# Patient Record
Sex: FEMALE | Race: BLACK OR AFRICAN AMERICAN | NOT HISPANIC OR LATINO | Employment: UNEMPLOYED | URBAN - NONMETROPOLITAN AREA
[De-identification: names, ages, dates, MRNs, and addresses within clinical notes are randomized per-mention and may not be internally consistent; named-entity substitution may affect disease eponyms.]

---

## 2017-01-31 ENCOUNTER — OFFICE VISIT (OUTPATIENT)
Dept: GASTROENTEROLOGY | Facility: CLINIC | Age: 62
End: 2017-01-31

## 2017-01-31 ENCOUNTER — PREP FOR SURGERY (OUTPATIENT)
Dept: GASTROENTEROLOGY | Facility: CLINIC | Age: 62
End: 2017-01-31

## 2017-01-31 VITALS
WEIGHT: 158 LBS | TEMPERATURE: 97.7 F | HEART RATE: 67 BPM | HEIGHT: 60 IN | RESPIRATION RATE: 15 BRPM | DIASTOLIC BLOOD PRESSURE: 76 MMHG | SYSTOLIC BLOOD PRESSURE: 137 MMHG | BODY MASS INDEX: 31.02 KG/M2

## 2017-01-31 DIAGNOSIS — R10.13 EPIGASTRIC PAIN: ICD-10-CM

## 2017-01-31 DIAGNOSIS — Z12.11 COLON CANCER SCREENING: Primary | ICD-10-CM

## 2017-01-31 DIAGNOSIS — R12 HEARTBURN: Chronic | ICD-10-CM

## 2017-01-31 PROCEDURE — 99203 OFFICE O/P NEW LOW 30 MIN: CPT | Performed by: NURSE PRACTITIONER

## 2017-01-31 RX ORDER — SODIUM CHLORIDE 9 MG/ML
70 INJECTION, SOLUTION INTRAVENOUS CONTINUOUS PRN
Status: CANCELLED | OUTPATIENT
Start: 2017-01-31

## 2017-01-31 RX ORDER — OMEPRAZOLE 20 MG/1
20 CAPSULE, DELAYED RELEASE ORAL DAILY
COMMUNITY
End: 2019-05-28

## 2017-01-31 RX ORDER — LISINOPRIL AND HYDROCHLOROTHIAZIDE 20; 12.5 MG/1; MG/1
1 TABLET ORAL DAILY
COMMUNITY
End: 2019-06-07 | Stop reason: HOSPADM

## 2017-01-31 RX ORDER — ASPIRIN 81 MG/1
81 TABLET ORAL DAILY
COMMUNITY
End: 2017-02-23 | Stop reason: HOSPADM

## 2017-01-31 RX ORDER — SODIUM CHLORIDE 0.9 % (FLUSH) 0.9 %
1-10 SYRINGE (ML) INJECTION AS NEEDED
Status: CANCELLED | OUTPATIENT
Start: 2017-01-31

## 2017-01-31 RX ORDER — SIMVASTATIN 20 MG
20 TABLET ORAL NIGHTLY
COMMUNITY
End: 2019-08-29

## 2017-01-31 NOTE — MR AVS SNAPSHOT
Lizbeth Johns   2017 12:00 PM   Office Visit    Dept Phone:  [phone]   Encounter #:  [id number]    Provider:  MARGARET Anand   Department:  [facility]                Your Full Care Plan              Your Updated Medication List          This list is accurate as of: 17 12:54 PM.  Always use your most recent med list.                aspirin 81 MG EC tablet       lisinopril-hydrochlorothiazide 20-12.5 MG per tablet   Commonly known as:  PRINZIDE,ZESTORETIC       omeprazole 20 MG capsule   Commonly known as:  priLOSEC       simvastatin 20 MG tablet   Commonly known as:  ZOCOR               You Were Diagnosed With        Codes Comments    Colon cancer screening    -  Primary ICD-10-CM: Z12.11  ICD-9-CM: V76.51 Possible family history of colon cancer in the patient's brother. Patient is unsure. No previous colonoscopy.    Epigastric pain     ICD-10-CM: R10.13  ICD-9-CM: 789.06 Improved. History of epigastric pain when drinking coffee. Patient has stopped coffee and symptoms improved.      Instructions    1. Colonoscopy: Description of the procedure, risks, benefits, alternatives and options, including nonoperative options, were discussed with the patient in detail. The patient understands and wishes to proceed.     Patient Instructions History      Upcoming Appointments     Visit Type Date Time Department    NEW PATIENT 2017 12:00 PM [facility]      [employer] Signup     [facility] [employer] allows you to send messages to your doctor, view your test results, renew your prescriptions, schedule appointments, and more. To sign up, go to [employer] and click on the Sign Up Now link in the New User? box. Enter your [employer] Activation Code exactly as it appears below along with the last four digits of your Social Security Number and your Date of Birth () to complete the sign-up process. If you do not sign up  "before the expiration date, you must request a new code.    [employer] Activation Code: [email or web address]  Expires: 2/14/2017 12:54 PM    If you have questions, you can email [email or web address] or call [phone] to talk to our [employer] staff. Remember, [employer] is NOT to be used for urgent needs. For medical emergencies, dial 911.               Other Info from Your Visit           Allergies     No Known Allergies      Reason for Visit     Abdominal Pain           Vital Signs     Blood Pressure Pulse Temperature Respirations Height Weight    137/76 67 97.7 °F (36.5 °C) 15 60\" (152.4 cm) 158 lb (71.7 kg)    Body Mass Index Smoking Status                30.86 kg/m2 Current Every Day Smoker          Problems and Diagnoses Noted     Screen for colon cancer    Abdominal pain, pit of stomach        "

## 2017-01-31 NOTE — PATIENT INSTRUCTIONS
1. Antireflux measures: Avoid fried, fatty foods, alcohol, chocolate, coffee, tea,  soft drinks, peppermint and spearmint, spicy foods, tomatoes and tomato based foods, onion based foods, and smoking. Other antireflux measures include weight reduction if overweight, avoiding tight clothing around the abdomen, elevating the head of the bed 6 inches with blocks under the head board, and don't drink or eat before going to bed and avoid lying down immediately after meals.  2. Continue Omeprazole 20 mg 1 po daily in the am 30 minutes before breakfast.  3. Colonoscopy: Description of the procedure, risks, benefits, alternatives and options, including nonoperative options, were discussed with the patient in detail. The patient understands and wishes to proceed.

## 2017-01-31 NOTE — LETTER
January 31, 2017     Ciaran Sanches DO  [address]  [facility] [address]    Patient: Lizbeth Johns   YOB: 1955   Date of Visit: 1/31/2017       Dear Dr. Myron DO:    Thank you for referring Lizbeth Johns to me for evaluation. Below is a copy of my consult note.    If you have questions, please do not hesitate to call me. I look forward to following Lizbeth along with you.         Sincerely,        MARGARET Sanchez        CC: No Recipients    [address] [facility] [address]    (H) [phone]  (W)     Chief Complaint   Patient presents with   • Abdominal Pain     The patient denies recent change in bowel habits. There is no diarrhea or constipation. There is a history of epigastric abdominal pain if she drinks coffee. The patient states she has been avoiding coffee and she is no longer having epigastric pain. There is no history of overt GI bleed (hematemesis melena or hematochezia). The patient denies nausea or vomiting. There is a history of reflux. This is well controlled with Omeprazole daily.. The patient denies dysphagia or odynophagia. There is no history of recent significant weight loss. There is no history of liver disease in the past. There is a possible family history of colon cancer in the patient's brother. Patient is unsure. The patient has not had a colonoscopy in the past.    Abdominal Pain   This is a chronic problem. The current episode started more than 1 year ago. The onset quality is sudden. The problem has been rapidly improving (Patient states she only has epigastric pain when she drinks coffee. She states she has stopped coffee and her pain has resolved.). The pain is located in the epigastric region. The pain is at a severity of 2/10. The pain is mild. The quality of the pain is burning. The abdominal pain does not radiate. Associated symptoms include arthralgias, constipation and nausea. Pertinent negatives include no diarrhea, dysuria, fever,  headaches, hematochezia, hematuria, melena, myalgias or vomiting. Exacerbated by: drinking coffee. Relieved by: avoiding coffee. Treatments tried: avoiding coffee. The treatment provided significant relief. Her past medical history is significant for GERD.   Heartburn   She complains of abdominal pain, heartburn and nausea. She reports no chest pain, no choking, no coughing or no dysphagia. This is a chronic problem. The current episode started more than 1 year ago. The problem occurs rarely. The problem has been unchanged. The heartburn duration is an hour. The heartburn is located in the substernum. The heartburn is of mild intensity. The heartburn does not wake her from sleep. The heartburn does not limit her activity. The heartburn doesn't change with position. The symptoms are aggravated by caffeine. Pertinent negatives include no anemia, fatigue or melena. There are no known risk factors. She has tried a PPI for the symptoms. The treatment provided significant relief.     Review of Systems   Constitutional: Negative for appetite change, chills, fatigue, fever and unexpected weight change.   HENT: Negative for mouth sores, nosebleeds and trouble swallowing.    Eyes: Negative for discharge and redness.   Respiratory: Negative for apnea, cough, choking and shortness of breath.    Cardiovascular: Negative for chest pain, palpitations and leg swelling.   Gastrointestinal: Positive for abdominal pain, constipation, heartburn and nausea. Negative for abdominal distention, anal bleeding, blood in stool, diarrhea, dysphagia, hematochezia, melena and vomiting.   Endocrine: Negative for cold intolerance, heat intolerance and polydipsia.   Genitourinary: Negative for dysuria, hematuria and urgency.   Musculoskeletal: Positive for arthralgias. Negative for myalgias.   Skin: Negative for rash.   Allergic/Immunologic: Negative for food allergies and immunocompromised state.   Neurological: Negative for dizziness, seizures,  "syncope and headaches.   Hematological: Negative for adenopathy. Does not bruise/bleed easily.   Psychiatric/Behavioral: Negative for dysphoric mood. The patient is not nervous/anxious and is not hyperactive.      Patient Active Problem List   Diagnosis   • Colon cancer screening   • Epigastric pain   • Heartburn     Past Medical History   Diagnosis Date   • Hyperlipidemia    • Hypertension      History reviewed. No pertinent past surgical history.  Family History   Problem Relation Age of Onset   • Cirrhosis Brother    • Liver disease Brother    • Colon cancer Brother      Possibly colon cancer, patient unsure.   • Stomach cancer Neg Hx    • Esophageal cancer Neg Hx      Social History   Substance Use Topics   • Smoking status: Current Every Day Smoker     Packs/day: 1.00     Years: 50.00   • Smokeless tobacco: Not on file   • Alcohol use Yes      Comment: rarely       Current Outpatient Prescriptions:   •  aspirin 81 MG EC tablet, Take 81 mg by mouth Daily., Disp: , Rfl:   •  lisinopril-hydrochlorothiazide (PRINZIDE,ZESTORETIC) 20-12.5 MG per tablet, Take 1 tablet by mouth Daily., Disp: , Rfl:   •  simvastatin (ZOCOR) 20 MG tablet, Take 20 mg by mouth Every Night., Disp: , Rfl:   •  omeprazole (priLOSEC) 20 MG capsule, Take 20 mg by mouth Daily., Disp: , Rfl:      No Known Allergies     Visit Vitals   • /76   • Pulse 67   • Temp 97.7 °F (36.5 °C)   • Resp 15   • Ht 60\" (152.4 cm)   • Wt 158 lb (71.7 kg)   • BMI 30.86 kg/m2     Physical Exam   Constitutional: She is oriented to person, place, and time. She appears well-developed and well-nourished. No distress.   HENT:   Head: Normocephalic and atraumatic.   Right Ear: Hearing and external ear normal.   Left Ear: Hearing and external ear normal.   Nose: Nose normal.   Mouth/Throat: Oropharynx is clear and moist and mucous membranes are normal. Mucous membranes are not pale, not dry and not cyanotic. No oral lesions. No oropharyngeal exudate.   Eyes: " Conjunctivae and EOM are normal. Right eye exhibits no discharge. Left eye exhibits no discharge.   Neck: Trachea normal. Neck supple. No JVD present. No edema present. No thyroid mass and no thyromegaly present.   Cardiovascular: Normal rate, regular rhythm, S2 normal and normal heart sounds.  Exam reveals no gallop, no S3 and no friction rub.    No murmur heard.  Pulmonary/Chest: Effort normal and breath sounds normal. No respiratory distress. She exhibits no tenderness.   Abdominal: Normal appearance and bowel sounds are normal. She exhibits no distension, no ascites and no mass. There is no splenomegaly or hepatomegaly. There is no tenderness. There is no rigidity, no rebound and no guarding. No hernia.       Vascular Status -  Her exam exhibits no right foot edema. Her exam exhibits no left foot edema.  Lymphadenopathy:     She has no cervical adenopathy.        Left: No supraclavicular adenopathy present.   Neurological: She is alert and oriented to person, place, and time. She has normal strength. No cranial nerve deficit or sensory deficit.   Skin: No rash noted. She is not diaphoretic. No cyanosis. No pallor. Nails show no clubbing.   Psychiatric: She has a normal mood and affect.   Nursing note and vitals reviewed.    Lizbeth was seen today for abdominal pain.    Diagnoses and all orders for this visit:    Colon cancer screening  Comments:  Possible family history of colon cancer in the patient's brother. Patient is unsure. No previous colonoscopy.    Epigastric pain  Comments:  Improved. History of epigastric pain when drinking coffee. Patient has stopped coffee and symptoms improved.    Heartburn  Comments:  History of recurrent heartburn. Well controlled with Omeprazole daily.        Plan   Patient Instructions   1. Antireflux measures: Avoid fried, fatty foods, alcohol, chocolate, coffee, tea,  soft drinks, peppermint and spearmint, spicy foods, tomatoes and tomato based foods, onion based foods, and  smoking. Other antireflux measures include weight reduction if overweight, avoiding tight clothing around the abdomen, elevating the head of the bed 6 inches with blocks under the head board, and don't drink or eat before going to bed and avoid lying down immediately after meals.  2. Continue Omeprazole 20 mg 1 po daily in the am 30 minutes before breakfast.  3. Colonoscopy: Description of the procedure, risks, benefits, alternatives and options, including nonoperative options, were discussed with the patient in detail. The patient understands and wishes to proceed.    Patient Care Team:  Ciaran Sanches DO as PCP - General    Jonathan Brito, APRN

## 2017-01-31 NOTE — PROGRESS NOTES
[address] [place] [address]    (H) [phone]  (W)     Chief Complaint   Patient presents with   • Abdominal Pain     The patient denies recent change in bowel habits. There is no diarrhea or constipation. There is a history of epigastric abdominal pain if she drinks coffee. The patient states she has been avoiding coffee and she is no longer having epigastric pain. There is no history of overt GI bleed (hematemesis melena or hematochezia). The patient denies nausea or vomiting. There is a history of reflux. This is well controlled with Omeprazole daily.. The patient denies dysphagia or odynophagia. There is no history of recent significant weight loss. There is no history of liver disease in the past. There is a possible family history of colon cancer in the patient's brother. Patient is unsure. The patient has not had a colonoscopy in the past.    Abdominal Pain   This is a chronic problem. The current episode started more than 1 year ago. The onset quality is sudden. The problem has been rapidly improving (Patient states she only has epigastric pain when she drinks coffee. She states she has stopped coffee and her pain has resolved.). The pain is located in the epigastric region. The pain is at a severity of 2/10. The pain is mild. The quality of the pain is burning. The abdominal pain does not radiate. Associated symptoms include arthralgias, constipation and nausea. Pertinent negatives include no diarrhea, dysuria, fever, headaches, hematochezia, hematuria, melena, myalgias or vomiting. Exacerbated by: drinking coffee. Relieved by: avoiding coffee. Treatments tried: avoiding coffee. The treatment provided significant relief. Her past medical history is significant for GERD.   Heartburn   She complains of abdominal pain, heartburn and nausea. She reports no chest pain, no choking, no coughing or no dysphagia. This is a chronic problem. The current episode started more than 1 year ago. The problem occurs  rarely. The problem has been unchanged. The heartburn duration is an hour. The heartburn is located in the substernum. The heartburn is of mild intensity. The heartburn does not wake her from sleep. The heartburn does not limit her activity. The heartburn doesn't change with position. The symptoms are aggravated by caffeine. Pertinent negatives include no anemia, fatigue or melena. There are no known risk factors. She has tried a PPI for the symptoms. The treatment provided significant relief.     Review of Systems   Constitutional: Negative for appetite change, chills, fatigue, fever and unexpected weight change.   HENT: Negative for mouth sores, nosebleeds and trouble swallowing.    Eyes: Negative for discharge and redness.   Respiratory: Negative for apnea, cough, choking and shortness of breath.    Cardiovascular: Negative for chest pain, palpitations and leg swelling.   Gastrointestinal: Positive for abdominal pain, constipation, heartburn and nausea. Negative for abdominal distention, anal bleeding, blood in stool, diarrhea, dysphagia, hematochezia, melena and vomiting.   Endocrine: Negative for cold intolerance, heat intolerance and polydipsia.   Genitourinary: Negative for dysuria, hematuria and urgency.   Musculoskeletal: Positive for arthralgias. Negative for myalgias.   Skin: Negative for rash.   Allergic/Immunologic: Negative for food allergies and immunocompromised state.   Neurological: Negative for dizziness, seizures, syncope and headaches.   Hematological: Negative for adenopathy. Does not bruise/bleed easily.   Psychiatric/Behavioral: Negative for dysphoric mood. The patient is not nervous/anxious and is not hyperactive.      Patient Active Problem List   Diagnosis   • Colon cancer screening   • Epigastric pain   • Heartburn     Past Medical History   Diagnosis Date   • Hyperlipidemia    • Hypertension      History reviewed. No pertinent past surgical history.  Family History   Problem Relation Age  "of Onset   • Cirrhosis Brother    • Liver disease Brother    • Colon cancer Brother      Possibly colon cancer, patient unsure.   • Stomach cancer Neg Hx    • Esophageal cancer Neg Hx      Social History   Substance Use Topics   • Smoking status: Current Every Day Smoker     Packs/day: 1.00     Years: 50.00   • Smokeless tobacco: Not on file   • Alcohol use Yes      Comment: rarely       Current Outpatient Prescriptions:   •  aspirin 81 MG EC tablet, Take 81 mg by mouth Daily., Disp: , Rfl:   •  lisinopril-hydrochlorothiazide (PRINZIDE,ZESTORETIC) 20-12.5 MG per tablet, Take 1 tablet by mouth Daily., Disp: , Rfl:   •  simvastatin (ZOCOR) 20 MG tablet, Take 20 mg by mouth Every Night., Disp: , Rfl:   •  omeprazole (priLOSEC) 20 MG capsule, Take 20 mg by mouth Daily., Disp: , Rfl:      No Known Allergies     Visit Vitals   • /76   • Pulse 67   • Temp 97.7 °F (36.5 °C)   • Resp 15   • Ht 60\" (152.4 cm)   • Wt 158 lb (71.7 kg)   • BMI 30.86 kg/m2     Physical Exam   Constitutional: She is oriented to person, place, and time. She appears well-developed and well-nourished. No distress.   HENT:   Head: Normocephalic and atraumatic.   Right Ear: Hearing and external ear normal.   Left Ear: Hearing and external ear normal.   Nose: Nose normal.   Mouth/Throat: Oropharynx is clear and moist and mucous membranes are normal. Mucous membranes are not pale, not dry and not cyanotic. No oral lesions. No oropharyngeal exudate.   Eyes: Conjunctivae and EOM are normal. Right eye exhibits no discharge. Left eye exhibits no discharge.   Neck: Trachea normal. Neck supple. No JVD present. No edema present. No thyroid mass and no thyromegaly present.   Cardiovascular: Normal rate, regular rhythm, S2 normal and normal heart sounds.  Exam reveals no gallop, no S3 and no friction rub.    No murmur heard.  Pulmonary/Chest: Effort normal and breath sounds normal. No respiratory distress. She exhibits no tenderness.   Abdominal: Normal " appearance and bowel sounds are normal. She exhibits no distension, no ascites and no mass. There is no splenomegaly or hepatomegaly. There is no tenderness. There is no rigidity, no rebound and no guarding. No hernia.       Vascular Status -  Her exam exhibits no right foot edema. Her exam exhibits no left foot edema.  Lymphadenopathy:     She has no cervical adenopathy.        Left: No supraclavicular adenopathy present.   Neurological: She is alert and oriented to person, place, and time. She has normal strength. No cranial nerve deficit or sensory deficit.   Skin: No rash noted. She is not diaphoretic. No cyanosis. No pallor. Nails show no clubbing.   Psychiatric: She has a normal mood and affect.   Nursing note and vitals reviewed.    Lizbeth was seen today for abdominal pain.    Diagnoses and all orders for this visit:    Colon cancer screening  Comments:  Possible family history of colon cancer in the patient's brother. Patient is unsure. No previous colonoscopy.    Epigastric pain  Comments:  Improved. History of epigastric pain when drinking coffee. Patient has stopped coffee and symptoms improved.    Heartburn  Comments:  History of recurrent heartburn. Well controlled with Omeprazole daily.        Plan   Patient Instructions   1. Antireflux measures: Avoid fried, fatty foods, alcohol, chocolate, coffee, tea,  soft drinks, peppermint and spearmint, spicy foods, tomatoes and tomato based foods, onion based foods, and smoking. Other antireflux measures include weight reduction if overweight, avoiding tight clothing around the abdomen, elevating the head of the bed 6 inches with blocks under the head board, and don't drink or eat before going to bed and avoid lying down immediately after meals.  2. Continue Omeprazole 20 mg 1 po daily in the am 30 minutes before breakfast.  3. Colonoscopy: Description of the procedure, risks, benefits, alternatives and options, including nonoperative options, were discussed  with the patient in detail. The patient understands and wishes to proceed.    Patient Care Team:  Ciaran Sanches DO as PCP - General    MARGARET Sanchez

## 2017-02-23 ENCOUNTER — ANESTHESIA EVENT (OUTPATIENT)
Dept: GASTROENTEROLOGY | Facility: HOSPITAL | Age: 62
End: 2017-02-23

## 2017-02-23 ENCOUNTER — HOSPITAL ENCOUNTER (OUTPATIENT)
Facility: HOSPITAL | Age: 62
Setting detail: HOSPITAL OUTPATIENT SURGERY
Discharge: HOME | End: 2017-02-23
Attending: INTERNAL MEDICINE | Admitting: INTERNAL MEDICINE

## 2017-02-23 ENCOUNTER — ANESTHESIA (OUTPATIENT)
Dept: GASTROENTEROLOGY | Facility: HOSPITAL | Age: 62
End: 2017-02-23

## 2017-02-23 VITALS
RESPIRATION RATE: 18 BRPM | TEMPERATURE: 98 F | DIASTOLIC BLOOD PRESSURE: 88 MMHG | WEIGHT: 158 LBS | OXYGEN SATURATION: 95 % | HEIGHT: 60 IN | SYSTOLIC BLOOD PRESSURE: 152 MMHG | BODY MASS INDEX: 31.02 KG/M2 | HEART RATE: 62 BPM

## 2017-02-23 DIAGNOSIS — Z12.11 COLON CANCER SCREENING: ICD-10-CM

## 2017-02-23 PROCEDURE — 88305 TISSUE EXAM BY PATHOLOGIST: CPT | Performed by: INTERNAL MEDICINE

## 2017-02-23 PROCEDURE — 25010000002 MIDAZOLAM PER 1 MG: Performed by: NURSE ANESTHETIST, CERTIFIED REGISTERED

## 2017-02-23 PROCEDURE — 45380 COLONOSCOPY AND BIOPSY: CPT | Performed by: INTERNAL MEDICINE

## 2017-02-23 PROCEDURE — 25010000002 PROPOFOL 200 MG/20ML EMULSION: Performed by: NURSE ANESTHETIST, CERTIFIED REGISTERED

## 2017-02-23 PROCEDURE — 45384 COLONOSCOPY W/LESION REMOVAL: CPT | Performed by: INTERNAL MEDICINE

## 2017-02-23 PROCEDURE — 25010000002 MEPERIDINE PER 100 MG: Performed by: NURSE ANESTHETIST, CERTIFIED REGISTERED

## 2017-02-23 PROCEDURE — 45385 COLONOSCOPY W/LESION REMOVAL: CPT | Performed by: INTERNAL MEDICINE

## 2017-02-23 DEVICE — DEV CLIP ENDO RESOLUTION360 CONTRL ROT 235CM: Type: IMPLANTABLE DEVICE | Status: FUNCTIONAL

## 2017-02-23 RX ORDER — MEPERIDINE HYDROCHLORIDE 50 MG/ML
INJECTION INTRAMUSCULAR; INTRAVENOUS; SUBCUTANEOUS AS NEEDED
Status: DISCONTINUED | OUTPATIENT
Start: 2017-02-23 | End: 2017-02-23 | Stop reason: SURG

## 2017-02-23 RX ORDER — PROPOFOL 10 MG/ML
INJECTION, EMULSION INTRAVENOUS AS NEEDED
Status: DISCONTINUED | OUTPATIENT
Start: 2017-02-23 | End: 2017-02-23 | Stop reason: SURG

## 2017-02-23 RX ORDER — SODIUM CHLORIDE 0.9 % (FLUSH) 0.9 %
1-10 SYRINGE (ML) INJECTION AS NEEDED
Status: DISCONTINUED | OUTPATIENT
Start: 2017-02-23 | End: 2017-02-23 | Stop reason: HOSPADM

## 2017-02-23 RX ORDER — MIDAZOLAM HYDROCHLORIDE 1 MG/ML
INJECTION INTRAMUSCULAR; INTRAVENOUS AS NEEDED
Status: DISCONTINUED | OUTPATIENT
Start: 2017-02-23 | End: 2017-02-23 | Stop reason: SURG

## 2017-02-23 RX ORDER — SODIUM CHLORIDE 9 MG/ML
70 INJECTION, SOLUTION INTRAVENOUS CONTINUOUS PRN
Status: DISCONTINUED | OUTPATIENT
Start: 2017-02-23 | End: 2017-02-23 | Stop reason: HOSPADM

## 2017-02-23 RX ADMIN — MEPERIDINE HYDROCHLORIDE 50 MG: 50 INJECTION INTRAMUSCULAR; INTRAVENOUS; SUBCUTANEOUS at 09:54

## 2017-02-23 RX ADMIN — SODIUM CHLORIDE 70 ML/HR: 9 INJECTION, SOLUTION INTRAVENOUS at 09:30

## 2017-02-23 RX ADMIN — MIDAZOLAM HYDROCHLORIDE 2 MG: 1 INJECTION, SOLUTION INTRAMUSCULAR; INTRAVENOUS at 09:50

## 2017-02-23 RX ADMIN — PROPOFOL 50 MG: 10 INJECTION, EMULSION INTRAVENOUS at 10:05

## 2017-02-23 RX ADMIN — PROPOFOL 30 MG: 10 INJECTION, EMULSION INTRAVENOUS at 10:30

## 2017-02-23 RX ADMIN — PROPOFOL 20 MG: 10 INJECTION, EMULSION INTRAVENOUS at 11:00

## 2017-02-23 RX ADMIN — PROPOFOL 30 MG: 10 INJECTION, EMULSION INTRAVENOUS at 10:15

## 2017-02-23 RX ADMIN — PROPOFOL 20 MG: 10 INJECTION, EMULSION INTRAVENOUS at 10:45

## 2017-02-23 NOTE — ANESTHESIA PREPROCEDURE EVALUATION
Anesthesia Evaluation     Patient summary reviewed and Nursing notes reviewed   NPO Status: > 8 hours   Airway   Dental - normal exam     Pulmonary - negative pulmonary ROS and normal exam    breath sounds clear to auscultation  Cardiovascular - normal exam    Rhythm: regular  Rate: normal    (+) hypertension well controlled,       Neuro/Psych- negative ROS  GI/Hepatic/Renal/Endo    (+)  GERD well controlled,     Musculoskeletal (-) negative ROS    Abdominal    Substance History - negative use     OB/GYN negative ob/gyn ROS         Other - negative ROS                                 Anesthesia Plan    ASA 2     MAC     Anesthetic plan and risks discussed with patient.

## 2017-02-23 NOTE — ANESTHESIA POSTPROCEDURE EVALUATION
Patient: Lizbeth Johns    Procedure Summary     Date Anesthesia Start Anesthesia Stop Room / Location    02/23/17 0953 1123 [facility] ENDOSCOPY 2 / [facility] ENDOSCOPY       Procedure Diagnosis Surgeon Provider    COLONOSCOPY with hot and cold snare polypectomies,  hot biopsy polypectomies, biopsies, resolution clip placement (N/A Anus) Colon cancer screening  (Colon cancer screening [Z12.11]) MD Neno Alonzo CRNA          Anesthesia Type: MAC  Last vitals  BP      Temp      Pulse     Resp      SpO2        Post Anesthesia Care and Evaluation    Patient location during evaluation: bedside  Patient participation: complete - patient participated  Level of consciousness: awake and alert  Pain management: satisfactory to patient  Airway patency: patent  Anesthetic complications: No anesthetic complications  PONV Status: none  Cardiovascular status: acceptable and stable  Respiratory status: acceptable  Hydration status: acceptable

## 2017-02-23 NOTE — ADDENDUM NOTE
Addendum  created 02/23/17 1424 by Bryan King, CASSANDRA    Anesthesia Review and Sign - Ready for Procedure

## 2017-02-28 LAB
LAB AP CASE REPORT: NORMAL
Lab: NORMAL
PATH REPORT.FINAL DX SPEC: NORMAL

## 2017-03-16 ENCOUNTER — OFFICE VISIT (OUTPATIENT)
Dept: GASTROENTEROLOGY | Facility: CLINIC | Age: 62
End: 2017-03-16

## 2017-03-16 VITALS
BODY MASS INDEX: 32.59 KG/M2 | HEART RATE: 76 BPM | SYSTOLIC BLOOD PRESSURE: 127 MMHG | RESPIRATION RATE: 18 BRPM | WEIGHT: 166 LBS | TEMPERATURE: 98.4 F | DIASTOLIC BLOOD PRESSURE: 80 MMHG | HEIGHT: 60 IN

## 2017-03-16 DIAGNOSIS — K63.5 COLON POLYPS: Primary | ICD-10-CM

## 2017-03-16 DIAGNOSIS — K57.30 DIVERTICULOSIS OF COLON WITHOUT HEMORRHAGE: ICD-10-CM

## 2017-03-16 DIAGNOSIS — R12 HEARTBURN: ICD-10-CM

## 2017-03-16 PROCEDURE — 99214 OFFICE O/P EST MOD 30 MIN: CPT | Performed by: INTERNAL MEDICINE

## 2017-06-12 ENCOUNTER — TRANSCRIBE ORDERS (OUTPATIENT)
Dept: MAMMOGRAPHY | Facility: HOSPITAL | Age: 62
End: 2017-06-12

## 2017-06-12 DIAGNOSIS — Z13.9 SCREENING: Primary | ICD-10-CM

## 2017-06-16 ENCOUNTER — HOSPITAL ENCOUNTER (OUTPATIENT)
Dept: MAMMOGRAPHY | Facility: HOSPITAL | Age: 62
Discharge: HOME | End: 2017-06-16
Attending: INTERNAL MEDICINE | Admitting: INTERNAL MEDICINE

## 2017-06-16 DIAGNOSIS — Z13.9 SCREENING: ICD-10-CM

## 2017-06-16 PROCEDURE — 77063 BREAST TOMOSYNTHESIS BI: CPT

## 2017-06-16 PROCEDURE — G0202 SCR MAMMO BI INCL CAD: HCPCS

## 2018-02-02 ENCOUNTER — TELEPHONE (OUTPATIENT)
Dept: URGENT CARE | Facility: CLINIC | Age: 63
End: 2018-02-02

## 2018-02-02 DIAGNOSIS — J20.9 ACUTE BRONCHITIS, UNSPECIFIED ORGANISM: Primary | ICD-10-CM

## 2018-02-02 RX ORDER — CEFUROXIME AXETIL 500 MG/1
500 TABLET ORAL 2 TIMES DAILY
Qty: 20 TABLET | Refills: 0 | Status: SHIPPED | OUTPATIENT
Start: 2018-02-02 | End: 2018-04-23

## 2018-02-02 NOTE — TELEPHONE ENCOUNTER
Pt called c/o diarrhea since beginning Augmentin x Tuesday. Has tried medication with/without food, has diarrhea either way. Requests RX change.     Stop augmentin; rx for ceftin sent to [facility]

## 2018-04-23 ENCOUNTER — OFFICE VISIT (OUTPATIENT)
Dept: GASTROENTEROLOGY | Facility: CLINIC | Age: 63
End: 2018-04-23

## 2018-04-23 ENCOUNTER — PREP FOR SURGERY (OUTPATIENT)
Dept: OTHER | Facility: HOSPITAL | Age: 63
End: 2018-04-23

## 2018-04-23 VITALS
BODY MASS INDEX: 31.8 KG/M2 | DIASTOLIC BLOOD PRESSURE: 82 MMHG | RESPIRATION RATE: 18 BRPM | TEMPERATURE: 98 F | HEART RATE: 87 BPM | HEIGHT: 60 IN | SYSTOLIC BLOOD PRESSURE: 145 MMHG | WEIGHT: 162 LBS

## 2018-04-23 DIAGNOSIS — R12 HEARTBURN: ICD-10-CM

## 2018-04-23 DIAGNOSIS — K63.9 LESION OF COLON: Primary | ICD-10-CM

## 2018-04-23 DIAGNOSIS — K57.30 DIVERTICULOSIS OF COLON WITHOUT HEMORRHAGE: ICD-10-CM

## 2018-04-23 DIAGNOSIS — Z12.11 COLON CANCER SCREENING: ICD-10-CM

## 2018-04-23 DIAGNOSIS — D12.2 ADENOMATOUS POLYP OF ASCENDING COLON: ICD-10-CM

## 2018-04-23 DIAGNOSIS — K59.00 CONSTIPATION, UNSPECIFIED CONSTIPATION TYPE: ICD-10-CM

## 2018-04-23 PROCEDURE — 99214 OFFICE O/P EST MOD 30 MIN: CPT | Performed by: INTERNAL MEDICINE

## 2018-04-23 NOTE — PROGRESS NOTES
Chief Complaint   Patient presents with   • Colon Cancer Screening   • Colon lesion     History of Present Illness     For colon cancer screening.  The patient denies recent change in bowel habits.  There is no diarrhea.  The patient has a history of some mild constipation at times for the last for the last several years. The patient has a bowel movement at times on daily basis and at times every other day. The patient had been prescribed MiraLAX however the patient does not take it.  There is history of multiple tubular adenomata and large lesions within the colon. She has 1 in the ascending colon that needs to be resected. There is positive family history of colon cancer-Brother. Her last colonoscopy was in 2/ 2017.  Here is no history of anemia.There is no history of overt GI bleed (Hematemesis, melena or hematochezia).    There is no history of abdominal pain. The patient denies nausea or vomiting.  The patient denies dysphagia or odynophagia.  There is no history of recent significant weight loss.  There is no history of liver or pancreatic disease.      The patient has a history of reflux off and on for the last several years.  The reflux is moderately severe.  Symptoms are described as retrosternal burning sensation, and indigestion.  There is history of occasional regurgitative symptoms.  Frequency being several times per week.  The symptoms are worse at night.  The patient takes acid suppressive therapy with reasonable control of his symptoms.  His no dysphagia or odynophagia.       Review of Systems   Constitutional: Negative for appetite change, chills, fatigue, fever and unexpected weight change.   HENT: Positive for ear pain. Negative for mouth sores, nosebleeds and trouble swallowing.    Eyes: Negative for discharge and redness.   Respiratory: Negative for apnea, cough and shortness of breath.    Cardiovascular: Negative for chest pain, palpitations and leg swelling.   Gastrointestinal: Negative for  abdominal distention, abdominal pain, anal bleeding, blood in stool, constipation, diarrhea, nausea and vomiting.   Endocrine: Negative for cold intolerance, heat intolerance and polydipsia.   Genitourinary: Negative for dysuria, hematuria and urgency.   Musculoskeletal: Positive for arthralgias and myalgias. Negative for joint swelling.   Skin: Negative for rash.   Allergic/Immunologic: Negative for food allergies and immunocompromised state.   Neurological: Negative for dizziness, seizures, syncope and headaches.   Hematological: Negative for adenopathy. Bruises/bleeds easily.   Psychiatric/Behavioral: Negative for dysphoric mood. The patient is not nervous/anxious and is not hyperactive.      Patient Active Problem List   Diagnosis   • Colon cancer screening   • Epigastric pain   • Heartburn     Past Medical History:   Diagnosis Date   • Acid reflux    • Cataract, bilateral    • Full dentures    • High cholesterol    • Hyperlipidemia    • Hypertension    • Wears glasses      Past Surgical History:   Procedure Laterality Date   • COLONOSCOPY N/A 2/23/2017    Procedure: COLONOSCOPY with hot and cold snare polypectomies,  hot biopsy polypectomies, biopsies, resolution clip placement;  Surgeon: Zackery Siddiqui MD;  Location: [facility] ENDOSCOPY;  Service:    • FRACTURE SURGERY Left     LEG   • TUBAL ABDOMINAL LIGATION       Family History   Problem Relation Age of Onset   • Cirrhosis Brother    • Liver disease Brother    • Colon cancer Brother      Possibly colon cancer, patient unsure.   • Stomach cancer Neg Hx    • Esophageal cancer Neg Hx      Social History   Substance Use Topics   • Smoking status: Current Every Day Smoker     Packs/day: 1.00     Years: 50.00     Types: Cigarettes   • Smokeless tobacco: Never Used   • Alcohol use Yes      Comment: rarely       Current Outpatient Prescriptions:   •  albuterol (PROVENTIL HFA;VENTOLIN HFA) 108 (90 Base) MCG/ACT inhaler, Inhale 2 puffs Every 4 (Four) Hours As Needed for  "Wheezing., Disp: 3.7 g, Rfl: 0  •  aspirin 81 MG EC tablet, Take 81 mg by mouth Daily., Disp: , Rfl:   •  lisinopril-hydrochlorothiazide (PRINZIDE,ZESTORETIC) 20-12.5 MG per tablet, Take 1 tablet by mouth Daily., Disp: , Rfl:   •  omeprazole (priLOSEC) 20 MG capsule, Take 20 mg by mouth Daily., Disp: , Rfl:   •  promethazine-dextromethorphan (PROMETHAZINE-DM) 6.25-15 MG/5ML syrup, Take 5 mL by mouth 4 (Four) Times a Day As Needed for Cough. May cause drowsiness, caution advised., Disp: 118 mL, Rfl: 0  •  simvastatin (ZOCOR) 20 MG tablet, Take 20 mg by mouth Every Night., Disp: , Rfl:     Allergies   Allergen Reactions   • Augmentin [Amoxicillin-Pot Clavulanate] Diarrhea   • Prednisone      She is unsure of reaction but told not to take steroids.       Blood pressure 145/82, pulse 87, temperature 98 °F (36.7 °C), resp. rate 18, height 152.4 cm (60\"), weight 73.5 kg (162 lb), last menstrual period 03/16/2004.    Physical Exam   Constitutional: She is oriented to person, place, and time. She appears well-developed and well-nourished. No distress.   HENT:   Head: Normocephalic and atraumatic.   Right Ear: Hearing and external ear normal.   Left Ear: Hearing and external ear normal.   Nose: Nose normal.   Mouth/Throat: Oropharynx is clear and moist and mucous membranes are normal. Mucous membranes are not pale, not dry and not cyanotic. No oral lesions. No oropharyngeal exudate.   Eyes: Conjunctivae and EOM are normal. Right eye exhibits no discharge. Left eye exhibits no discharge. No scleral icterus.   Neck: Trachea normal. Neck supple. No JVD present. No edema present. No thyroid mass and no thyromegaly present.   Cardiovascular: Normal rate, regular rhythm, S2 normal and normal heart sounds.  Exam reveals no gallop, no S3 and no friction rub.    No murmur heard.  Pulmonary/Chest: Effort normal and breath sounds normal. No respiratory distress. She has no wheezes. She has no rales. She exhibits no tenderness. "   Abdominal: Soft. Normal appearance and bowel sounds are normal. She exhibits no distension, no ascites and no mass. There is no splenomegaly or hepatomegaly. There is no tenderness. There is no rigidity, no rebound and no guarding. No hernia.   Musculoskeletal: She exhibits no tenderness or deformity.     Vascular Status -  Her right foot exhibits no edema. Her left foot exhibits no edema.  Lymphadenopathy:     She has no cervical adenopathy.        Left: No supraclavicular adenopathy present.   Neurological: She is alert and oriented to person, place, and time. She has normal strength. No cranial nerve deficit or sensory deficit. She exhibits normal muscle tone. Coordination normal.   Skin: No rash noted. She is not diaphoretic. No cyanosis. No pallor. Nails show no clubbing.   Psychiatric: She has a normal mood and affect. Her behavior is normal. Judgment and thought content normal.   Nursing note and vitals reviewed.  Stigmata of chronic liver disease:  None.  Asterixis:  None.    Procedures:  Upon review of medical records:       Dated February 23, 2017 the patient underwent a colonoscopy to the terminal ileum which revealed: Pandiverticulosis.  Multiple colon polyps.  Lipomatous area in the ascending colon (somewhat more firm than lipomatous feel).  Vascular ectasia.  Internal hemorrhoids.  Sigmoid colon polyps, biopsy revealed hyperplastic polyp fragments.  Descending colon polyps, biopsy revealed tubular adenoma fragments without high-grade dysplasia. Transverse colon polyps, biopsy revealed pedunculated tubular adenoma without high-grade dysplasia, margin free. Ascending colon polyps, biopsy revealed tubular adenoma fragments without high-grade dysplasia.  Tubular adenoma without high-grade dysplasia, margin clear.  Ascending colon biopsies revealed reactive colon mucosa without atypia.  No submucosal tissue or lipoma identified.  Rectal polyps, biopsy revealed hyperplastic polyp fragments.    Assessment:       ICD-10-CM ICD-9-CM   1. Lesion of colon K63.9 569.89   2. Adenomatous polyp of ascending colon D12.2 211.3   3. Diverticulosis of colon without hemorrhage K57.30 562.10   4. Heartburn R12 787.1   5. Constipation, unspecified constipation type K59.00 564.00   6. Colon cancer screening Z12.11 V76.51         Plan/  Patient Instructions   1. Antireflux measures.  2. Low-fat and high-fiber diet with liberal water intake.  3. Weight reduction.  4. Omeprazole 20 mg 1 by mouth every morning one half hour before breakfast.  5. Colonoscopy for further evaluation and resection of right colonic lesion.: Description of the procedure, risks benefits alternatives and options including non-operative options were discussed with the patient in detail.  The patient understands and wishes to proceed.  6. Discussed with the patient in detail. Opportunity was given to ask questions.           Zackery Siddiqui MD

## 2018-04-24 RX ORDER — SODIUM CHLORIDE 9 MG/ML
70 INJECTION, SOLUTION INTRAVENOUS CONTINUOUS PRN
Status: CANCELLED | OUTPATIENT
Start: 2018-04-24

## 2018-04-24 NOTE — PATIENT INSTRUCTIONS
1. Antireflux measures.  2. Low-fat and high-fiber diet with liberal water intake.  3. Weight reduction.  4. Omeprazole 20 mg 1 by mouth every morning one half hour before breakfast.  5. Colonoscopy for further evaluation and resection of right colonic lesion.: Description of the procedure, risks benefits alternatives and options including non-operative options were discussed with the patient in detail.  The patient understands and wishes to proceed.  6. Discussed with the patient in detail. Opportunity was given to ask questions.

## 2018-04-27 ENCOUNTER — TELEPHONE (OUTPATIENT)
Dept: GASTROENTEROLOGY | Facility: CLINIC | Age: 63
End: 2018-04-27

## 2018-04-27 NOTE — TELEPHONE ENCOUNTER
Called patient who had called us, wanting other options for the colon prep.  Miralax prep briefly discussed, patient will stick with current prep regimen.

## 2018-05-21 PROBLEM — K63.9 LESION OF COLON: Status: ACTIVE | Noted: 2018-05-21

## 2018-06-04 ENCOUNTER — ANESTHESIA (OUTPATIENT)
Dept: GASTROENTEROLOGY | Facility: HOSPITAL | Age: 63
End: 2018-06-04

## 2018-06-04 ENCOUNTER — ANESTHESIA EVENT (OUTPATIENT)
Dept: GASTROENTEROLOGY | Facility: HOSPITAL | Age: 63
End: 2018-06-04

## 2018-06-04 ENCOUNTER — HOSPITAL ENCOUNTER (OUTPATIENT)
Facility: HOSPITAL | Age: 63
Setting detail: HOSPITAL OUTPATIENT SURGERY
Discharge: HOME | End: 2018-06-04
Attending: INTERNAL MEDICINE | Admitting: INTERNAL MEDICINE

## 2018-06-04 VITALS
HEART RATE: 52 BPM | TEMPERATURE: 97.3 F | BODY MASS INDEX: 30.43 KG/M2 | SYSTOLIC BLOOD PRESSURE: 134 MMHG | DIASTOLIC BLOOD PRESSURE: 74 MMHG | RESPIRATION RATE: 18 BRPM | WEIGHT: 155 LBS | OXYGEN SATURATION: 99 % | HEIGHT: 60 IN

## 2018-06-04 DIAGNOSIS — K63.9 LESION OF COLON: ICD-10-CM

## 2018-06-04 PROCEDURE — 25010000002 PROPOFOL 10 MG/ML EMULSION: Performed by: NURSE ANESTHETIST, CERTIFIED REGISTERED

## 2018-06-04 PROCEDURE — 45388 COLONOSCOPY W/ABLATION: CPT | Performed by: INTERNAL MEDICINE

## 2018-06-04 PROCEDURE — 45384 COLONOSCOPY W/LESION REMOVAL: CPT | Performed by: INTERNAL MEDICINE

## 2018-06-04 PROCEDURE — 45380 COLONOSCOPY AND BIOPSY: CPT | Performed by: INTERNAL MEDICINE

## 2018-06-04 PROCEDURE — 45385 COLONOSCOPY W/LESION REMOVAL: CPT | Performed by: INTERNAL MEDICINE

## 2018-06-04 PROCEDURE — 25010000002 PROPOFOL 1000 MG/ML EMULSION: Performed by: NURSE ANESTHETIST, CERTIFIED REGISTERED

## 2018-06-04 PROCEDURE — 88305 TISSUE EXAM BY PATHOLOGIST: CPT | Performed by: INTERNAL MEDICINE

## 2018-06-04 PROCEDURE — S0260 H&P FOR SURGERY: HCPCS | Performed by: INTERNAL MEDICINE

## 2018-06-04 PROCEDURE — 25010000002 ONDANSETRON PER 1 MG: Performed by: NURSE ANESTHETIST, CERTIFIED REGISTERED

## 2018-06-04 DEVICE — CLIPPING DEVICE
Type: IMPLANTABLE DEVICE | Site: ASCENDING COLON | Status: FUNCTIONAL
Brand: RESOLUTION CLIP

## 2018-06-04 RX ORDER — PROPOFOL 10 MG/ML
VIAL (ML) INTRAVENOUS AS NEEDED
Status: DISCONTINUED | OUTPATIENT
Start: 2018-06-04 | End: 2018-06-04 | Stop reason: SURG

## 2018-06-04 RX ORDER — SODIUM CHLORIDE 0.9 % (FLUSH) 0.9 %
3 SYRINGE (ML) INJECTION AS NEEDED
Status: DISCONTINUED | OUTPATIENT
Start: 2018-06-04 | End: 2018-06-04 | Stop reason: HOSPADM

## 2018-06-04 RX ORDER — BUPIVACAINE HCL/0.9 % NACL/PF 0.125 %
PLASTIC BAG, INJECTION (ML) EPIDURAL AS NEEDED
Status: DISCONTINUED | OUTPATIENT
Start: 2018-06-04 | End: 2018-06-04 | Stop reason: SURG

## 2018-06-04 RX ORDER — ONDANSETRON 2 MG/ML
INJECTION INTRAMUSCULAR; INTRAVENOUS AS NEEDED
Status: DISCONTINUED | OUTPATIENT
Start: 2018-06-04 | End: 2018-06-04 | Stop reason: SURG

## 2018-06-04 RX ORDER — SODIUM CHLORIDE 9 MG/ML
70 INJECTION, SOLUTION INTRAVENOUS CONTINUOUS PRN
Status: DISCONTINUED | OUTPATIENT
Start: 2018-06-04 | End: 2018-06-04 | Stop reason: HOSPADM

## 2018-06-04 RX ADMIN — PROPOFOL 120 MCG/KG/MIN: 10 INJECTION, EMULSION INTRAVENOUS at 11:13

## 2018-06-04 RX ADMIN — PROPOFOL 40 MG: 10 INJECTION, EMULSION INTRAVENOUS at 12:03

## 2018-06-04 RX ADMIN — Medication 200 MCG: at 12:04

## 2018-06-04 RX ADMIN — SODIUM CHLORIDE 70 ML/HR: 9 INJECTION, SOLUTION INTRAVENOUS at 09:55

## 2018-06-04 RX ADMIN — ONDANSETRON 4 MG: 2 INJECTION INTRAMUSCULAR; INTRAVENOUS at 11:10

## 2018-06-04 RX ADMIN — PROPOFOL 40 MG: 10 INJECTION, EMULSION INTRAVENOUS at 11:30

## 2018-06-04 RX ADMIN — Medication 200 MCG: at 11:25

## 2018-06-04 RX ADMIN — PROPOFOL 40 MG: 10 INJECTION, EMULSION INTRAVENOUS at 11:20

## 2018-06-04 RX ADMIN — PROPOFOL 40 MG: 10 INJECTION, EMULSION INTRAVENOUS at 12:12

## 2018-06-04 RX ADMIN — Medication 200 MCG: at 12:14

## 2018-06-04 RX ADMIN — PROPOFOL 40 MG: 10 INJECTION, EMULSION INTRAVENOUS at 11:12

## 2018-06-04 RX ADMIN — LIDOCAINE HYDROCHLORIDE 80 MG: 20 INJECTION, SOLUTION INTRAVENOUS at 11:12

## 2018-06-04 RX ADMIN — SODIUM CHLORIDE: 9 INJECTION, SOLUTION INTRAVENOUS at 11:56

## 2018-06-04 RX ADMIN — SODIUM CHLORIDE: 9 INJECTION, SOLUTION INTRAVENOUS at 11:06

## 2018-06-04 NOTE — OP NOTE
PROCEDURE:  Colonoscopy to the terminal ileum with 7 hot snare, 3 cold snare, 20 hot biopsy and 2 cold biopsy polypectomies as well as thermal ablation of 23 polyps, and placement of 2 resolution clips to coapt the margins     DATE OF PROCEDURE:  June 4, 2018    REFERRING PROVIDER:  Ciaran Sanches DO     INSTRUMENT USED:   Olympus PCF H 190 videocolonoscope.      INDICATIONS OF THE PROCEDURE:   This is a 62-year-old -American female with history of multiple colon polyps and large lesion in the ascending colon somewhat atypical for lipoma. Currently undergoing colonoscopy for further evaluation and colon cancer screening as well as removal of residual polyps.    PREVIOUS COLONOSCOPY: February 2017.    FAMILY HISTORY OF COLON CANCER: Brother.    BIOPSIES:  Ascending colon: Multiple biopsies were obtained from the large lipomatous lesion in the ascending colon to include the deeper tissue. 3 hot snare polypectomies. One cold snare polypectomy. Hepatic flexure: One hot snare polypectomy. Transverse colon: 1 cold snare polypectomy. Descending colon: 1 cold snare and one cold biopsy polypectomies. Sigmoid colon: 3 hot snare, and 16 hot biopsy and one cold biopsy polypectomies. Rectum: 4 hot biopsy polypectomies. Additionally, 23 polyps were thermally ablated in the rectum and sigmoid colon.      PHOTOGRAPHS:  Photographs were included in the medical records.     MEDICATIONS:  MAC.       CONSENT/PREPROCEDURE EVALUATION:  Risks, benefits, alternatives and options of the procedure including risks of sedation/anesthesia were discussed with the patient and informed consent was obtained prior to the procedure.  History and physical examination were performed and nothing precluded the test.      REPORT:  The patient was placed in left lateral decubitus position and a digital examination was performed.  Once under the influence of IV sedation, the instrument was inserted into the rectum and advanced under direct vision  to cecum which was identified by the ileocecal valve, triradiate folds and appendiceal orifice. The scope was then maneuvered into the terminal ileum.        FINDINGS:      Digital rectal examination:  Good anal tone.  No perianal pathology.  No mass.        Terminal ileum:  7-8 cm.  Normal.     Cecum and ascending colon: Diverticulosis was noted.   A 2 cm lipomatous lesion was noted.  This was palpated with biopsy forceps and appeared to be a little more firm than classic lipoma. Multiple biopsies were obtained to include the deeper tissue.     4, 5-10 mm sessile polyps were noted in the ascending colon. One was removed with cold snare and 3 with hot snare. Margins of the larger polypectomy site were coapt using a resolution clip. This includes 2 polyps behind the folds on retroflex view.     Hepatic flexure, transverse colon, splenic flexure:  Diverticulosis. A 10 mm sessile polyp was noted at hepatic flexure behind a fold. This was removed with hot snare. Margins were coapt using a resolution clip.         Descending colon, sigmoid colon and rectum:  Diverticulosis was noted. 2, 3 - 5 mm sessile polyps in the descending colon were removed. One with cold biopsy forceps and 1 with cold snare. In the sigmoid : 36-7 mm sessile polyps were removed with hot snare. 16, 4 mm sessile polyps were removed with hot biopsy forceps. One 3 mm sessile polyp was removed with cold biopsy forceps. In the rectum for, 4 mm sessile polyps were removed with hot biopsy forceps.  Additionally,  23 2-3 mm sessile polyps in the sigmoid colon and rectum were thermally ablated using soft cautery. A retroflex examination within the rectum revealed internal hemorrhoids.        The scope was then straightened, the lower GI tract was decompressed, and the scope was pulled out of the patient.  The patient tolerated the procedure well.  There were no immediate complications and the patient was transferred in stable condition for post procedure  observation.      TECHNICAL DATA:   1. Ada prep score: 6 (2+2+2).    2. Anus to cecal time: 6  minutes.  3. Difficulty of examination:  Average.  The colon was noted to be tortuous and somewhat redundant.  4. Withdrawal time: 8 minutes.  5. Procedure time:  1 hour and 12 minutes.  6. Retroflex examination in right colon: Yes.    7. Second look Rectum to cecum with decompression: Yes.    DIAGNOSES:    1. Pandiverticulosis more pronounced in the left colon.  2. Multiple colon polyps. 32 were removed ranging between 3 mm and 10 mm in size. Additionally, 23 small polyps were thermally ablated.  3. 2 cm lipomatous lesion in the ascending colon which is somewhat firm than classic lipoma. Multiple biopsies were obtained to include the deeper tissue. The lesion is otherwise stable.  4. Internal hemorrhoids.      RECOMMENDATIONS:     1. Dietary instructions.  2. Follow biopsies.    3. Follow-up:   2-3 weeks.     4. Followup colonoscopy:  Depending on the biopsy results.          Thank you very much for letting me participate in the care of this patient. Please do not hesitate to call me if you have any questions.

## 2018-06-04 NOTE — ANESTHESIA PREPROCEDURE EVALUATION
Anesthesia Evaluation     Patient summary reviewed and Nursing notes reviewed   no history of anesthetic complications:  NPO Solid Status: > 8 hours  NPO Liquid Status: > 8 hours           Airway   Mallampati: I  TM distance: >3 FB  Neck ROM: full  no difficulty expected  Dental - normal exam     Pulmonary - normal exam    breath sounds clear to auscultation  (+) a smoker Current,   Cardiovascular - normal exam    PT is on anticoagulation therapy  Rhythm: regular  Rate: normal    (+) hypertension well controlled, hyperlipidemia,       Neuro/Psych- negative ROS  GI/Hepatic/Renal/Endo    (+) obesity,  GERD well controlled,      Musculoskeletal     Abdominal  - normal exam    Abdomen: soft.  Bowel sounds: normal.   Substance History   (+) alcohol use,      OB/GYN negative ob/gyn ROS         Other   (+) arthritis                       Anesthesia Plan    ASA 2     MAC   (Risks and benefits discussed including risk of aspiration, recall and dental damage. All patient questions answered. Will continue with POC.)  intravenous induction   Anesthetic plan and risks discussed with patient.

## 2018-06-04 NOTE — H&P
Chief complaint:  Colon CA Screen/Large Polyp in Colon    History of present illness: Colon Cancer Screen, Mild Constipation, Hx Multiple Colon Polyp/Large Lesions, Family History of Colon Cancer (Brother), Last Colonoscopy 2017    Past medical history:   Past Medical History:   Diagnosis Date   • Acid reflux    • Arthritis    • Cataract, bilateral    • Full dentures    • High cholesterol    • Hyperlipidemia    • Hypertension    • Wears glasses        Surgical history:    Past Surgical History:   Procedure Laterality Date   • COLONOSCOPY N/A 2/23/2017    Procedure: COLONOSCOPY with hot and cold snare polypectomies,  hot biopsy polypectomies, biopsies, resolution clip placement;  Surgeon: Zackery Siddiqui MD;  Location: [facility] ENDOSCOPY;  Service:    • FRACTURE SURGERY Left     LEG   • MOUTH SURGERY      FULL EXTRACTION   • TUBAL ABDOMINAL LIGATION         Social history:  Social History     Social History   • Marital status:      Spouse name: N/A   • Number of children: N/A   • Years of education: N/A     Occupational History   • Not on file.     Social History Main Topics   • Smoking status: Current Every Day Smoker     Packs/day: 1.00     Years: 50.00     Types: Cigarettes   • Smokeless tobacco: Never Used   • Alcohol use 1.2 oz/week     2 Glasses of wine per week   • Drug use: No   • Sexual activity: Defer     Other Topics Concern   • Not on file     Social History Narrative   • No narrative on file       Allergies:  Augmentin [amoxicillin-pot clavulanate] and Prednisone  Latex allergy: None  Contrast allergy: None    Medications:  Prescriptions Prior to Admission   Medication Sig Dispense Refill Last Dose   • albuterol (PROVENTIL HFA;VENTOLIN HFA) 108 (90 Base) MCG/ACT inhaler Inhale 2 puffs Every 4 (Four) Hours As Needed for Wheezing. 3.7 g 0 Past Month at Unknown time   • aspirin 81 MG EC tablet Take 81 mg by mouth Daily.   Past Week at Unknown time   • lisinopril-hydrochlorothiazide (PRINZIDE,ZESTORETIC)  "20-12.5 MG per tablet Take 1 tablet by mouth Daily.   6/4/2018 at 0700   • simvastatin (ZOCOR) 20 MG tablet Take 20 mg by mouth Every Night.   6/3/2018 at 2300   • omeprazole (priLOSEC) 20 MG capsule Take 20 mg by mouth Daily.   More than a month at Unknown time       Review of systems:   Constitutional: No recent: Fever, Weight loss   Respiratory: No recent: SOB, Cough   Cardiovascular: No recent: Chest Pains, congestive heart failure or arrhythmias.   Neurological: No recent: Seizures, CVA, TIA.   Genitourinary: No recent: Renal Failure, UTI.  Endocrine: No recent: Worsening of diabetes or thyroid disease.  Musculoskeletal: No recent: Joint swelling.  Hem. Oncology: No recent: Anemia or bleeding.  Psychiatric: No recent: Worsening of depression or anxiety.     VITAL SIGNS:    Blood pressure 114/75, pulse 85, temperature 97.6 °F (36.4 °C), temperature source Temporal Artery , resp. rate 18, height 152.4 cm (60\"), weight 70.3 kg (155 lb), last menstrual period 03/16/2004, SpO2 96 %.    PHYSICAL EXAMINATION:   HEENT: Normal.   Lungs: Clear to auscultation.  Heart: No S3, no murmur.    Abdomen: Soft.  BS+ ND, NT  Extremities: No edema.  No cyanosis.  Neuro: Alert X 3. No focal deficit.    Assessment: Colon Cancer Screen, Mild Constipation, Hx Multiple Colon Polyp/Large Lesions, Family History of Colon Cancer (Brother), Last Colonoscopy 2017    Plan:  Colonoscopy       Risks/Benefits:  The potential benefits, risk and/or side effects of the procedure and alternatives have been discussed with the patient/authorized representative and questions were answered.    "

## 2018-06-04 NOTE — ANESTHESIA POSTPROCEDURE EVALUATION
Patient: Lizbeth Johns    Procedure Summary     Date:  06/04/18 Room / Location:  [facility] ENDOSCOPY 2 / [facility] ENDOSCOPY    Anesthesia Start:  1106 Anesthesia Stop:  1237    Procedure:  COLONOSCOPY WITH HOT SNARE POLYPECTOMY X 7; COLD SNARE POLYPECTOMY X 3; HOT BIOPSY POLYPECTOMY X 20; COLD BIOPSY POLYPECTOMY X 2; THERMAL ABLATION OF COLON POLYPS X 23; CLIP PLACEMENT X 1; BIOPSIES (N/A Anus) Diagnosis:       Lesion of colon      (Lesion of colon [K63.9])    Surgeon:  Zackery Siddiqui MD Provider:  Andre Ortiz CRNA    Anesthesia Type:  MAC ASA Status:  2          Anesthesia Type: MAC  Last vitals  BP   (!) 101/39 (06/04/18 1240)   Temp   97.3 °F (36.3 °C) (06/04/18 1240)   Pulse   66 (06/04/18 1240)   Resp   16 (06/04/18 1240)     SpO2   96 % (06/04/18 1240)     Post Anesthesia Care and Evaluation    Patient location during evaluation: PHASE II  Patient participation: complete - patient participated  Level of consciousness: awake and awake and alert  Pain score: 0  Pain management: satisfactory to patient  Airway patency: patent  Anesthetic complications: No anesthetic complications  PONV Status: none  Cardiovascular status: acceptable and stable  Respiratory status: acceptable, spontaneous ventilation, room air and nonlabored ventilation  Hydration status: acceptable

## 2018-06-04 NOTE — DISCHARGE INSTRUCTIONS
Postprocedure instructions:  Nothing by mouth total fully alert.  Bedrest until fully alert.  Once fully alert may have clear liquids.  Avoid soda beverages.  Advance diet as tolerated.  Vital signs as routine.    Diet:   Low Fat Diet.   Fiber One Cereal-40% Nutty Clusters 1/4 cup daily   [place] All Bran-Bran Buds 1/4 cup daily.  Use skim, 1, 2 % or soy milk.  Drink 5-6 glasses of water daily.    Blood Thinner Directions:     Avoid Aspirin & other NSAIDS for _7__ days.  Tylenol is okay.    Treatments:      [facility] at [phone] or come to the Emergency Department if you experience the following: Chest pain, abdominal pain, bleeding (vomiting of blood or coffee colored material, black stools or clare blood in stools), fever/chills, nausea and vomiting or dizziness.      Follow-up:  DR. NAHOMI [facility] in 2-3 weeks. Office phone # [phone].    Follow-up colonoscopy:  Depending on the biopsy results.

## 2018-06-08 LAB
LAB AP CASE REPORT: NORMAL
Lab: NORMAL
PATH REPORT.FINAL DX SPEC: NORMAL

## 2018-06-19 ENCOUNTER — TRANSCRIBE ORDERS (OUTPATIENT)
Dept: ADMINISTRATIVE | Facility: HOSPITAL | Age: 63
End: 2018-06-19

## 2018-06-19 DIAGNOSIS — Z12.39 SCREENING BREAST EXAMINATION: Primary | ICD-10-CM

## 2018-06-20 ENCOUNTER — HOSPITAL ENCOUNTER (OUTPATIENT)
Dept: MAMMOGRAPHY | Facility: HOSPITAL | Age: 63
Discharge: HOME | End: 2018-06-20
Attending: INTERNAL MEDICINE | Admitting: INTERNAL MEDICINE

## 2018-06-20 DIAGNOSIS — Z12.39 SCREENING BREAST EXAMINATION: ICD-10-CM

## 2018-06-20 PROCEDURE — 77063 BREAST TOMOSYNTHESIS BI: CPT

## 2018-06-20 PROCEDURE — 77067 SCR MAMMO BI INCL CAD: CPT

## 2018-06-27 ENCOUNTER — OFFICE VISIT (OUTPATIENT)
Dept: GASTROENTEROLOGY | Facility: CLINIC | Age: 63
End: 2018-06-27

## 2018-06-27 VITALS
TEMPERATURE: 97 F | WEIGHT: 165 LBS | HEIGHT: 60 IN | HEART RATE: 71 BPM | BODY MASS INDEX: 32.39 KG/M2 | DIASTOLIC BLOOD PRESSURE: 78 MMHG | SYSTOLIC BLOOD PRESSURE: 146 MMHG | RESPIRATION RATE: 18 BRPM

## 2018-06-27 DIAGNOSIS — D12.2 ADENOMATOUS POLYP OF ASCENDING COLON: ICD-10-CM

## 2018-06-27 DIAGNOSIS — R12 HEARTBURN: ICD-10-CM

## 2018-06-27 DIAGNOSIS — K63.9 LESION OF COLON: Primary | ICD-10-CM

## 2018-06-27 PROCEDURE — 99214 OFFICE O/P EST MOD 30 MIN: CPT | Performed by: INTERNAL MEDICINE

## 2018-08-14 ENCOUNTER — TELEPHONE (OUTPATIENT)
Dept: GASTROENTEROLOGY | Facility: CLINIC | Age: 63
End: 2018-08-14

## 2018-08-14 NOTE — TELEPHONE ENCOUNTER
Called patient. She has been having irritation on the outside of her rectum for the past several days. She has been exercising, but not sure if this is the cause. She denies drainage or rash. No vaginal symptoms. No urinary symptoms. Recommended sitz baths, hemorrhoidal care. Advised patient to be evaluated as we can't see the area over the phone. The patient verbalizes understanding and will call back if she wants to make a follow up appointment.      ----- Message from Usha Salmeron MA sent at 8/13/2018  7:48 PM EDT -----  Please advise.    Thanks  Usha        ----- Message -----  From: Lesa Culp  Sent: 8/13/2018   1:29 PM  To: [facility]    PT WAS LAST SEEN June 27TH AND SHE IS STATING SHE'S HAVING IRRITATION SINCE TAKING MEDS AFTER PROCEDURE June 4TH. PT WOULD LIKE A CALL BACK REGARDING WHAT SHE SHOULD DO.

## 2019-05-23 ENCOUNTER — TRANSCRIBE ORDERS (OUTPATIENT)
Dept: MAMMOGRAPHY | Facility: HOSPITAL | Age: 64
End: 2019-05-23

## 2019-05-23 DIAGNOSIS — Z12.39 BREAST CANCER SCREENING: Primary | ICD-10-CM

## 2019-05-28 ENCOUNTER — OFFICE VISIT (OUTPATIENT)
Dept: GASTROENTEROLOGY | Facility: CLINIC | Age: 64
End: 2019-05-28

## 2019-05-28 VITALS
TEMPERATURE: 97.1 F | BODY MASS INDEX: 32.2 KG/M2 | RESPIRATION RATE: 16 BRPM | WEIGHT: 164 LBS | HEIGHT: 60 IN | DIASTOLIC BLOOD PRESSURE: 78 MMHG | HEART RATE: 76 BPM | SYSTOLIC BLOOD PRESSURE: 125 MMHG

## 2019-05-28 DIAGNOSIS — K59.00 CONSTIPATION, UNSPECIFIED CONSTIPATION TYPE: Primary | ICD-10-CM

## 2019-05-28 DIAGNOSIS — D12.2 ADENOMATOUS POLYP OF ASCENDING COLON: ICD-10-CM

## 2019-05-28 DIAGNOSIS — R19.4 CHANGE IN BOWEL HABITS: ICD-10-CM

## 2019-05-28 DIAGNOSIS — E66.3 OVER WEIGHT: ICD-10-CM

## 2019-05-28 DIAGNOSIS — K63.9 LESION OF COLON: ICD-10-CM

## 2019-05-28 PROCEDURE — 99214 OFFICE O/P EST MOD 30 MIN: CPT | Performed by: INTERNAL MEDICINE

## 2019-05-29 ENCOUNTER — PREP FOR SURGERY (OUTPATIENT)
Dept: OTHER | Facility: HOSPITAL | Age: 64
End: 2019-05-29

## 2019-05-29 DIAGNOSIS — Z86.010 HISTORY OF COLON POLYPS: Primary | ICD-10-CM

## 2019-05-29 DIAGNOSIS — Z80.0 FAMILY HISTORY OF COLON CANCER: ICD-10-CM

## 2019-05-29 DIAGNOSIS — K59.00 CONSTIPATION: ICD-10-CM

## 2019-05-29 RX ORDER — SODIUM CHLORIDE 9 MG/ML
70 INJECTION, SOLUTION INTRAVENOUS CONTINUOUS PRN
Status: CANCELLED | OUTPATIENT
Start: 2019-05-29

## 2019-05-31 PROBLEM — Z86.0100 HISTORY OF COLON POLYPS: Status: ACTIVE | Noted: 2019-05-31

## 2019-05-31 PROBLEM — Z86.010 HISTORY OF COLON POLYPS: Status: ACTIVE | Noted: 2019-05-31

## 2019-05-31 PROBLEM — K59.00 CONSTIPATION: Status: ACTIVE | Noted: 2019-05-31

## 2019-05-31 PROBLEM — Z80.0 FAMILY HISTORY OF COLON CANCER: Status: ACTIVE | Noted: 2019-05-31

## 2019-06-06 ENCOUNTER — APPOINTMENT (OUTPATIENT)
Dept: ULTRASOUND IMAGING | Facility: HOSPITAL | Age: 64
End: 2019-06-06

## 2019-06-06 ENCOUNTER — APPOINTMENT (OUTPATIENT)
Dept: GENERAL RADIOLOGY | Facility: HOSPITAL | Age: 64
End: 2019-06-06

## 2019-06-06 ENCOUNTER — HOSPITAL ENCOUNTER (OUTPATIENT)
Facility: HOSPITAL | Age: 64
Setting detail: OBSERVATION
Discharge: HOME | End: 2019-06-07
Attending: EMERGENCY MEDICINE | Admitting: INTERNAL MEDICINE

## 2019-06-06 DIAGNOSIS — I10 ESSENTIAL HYPERTENSION: ICD-10-CM

## 2019-06-06 DIAGNOSIS — R91.1 LUNG NODULE: ICD-10-CM

## 2019-06-06 DIAGNOSIS — R91.8 MASS OF UPPER LOBE OF RIGHT LUNG: ICD-10-CM

## 2019-06-06 DIAGNOSIS — N28.9 RENAL INSUFFICIENCY: Primary | ICD-10-CM

## 2019-06-06 DIAGNOSIS — N17.9 ACUTE RENAL FAILURE, UNSPECIFIED ACUTE RENAL FAILURE TYPE (HCC): ICD-10-CM

## 2019-06-06 PROBLEM — M79.604 PAIN IN BOTH LOWER EXTREMITIES: Status: ACTIVE | Noted: 2019-06-06

## 2019-06-06 PROBLEM — M79.605 PAIN IN BOTH LOWER EXTREMITIES: Status: ACTIVE | Noted: 2019-06-06

## 2019-06-06 LAB
ALBUMIN SERPL-MCNC: 4.9 G/DL (ref 3.5–5)
ALBUMIN/GLOB SERPL: 1.4 G/DL (ref 1–2)
ALP SERPL-CCNC: 115 U/L (ref 38–126)
ALT SERPL W P-5'-P-CCNC: 20 U/L (ref 13–69)
ANION GAP SERPL CALCULATED.3IONS-SCNC: 15.6 MMOL/L (ref 10–20)
AST SERPL-CCNC: 30 U/L (ref 15–46)
BASOPHILS # BLD AUTO: 0.04 10*3/MM3 (ref 0–0.2)
BASOPHILS NFR BLD AUTO: 0.6 % (ref 0–1.5)
BILIRUB SERPL-MCNC: 0.5 MG/DL (ref 0.2–1.3)
BUN BLD-MCNC: 40 MG/DL (ref 7–20)
BUN/CREAT SERPL: 19 (ref 7.1–23.5)
CALCIUM SPEC-SCNC: 9.3 MG/DL (ref 8.4–10.2)
CHLORIDE SERPL-SCNC: 97 MMOL/L (ref 98–107)
CO2 SERPL-SCNC: 26 MMOL/L (ref 26–30)
CREAT BLD-MCNC: 2.1 MG/DL (ref 0.6–1.3)
CRP SERPL-MCNC: 1.9 MG/DL (ref 0–1)
DEPRECATED RDW RBC AUTO: 46.4 FL (ref 37–54)
EOSINOPHIL # BLD AUTO: 0.12 10*3/MM3 (ref 0–0.4)
EOSINOPHIL NFR BLD AUTO: 1.8 % (ref 0.3–6.2)
ERYTHROCYTE [DISTWIDTH] IN BLOOD BY AUTOMATED COUNT: 14.4 % (ref 12.3–15.4)
ERYTHROCYTE [SEDIMENTATION RATE] IN BLOOD: 67 MM/HR (ref 0–20)
GFR SERPL CREATININE-BSD FRML MDRD: 29 ML/MIN/1.73
GLOBULIN UR ELPH-MCNC: 3.6 GM/DL
GLUCOSE BLD-MCNC: 143 MG/DL (ref 74–98)
HCT VFR BLD AUTO: 36.3 % (ref 34–46.6)
HGB BLD-MCNC: 12.1 G/DL (ref 12–15.9)
IMM GRANULOCYTES # BLD AUTO: 0.03 10*3/MM3 (ref 0–0.05)
IMM GRANULOCYTES NFR BLD AUTO: 0.5 % (ref 0–0.5)
LYMPHOCYTES # BLD AUTO: 1.84 10*3/MM3 (ref 0.7–3.1)
LYMPHOCYTES NFR BLD AUTO: 27.9 % (ref 19.6–45.3)
MCH RBC QN AUTO: 29.4 PG (ref 26.6–33)
MCHC RBC AUTO-ENTMCNC: 33.3 G/DL (ref 31.5–35.7)
MCV RBC AUTO: 88.1 FL (ref 79–97)
MONOCYTES # BLD AUTO: 0.75 10*3/MM3 (ref 0.1–0.9)
MONOCYTES NFR BLD AUTO: 11.4 % (ref 5–12)
NEUTROPHILS # BLD AUTO: 3.81 10*3/MM3 (ref 1.7–7)
NEUTROPHILS NFR BLD AUTO: 57.8 % (ref 42.7–76)
NRBC BLD AUTO-RTO: 0 /100 WBC (ref 0–0.2)
NT-PROBNP SERPL-MCNC: 90.3 PG/ML (ref 0–125)
PLATELET # BLD AUTO: 280 10*3/MM3 (ref 140–450)
PMV BLD AUTO: 9.3 FL (ref 6–12)
POTASSIUM BLD-SCNC: 3.6 MMOL/L (ref 3.5–5.1)
PROT SERPL-MCNC: 8.5 G/DL (ref 6.3–8.2)
RBC # BLD AUTO: 4.12 10*6/MM3 (ref 3.77–5.28)
SODIUM BLD-SCNC: 135 MMOL/L (ref 137–145)
TROPONIN I SERPL-MCNC: <0.012 NG/ML (ref 0–0.03)
WBC NRBC COR # BLD: 6.59 10*3/MM3 (ref 3.4–10.8)

## 2019-06-06 PROCEDURE — 25810000003 SODIUM CHLORIDE 0.9 % WITH KCL 20 MEQ 20-0.9 MEQ/L-% SOLUTION: Performed by: INTERNAL MEDICINE

## 2019-06-06 PROCEDURE — 96361 HYDRATE IV INFUSION ADD-ON: CPT

## 2019-06-06 PROCEDURE — 83880 ASSAY OF NATRIURETIC PEPTIDE: CPT | Performed by: NURSE PRACTITIONER

## 2019-06-06 PROCEDURE — 84484 ASSAY OF TROPONIN QUANT: CPT | Performed by: INTERNAL MEDICINE

## 2019-06-06 PROCEDURE — G0378 HOSPITAL OBSERVATION PER HR: HCPCS

## 2019-06-06 PROCEDURE — 25010000002 METHYLPREDNISOLONE PER 125 MG: Performed by: NURSE PRACTITIONER

## 2019-06-06 PROCEDURE — 85651 RBC SED RATE NONAUTOMATED: CPT | Performed by: INTERNAL MEDICINE

## 2019-06-06 PROCEDURE — 84484 ASSAY OF TROPONIN QUANT: CPT | Performed by: NURSE PRACTITIONER

## 2019-06-06 PROCEDURE — 99284 EMERGENCY DEPT VISIT MOD MDM: CPT

## 2019-06-06 PROCEDURE — 94799 UNLISTED PULMONARY SVC/PX: CPT

## 2019-06-06 PROCEDURE — 85025 COMPLETE CBC W/AUTO DIFF WBC: CPT | Performed by: NURSE PRACTITIONER

## 2019-06-06 PROCEDURE — 80053 COMPREHEN METABOLIC PANEL: CPT | Performed by: NURSE PRACTITIONER

## 2019-06-06 PROCEDURE — 86140 C-REACTIVE PROTEIN: CPT | Performed by: INTERNAL MEDICINE

## 2019-06-06 PROCEDURE — 96374 THER/PROPH/DIAG INJ IV PUSH: CPT

## 2019-06-06 PROCEDURE — 96375 TX/PRO/DX INJ NEW DRUG ADDON: CPT

## 2019-06-06 PROCEDURE — 93970 EXTREMITY STUDY: CPT

## 2019-06-06 PROCEDURE — 76775 US EXAM ABDO BACK WALL LIM: CPT

## 2019-06-06 PROCEDURE — 71046 X-RAY EXAM CHEST 2 VIEWS: CPT

## 2019-06-06 PROCEDURE — 93005 ELECTROCARDIOGRAM TRACING: CPT | Performed by: NURSE PRACTITIONER

## 2019-06-06 PROCEDURE — 96372 THER/PROPH/DIAG INJ SC/IM: CPT

## 2019-06-06 PROCEDURE — 99220 PR INITIAL OBSERVATION CARE/DAY 70 MINUTES: CPT | Performed by: INTERNAL MEDICINE

## 2019-06-06 PROCEDURE — 25010000002 HEPARIN (PORCINE) PER 1000 UNITS: Performed by: INTERNAL MEDICINE

## 2019-06-06 RX ORDER — METHYLPREDNISOLONE SODIUM SUCCINATE 125 MG/2ML
125 INJECTION, POWDER, LYOPHILIZED, FOR SOLUTION INTRAMUSCULAR; INTRAVENOUS ONCE
Status: COMPLETED | OUTPATIENT
Start: 2019-06-06 | End: 2019-06-06

## 2019-06-06 RX ORDER — ATORVASTATIN CALCIUM 10 MG/1
10 TABLET, FILM COATED ORAL DAILY
Status: DISCONTINUED | OUTPATIENT
Start: 2019-06-06 | End: 2019-06-07 | Stop reason: HOSPADM

## 2019-06-06 RX ORDER — SODIUM CHLORIDE 0.9 % (FLUSH) 0.9 %
10 SYRINGE (ML) INJECTION AS NEEDED
Status: DISCONTINUED | OUTPATIENT
Start: 2019-06-06 | End: 2019-06-07 | Stop reason: HOSPADM

## 2019-06-06 RX ORDER — TRAMADOL HYDROCHLORIDE 50 MG/1
50 TABLET ORAL EVERY 12 HOURS PRN
Status: DISCONTINUED | OUTPATIENT
Start: 2019-06-06 | End: 2019-06-07 | Stop reason: HOSPADM

## 2019-06-06 RX ORDER — ONDANSETRON 2 MG/ML
4 INJECTION INTRAMUSCULAR; INTRAVENOUS EVERY 6 HOURS PRN
Status: DISCONTINUED | OUTPATIENT
Start: 2019-06-06 | End: 2019-06-07 | Stop reason: HOSPADM

## 2019-06-06 RX ORDER — SODIUM CHLORIDE AND POTASSIUM CHLORIDE 150; 900 MG/100ML; MG/100ML
125 INJECTION, SOLUTION INTRAVENOUS CONTINUOUS
Status: DISCONTINUED | OUTPATIENT
Start: 2019-06-06 | End: 2019-06-07 | Stop reason: HOSPADM

## 2019-06-06 RX ORDER — PANTOPRAZOLE SODIUM 40 MG/1
40 TABLET, DELAYED RELEASE ORAL EVERY MORNING
Status: DISCONTINUED | OUTPATIENT
Start: 2019-06-07 | End: 2019-06-07 | Stop reason: HOSPADM

## 2019-06-06 RX ORDER — IPRATROPIUM BROMIDE AND ALBUTEROL SULFATE 2.5; .5 MG/3ML; MG/3ML
3 SOLUTION RESPIRATORY (INHALATION) ONCE
Status: COMPLETED | OUTPATIENT
Start: 2019-06-06 | End: 2019-06-06

## 2019-06-06 RX ORDER — SODIUM CHLORIDE 0.9 % (FLUSH) 0.9 %
3-10 SYRINGE (ML) INJECTION AS NEEDED
Status: DISCONTINUED | OUTPATIENT
Start: 2019-06-06 | End: 2019-06-07 | Stop reason: HOSPADM

## 2019-06-06 RX ORDER — HEPARIN SODIUM 5000 [USP'U]/ML
5000 INJECTION, SOLUTION INTRAVENOUS; SUBCUTANEOUS EVERY 8 HOURS SCHEDULED
Status: DISCONTINUED | OUTPATIENT
Start: 2019-06-06 | End: 2019-06-07 | Stop reason: HOSPADM

## 2019-06-06 RX ORDER — AMLODIPINE BESYLATE 5 MG/1
5 TABLET ORAL
Status: DISCONTINUED | OUTPATIENT
Start: 2019-06-06 | End: 2019-06-07

## 2019-06-06 RX ORDER — CHOLECALCIFEROL (VITAMIN D3) 125 MCG
5 CAPSULE ORAL NIGHTLY PRN
Status: DISCONTINUED | OUTPATIENT
Start: 2019-06-06 | End: 2019-06-07 | Stop reason: HOSPADM

## 2019-06-06 RX ORDER — ACETAMINOPHEN 325 MG/1
650 TABLET ORAL EVERY 4 HOURS PRN
Status: DISCONTINUED | OUTPATIENT
Start: 2019-06-06 | End: 2019-06-07 | Stop reason: HOSPADM

## 2019-06-06 RX ORDER — SODIUM CHLORIDE 0.9 % (FLUSH) 0.9 %
3 SYRINGE (ML) INJECTION EVERY 12 HOURS SCHEDULED
Status: DISCONTINUED | OUTPATIENT
Start: 2019-06-06 | End: 2019-06-07 | Stop reason: HOSPADM

## 2019-06-06 RX ORDER — ROPINIROLE 0.25 MG/1
0.5 TABLET, FILM COATED ORAL NIGHTLY
Status: DISCONTINUED | OUTPATIENT
Start: 2019-06-06 | End: 2019-06-07 | Stop reason: HOSPADM

## 2019-06-06 RX ADMIN — AMLODIPINE BESYLATE 5 MG: 5 TABLET ORAL at 18:34

## 2019-06-06 RX ADMIN — ATORVASTATIN CALCIUM 10 MG: 10 TABLET, FILM COATED ORAL at 18:34

## 2019-06-06 RX ADMIN — IPRATROPIUM BROMIDE AND ALBUTEROL SULFATE 3 ML: .5; 3 SOLUTION RESPIRATORY (INHALATION) at 14:06

## 2019-06-06 RX ADMIN — METHYLPREDNISOLONE SODIUM SUCCINATE 125 MG: 125 INJECTION, POWDER, FOR SOLUTION INTRAMUSCULAR; INTRAVENOUS at 14:00

## 2019-06-06 RX ADMIN — SODIUM CHLORIDE 1000 ML: 9 INJECTION, SOLUTION INTRAVENOUS at 15:04

## 2019-06-06 RX ADMIN — ROPINIROLE 0.5 MG: 0.25 TABLET, FILM COATED ORAL at 21:10

## 2019-06-06 RX ADMIN — POTASSIUM CHLORIDE AND SODIUM CHLORIDE 125 ML/HR: 900; 150 INJECTION, SOLUTION INTRAVENOUS at 18:34

## 2019-06-06 RX ADMIN — HEPARIN SODIUM 5000 UNITS: 5000 INJECTION, SOLUTION INTRAVENOUS; SUBCUTANEOUS at 21:17

## 2019-06-06 NOTE — ED NOTES
At this time, house supervisor was contacted for bed assignment      Joanne Slaughter  06/06/19 [id number]

## 2019-06-06 NOTE — H&P
[facility]   HISTORY AND PHYSICAL      Name:  Lizbeth Johns   Age:  63 y.o.  Sex:  female  :  1955  MRN:  [id number]   Visit Number:  [id number]  Admission Date:  2019  Date Of Service:  19  Primary Care Physician:  Yassine Brambila MD    History Obtained From:    patient    Chief Complaint:     Leg pain    History Of Presenting Illness:      Patient is a 63-year-old  female who comes in with multiple complaints.  She was sent from urgent care center.  Patient apparently had intermittent chest pain, arm pain, leg pain and general sense of weakness.  Upon questioning here she does not claim the arm pain of the chest pain.  She states her main complaint was bilateral leg pain which started in her lower legs and has now gone to her upper legs.  This is been going on for months.  She states that it is has worsened to the point where it is moderately severe.  She even states that she spoke to Dr. Brambila regarding this and he had prescribed Mobic.  He had later stopped it, and she has no idea why.  She did take some today without relief.  She is also stating that her legs move all over the bed when she is sleeping.  She also complains of fatigue, and she states that she is usually able to do her activities of daily living without any difficulty.  She currently denies any chest pressure, shortness of breath, nausea, vomiting, diarrhea, fever, chills.  She was sent from the urgent care over to the emergency room.  Lab work in the emergency room showed a creatinine of 2.10.  Patient has never known that she has any kidney failure at all.  Chest x-ray shows right upper lobe mass, the patient did not know anything about this as well.  She claims she has a cough but is nonproductive and she does not think it very significant.  She denies any fevers or chills.  Her vital signs are stable.  Troponin has been negative.  Pro BNP is negative as well.  She is admitted for the  renal failure and work-up of the mass.    Review Of Systems:     General ROS: positive for  - fatigue and malaise  Psychological ROS: negative  Ophthalmic ROS: negative  ENT ROS: negative  Allergy and Immunology ROS: negative  Hematological and Lymphatic ROS: negative  Endocrine ROS: negative  Breast ROS: negative  Respiratory ROS: negative  Cardiovascular ROS: negative  Gastrointestinal ROS: negative  Genito-Urinary ROS: negative  Musculoskeletal ROS: positive for - muscle pain  Neurological ROS: negative  Dermatological ROS: negative       Past Medical History:    Essential hypertension, colon polyps, hyperlipidemia    Past Surgical history:    Colonoscopy in 2017, left leg fracture repair      Social History:    She is a smoker with 50-pack-year history, she drinks liquor every other weekend, denies drugs.  She has 4 children.  She lives with her boyfriend.  She is a full code.    Family History:    Mother  of lung cancer.  Father  in meet accident.  Brother had cirrhosis and colon cancer.    Allergies:      Patient has no known allergies.  When patient asked about allergies she states she has none.  She thinks some sort of cream made her break out on her legs.  She is not allergic to prednisone and Augmentin causes diarrhea for her which is a known side effect.    Home Medications:    Prior to Admission Medications     Prescriptions Last Dose Informant Patient Reported? Taking?    aspirin 81 MG EC tablet  Self Yes No    Take 81 mg by mouth Daily.    lisinopril-hydrochlorothiazide (PRINZIDE,ZESTORETIC) 20-12.5 MG per tablet  Self Yes No    Take 1 tablet by mouth Daily.    meloxicam (MOBIC) 15 MG tablet   Yes No    Take 15 mg by mouth Daily.    omeprazole (priLOSEC) 40 MG capsule   Yes No    Take 40 mg by mouth Daily.    simvastatin (ZOCOR) 20 MG tablet  Self Yes No    Take 20 mg by mouth Every Night.             Hospital Scheduled Meds:               Vital Signs:    Temp:  [97.9 °F (36.6 °C)-98.2 °F  (36.8 °C)] 97.9 °F (36.6 °C)  Heart Rate:  [66-90] 72  Resp:  [14-18] 18  BP: (102-132)/(70-87) 123/77        06/06/19  1340   Weight: 72.5 kg (159 lb 12.8 oz)       Body mass index is 31.21 kg/m².    Physical Exam:      General Appearance:    Alert, cooperative, in no acute distress   Head:    Normocephalic, without obvious abnormality, atraumatic   Eyes:            Lids and lashes normal, conjunctivae and sclerae normal, no   icterus, no pallor, corneas clear, PERRLA   Ears:    Ears appear intact with no abnormalities noted   Throat:   No oral lesions, no thrush, oral mucosa moist   Neck:   No adenopathy, supple, trachea midline, no thyromegaly, no     carotid bruit, no JVD   Back:     No kyphosis present, no scoliosis present, no skin lesions,       erythema or scars, no tenderness to percussion or                   palpation,   range of motion normal   Lungs:     Clear to auscultation,respirations regular, even and                   unlabored    Heart:    Regular rhythm and normal rate, normal S1 and S2, no            murmur, no gallop, no rub, no click   Breast Exam:    Deferred   Abdomen:     Normal bowel sounds, no masses, no organomegaly, soft        non-tender, non-distended, no guarding, no rebound                 tenderness   Genitalia:    Deferred   Extremities:   Moves all extremities well, no edema, no cyanosis, no              redness   Pulses:   Pulses palpable and equal bilaterally   Skin:   No bleeding, bruising or rash   Lymph nodes:   No palpable adenopathy   Neurologic:   Cranial nerves 2 - 12 grossly intact, sensation intact, DTR        present and equal bilaterally       EKG:      Normal sinus rhythm with inverted T wave in aVL    Telemetry:      Sinus rhythm    I have personally looked at both the EKG and the telemetry strips.    Labs:    Results from last 7 days   Lab Units 06/06/19  1350   WBC 10*3/mm3 6.59   HEMOGLOBIN g/dL 12.1   HEMATOCRIT % 36.3   MCV fL 88.1   MCHC g/dL 33.3    PLATELETS 10*3/mm3 280         Results from last 7 days   Lab Units 06/06/19  1350   SODIUM mmol/L 135*   POTASSIUM mmol/L 3.6   CHLORIDE mmol/L 97*   CO2 mmol/L 26.0   BUN mg/dL 40*   CREATININE mg/dL 2.10*   EGFR IF AFRICN AM mL/min/1.73 29*   CALCIUM mg/dL 9.3   GLUCOSE mg/dL 143*   ALBUMIN g/dL 4.90   BILIRUBIN mg/dL 0.5   ALK PHOS U/L 115   AST (SGOT) U/L 30   ALT (SGPT) U/L 20   Estimated Creatinine Clearance: 23.7 mL/min (A) (by C-G formula based on SCr of 2.1 mg/dL (H)).  No results found for: AMMONIA  Results from last 7 days   Lab Units 06/06/19  1555 06/06/19  1350   TROPONIN I ng/mL <0.012 <0.012     Results from last 7 days   Lab Units 06/06/19  1350   PROBNP pg/mL 90.3     No results found for: HGBA1C  No results found for: TSH, FREET4  No results found for: PREGTESTUR, PREGSERUM, HCG, HCGQUANT  Pain Management Panel     There is no flowsheet data to display.                          Radiology:    Imaging Results (last 7 days)     Procedure Component Value Units Date/Time    XR Chest 2 View [[id number]] Collected:  06/06/19 1455     Updated:  06/06/19 1502    Narrative:       PROCEDURE: XR CHEST 2 VW-        HISTORY: cough, short of breath     COMPARISON: 01/29/2018.     FINDINGS: The heart is normal in size. The mediastinum is unremarkable.  A nodular opacity is noted in the right upper lobe medially. The left  lung is clear. There is no pneumothorax. There are no acute osseous  abnormalities.       Impression:       Nodular opacity in the medial right upper lobe which should  be followed up with a CT of the chest with contrast.           This report was finalized on 6/6/2019 2:56 PM by Ashley Ariza M.D..          Assessment:    1.  Acute renal failure, suspect due to medications  2.  Right upper lobe mass  3.  Leg pain, suspect restless leg syndrome, rule out polymyalgia rheumatica, versus DVT  4.  Essential hypertension    Plan:     We will give IV fluids.  Hold lisinopril,  hydrochlorothiazide, Mobic.  Check renal ultrasound.  Check urine sodium.  Check serial troponins.  Placed on Requip 0.5 mg at night.  Check venous Dopplers of bilateral lower extremities.  Recheck lab work in the a.m.  Will check sed rate and CRP as well.  Once creatinine returns to normal, would check CT with IV contrast as recommended by radiology.  Heparin for DVT prophylaxis.  Further recommendations will depend on the clinical course.    Sanju Briseno,   06/06/19  4:45 PM

## 2019-06-06 NOTE — PLAN OF CARE
Problem: Patient Care Overview  Goal: Plan of Care Review  Outcome: Ongoing (interventions implemented as appropriate)   06/06/19 6899   Coping/Psychosocial   Plan of Care Reviewed With patient   Coping/Psychosocial   Patient Agreement with Plan of Care agrees   Plan of Care Review   Progress improving   OTHER   Outcome Summary NEW ADMISSION. NO C/O CHEST DISCOMFORT.IVF INITIATED.

## 2019-06-07 ENCOUNTER — APPOINTMENT (OUTPATIENT)
Dept: CT IMAGING | Facility: HOSPITAL | Age: 64
End: 2019-06-07

## 2019-06-07 VITALS
OXYGEN SATURATION: 100 % | BODY MASS INDEX: 32.45 KG/M2 | RESPIRATION RATE: 16 BRPM | TEMPERATURE: 97.7 F | WEIGHT: 165.3 LBS | SYSTOLIC BLOOD PRESSURE: 145 MMHG | DIASTOLIC BLOOD PRESSURE: 74 MMHG | HEIGHT: 60 IN | HEART RATE: 66 BPM

## 2019-06-07 LAB
ANION GAP SERPL CALCULATED.3IONS-SCNC: 11.6 MMOL/L (ref 10–20)
BASOPHILS # BLD AUTO: 0.01 10*3/MM3 (ref 0–0.2)
BASOPHILS NFR BLD AUTO: 0.2 % (ref 0–1.5)
BUN BLD-MCNC: 35 MG/DL (ref 7–20)
BUN/CREAT SERPL: 26.9 (ref 7.1–23.5)
CALCIUM SPEC-SCNC: 8.5 MG/DL (ref 8.4–10.2)
CHLORIDE SERPL-SCNC: 109 MMOL/L (ref 98–107)
CO2 SERPL-SCNC: 22 MMOL/L (ref 26–30)
CREAT BLD-MCNC: 1.3 MG/DL (ref 0.6–1.3)
DEPRECATED RDW RBC AUTO: 46 FL (ref 37–54)
EOSINOPHIL # BLD AUTO: 0 10*3/MM3 (ref 0–0.4)
EOSINOPHIL NFR BLD AUTO: 0 % (ref 0.3–6.2)
ERYTHROCYTE [DISTWIDTH] IN BLOOD BY AUTOMATED COUNT: 14.3 % (ref 12.3–15.4)
GFR SERPL CREATININE-BSD FRML MDRD: 50 ML/MIN/1.73
GLUCOSE BLD-MCNC: 149 MG/DL (ref 74–98)
HBA1C MFR BLD: 5.7 % (ref 3–6)
HCT VFR BLD AUTO: 30.4 % (ref 34–46.6)
HGB BLD-MCNC: 10.2 G/DL (ref 12–15.9)
IMM GRANULOCYTES # BLD AUTO: 0.02 10*3/MM3 (ref 0–0.05)
IMM GRANULOCYTES NFR BLD AUTO: 0.4 % (ref 0–0.5)
LYMPHOCYTES # BLD AUTO: 0.64 10*3/MM3 (ref 0.7–3.1)
LYMPHOCYTES NFR BLD AUTO: 11.9 % (ref 19.6–45.3)
MAGNESIUM SERPL-MCNC: 1.9 MG/DL (ref 1.6–2.3)
MCH RBC QN AUTO: 29.4 PG (ref 26.6–33)
MCHC RBC AUTO-ENTMCNC: 33.6 G/DL (ref 31.5–35.7)
MCV RBC AUTO: 87.6 FL (ref 79–97)
MONOCYTES # BLD AUTO: 0.19 10*3/MM3 (ref 0.1–0.9)
MONOCYTES NFR BLD AUTO: 3.5 % (ref 5–12)
NEUTROPHILS # BLD AUTO: 4.53 10*3/MM3 (ref 1.7–7)
NEUTROPHILS NFR BLD AUTO: 84 % (ref 42.7–76)
NRBC BLD AUTO-RTO: 0 /100 WBC (ref 0–0.2)
PHOSPHATE SERPL-MCNC: 2.8 MG/DL (ref 2.5–4.5)
PLATELET # BLD AUTO: 260 10*3/MM3 (ref 140–450)
PMV BLD AUTO: 9.9 FL (ref 6–12)
POTASSIUM BLD-SCNC: 4.6 MMOL/L (ref 3.5–5.1)
RBC # BLD AUTO: 3.47 10*6/MM3 (ref 3.77–5.28)
SODIUM BLD-SCNC: 138 MMOL/L (ref 137–145)
SODIUM UR-SCNC: 117 MMOL/L (ref 30–90)
TROPONIN I SERPL-MCNC: <0.012 NG/ML (ref 0–0.03)
WBC NRBC COR # BLD: 5.39 10*3/MM3 (ref 3.4–10.8)

## 2019-06-07 PROCEDURE — 83036 HEMOGLOBIN GLYCOSYLATED A1C: CPT | Performed by: INTERNAL MEDICINE

## 2019-06-07 PROCEDURE — 80048 BASIC METABOLIC PNL TOTAL CA: CPT | Performed by: INTERNAL MEDICINE

## 2019-06-07 PROCEDURE — 96361 HYDRATE IV INFUSION ADD-ON: CPT

## 2019-06-07 PROCEDURE — 84100 ASSAY OF PHOSPHORUS: CPT | Performed by: INTERNAL MEDICINE

## 2019-06-07 PROCEDURE — 84300 ASSAY OF URINE SODIUM: CPT | Performed by: INTERNAL MEDICINE

## 2019-06-07 PROCEDURE — 85025 COMPLETE CBC W/AUTO DIFF WBC: CPT | Performed by: INTERNAL MEDICINE

## 2019-06-07 PROCEDURE — 99217 PR OBSERVATION CARE DISCHARGE MANAGEMENT: CPT | Performed by: INTERNAL MEDICINE

## 2019-06-07 PROCEDURE — 71250 CT THORAX DX C-: CPT

## 2019-06-07 PROCEDURE — 84484 ASSAY OF TROPONIN QUANT: CPT | Performed by: INTERNAL MEDICINE

## 2019-06-07 PROCEDURE — 83735 ASSAY OF MAGNESIUM: CPT | Performed by: INTERNAL MEDICINE

## 2019-06-07 PROCEDURE — 25810000003 SODIUM CHLORIDE 0.9 % WITH KCL 20 MEQ 20-0.9 MEQ/L-% SOLUTION: Performed by: INTERNAL MEDICINE

## 2019-06-07 PROCEDURE — G0378 HOSPITAL OBSERVATION PER HR: HCPCS

## 2019-06-07 PROCEDURE — 25010000002 HEPARIN (PORCINE) PER 1000 UNITS: Performed by: INTERNAL MEDICINE

## 2019-06-07 PROCEDURE — 96372 THER/PROPH/DIAG INJ SC/IM: CPT

## 2019-06-07 RX ORDER — TRAMADOL HYDROCHLORIDE 50 MG/1
50 TABLET ORAL EVERY 12 HOURS PRN
Qty: 12 TABLET | Refills: 0 | Status: SHIPPED | OUTPATIENT
Start: 2019-06-07 | End: 2019-06-16

## 2019-06-07 RX ORDER — AMLODIPINE BESYLATE 10 MG/1
10 TABLET ORAL
Qty: 30 TABLET | Refills: 0 | Status: SHIPPED | OUTPATIENT
Start: 2019-06-08 | End: 2019-08-29

## 2019-06-07 RX ORDER — AMLODIPINE BESYLATE 5 MG/1
10 TABLET ORAL
Status: DISCONTINUED | OUTPATIENT
Start: 2019-06-08 | End: 2019-06-07 | Stop reason: HOSPADM

## 2019-06-07 RX ORDER — ROPINIROLE 0.5 MG/1
0.5 TABLET, FILM COATED ORAL NIGHTLY
Qty: 30 TABLET | Refills: 0 | Status: SHIPPED | OUTPATIENT
Start: 2019-06-07 | End: 2020-12-16

## 2019-06-07 RX ADMIN — SODIUM CHLORIDE, PRESERVATIVE FREE 3 ML: 5 INJECTION INTRAVENOUS at 08:19

## 2019-06-07 RX ADMIN — HEPARIN SODIUM 5000 UNITS: 5000 INJECTION, SOLUTION INTRAVENOUS; SUBCUTANEOUS at 06:45

## 2019-06-07 RX ADMIN — HEPARIN SODIUM 5000 UNITS: 5000 INJECTION, SOLUTION INTRAVENOUS; SUBCUTANEOUS at 14:29

## 2019-06-07 RX ADMIN — POTASSIUM CHLORIDE AND SODIUM CHLORIDE 125 ML/HR: 900; 150 INJECTION, SOLUTION INTRAVENOUS at 12:07

## 2019-06-07 RX ADMIN — ATORVASTATIN CALCIUM 10 MG: 10 TABLET, FILM COATED ORAL at 08:15

## 2019-06-07 RX ADMIN — AMLODIPINE BESYLATE 5 MG: 5 TABLET ORAL at 08:15

## 2019-06-07 RX ADMIN — PANTOPRAZOLE SODIUM 40 MG: 40 TABLET, DELAYED RELEASE ORAL at 06:45

## 2019-06-07 NOTE — PROGRESS NOTES
Discharge Planning Assessment  [facility]     Patient Name: Lizbeth Johns  MRN: [id number]  Today's Date: 6/7/2019    Admit Date: 6/6/2019    Discharge Needs Assessment     Row Name 06/07/19 1132       Living Environment    Lives With  alone    Primary Care Provided by  self    Provides Primary Care For  no one, unable/limited ability to care for self    Family Caregiver if Needed  none    Able to Return to Prior Arrangements  yes    Living Arrangement Comments  -- lives alone in one story house       Resource/Environmental Concerns    Resource/Environmental Concerns  -- Has no problem getting food in home or paying bills    Transportation Concerns  car, none       Discharge Needs Assessment    Readmission Within the Last 30 Days  no previous admission in last 30 days    Concerns to be Addressed  no discharge needs identified    Equipment Currently Used at Home  none    Anticipated Changes Related to Illness  none    Equipment Needed After Discharge  none    Offered/Gave Vendor List  no Not nec        Discharge Plan     Row Name 06/07/19 1145       Plan    Plan Spoke to pt and boyfriend in room...Permission granted to speak in front of boyfriend.  Has no POA or Living Will.  Lives alone in a one story house.  Address on Phone # correct.  Independent with ADLS.  Has no Medical equipment.  Plans to go home at discharge.  Denies discharge needs.          Destination      No service coordination in this encounter.      Durable Medical Equipment      No service coordination in this encounter.      Dialysis/Infusion      No service coordination in this encounter.      Home Medical Care      No service coordination in this encounter.      Therapy      No service coordination in this encounter.      Community Resources      No service coordination in this encounter.        Expected Discharge Date and Time     Expected Discharge Date Expected Discharge Time    Jun 7, 2019         Demographic Summary     Row Name  06/07/19 1126       General Information    Admission Type  observation    Arrived From  emergency department    Required Notices Provided  Observation Status Notice    Referral Source  admission list    Reason for Consult  discharge planning        Functional Status     Row Name 06/07/19 1131       Functional Status    Usual Activity Tolerance  good    Current Activity Tolerance  good       Functional Status, IADL    Medications  independent    Meal Preparation  independent    Housekeeping  independent    Laundry  independent    Shopping  independent;other (see comments)       Mental Status    General Appearance WDL  WDL       Mental Status Summary    Recent Changes in Mental Status/Cognitive Functioning  no changes        Psychosocial    No documentation.       Abuse/Neglect    No documentation.       Legal    No documentation.       Substance Abuse    No documentation.       Patient Forms    No documentation.           Rubia Kinney

## 2019-06-07 NOTE — PLAN OF CARE
Problem: Patient Care Overview  Goal: Plan of Care Review  Outcome: Ongoing (interventions implemented as appropriate)   06/07/19 0132   Coping/Psychosocial   Plan of Care Reviewed With patient   Plan of Care Review   Progress improving

## 2019-06-07 NOTE — DISCHARGE SUMMARY
[facility]   DISCHARGE SUMMARY      Name:  Lizbeth Johns   Age:  63 y.o.  Sex:  female  :  1955  MRN:  [id number]   Visit Number:  [id number]  Primary Care Physician:  Yassine Brambila MD  Date of Discharge:  2019  Admission Date:  2019      Discharge Diagnosis:     1.  Acute renal failure, suspect due to medications  2.  Right upper lobe mass, 1.7 cm, spiculated nodule.  3.  Leg pain, suspect restless leg syndrome, rule out polymyalgia rheumatica, versus DVT  4.  Essential hypertension  5.  1.3 cm left adrenal nodule.      Active Hospital Problems    Diagnosis  POA   • Renal insufficiency [N28.9]  Yes   • Acute renal failure (ARF) (CMS/HCC) [N17.9]  Unknown   • Mass of upper lobe of right lung [R91.8]  Unknown   • Pain in both lower extremities [M79.604, M79.605]  Unknown   • Essential hypertension [I10]  Unknown      Resolved Hospital Problems   No resolved problems to display.         Presenting Problem/History of Present Illness:    Renal insufficiency [N28.9]         Hospital Course:    Patient is a 63-year-old  female who comes in with multiple complaints.  She was sent from urgent care center.  Patient apparently had intermittent chest pain, arm pain, leg pain and general sense of weakness.  Upon questioning here she does not claim the arm pain of the chest pain.  She states her main complaint was bilateral leg pain which started in her lower legs and has now gone to her upper legs.  This is been going on for months.  She states that it is has worsened to the point where it is moderately severe.  She even states that she spoke to Dr. Brambila regarding this and he had prescribed Mobic.  He had later stopped it, and she has no idea why.  She did take some today without relief.  She is also stating that her legs move all over the bed when she is sleeping.  She also complains of fatigue, and she states that she is usually able to do her activities of daily living  without any difficulty.  She currently denies any chest pressure, shortness of breath, nausea, vomiting, diarrhea, fever, chills.  She was sent from the urgent care over to the emergency room.  Lab work in the emergency room showed a creatinine of 2.10.  Patient has never known that she has any kidney failure at all.  Chest x-ray shows right upper lobe mass, the patient did not know anything about this as well.  She claims she has a cough but is nonproductive and she does not think it very significant.  She denies any fevers or chills.  Her vital signs are stable.  Troponin has been negative.  Pro BNP is negative as well.      Patient was given IV fluids, her creatinine has improved down to 1.30.  Patient was placed on Norvasc and lisinopril/hydrochlorothiazide was stopped.  She has been placed on Ultram, and Mobic has been stopped.  Would recommend follow-up BMP in 1 week and follow-up with Dr. Brambila in 1 week.  She currently denies any chest pressure, shortness of breath, nausea, vomiting, or pain.  She is less fatigued today.  Her leg symptoms were better last night with the addition of Requip.  We will give her a prescription for this.  In regards to her lung mass, CT of the chest is in process at the present time.    Patient eagerly wants to go home today as she has a family gathering.  Will discharge her home today after the CT.    Reviewed CT.  Patient has a suspicious spiculated nodule in the right upper lobe as well as a 1.3 cm left adrenal nodule.  Will refer the patient back to Dr. Brambila as well as referred to Dr. Patel as soon as possible.  Patient has been spoken to regarding this.  She otherwise will be discharged home today.      Procedures Performed:           Consults:     Consults     No orders found for last 30 day(s).          Pertinent Test Results:     Lab Results (all)     Procedure Component Value Units Date/Time    Sodium, Urine, Random - Urine, Clean Catch [[id number]]  (Abnormal) Collected:   06/07/19 0907    Specimen:  Urine, Clean Catch Updated:  06/07/19 0958     Sodium, Urine 117 mmol/L     Basic Metabolic Panel [[id number]]  (Abnormal) Collected:  06/07/19 0519    Specimen:  Blood Updated:  06/07/19 0649     Glucose 149 mg/dL      BUN 35 mg/dL      Creatinine 1.30 mg/dL      Sodium 138 mmol/L      Potassium 4.6 mmol/L      Chloride 109 mmol/L      CO2 22.0 mmol/L      Calcium 8.5 mg/dL      eGFR  African Amer 50 mL/min/1.73      BUN/Creatinine Ratio 26.9     Anion Gap 11.6 mmol/L     Narrative:       GFR Normal >60  Chronic Kidney Disease <60  Kidney Failure <15    Magnesium [[id number]]  (Normal) Collected:  06/07/19 0519    Specimen:  Blood Updated:  06/07/19 0646     Magnesium 1.9 mg/dL     Phosphorus [[id number]]  (Normal) Collected:  06/07/19 0519    Specimen:  Blood Updated:  06/07/19 0646     Phosphorus 2.8 mg/dL     Troponin [[id number]]  (Normal) Collected:  06/07/19 0519    Specimen:  Blood Updated:  06/07/19 0646     Troponin I <0.012 ng/mL     Narrative:       Normal Patient Upper Reference Limit (URL) (99th Percentile)=0.03 ng/mL   Non-AMI Illness Reference Limit=0.03-0.11 ng/mL   AMI Confirmation=0.12 ng/mL and above    CBC Auto Differential [[id number]]  (Abnormal) Collected:  06/07/19 0519    Specimen:  Blood Updated:  06/07/19 0619     WBC 5.39 10*3/mm3      RBC 3.47 10*6/mm3      Hemoglobin 10.2 g/dL      Hematocrit 30.4 %      MCV 87.6 fL      MCH 29.4 pg      MCHC 33.6 g/dL      RDW 14.3 %      RDW-SD 46.0 fl      MPV 9.9 fL      Platelets 260 10*3/mm3      Neutrophil % 84.0 %      Lymphocyte % 11.9 %      Monocyte % 3.5 %      Eosinophil % 0.0 %      Basophil % 0.2 %      Immature Grans % 0.4 %      Neutrophils, Absolute 4.53 10*3/mm3      Lymphocytes, Absolute 0.64 10*3/mm3      Monocytes, Absolute 0.19 10*3/mm3      Eosinophils, Absolute 0.00 10*3/mm3      Basophils, Absolute 0.01 10*3/mm3      Immature Grans, Absolute 0.02 10*3/mm3      nRBC 0.0 /100 WBC     Hemoglobin A1c  [[id number]] Collected:  06/07/19 0519    Specimen:  Blood Updated:  06/07/19 0606    Sedimentation Rate [[id number]]  (Abnormal) Collected:  06/06/19 1812    Specimen:  Blood Updated:  06/06/19 1948     Sed Rate 67 mm/hr     C-reactive Protein [[id number]]  (Abnormal) Collected:  06/06/19 1812    Specimen:  Blood Updated:  06/06/19 1902     C-Reactive Protein 1.90 mg/dL     Troponin [[id number]]  (Normal) Collected:  06/06/19 1812    Specimen:  Blood Updated:  06/06/19 1843     Troponin I <0.012 ng/mL     Narrative:       Normal Patient Upper Reference Limit (URL) (99th Percentile)=0.03 ng/mL   Non-AMI Illness Reference Limit=0.03-0.11 ng/mL   AMI Confirmation=0.12 ng/mL and above    Troponin [[id number]]  (Normal) Collected:  06/06/19 1555    Specimen:  Blood Updated:  06/06/19 1633     Troponin I <0.012 ng/mL     Narrative:       Normal Patient Upper Reference Limit (URL) (99th Percentile)=0.03 ng/mL   Non-AMI Illness Reference Limit=0.03-0.11 ng/mL   AMI Confirmation=0.12 ng/mL and above    Comprehensive Metabolic Panel [[id number]]  (Abnormal) Collected:  06/06/19 1350    Specimen:  Blood Updated:  06/06/19 1422     Glucose 143 mg/dL      BUN 40 mg/dL      Creatinine 2.10 mg/dL      Sodium 135 mmol/L      Potassium 3.6 mmol/L      Chloride 97 mmol/L      CO2 26.0 mmol/L      Calcium 9.3 mg/dL      Total Protein 8.5 g/dL      Albumin 4.90 g/dL      ALT (SGPT) 20 U/L      AST (SGOT) 30 U/L      Alkaline Phosphatase 115 U/L      Total Bilirubin 0.5 mg/dL      eGFR  African Amer 29 mL/min/1.73      Globulin 3.6 gm/dL      A/G Ratio 1.4 g/dL      BUN/Creatinine Ratio 19.0     Anion Gap 15.6 mmol/L     Narrative:       GFR Normal >60  Chronic Kidney Disease <60  Kidney Failure <15    Troponin [[id number]]  (Normal) Collected:  06/06/19 1350    Specimen:  Blood Updated:  06/06/19 1422     Troponin I <0.012 ng/mL     Narrative:       Normal Patient Upper Reference Limit (URL) (99th Percentile)=0.03 ng/mL   Non-AMI Illness  Reference Limit=0.03-0.11 ng/mL   AMI Confirmation=0.12 ng/mL and above    BNP [[id number]]  (Normal) Collected:  06/06/19 1350    Specimen:  Blood Updated:  06/06/19 1422     proBNP 90.3 pg/mL     CBC & Differential [[id number]] Collected:  06/06/19 1350    Specimen:  Blood Updated:  06/06/19 1357    Narrative:       The following orders were created for panel order CBC & Differential.  Procedure                               Abnormality         Status                     ---------                               -----------         ------                     CBC Auto Differential[[id number]]        Normal              Final result                 Please view results for these tests on the individual orders.    CBC Auto Differential [[id number]]  (Normal) Collected:  06/06/19 1350    Specimen:  Blood Updated:  06/06/19 1357     WBC 6.59 10*3/mm3      RBC 4.12 10*6/mm3      Hemoglobin 12.1 g/dL      Hematocrit 36.3 %      MCV 88.1 fL      MCH 29.4 pg      MCHC 33.3 g/dL      RDW 14.4 %      RDW-SD 46.4 fl      MPV 9.3 fL      Platelets 280 10*3/mm3      Neutrophil % 57.8 %      Lymphocyte % 27.9 %      Monocyte % 11.4 %      Eosinophil % 1.8 %      Basophil % 0.6 %      Immature Grans % 0.5 %      Neutrophils, Absolute 3.81 10*3/mm3      Lymphocytes, Absolute 1.84 10*3/mm3      Monocytes, Absolute 0.75 10*3/mm3      Eosinophils, Absolute 0.12 10*3/mm3      Basophils, Absolute 0.04 10*3/mm3      Immature Grans, Absolute 0.03 10*3/mm3      nRBC 0.0 /100 WBC           Imaging Results (all)     Procedure Component Value Units Date/Time    US Renal Limited [[id number]] Collected:  06/07/19 0755     Updated:  06/07/19 0757    Narrative:       PROCEDURE: US RENAL LIMITED-     HISTORY: renal failure; N28.9-Disorder of kidney and ureter,  unspecified; R91.1-Solitary pulmonary nodule     PROCEDURE: Ultrasound images of the kidneys were obtained.     FINDINGS:.    The kidneys are normal in size and echogenicity with the right  kidney  measuring 9.9 cm and the left kidney measuring 9.6 cm. There are small  bilateral renal cysts. There is no solid renal mass. There is no  hydronephrosis.       Impression:       Bilateral renal cysts with no evidence of renal obstruction.     This report was finalized on 6/7/2019 7:55 AM by Ashley Ariza M.D..    US Venous Doppler Lower Extremity Bilateral (duplex) [[id number]] Collected:  06/07/19 0754     Updated:  06/07/19 0757    Narrative:       PROCEDURE: US VENOUS DOPPLER LOWER EXTREMITY BILATERAL (DUPLEX)-     HISTORY: leg pain; N28.9-Disorder of kidney and ureter, unspecified;  R91.1-Solitary pulmonary nodule     PROCEDURE: Multiple transverse and longitudinal scans were performed of  both femoropopliteal deep venous system, with augmentation and  compression maneuvers.     FINDINGS: Normal phasic flow was noted in the visualized deep venous  system. No intraluminal increased echogenicity is noted to suggest  thrombus. There is normal compression and augmentation of the venous  structures.  No abnormal venous collaterals are seen.       Impression:       No evidence of deep venous thrombosis.     This report was finalized on 6/7/2019 7:54 AM by Ashley Ariza M.D..    XR Chest 2 View [[id number]] Collected:  06/06/19 1455     Updated:  06/06/19 1502    Narrative:       PROCEDURE: XR CHEST 2 VW-        HISTORY: cough, short of breath     COMPARISON: 01/29/2018.     FINDINGS: The heart is normal in size. The mediastinum is unremarkable.  A nodular opacity is noted in the right upper lobe medially. The left  lung is clear. There is no pneumothorax. There are no acute osseous  abnormalities.       Impression:       Nodular opacity in the medial right upper lobe which should  be followed up with a CT of the chest with contrast.           This report was finalized on 6/6/2019 2:56 PM by Ashley Ariza M.D..          Condition on Discharge:      Stable    Vital Signs:    /74 (BP  "Location: Left arm, Patient Position: Lying)   Pulse 66   Temp 97.7 °F (36.5 °C) (Oral)   Resp 16   Ht 152.4 cm (60\")   Wt 75 kg (165 lb 4.8 oz)   LMP 03/16/2004   SpO2 100%   BMI 32.28 kg/m²     Physical Exam:      General Appearance:    Alert, cooperative, in no acute distress   Head:    Normocephalic, without obvious abnormality, atraumatic   Eyes:            Lids and lashes normal, conjunctivae and sclerae normal, no   icterus, no pallor, corneas clear, PERRLA   Ears:    Ears appear intact with no abnormalities noted   Throat:   No oral lesions, no thrush, oral mucosa moist   Neck:   No adenopathy, supple, trachea midline, no thyromegaly, no     carotid bruit, no JVD   Back:     No kyphosis present, no scoliosis present, no skin lesions,       erythema or scars, no tenderness to percussion or                   palpation,   range of motion normal   Lungs:     Clear to auscultation,respirations regular, even and                   unlabored    Heart:    Regular rhythm and normal rate, normal S1 and S2, no            murmur, no gallop, no rub, no click   Breast Exam:    Deferred   Abdomen:     Normal bowel sounds, no masses, no organomegaly, soft        non-tender, non-distended, no guarding, no rebound                 tenderness   Genitalia:    Deferred   Extremities:   Moves all extremities well, no edema, no cyanosis, no              redness   Pulses:   Pulses palpable and equal bilaterally   Skin:   No bleeding, bruising or rash   Lymph nodes:   No palpable adenopathy   Neurologic:   Cranial nerves 2 - 12 grossly intact, sensation intact, DTR        present and equal bilaterally       Discharge Disposition:    Home or Self Care    Discharge Medication:       Discharge Medications      New Medications      Instructions Start Date   amLODIPine 10 MG tablet  Commonly known as:  NORVASC   10 mg, Oral, Every 24 Hours Scheduled   Start Date:  6/8/2019     rOPINIRole 0.5 MG tablet  Commonly known as:  REQUIP   " 0.5 mg, Oral, Nightly, Take 1 hour before bedtime.      traMADol 50 MG tablet  Commonly known as:  ULTRAM   50 mg, Oral, Every 12 Hours PRN         Continue These Medications      Instructions Start Date   aspirin 81 MG EC tablet   81 mg, Oral, Daily      omeprazole 40 MG capsule  Commonly known as:  priLOSEC   40 mg, Oral, Daily      simvastatin 20 MG tablet  Commonly known as:  ZOCOR   20 mg, Oral, Nightly         Stop These Medications    lisinopril-hydrochlorothiazide 20-12.5 MG per tablet  Commonly known as:  PRINZIDE,ZESTORETIC     meloxicam 15 MG tablet  Commonly known as:  MOBIC            Discharge Diet:     Diet Instructions     Diet: Cardiac      Discharge Diet:  Cardiac          Activity at Discharge:     Activity Instructions     Activity as Tolerated            Follow-up Appointments:    Future Appointments   Date Time Provider Department Center   7/18/2019  9:30 AM [facility]  [facility]     Additional Instructions for the Follow-ups that You Need to Schedule     Discharge Follow-up with PCP   As directed       Currently Documented PCP:    Yassine Brambila MD    PCP Phone Number:    [phone]     Follow Up Details:  1 week         Discharge Follow-up with Specialty: dr vasquez martinez   As directed      Specialty:  dr vasquez martinez         Renal Function Panel    Jun 14, 2019 (Approximate)      Results to dr brambila    Order Comments:  Results to dr brambila                Test Results Pending at Discharge:     Order Current Status    Hemoglobin A1c In process             Sanju Briseno DO  06/07/19  1:41 PM

## 2019-06-10 ENCOUNTER — PREP FOR SURGERY (OUTPATIENT)
Dept: OTHER | Facility: HOSPITAL | Age: 64
End: 2019-06-10

## 2019-06-10 ENCOUNTER — OFFICE VISIT (OUTPATIENT)
Dept: PULMONOLOGY | Facility: CLINIC | Age: 64
End: 2019-06-10

## 2019-06-10 ENCOUNTER — TELEPHONE (OUTPATIENT)
Dept: TELEMETRY | Facility: HOSPITAL | Age: 64
End: 2019-06-10

## 2019-06-10 VITALS
HEIGHT: 60 IN | BODY MASS INDEX: 31.8 KG/M2 | DIASTOLIC BLOOD PRESSURE: 88 MMHG | SYSTOLIC BLOOD PRESSURE: 120 MMHG | OXYGEN SATURATION: 95 % | RESPIRATION RATE: 18 BRPM | WEIGHT: 162 LBS | HEART RATE: 90 BPM

## 2019-06-10 DIAGNOSIS — IMO0001 LUNG NODULE < 6CM ON CT: ICD-10-CM

## 2019-06-10 DIAGNOSIS — J43.9 PULMONARY EMPHYSEMA, UNSPECIFIED EMPHYSEMA TYPE (HCC): ICD-10-CM

## 2019-06-10 DIAGNOSIS — F17.200 SMOKING: ICD-10-CM

## 2019-06-10 DIAGNOSIS — R93.89 ABNORMAL CT OF THE CHEST: Primary | ICD-10-CM

## 2019-06-10 PROCEDURE — 99204 OFFICE O/P NEW MOD 45 MIN: CPT | Performed by: INTERNAL MEDICINE

## 2019-06-10 RX ORDER — SODIUM CHLORIDE 0.9 % (FLUSH) 0.9 %
3 SYRINGE (ML) INJECTION EVERY 12 HOURS SCHEDULED
Status: CANCELLED | OUTPATIENT
Start: 2019-06-10

## 2019-06-10 RX ORDER — SODIUM CHLORIDE 0.9 % (FLUSH) 0.9 %
3-10 SYRINGE (ML) INJECTION AS NEEDED
Status: CANCELLED | OUTPATIENT
Start: 2019-06-10

## 2019-06-10 RX ORDER — SODIUM CHLORIDE 9 MG/ML
42 INJECTION, SOLUTION INTRAVENOUS CONTINUOUS
Status: CANCELLED | OUTPATIENT
Start: 2019-06-10

## 2019-06-10 NOTE — H&P (VIEW-ONLY)
"CONSULT NOTE    Requested by:   Yassine Brambila MD      Chief Complaint   Patient presents with   • Consult     Abnormal CT scan of the chest       Subjective:  Lizbeth Johns is a 63 y.o. female.     History of Present Illness   Patient comes in today for consultation because of abnormal CT.    Patient underwent a CT due to weakness and \"feeling weird\". she was told that the imaging study showed a 1.7 cm lung nodule for which a pulmonology consultation was requested.    Patient says that she is a smoker and has been doing so for the past 50 years. The patient describes family members with a history of lung cancer, in her mother.    She denies any shortness of breath.     The following portions of the patient's history were reviewed and updated as appropriate: allergies, current medications, past family history, past medical history, past social history and past surgical history.    Review of Systems   Constitutional: Negative for chills, fatigue and fever.   HENT: Negative for rhinorrhea, sinus pressure, sneezing, sore throat and trouble swallowing.    Respiratory: Positive for cough and wheezing. Negative for chest tightness and shortness of breath.    Cardiovascular: Negative for chest pain, palpitations and leg swelling.   Psychiatric/Behavioral: Positive for sleep disturbance.   All other systems reviewed and are negative.      Past Medical History:   Diagnosis Date   • Acid reflux    • Arthritis    • Cataract, bilateral    • Full dentures    • High cholesterol    • Hyperlipidemia    • Hypertension    • Wears glasses        Social History     Tobacco Use   • Smoking status: Current Every Day Smoker     Packs/day: 1.00     Years: 50.00     Pack years: 50.00     Types: Cigarettes   • Smokeless tobacco: Never Used   Substance Use Topics   • Alcohol use: Yes     Alcohol/week: 1.2 oz     Types: 2 Glasses of wine per week     Comment: once in a while          Objective:  Visit Vitals  /88   Pulse 90 " "  Resp 18   Ht 152.4 cm (60\")   Wt 73.5 kg (162 lb)   LMP 03/16/2004   SpO2 95%   BMI 31.64 kg/m²       Physical Exam   Constitutional: She is oriented to person, place, and time. She appears well-developed.   HENT:   Head: Normocephalic and atraumatic.   Dentures noted.    Eyes: EOM are normal.   Neck: Neck supple. No JVD present.   Cardiovascular: Normal rate and regular rhythm.   Pulmonary/Chest: Effort normal. She has no rales.   Somewhat hyperresonant to percussion.  Decreased Air Entry Bilaterally.   Musculoskeletal:   Gait was normal.   Neurological: She is alert and oriented to person, place, and time.   Skin: Skin is warm and dry.   Psychiatric: She has a normal mood and affect. Her behavior is normal.   Vitals reviewed.      Assessment/Plan:  Lizbeth was seen today for consult.    Diagnoses and all orders for this visit:    Abnormal CT of the chest  -     Pulmonary Function Test; Future    Lung nodule < 6cm on CT    Smoking    Pulmonary emphysema, unspecified emphysema type (CMS/HCC)  -     Pulmonary Function Test; Future        Return in about 2 weeks (around 6/24/2019) for Recheck, Overbook, PFT on day of F/Up.    DISCUSSION(if any):  I have reviewed the patient's imaging studies and shared them with her.    Last CT scan was reviewed in great detail with the patient.  Results for orders placed during the hospital encounter of 06/06/19   CT Chest Without Contrast    Narrative PROCEDURE: CT CHEST WO CONTRAST-     HISTORY: Neoplasm: chest, lung, suspected; N28.9-Disorder of kidney and  ureter, unspecified; R91.1-Solitary pulmonary nodule; N17.9-Acute kidney  failure, unspecified; R91.8-Other nonspecific abnormal finding of lung  field; I10-Essential (primary) hypertension     COMPARISON:  None .     PROCEDURE:  Multiple axial CT images were obtained from the thoracic  inlet through the upper abdomen without the use of contrast.      FINDINGS:   Soft tissue windows reveal no axillary, hilar or mediastinal " adenopathy.  Heart size is normal. The aorta is normal in caliber. There are no  pleural or pericardial effusions. Lung windows reveal a spiculated  nodule in the right upper lobe measuring approximately 1.7 cm. No other  noncalcified pulmonary nodules are identified. Within the visualized  upper abdomen there are bilateral renal cysts. There is a suspected 1.3  cm left adrenal nodule. The upper abdomen is otherwise unremarkable.  Bone windows reveal no acute osseous abnormalities.       Impression 1. 1.7 cm spiculated nodule in the right upper lobe which may reflect a  primary or metastatic process. A soft tissue diagnosis is recommended.  2. Suspected 1.3 cm left adrenal nodule which should be followed up with  a CT adrenal mass protocol.     385.97 mGy.cm        This study was performed with techniques to keep radiation doses as low  as reasonably achievable (ALARA). Individualized dose reduction  techniques using automated exposure control or adjustment of mA and/or  kV according to the patient size were employed.      This report was finalized on 6/7/2019 11:50 AM by Ashley Ariza M.D..       I have also reviewed her last discharge summary that mentions acute renal failure and right upper lobe mass along with adrenal nodule.     ===========================  ===========================  Laboratory workup was also reviewed which showed   Lab Results   Component Value Date    CO2 22.0 (L) 06/07/2019     ===========================  ===========================    Based on the history obtained today, and based on the latest guidelines, the patient will need to be considered for a bronchoscopy.     The risks of Bronchoscopy including but not limited to damage to the overlying structures, pneumothorax, bleeding, worsening of respiratory failure, requirement for chest tube placement among others were explained.    Will also send Biologic Genomic marker.     The alternatives were also discussed with the patient.      The patient has considered the above-mentioned risks, benefits and alternatives and has agreed to proceed with the procedure.    Patient was offered modalities such as Chantix/nicotine patches/Wellbutrin to aid in smoking cessation.    The patient says that she will think about it and get back to us regarding her choice, once a decision has been taken.     Patient was given reading material.    I advised Lizbeth of the risks of continuing to use tobacco, and I provided her with tobacco cessation educational materials in the After Visit Summary.     During this visit, I spent 5-10 minutes counseling the patient regarding tobacco cessation.    PFTs will be done on the day of follow up.       Dictated utilizing Dragon dictation.    This document was electronically signed by Gudelia Patel MD on 06/10/19 at 2:36 PM

## 2019-06-12 ENCOUNTER — DOCUMENTATION (OUTPATIENT)
Dept: PULMONOLOGY | Facility: CLINIC | Age: 64
End: 2019-06-12

## 2019-06-12 NOTE — PROGRESS NOTES
Called patient's daughter, at the request of the patient. Informed her about the RUL nodule being very concerning for malignancy. Informed of the issues with bronchoscopy possibly not able to reach the lesion.     Answered some other questions about bronchoscopy and future course of action.     This document was electronically signed by Gudelia Patel MD on 06/12/19 at 8:39 AM

## 2019-06-13 ENCOUNTER — APPOINTMENT (OUTPATIENT)
Dept: GENERAL RADIOLOGY | Facility: HOSPITAL | Age: 64
End: 2019-06-13

## 2019-06-13 ENCOUNTER — ANESTHESIA (OUTPATIENT)
Dept: PERIOP | Facility: HOSPITAL | Age: 64
End: 2019-06-13

## 2019-06-13 ENCOUNTER — HOSPITAL ENCOUNTER (OUTPATIENT)
Facility: HOSPITAL | Age: 64
Setting detail: HOSPITAL OUTPATIENT SURGERY
Discharge: HOME | End: 2019-06-13
Attending: INTERNAL MEDICINE | Admitting: INTERNAL MEDICINE

## 2019-06-13 ENCOUNTER — ANESTHESIA EVENT (OUTPATIENT)
Dept: PERIOP | Facility: HOSPITAL | Age: 64
End: 2019-06-13

## 2019-06-13 VITALS
OXYGEN SATURATION: 92 % | WEIGHT: 162 LBS | TEMPERATURE: 96.6 F | SYSTOLIC BLOOD PRESSURE: 117 MMHG | RESPIRATION RATE: 20 BRPM | BODY MASS INDEX: 31.8 KG/M2 | HEIGHT: 60 IN | DIASTOLIC BLOOD PRESSURE: 74 MMHG | HEART RATE: 96 BPM

## 2019-06-13 DIAGNOSIS — R93.89 ABNORMAL CT OF THE CHEST: ICD-10-CM

## 2019-06-13 DIAGNOSIS — IMO0001 LUNG NODULE < 6CM ON CT: ICD-10-CM

## 2019-06-13 LAB
DEPRECATED RDW RBC AUTO: 46.5 FL (ref 37–54)
ERYTHROCYTE [DISTWIDTH] IN BLOOD BY AUTOMATED COUNT: 14.5 % (ref 12.3–15.4)
HCT VFR BLD AUTO: 31.8 % (ref 34–46.6)
HGB BLD-MCNC: 10.7 G/DL (ref 12–15.9)
MCH RBC QN AUTO: 29.2 PG (ref 26.6–33)
MCHC RBC AUTO-ENTMCNC: 33.6 G/DL (ref 31.5–35.7)
MCV RBC AUTO: 86.9 FL (ref 79–97)
PLATELET # BLD AUTO: 237 10*3/MM3 (ref 140–450)
PMV BLD AUTO: 9.9 FL (ref 6–12)
RBC # BLD AUTO: 3.66 10*6/MM3 (ref 3.77–5.28)
WBC NRBC COR # BLD: 6.08 10*3/MM3 (ref 3.4–10.8)

## 2019-06-13 PROCEDURE — 87206 SMEAR FLUORESCENT/ACID STAI: CPT | Performed by: INTERNAL MEDICINE

## 2019-06-13 PROCEDURE — 87070 CULTURE OTHR SPECIMN AEROBIC: CPT | Performed by: INTERNAL MEDICINE

## 2019-06-13 PROCEDURE — 88305 TISSUE EXAM BY PATHOLOGIST: CPT | Performed by: INTERNAL MEDICINE

## 2019-06-13 PROCEDURE — 31624 DX BRONCHOSCOPE/LAVAGE: CPT | Performed by: INTERNAL MEDICINE

## 2019-06-13 PROCEDURE — 31623 DX BRONCHOSCOPE/BRUSH: CPT | Performed by: INTERNAL MEDICINE

## 2019-06-13 PROCEDURE — 88112 CYTOPATH CELL ENHANCE TECH: CPT | Performed by: INTERNAL MEDICINE

## 2019-06-13 PROCEDURE — 25010000002 ONDANSETRON PER 1 MG: Performed by: NURSE ANESTHETIST, CERTIFIED REGISTERED

## 2019-06-13 PROCEDURE — 25010000002 PROPOFOL 10 MG/ML EMULSION: Performed by: NURSE ANESTHETIST, CERTIFIED REGISTERED

## 2019-06-13 PROCEDURE — 31628 BRONCHOSCOPY/LUNG BX EACH: CPT | Performed by: INTERNAL MEDICINE

## 2019-06-13 PROCEDURE — 85027 COMPLETE CBC AUTOMATED: CPT | Performed by: INTERNAL MEDICINE

## 2019-06-13 PROCEDURE — 87102 FUNGUS ISOLATION CULTURE: CPT | Performed by: INTERNAL MEDICINE

## 2019-06-13 PROCEDURE — 87116 MYCOBACTERIA CULTURE: CPT | Performed by: INTERNAL MEDICINE

## 2019-06-13 PROCEDURE — 71045 X-RAY EXAM CHEST 1 VIEW: CPT

## 2019-06-13 PROCEDURE — 87205 SMEAR GRAM STAIN: CPT | Performed by: INTERNAL MEDICINE

## 2019-06-13 PROCEDURE — 25010000002 MIDAZOLAM PER 1 MG: Performed by: NURSE ANESTHETIST, CERTIFIED REGISTERED

## 2019-06-13 PROCEDURE — 25010000002 PROPOFOL 1000 MG/ML EMULSION: Performed by: NURSE ANESTHETIST, CERTIFIED REGISTERED

## 2019-06-13 RX ORDER — SODIUM CHLORIDE 9 MG/ML
42 INJECTION, SOLUTION INTRAVENOUS CONTINUOUS
Status: DISCONTINUED | OUTPATIENT
Start: 2019-06-13 | End: 2019-06-13 | Stop reason: HOSPADM

## 2019-06-13 RX ORDER — MAGNESIUM HYDROXIDE 1200 MG/15ML
LIQUID ORAL AS NEEDED
Status: DISCONTINUED | OUTPATIENT
Start: 2019-06-13 | End: 2019-06-13 | Stop reason: HOSPADM

## 2019-06-13 RX ORDER — ONDANSETRON 2 MG/ML
INJECTION INTRAMUSCULAR; INTRAVENOUS AS NEEDED
Status: DISCONTINUED | OUTPATIENT
Start: 2019-06-13 | End: 2019-06-13 | Stop reason: SURG

## 2019-06-13 RX ORDER — IPRATROPIUM BROMIDE AND ALBUTEROL SULFATE 2.5; .5 MG/3ML; MG/3ML
3 SOLUTION RESPIRATORY (INHALATION) ONCE AS NEEDED
Status: CANCELLED | OUTPATIENT
Start: 2019-06-13

## 2019-06-13 RX ORDER — PROPOFOL 10 MG/ML
VIAL (ML) INTRAVENOUS AS NEEDED
Status: DISCONTINUED | OUTPATIENT
Start: 2019-06-13 | End: 2019-06-13 | Stop reason: SURG

## 2019-06-13 RX ORDER — ACETAMINOPHEN 650 MG/1
650 SUPPOSITORY RECTAL ONCE AS NEEDED
Status: CANCELLED | OUTPATIENT
Start: 2019-06-13

## 2019-06-13 RX ORDER — KETAMINE HYDROCHLORIDE 50 MG/ML
INJECTION, SOLUTION, CONCENTRATE INTRAMUSCULAR; INTRAVENOUS AS NEEDED
Status: DISCONTINUED | OUTPATIENT
Start: 2019-06-13 | End: 2019-06-13 | Stop reason: SURG

## 2019-06-13 RX ORDER — GLYCOPYRROLATE 0.2 MG/ML
INJECTION INTRAMUSCULAR; INTRAVENOUS AS NEEDED
Status: DISCONTINUED | OUTPATIENT
Start: 2019-06-13 | End: 2019-06-13 | Stop reason: SURG

## 2019-06-13 RX ORDER — ACETAMINOPHEN 325 MG/1
650 TABLET ORAL ONCE AS NEEDED
Status: CANCELLED | OUTPATIENT
Start: 2019-06-13

## 2019-06-13 RX ORDER — LIDOCAINE HYDROCHLORIDE 20 MG/ML
INJECTION, SOLUTION INTRAVENOUS AS NEEDED
Status: DISCONTINUED | OUTPATIENT
Start: 2019-06-13 | End: 2019-06-13 | Stop reason: SURG

## 2019-06-13 RX ORDER — SODIUM CHLORIDE 0.9 % (FLUSH) 0.9 %
3 SYRINGE (ML) INJECTION AS NEEDED
Status: DISCONTINUED | OUTPATIENT
Start: 2019-06-13 | End: 2019-06-13 | Stop reason: HOSPADM

## 2019-06-13 RX ORDER — MIDAZOLAM HYDROCHLORIDE 1 MG/ML
INJECTION INTRAMUSCULAR; INTRAVENOUS AS NEEDED
Status: DISCONTINUED | OUTPATIENT
Start: 2019-06-13 | End: 2019-06-13 | Stop reason: SURG

## 2019-06-13 RX ORDER — LIDOCAINE HYDROCHLORIDE 20 MG/ML
INJECTION, SOLUTION INFILTRATION; PERINEURAL AS NEEDED
Status: DISCONTINUED | OUTPATIENT
Start: 2019-06-13 | End: 2019-06-13 | Stop reason: HOSPADM

## 2019-06-13 RX ORDER — SODIUM CHLORIDE 0.9 % (FLUSH) 0.9 %
3 SYRINGE (ML) INJECTION EVERY 12 HOURS SCHEDULED
Status: DISCONTINUED | OUTPATIENT
Start: 2019-06-13 | End: 2019-06-13 | Stop reason: HOSPADM

## 2019-06-13 RX ORDER — SODIUM CHLORIDE 0.9 % (FLUSH) 0.9 %
3-10 SYRINGE (ML) INJECTION AS NEEDED
Status: DISCONTINUED | OUTPATIENT
Start: 2019-06-13 | End: 2019-06-13 | Stop reason: HOSPADM

## 2019-06-13 RX ADMIN — PROPOFOL 30 MG: 10 INJECTION, EMULSION INTRAVENOUS at 12:25

## 2019-06-13 RX ADMIN — MIDAZOLAM HYDROCHLORIDE 1 MG: 1 INJECTION, SOLUTION INTRAMUSCULAR; INTRAVENOUS at 12:28

## 2019-06-13 RX ADMIN — KETAMINE HYDROCHLORIDE 15 MG: 50 INJECTION, SOLUTION INTRAMUSCULAR; INTRAVENOUS at 12:14

## 2019-06-13 RX ADMIN — PROPOFOL 75 MCG/KG/MIN: 10 INJECTION, EMULSION INTRAVENOUS at 12:14

## 2019-06-13 RX ADMIN — ONDANSETRON 4 MG: 2 INJECTION INTRAMUSCULAR; INTRAVENOUS at 12:14

## 2019-06-13 RX ADMIN — GLYCOPYRROLATE 0.2 MG: 0.2 INJECTION, SOLUTION INTRAMUSCULAR; INTRAVENOUS at 12:14

## 2019-06-13 RX ADMIN — KETAMINE HYDROCHLORIDE 10 MG: 50 INJECTION, SOLUTION INTRAMUSCULAR; INTRAVENOUS at 12:28

## 2019-06-13 RX ADMIN — MIDAZOLAM HYDROCHLORIDE 1 MG: 1 INJECTION, SOLUTION INTRAMUSCULAR; INTRAVENOUS at 12:14

## 2019-06-13 RX ADMIN — LIDOCAINE HYDROCHLORIDE 60 MG: 20 INJECTION, SOLUTION INTRAVENOUS at 12:14

## 2019-06-13 RX ADMIN — SODIUM CHLORIDE 42 ML/HR: 9 INJECTION, SOLUTION INTRAVENOUS at 11:52

## 2019-06-13 RX ADMIN — PROPOFOL 60 MG: 10 INJECTION, EMULSION INTRAVENOUS at 12:14

## 2019-06-13 NOTE — ANESTHESIA POSTPROCEDURE EVALUATION
Patient: Lizbeth Johns    Procedure Summary     Date:  06/13/19 Room / Location:  [facility] /  [facility]    Anesthesia Start:  1211 Anesthesia Stop:      Procedure:  BRONCHOSCOPY WITH FLUOROSCOPY, BIOPSY, BRUSHINGS, AND WASHINGS (N/A Bronchus) Diagnosis:       Abnormal CT of the chest      Lung nodule < 6cm on CT      (Abnormal CT of the chest [R93.89])      (Lung nodule < 6cm on CT [R91.1])    Surgeon:  Gudelia Patel MD Provider:  Popeye Hickey CRNA    Anesthesia Type:  MAC ASA Status:  3          Anesthesia Type: MAC  Last vitals  BP   105/64 (06/13/19 1300)   Temp   96.6 °F (35.9 °C) (06/13/19 1300)   Pulse   102 (06/13/19 1300)   Resp   20 (06/13/19 1300)     SpO2   99 % (06/13/19 1300)     Post Anesthesia Care and Evaluation    Patient location during evaluation: PHASE II  Patient participation: complete - patient participated  Level of consciousness: sleepy but conscious  Pain management: adequate  Airway patency: patent  Anesthetic complications: No anesthetic complications  PONV Status: none  Cardiovascular status: acceptable  Respiratory status: acceptable, face mask and spontaneous ventilation  Hydration status: acceptable

## 2019-06-13 NOTE — OP NOTE
Date of Procedure: June 13, 2019       Type:   1. Bronchoscopy with BAL    2. Bronchoscopy with [place] Biopsy    3. Bronchoscopy with Trans-bronchial biopsies under [place] Guidance      Reason for procedure: Pre-Op Diagnosis Codes:     * Abnormal CT of the chest [R93.89]     * Lung nodule < 6cm on CT [R91.1]      Consent: Consent was obtained from the Patient & Family after explanation of the risks and benefits of the procedure. Risks including but not limited to damage to the overlying structures, pneumothorax, bleeding, infection, worsening of respiratory status, requirement for chest tube placement among others were explained.    Patient & Family understood the risks and benefits and agreed to proceed with the procedure.    Time Out: Time out was done with the RN & Bronch Technician  at the bedside after confirmation of the site of the procedure and name and date of birth with the patient's ID band.    Details of the procedure:   MAC anesthesia was provided by the anesthesia team. Vital signs were monitored by them, as well. Once under MAC, the right nostril was anesthetized with lidocaine gel. The bronchoscope was introduced through the nostril with immediate  visualization of the vocal cords. The vocal cords were adducting and abducting to inspiration and expiration, equally.     Lidocaine was then instilled on the vocal cords and surrounding structures. The bronchoscope was then introduced through the vocal cords with immediate visualization of the trachea.     The trachea was central. The bartolo was sharp.     Left side was inspected first. The bronchoscope was introduced into the main stem.  The left upper lobe and lower lobe were visualized.  No endobronchial lesion noted.  No obvious secretions were noted as well.    Next, the right side was evaluated. The bronchoscope was introduced into the main stem.  The right upper lobe, middle lobe and lower lobe were all identified and no significant endobronchial  lesions are noted.  Upon evaluation of the right upper lobe, posterior subsegment, the mucosa appeared somewhat thickened although there was no mass or significant irregularity noted.    From the right mainstem, a genomic biomarker brush biopsy, x 2, was obtained.    Biopsy specimens were obtained under fluoroscopic guidance, in a transbronchial fashion from the right upper lobe of the lung.     2 separate brushings were also obtained from the same location, using fluoroscopy.      Bronchial alveolar lavage was collected during the procedure as well, before and after the biopsies were obtained. A total of 120 mls was instilled and return of approximately 25 mls was obtained.     The samples obtained during the procedure will be sent for Pathology & Cytology. Additionally, cultures have been ordered.    The patient will be monitored by anesthesia. The patient will be discharged home once deemed stable by anesthesia team and if there are no post-procedural complications.     Complications:  No immediate complications noted.  Blood loss of <2 mls.      Chest X Ray has been ordered.      Post Op Impression:  Pre-Op Diagnosis Codes:     * Abnormal CT of the chest [R93.89]     * Lung nodule < 6cm on CT [R91.1]        This document was electronically signed by Gudelia Patel MD on 06/13/19 at 12:55 PM

## 2019-06-13 NOTE — ANESTHESIA PREPROCEDURE EVALUATION
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  Cardiovascular - normal exam  Exercise tolerance: good (4-7 METS)    ECG reviewed  PT is on anticoagulation therapy    (+) hypertension well controlled less than 2 medications, hyperlipidemia,     ROS comment: 6/2019 NSR ecg    Asa 81 MG taken 6/11/2019    Neuro/Psych- negative ROS  GI/Hepatic/Renal/Endo    (+)  GERD,  renal disease ARF,     Musculoskeletal     (+) chronic pain,   Abdominal  - normal exam   Substance History   (+) alcohol use,       Comment: 1.2 oz of etoh per week    OB/GYN negative ob/gyn ROS         Other   (+) arthritis     ROS/Med Hx Other: Colon cancer screening  Epigastric pain  Heartburn  Lesion of colon  History of colon polyps  Family history of colon cancer  Constipation  Renal insufficiency  Acute renal failure (ARF)   Mass of upper lobe of right lung  Pain in both lower extremities  Essential hypertension  Abnormal CT of the chest  Lung nodule < 6cm on CT                      Anesthesia Plan    ASA 3     MAC   (Patient advised that intravenous sedation would be used as the primary anesthetic, along with local anesthesia if necessary. Every effort will be made to make sure the patient is comfortable.     The patient was told that they may experience recall of the procedure. The patient was further advised that if the MAC anesthetic was deemed ineffective that general anesthesia administered via LMA or ETT may be required.    Patient verbalized understanding and agreed to plan of care. )  intravenous induction   Anesthetic plan, all " risks, benefits, and alternatives have been provided, discussed and informed consent has been obtained with: patient.    Plan discussed with CRNA.

## 2019-06-13 NOTE — DISCHARGE INSTRUCTIONS
Rest today  No pushing,pulling,tugging,heavy lifting, or strenuous activity   No major decision making,driving,or drinking alcoholic beverages for 24 hours due to the sedation you received  Always use good hand hygiene/washing technique  No driving on pain medication.      To assist you in voiding:  Drink plenty of fluids  Listen to running water while attempting to void.    If you are unable to urinate and you have an uncomfortable urge to void or it has been   6 hours since you were discharged, return to the Emergency Room.    *********************************************************************************    BRONCHOSCOPY AFTER CARE:  WHAT TO EXPECT AFTER THE PROCEDURE  It is normal to have these symptoms 24-48 hours following your procedure.  * increased cough  * low grade fever  * sore throat or hoarse voice  * small streaks of blood in your thick spit(sputum)  HOME CARE INSTRUCTIONS  * Do not eat or drink anything for two hours (or as instructed by your physician) after your procedure.Then for the first 4 hours cool liquids as tolerated. If you try to eat or drink before the medicine wears off, food or drink could go into your lungs or you could burn your self.  After the numbness is gone and your cough and gag reflexes have returned, you may eat soft food and drink room temperature liquids slowly.  * The day after the procedure, you can go back to your normal diet.  * You may resume normal activities.  * You make take tylenol 650 mg for low grade temperature.  * Keep all follow up visits as directed by your health care provider.  SEED IMMEDIATE MEDICAL CARE IF:  * You experience increasing shortness of breath.  * You become light-headed or faint.  * You have chest pain.  * You cough up more than a small amount of blood( a cup in 6 hours)  * The amount of blood you cough up increases.  MAKE SURE YOU:  * Understand these instructions.  * Will watch your condition.  * Will get help right away if you are not  doing well (if unable to contact your provider, return to the ED)  * No driving , no alcohol, and no major decisions for 24 hours.  * Avoid cigarettes for at least 24 hours.  Plan to stop smoking!

## 2019-06-16 LAB
BACTERIA SPEC RESP CULT: NORMAL
GRAM STN SPEC: NORMAL

## 2019-06-18 LAB
LAB AP CASE REPORT: NORMAL
LAB AP DIAGNOSIS COMMENT: NORMAL

## 2019-06-19 ENCOUNTER — ANESTHESIA (OUTPATIENT)
Dept: GASTROENTEROLOGY | Facility: HOSPITAL | Age: 64
End: 2019-06-19

## 2019-06-19 ENCOUNTER — HOSPITAL ENCOUNTER (OUTPATIENT)
Facility: HOSPITAL | Age: 64
Setting detail: HOSPITAL OUTPATIENT SURGERY
Discharge: HOME | End: 2019-06-19
Attending: INTERNAL MEDICINE | Admitting: INTERNAL MEDICINE

## 2019-06-19 ENCOUNTER — ANESTHESIA EVENT (OUTPATIENT)
Dept: GASTROENTEROLOGY | Facility: HOSPITAL | Age: 64
End: 2019-06-19

## 2019-06-19 VITALS
HEART RATE: 90 BPM | HEIGHT: 60 IN | RESPIRATION RATE: 18 BRPM | OXYGEN SATURATION: 96 % | DIASTOLIC BLOOD PRESSURE: 71 MMHG | SYSTOLIC BLOOD PRESSURE: 133 MMHG | BODY MASS INDEX: 31.8 KG/M2 | TEMPERATURE: 97.1 F | WEIGHT: 162 LBS

## 2019-06-19 DIAGNOSIS — K59.00 CONSTIPATION: ICD-10-CM

## 2019-06-19 DIAGNOSIS — Z80.0 FAMILY HISTORY OF COLON CANCER: ICD-10-CM

## 2019-06-19 DIAGNOSIS — Z86.010 HISTORY OF COLON POLYPS: ICD-10-CM

## 2019-06-19 PROCEDURE — 88305 TISSUE EXAM BY PATHOLOGIST: CPT | Performed by: INTERNAL MEDICINE

## 2019-06-19 PROCEDURE — 25010000002 PROPOFOL 1000 MG/ML EMULSION: Performed by: NURSE ANESTHETIST, CERTIFIED REGISTERED

## 2019-06-19 PROCEDURE — 25010000002 PROPOFOL 200 MG/20ML EMULSION: Performed by: NURSE ANESTHETIST, CERTIFIED REGISTERED

## 2019-06-19 PROCEDURE — 45380 COLONOSCOPY AND BIOPSY: CPT | Performed by: INTERNAL MEDICINE

## 2019-06-19 RX ORDER — MAGNESIUM HYDROXIDE 1200 MG/15ML
LIQUID ORAL AS NEEDED
Status: DISCONTINUED | OUTPATIENT
Start: 2019-06-19 | End: 2019-06-19 | Stop reason: HOSPADM

## 2019-06-19 RX ORDER — LIDOCAINE 50 MG/G
OINTMENT TOPICAL AS NEEDED
Status: DISCONTINUED | OUTPATIENT
Start: 2019-06-19 | End: 2019-06-19 | Stop reason: HOSPADM

## 2019-06-19 RX ORDER — GLYCOPYRROLATE 0.2 MG/ML
INJECTION INTRAMUSCULAR; INTRAVENOUS AS NEEDED
Status: DISCONTINUED | OUTPATIENT
Start: 2019-06-19 | End: 2019-06-19 | Stop reason: SURG

## 2019-06-19 RX ORDER — IPRATROPIUM BROMIDE AND ALBUTEROL SULFATE 2.5; .5 MG/3ML; MG/3ML
3 SOLUTION RESPIRATORY (INHALATION) ONCE
Status: DISCONTINUED | OUTPATIENT
Start: 2019-06-19 | End: 2019-06-19 | Stop reason: HOSPADM

## 2019-06-19 RX ORDER — IPRATROPIUM BROMIDE AND ALBUTEROL SULFATE 2.5; .5 MG/3ML; MG/3ML
SOLUTION RESPIRATORY (INHALATION)
Status: DISCONTINUED
Start: 2019-06-19 | End: 2019-06-19 | Stop reason: HOSPADM

## 2019-06-19 RX ORDER — PROPOFOL 10 MG/ML
INJECTION, EMULSION INTRAVENOUS AS NEEDED
Status: DISCONTINUED | OUTPATIENT
Start: 2019-06-19 | End: 2019-06-19 | Stop reason: SURG

## 2019-06-19 RX ORDER — LIDOCAINE HYDROCHLORIDE 20 MG/ML
INJECTION, SOLUTION INTRAVENOUS AS NEEDED
Status: DISCONTINUED | OUTPATIENT
Start: 2019-06-19 | End: 2019-06-19 | Stop reason: SURG

## 2019-06-19 RX ORDER — SIMETHICONE 20 MG/.3ML
EMULSION ORAL AS NEEDED
Status: DISCONTINUED | OUTPATIENT
Start: 2019-06-19 | End: 2019-06-19 | Stop reason: HOSPADM

## 2019-06-19 RX ORDER — SODIUM CHLORIDE 9 MG/ML
70 INJECTION, SOLUTION INTRAVENOUS CONTINUOUS PRN
Status: DISCONTINUED | OUTPATIENT
Start: 2019-06-19 | End: 2019-06-19 | Stop reason: HOSPADM

## 2019-06-19 RX ORDER — SODIUM CHLORIDE 0.9 % (FLUSH) 0.9 %
3 SYRINGE (ML) INJECTION AS NEEDED
Status: DISCONTINUED | OUTPATIENT
Start: 2019-06-19 | End: 2019-06-19 | Stop reason: HOSPADM

## 2019-06-19 RX ORDER — KETAMINE HYDROCHLORIDE 50 MG/ML
INJECTION, SOLUTION, CONCENTRATE INTRAMUSCULAR; INTRAVENOUS AS NEEDED
Status: DISCONTINUED | OUTPATIENT
Start: 2019-06-19 | End: 2019-06-19 | Stop reason: SURG

## 2019-06-19 RX ADMIN — SODIUM CHLORIDE 70 ML/HR: 9 INJECTION, SOLUTION INTRAVENOUS at 09:18

## 2019-06-19 RX ADMIN — PROPOFOL 100 MG: 10 INJECTION, EMULSION INTRAVENOUS at 11:19

## 2019-06-19 RX ADMIN — PROPOFOL 100 MCG/KG/MIN: 10 INJECTION, EMULSION INTRAVENOUS at 11:19

## 2019-06-19 RX ADMIN — GLYCOPYRROLATE 0.2 MG: 0.2 INJECTION, SOLUTION INTRAMUSCULAR; INTRAVENOUS at 11:34

## 2019-06-19 RX ADMIN — LIDOCAINE HYDROCHLORIDE 70 MG: 20 INJECTION, SOLUTION INTRAVENOUS at 11:48

## 2019-06-19 RX ADMIN — SODIUM CHLORIDE: 9 INJECTION, SOLUTION INTRAVENOUS at 11:10

## 2019-06-19 RX ADMIN — KETAMINE HYDROCHLORIDE 20 MG: 50 INJECTION, SOLUTION INTRAMUSCULAR; INTRAVENOUS at 11:19

## 2019-06-19 NOTE — OP NOTE
PROCEDURE:  Colonoscopy to the terminal ileum with 5 cold biopsy polypectomies and biopsies.    DATE OF PROCEDURE:  June 19, 2019    REFERRING PROVIDER:  Yassine Brambila MD     INSTRUMENT USED:  Olympus PCF H 190 videocolonoscope.      INDICATIONS OF THE PROCEDURE: This is a 63-year-old Afro-American female for colon cancer screening.  There is history of constipation.  There is positive family history of colon cancer (brother)    PREVIOUS COLONOSCOPY: 2018.  Multiple polyps.    BIOPSIES: For cold biopsy polypectomies were performed in the rectum and one in the sigmoid colon.  Multiple biopsies were obtained from the lipomatous area in the proximal ascending colon.      PHOTOGRAPHS:  Photographs were included in the medical records.     MEDICATIONS:  MAC.       CONSENT/PREPROCEDURE EVALUATION:  Risks, benefits, alternatives and options of the procedure including risks of sedation/anesthesia were discussed with the patient and informed consent was obtained prior to the procedure.  History and physical examination were performed and nothing precluded the test.      REPORT:  The patient was placed in left lateral decubitus position and a digital examination was performed.  Once under the influence of IV sedation, the instrument was inserted into the rectum and advanced under direct vision to cecum which was identified by the ileocecal valve, triradiate folds and appendiceal orifice. The scope was then maneuvered into the terminal ileum.        FINDINGS:      Digital rectal examination:  Good anal tone.  No perianal pathology.  No mass.        Terminal ileum:  7-8 cm.  Normal.     Cecum and ascending colon: 1 cm lipomatous area was seen in the ascending colon.  This was palpated with biopsy forceps.  Multiple biopsies were obtained to include the deeper tissue.  Diverticulosis was noted involving the right colon.     Hepatic flexure, transverse colon, splenic flexure: Diverticulosis.     Descending colon, sigmoid  colon and rectum: Diverticulosis.  A 3 mm sessile polyp in the sigmoid colon was removed with cold biopsy forceps.  4, 3 mm sessile polyps in the rectum were removed with cold biopsy forceps.  A retroflex examination within the rectum revealed internal hemorrhoids.        The scope was then straightened, the lower GI tract was decompressed, and the scope was pulled out of the patient.  The patient tolerated the procedure well.  There were no immediate complications and the patient was transferred in stable condition for post procedure observation.      TECHNICAL DATA:   1. [place] prep score: 6 (2+2+2).  Extended preparation.  The colon was extensively irrigated with water and suction.  2. Anus to cecal time: 6  minutes.  3. Difficulty of examination:  Average.  The colon was noted to be tortuous, redundant and spastic.  4. Withdrawal time: 16 minutes.  5. Procedure time: 29 minutes  6. Retroflex examination in right colon: Not attempted.  Redundant colon and right-sided diverticulosis.  7. Second look Rectum to cecum with decompression: Yes.    DIAGNOSES:    1. Pandiverticulosis.  More pronounced in the left colon is compared to the transverse colon and right colon.  2. Colon polyps.  5 were removed.  3 mm in size.  3. Submucosal lipoma within the proximal ascending colon.  4. Internal hemorrhoids.    RECOMMENDATIONS:     1. Dietary instructions.  2. Follow biopsies.    3. Follow-up: 3 to 4 weeks.  4. Followup colonoscopy: 3 years.          Thank you very much for letting me participate in the care of this patient. Please do not hesitate to call me if you have any questions.

## 2019-06-19 NOTE — INTERVAL H&P NOTE
"  H&P reviewed. The patient was examined and there are no changes to the H&P.       No recent shortness of breath or chest pains.      Blood pressure 116/77, pulse 103, temperature 97.8 °F (36.6 °C), temperature source Temporal, resp. rate 16, height 152.4 cm (60\"), weight 73.5 kg (162 lb), last menstrual period 03/16/2004, SpO2 97 %, not currently breastfeeding.    Chest: clear to auscultation.  Cardiac exam: No S3 no murmurs.   Abdomen: soft bowel sounds present nondistended nontender.        "

## 2019-06-19 NOTE — DISCHARGE INSTRUCTIONS
Rest today  No pushing,pulling,tugging,heavy lifting, or strenuous activity   No major decision making,driving,or drinking alcoholic beverages for 24 hours due to the sedation you received  Always use good hand hygiene/washing technique  No driving on pain medication.    To assist you in voiding:  Drink plenty of fluids  Listen to running water while attempting to void.    If you are unable to urinate and you have an uncomfortable urge to void or it has been   6 hours since you were discharged, return to the Emergency Room.    ************************************************************************************    Postprocedure instructions:    1. Nothing by mouth to fully alert.  2. Once fully alert may have clear liquid diet.  3. Advance diet as tolerated.  4. Vital signs as routine.    Diet:     1. Low-fat diet.  2. David's All Bran-Bran Buds 1/4 cup daily.  3. May add Honey Bunches of Oats with Almonds 1/4 cup for taste.  4. Use almond milk, 1 or 2% milk.  5. Drink liberal amounts of water.    Blood Thinner Directions:    Avoid Aspirin & other NSAIDS for _7__ days. Tylenol is okay.    Treatments:  If still constipated may use over-the-counter Hanks magnesium caplet. Take one caplet one orally at night.    Other Instructions:    Call [facility] at [phone] or come to the Emergency Department if you experience the following: Chest pain, abdominal pain, bleeding (vomiting of blood or coffee colored material, black stools or clare blood in stools), fever/chills, nausea and vomiting or dizziness.      Follow-up:  DR. NAHOMI SIDDIQUI in 3-4 weeks.Office phone # [phone].    Follow-up colonoscopy: in 3 years.    ************************************************************************************    Notes to the patient and the family from Dr. Siddiqui.    Dear patient/family member,    Today your colon was examined extensively from rectum to cecum and beyond into the small intestine twice.    Findings on today's procedure are as follows:    1. Diverticulosis. These are benign outpouchings in the colon.   2. Small Colon polyps. 5 were found and removed.   3. No cancer. No inflammation or colitis. No suggestion of Crohn's disease.  4. Benign fatty tumor (lipoma) in the right colon.  This was biopsied.  5. Internal hemorrhoids.    Recommendations:    1. As above.      Should you have more questions please do not hesitate to talk to the nurse who can call me and let me talk to you.      I hope you feel better.    Zackery Siddiqui M.D., FACP, FACG.

## 2019-06-19 NOTE — ANESTHESIA POSTPROCEDURE EVALUATION
Patient: Lizbeth Johns    Procedure Summary     Date:  06/19/19 Room / Location:  [facility] ENDOSCOPY 2 / [facility] ENDOSCOPY    Anesthesia Start:  1110 Anesthesia Stop:  1154    Procedure:  COLONOSCOPY with cold biopsy polypectomies and cold biopsy (N/A Anus) Diagnosis:       History of colon polyps      Family history of colon cancer      Constipation      (History of colon polyps [Z86.010])      (Family history of colon cancer [Z80.0])      (Constipation [K59.00])    Surgeon:  Zackery Siddiqui MD Provider:  Miguel Angel Serrano CRNA    Anesthesia Type:  MAC ASA Status:  3          Anesthesia Type: MAC  Last vitals  BP   133/71 (06/19/19 1225)   Temp   97.1 °F (36.2 °C) (06/19/19 1200)   Pulse   90 (06/19/19 1225)   Resp   18 (06/19/19 1225)     SpO2   96 % (06/19/19 1225)     Post Anesthesia Care and Evaluation    Patient location during evaluation: bedside  Patient participation: complete - patient participated  Level of consciousness: awake and alert  Pain score: 0  Pain management: satisfactory to patient  Airway patency: patent  Anesthetic complications: No anesthetic complications  PONV Status: none  Cardiovascular status: acceptable and hemodynamically stable  Respiratory status: acceptable  Hydration status: acceptable    Comments: Lungs clear bilat

## 2019-06-19 NOTE — ANESTHESIA PREPROCEDURE EVALUATION
Anesthesia Evaluation     Patient summary reviewed and Nursing notes reviewed   no history of anesthetic complications:  NPO Solid Status: > 8 hours  NPO Liquid Status: > 8 hours           Airway   Mallampati: II  TM distance: >3 FB  Neck ROM: full  no difficulty expected  Dental - normal exam     Pulmonary - normal exam   (+) a smoker Current Smoked day of surgery, shortness of breath,   Cardiovascular - normal exam    ECG reviewed  PT is on anticoagulation therapy  Rhythm: regular  Rate: normal    (+) hypertension less than 2 medications, hyperlipidemia,       Neuro/Psych- negative ROS  GI/Hepatic/Renal/Endo    (+) obesity,  GERD,      Musculoskeletal     Abdominal    Substance History - negative use     OB/GYN negative ob/gyn ROS         Other   (+) arthritis                     Anesthesia Plan    ASA 3     MAC   (Pt told that intravenous sedation will be used as the primary anesthetic along with local anesthesia if necessary. Every effort will be made to make sure the patient is comfortable.     The patient was told they may or may not have recall for the procedure. It was further explained that if the MAC was not adequate that a general anesthetic with either an LMA or endotracheal tube would be required.     Will proceed with the plan of care.)  intravenous induction   Anesthetic plan, all risks, benefits, and alternatives have been provided, discussed and informed consent has been obtained with: patient.

## 2019-06-20 LAB
LAB AP CASE REPORT: NORMAL
PATH REPORT.FINAL DX SPEC: NORMAL

## 2019-06-24 LAB
LAB AP CASE REPORT: NORMAL
PATH REPORT.FINAL DX SPEC: NORMAL

## 2019-06-26 ENCOUNTER — OFFICE VISIT (OUTPATIENT)
Dept: PULMONOLOGY | Facility: CLINIC | Age: 64
End: 2019-06-26

## 2019-06-26 VITALS
WEIGHT: 164 LBS | OXYGEN SATURATION: 94 % | HEIGHT: 60 IN | DIASTOLIC BLOOD PRESSURE: 74 MMHG | HEART RATE: 82 BPM | BODY MASS INDEX: 32.2 KG/M2 | SYSTOLIC BLOOD PRESSURE: 122 MMHG | RESPIRATION RATE: 18 BRPM

## 2019-06-26 DIAGNOSIS — R06.02 SHORTNESS OF BREATH: Primary | ICD-10-CM

## 2019-06-26 DIAGNOSIS — J43.9 PULMONARY EMPHYSEMA, UNSPECIFIED EMPHYSEMA TYPE (HCC): ICD-10-CM

## 2019-06-26 DIAGNOSIS — R93.89 ABNORMAL CT OF THE CHEST: ICD-10-CM

## 2019-06-26 DIAGNOSIS — IMO0001 LUNG NODULE < 6CM ON CT: ICD-10-CM

## 2019-06-26 DIAGNOSIS — F17.200 SMOKING: ICD-10-CM

## 2019-06-26 DIAGNOSIS — J44.9 CHRONIC OBSTRUCTIVE PULMONARY DISEASE, UNSPECIFIED COPD TYPE (HCC): ICD-10-CM

## 2019-06-26 PROCEDURE — 99214 OFFICE O/P EST MOD 30 MIN: CPT | Performed by: INTERNAL MEDICINE

## 2019-06-26 PROCEDURE — 94729 DIFFUSING CAPACITY: CPT | Performed by: INTERNAL MEDICINE

## 2019-06-26 PROCEDURE — 94726 PLETHYSMOGRAPHY LUNG VOLUMES: CPT | Performed by: INTERNAL MEDICINE

## 2019-06-26 PROCEDURE — 94060 EVALUATION OF WHEEZING: CPT | Performed by: INTERNAL MEDICINE

## 2019-06-26 NOTE — PROGRESS NOTES
"Chief Complaint   Patient presents with   • Follow-up   • Breathing Problem       Subjective   Lizbeth Johns is a 63 y.o. female.     History of Present Illness   Patient comes in today to follow-up with the results of bronchoscopy.    The patient actually reports some shortness of breath with exertion.  She denies any cough or phlegm production.    She unfortunately continues to smoke 1 pack/day.    The patient denies any weight loss, hemoptysis or night sweats.    The following portions of the patient's history were reviewed and updated as appropriate: allergies, current medications, past family history, past medical history, past social history and past surgical history.    Review of Systems   HENT: Negative for sinus pressure, sneezing and sore throat.    Respiratory: Positive for cough and wheezing. Negative for shortness of breath.        Objective   Visit Vitals  /74   Pulse 82   Resp 18   Ht 152.4 cm (60\")   Wt 74.4 kg (164 lb)   LMP 03/16/2004   SpO2 94%   BMI 32.03 kg/m²       Physical Exam   Constitutional: She is oriented to person, place, and time. She appears well-developed and well-nourished.   Eyes: EOM are normal.   Neck: Neck supple.   Cardiovascular: Normal rate and regular rhythm.   Pulmonary/Chest: Effort normal. No respiratory distress.   Somewhat hyperresonant to percussion.  Somewhat decreased A/E with out wheezing noted.   Musculoskeletal: She exhibits no edema.   Neurological: She is alert and oriented to person, place, and time.   Skin: Skin is warm and dry.   Psychiatric: She has a normal mood and affect.   Vitals reviewed.        Assessment/Plan   Lizbeth was seen today for follow-up and breathing problem.    Diagnoses and all orders for this visit:    Shortness of breath    Abnormal CT of the chest  -     NM Pet Skull Base To Mid Thigh; Future  -     Ambulatory Referral to Cardiothoracic Surgery    Lung nodule < 6cm on CT  -     NM Pet Skull Base To Mid Thigh; Future  -     " Ambulatory Referral to Cardiothoracic Surgery    Pulmonary emphysema, unspecified emphysema type (CMS/HCC)    Smoking    Chronic obstructive pulmonary disease, unspecified COPD type (CMS/HCC)    Other orders  -     tiotropium bromide-olodaterol (STIOLTO RESPIMAT) 2.5-2.5 MCG/ACT aerosol solution inhaler; Inhale 2 puffs Daily.           Return in about 3 weeks (around 7/17/2019) for Recheck, Imaging study, Overbook.    DISCUSSION (if any):  PFTs reviewed with her. Showed Moderate COPD. FEV1 of 1.06 (60%). DLCO of 50%.     Reviewed all the available results including pathology, cytology and breast biopsies.  None of the available results were positive for malignancy.    The genomic biological marker test is still pending.    Due to symptoms which are suggestive of mildly poorly controlled obstructive lung disease, I have decided to add Stiolto.    Compliance with medications stressed.     Side effects of prescribed medications were discussed with the patient    Patient was offered modalities such as Chantix/nicotine patches/Wellbutrin to aid in smoking cessation.    The patient says that she will think about it and get back to us regarding her choice, once a decision has been taken.     Patient was given reading material.    I advised Lizbeth of the risks of continuing to use tobacco, and I provided her with tobacco cessation educational materials in the After Visit Summary.     During this visit, I spent 7-8 minutes counseling the patient regarding tobacco cessation.    Since the lung nodule is inaccessible via conventional bronchoscopy, and is definitely suspicious, combined with the fact that there is a left adrenal nodule, the patient will need a PET scan before proceeding further.    Patient wants her daughter ([phone]) to be involved in her disease process and decision making.     I actually tried to call her daughter to inform her of the plan.    I also called Dr. Barreto and discussed the plan with  him.     She will be referred to his office for possible resection.    Dictated utilizing Dragon dictation.    This document was electronically signed by Gudelia Patel MD on 06/26/19 at 5:48 PM

## 2019-07-11 DIAGNOSIS — R93.89 ABNORMAL CT OF THE CHEST: ICD-10-CM

## 2019-07-11 DIAGNOSIS — IMO0001 LUNG NODULE < 6CM ON CT: ICD-10-CM

## 2019-07-11 LAB
BACTERIA SPEC AEROBE CULT: NORMAL
FUNGUS SPEC CULT: NORMAL
FUNGUS SPEC CULT: NORMAL
FUNGUS SPEC FUNGUS STN: NORMAL

## 2019-07-12 ENCOUNTER — TRANSCRIBE ORDERS (OUTPATIENT)
Dept: ADMINISTRATIVE | Facility: HOSPITAL | Age: 64
End: 2019-07-12

## 2019-07-12 ENCOUNTER — APPOINTMENT (OUTPATIENT)
Dept: LAB | Facility: HOSPITAL | Age: 64
End: 2019-07-12

## 2019-07-12 DIAGNOSIS — I10 ESSENTIAL HYPERTENSION, MALIGNANT: Primary | ICD-10-CM

## 2019-07-12 LAB
ALBUMIN SERPL-MCNC: 4.3 G/DL (ref 3.5–5)
ALBUMIN/GLOB SERPL: 1.3 G/DL (ref 1–2)
ALP SERPL-CCNC: 105 U/L (ref 38–126)
ALT SERPL W P-5'-P-CCNC: 14 U/L (ref 13–69)
ANION GAP SERPL CALCULATED.3IONS-SCNC: 11.2 MMOL/L (ref 10–20)
AST SERPL-CCNC: 26 U/L (ref 15–46)
BILIRUB SERPL-MCNC: 0.2 MG/DL (ref 0.2–1.3)
BUN BLD-MCNC: 28 MG/DL (ref 7–20)
BUN/CREAT SERPL: 25.5 (ref 7.1–23.5)
CALCIUM SPEC-SCNC: 9.5 MG/DL (ref 8.4–10.2)
CHLORIDE SERPL-SCNC: 105 MMOL/L (ref 98–107)
CHOLEST SERPL-MCNC: 228 MG/DL (ref 0–199)
CO2 SERPL-SCNC: 27 MMOL/L (ref 26–30)
CREAT BLD-MCNC: 1.1 MG/DL (ref 0.6–1.3)
DEPRECATED RDW RBC AUTO: 46.5 FL (ref 37–54)
ERYTHROCYTE [DISTWIDTH] IN BLOOD BY AUTOMATED COUNT: 14.4 % (ref 12.3–15.4)
GFR SERPL CREATININE-BSD FRML MDRD: 60 ML/MIN/1.73
GLOBULIN UR ELPH-MCNC: 3.4 GM/DL
GLUCOSE BLD-MCNC: 87 MG/DL (ref 74–98)
HCT VFR BLD AUTO: 34.5 % (ref 34–46.6)
HDLC SERPL-MCNC: 52 MG/DL (ref 40–60)
HGB BLD-MCNC: 11 G/DL (ref 12–15.9)
LDLC SERPL CALC-MCNC: 146 MG/DL (ref 0–99)
LDLC/HDLC SERPL: 2.81 {RATIO}
MCH RBC QN AUTO: 28.3 PG (ref 26.6–33)
MCHC RBC AUTO-ENTMCNC: 31.9 G/DL (ref 31.5–35.7)
MCV RBC AUTO: 88.7 FL (ref 79–97)
PLATELET # BLD AUTO: 347 10*3/MM3 (ref 140–450)
PMV BLD AUTO: 9.1 FL (ref 6–12)
POTASSIUM BLD-SCNC: 4.2 MMOL/L (ref 3.5–5.1)
PROT SERPL-MCNC: 7.7 G/DL (ref 6.3–8.2)
RBC # BLD AUTO: 3.89 10*6/MM3 (ref 3.77–5.28)
SODIUM BLD-SCNC: 139 MMOL/L (ref 137–145)
TRIGL SERPL-MCNC: 149 MG/DL
VLDLC SERPL-MCNC: 29.8 MG/DL
WBC NRBC COR # BLD: 7 10*3/MM3 (ref 3.4–10.8)

## 2019-07-12 PROCEDURE — 85027 COMPLETE CBC AUTOMATED: CPT | Performed by: FAMILY MEDICINE

## 2019-07-12 PROCEDURE — 80053 COMPREHEN METABOLIC PANEL: CPT | Performed by: FAMILY MEDICINE

## 2019-07-12 PROCEDURE — 36415 COLL VENOUS BLD VENIPUNCTURE: CPT | Performed by: FAMILY MEDICINE

## 2019-07-12 PROCEDURE — 80061 LIPID PANEL: CPT | Performed by: FAMILY MEDICINE

## 2019-07-16 ENCOUNTER — OFFICE VISIT (OUTPATIENT)
Dept: CARDIAC SURGERY | Facility: CLINIC | Age: 64
End: 2019-07-16

## 2019-07-16 VITALS
DIASTOLIC BLOOD PRESSURE: 94 MMHG | SYSTOLIC BLOOD PRESSURE: 173 MMHG | HEIGHT: 60 IN | HEART RATE: 69 BPM | WEIGHT: 167.4 LBS | OXYGEN SATURATION: 98 % | TEMPERATURE: 97.7 F | BODY MASS INDEX: 32.86 KG/M2

## 2019-07-16 DIAGNOSIS — R91.8 MASS OF UPPER LOBE OF RIGHT LUNG: Primary | ICD-10-CM

## 2019-07-16 DIAGNOSIS — Z00.6 EXAMINATION FOR NORMAL COMPARISON OR CONTROL IN CLINICAL RESEARCH: Primary | ICD-10-CM

## 2019-07-16 PROCEDURE — 99204 OFFICE O/P NEW MOD 45 MIN: CPT | Performed by: THORACIC SURGERY (CARDIOTHORACIC VASCULAR SURGERY)

## 2019-07-16 NOTE — PROGRESS NOTES
"07/16/2019  Patient Information  Lizbeth Johns                                                                                          [address]  [place] [address]   1955  'PCP/Referring Physician'  Emily Ferreira MD  [phone]  Gudelia Patel MD  [phone]  Chief Complaint   Patient presents with   • Consult     referred by Dr. Jose Antonio Patel   • Lung Nodule     right       History of Present Illness: 63-year-old -American female with a history of hypertension, hyperlipidemia, COPD and active tobacco abuse who presents with a right lung nodule.  Initially, the patient presented on 6/6/2019 after \"feeling sick,\" with pain in the chest.  She describes this as a pressure-like sensation with subsequent spontaneous resolution.  The patient has an occasional cough with sputum production with her cigarette use.  She denies hemoptysis, weight loss, lymphadenopathy, fevers or chills.  The patient does note left lower extremity swelling that is long-standing, but has recently worsened with accompanied knee and ankle pain.  Ambulation helps with these symptoms.  She is currently trying to quit smoking with the use of e-cigarettes.      Patient Active Problem List   Diagnosis   • Colon cancer screening   • Epigastric pain   • Heartburn   • Lesion of colon   • History of colon polyps   • Family history of colon cancer   • Constipation   • Renal insufficiency   • Acute renal failure (ARF) (CMS/HCC)   • Mass of upper lobe of right lung   • Pain in both lower extremities   • Essential hypertension   • Abnormal CT of the chest   • Lung nodule < 6cm on CT     Past Medical History:   Diagnosis Date   • Acid reflux    • Arthritis    • Cataract, bilateral    • COPD (chronic obstructive pulmonary disease) (CMS/HCC)    • Full dentures    • High cholesterol    • Hyperlipidemia    • Hypertension    • Leg cramps    • 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   • Piercing     ears only   • SOB (shortness of breath)     " xray showed spot on lungs   • Wears glasses      Past Surgical History:   Procedure Laterality Date   • BRONCHOSCOPY N/A 6/13/2019    Procedure: BRONCHOSCOPY WITH FLUOROSCOPY, BIOPSY, BRUSHINGS, AND WASHINGS;  Surgeon: Gudelia Patel MD;  Location: [facility] OR;  Service: Pulmonary   • COLONOSCOPY N/A 2/23/2017    Procedure: COLONOSCOPY with hot and cold snare polypectomies,  hot biopsy polypectomies, biopsies, resolution clip placement;  Surgeon: Zackery Siddiqui MD;  Location: [facility] ENDOSCOPY;  Service:    • COLONOSCOPY N/A 6/4/2018    Procedure: COLONOSCOPY WITH HOT SNARE POLYPECTOMY X 7; COLD SNARE POLYPECTOMY X 3; HOT BIOPSY POLYPECTOMY X 20; COLD BIOPSY POLYPECTOMY X 2; THERMAL ABLATION OF COLON POLYPS X 23; CLIP PLACEMENT X 2; BIOPSIES;  Surgeon: Zackery Siddiqui MD;  Location: [facility] ENDOSCOPY;  Service: Gastroenterology   • COLONOSCOPY N/A 6/19/2019    Procedure: COLONOSCOPY with cold biopsy polypectomies and cold biopsy;  Surgeon: Zackery Siddiqui MD;  Location: [facility] ENDOSCOPY;  Service: Gastroenterology   • ENDOSCOPY  06/13/2019   • FRACTURE SURGERY Left     LEG   • MOUTH SURGERY      FULL EXTRACTION   • TUBAL ABDOMINAL LIGATION         Current Outpatient Medications:   •  aspirin 81 MG EC tablet, Take 81 mg by mouth Daily., Disp: , Rfl:   •  amLODIPine (NORVASC) 10 MG tablet, Take 1 tablet by mouth Daily., Disp: 30 tablet, Rfl: 0  •  omeprazole (priLOSEC) 40 MG capsule, Take 40 mg by mouth Daily., Disp: , Rfl:   •  rOPINIRole (REQUIP) 0.5 MG tablet, Take 1 tablet by mouth Every Night. Take 1 hour before bedtime., Disp: 30 tablet, Rfl: 0  •  simvastatin (ZOCOR) 20 MG tablet, Take 20 mg by mouth Every Night., Disp: , Rfl:   •  tiotropium bromide-olodaterol (STIOLTO RESPIMAT) 2.5-2.5 MCG/ACT aerosol solution inhaler, Inhale 2 puffs Daily., Disp: 1 inhaler, Rfl: 5  No Known Allergies  Social History     Socioeconomic History   • Marital status:      Spouse name: Not on file   • Number of children: 4    • Years of education: Not on file   • Highest education level: Not on file   Occupational History     Employer: UNEMPLOYED   Tobacco Use   • Smoking status: Current Every Day Smoker     Packs/day: 1.00     Years: 50.00     Pack years: 50.00     Types: Cigarettes   • Smokeless tobacco: Never Used   • Tobacco comment: cutting down on cigarettes   Substance and Sexual Activity   • Alcohol use: Yes     Alcohol/week: 1.2 oz     Types: 2 Glasses of wine per week     Comment: once in a while    • Drug use: No   • Sexual activity: Defer   Social History Narrative    Lives in [place], [place]     Family History   Problem Relation Age of Onset   • Cirrhosis Brother    • Liver disease Brother    • Colon cancer Brother         Possibly colon cancer, patient unsure.   • Cancer Mother    • No Known Problems Father    • Stomach cancer Neg Hx    • Esophageal cancer Neg Hx      Review of Systems   Constitution: Positive for night sweats. Negative for chills, diaphoresis, fever, weakness, malaise/fatigue, weight gain and weight loss.   HENT: Negative for congestion, ear pain, hearing loss, nosebleeds and sore throat.    Eyes: Positive for blurred vision and double vision.   Cardiovascular: Positive for claudication, dyspnea on exertion and leg swelling. Negative for chest pain, near-syncope, palpitations and syncope.   Respiratory: Positive for cough. Negative for hemoptysis, shortness of breath and wheezing.    Hematologic/Lymphatic: Does not bruise/bleed easily.   Skin: Negative for itching, poor wound healing and rash.   Musculoskeletal: Positive for muscle cramps (IN LEGS AT NIGHT) and muscle weakness (IN LEGS AT NIGHT). Negative for arthritis, back pain, falls, joint pain, joint swelling and neck pain.   Gastrointestinal: Negative for abdominal pain, constipation, diarrhea, dysphagia, hematochezia, nausea and vomiting.   Genitourinary: Negative for frequency, hematuria, hesitancy, incomplete emptying and urgency.   Neurological:  "Negative for dizziness, headaches, light-headedness, loss of balance, numbness, seizures and tremors.   Psychiatric/Behavioral: Negative for depression and suicidal ideas. The patient is not nervous/anxious.    Allergic/Immunologic: Negative for environmental allergies and HIV exposure.     Vitals:    07/16/19 0940   BP: 173/94   BP Location: Right arm   Patient Position: Sitting   Pulse: 69   Temp: 97.7 °F (36.5 °C)   SpO2: 98%   Weight: 75.9 kg (167 lb 6.4 oz)   Height: 152.4 cm (60\")      Physical Exam   Constitutional: She is oriented to person, place, and time. She appears well-developed and well-nourished. No distress.   -American female who is present with her daughter.   HENT:   Head: Normocephalic and atraumatic.   Eyes: Conjunctivae and EOM are normal. No scleral icterus.   Neck: Normal range of motion. Neck supple. No JVD present. No tracheal deviation present.   Cardiovascular: Normal rate, regular rhythm and normal heart sounds. Exam reveals no gallop and no friction rub.   No murmur heard.  Pulmonary/Chest: Effort normal and breath sounds normal. No stridor. No respiratory distress. She has no wheezes. She has no rales.   Abdominal: Soft. She exhibits no distension and no mass. There is no tenderness. There is no rebound and no guarding.   Musculoskeletal: Normal range of motion. She exhibits no deformity.   Lymphadenopathy:     She has no cervical adenopathy.     She has no axillary adenopathy.        Right: No supraclavicular adenopathy present.        Left: No supraclavicular adenopathy present.   Neurological: She is alert and oriented to person, place, and time.   Skin: No rash noted. No erythema.   Psychiatric: She has a normal mood and affect. Her behavior is normal. Judgment and thought content normal.       Labs/Imaging:  -Right upper lobe transbronchial needle biopsy performed 6/13/2019 consistent with fibrous scar tissue and lymphocytic infiltrate.  Negative for malignancy.  Brushings " and lavage negative for malignancy.  -CT of the chest performed 6/7/2019, personally reviewed, demonstrates a 1.7 cm right upper lobe spiculated nodule.  There is a 1.3 cm left adrenal nodule.  No mediastinal lymphadenopathy.  -PET/CT performed 7/1/2019, personally reviewed, demonstrates a 3.2 cm mass in the right upper lobe with an SUV of 38.2.  No mediastinal lymph node activity.  No adrenal activity.  -Pulmonary function test performed 6/26/2019, FEV1 1.06 (60% predicted), DLCO 50% predicted    Assessment/Plan:  63-year-old -American female with a history of hypertension, hyperlipidemia, COPD and active tobacco abuse who presents with a PET active 3.2 cm spiculated right lung nodule.  She also has a 1.3 cm left adrenal nodule.  This lung nodule is suspicious for malignancy.  She will need an MRI of the brain to ensure there is no intracranial mass.  I will discuss her case at multidisciplinary tumor board to evaluate this adrenal nodule.  If radiology feels that this is suspicious, she may need a biopsy of this prior to proceeding with surgical intervention.  If this adrenal nodule is felt to be separate, the plan will be to proceed with bronchoscopy, right VATS, right upper lobe wedge resection with possible lobectomy, mediastinal lymph node dissection and intercostal nerve blocks with cryoablation.  The risks and benefits of surgery were discussed with the patient including pain, bleeding, infection, air leak, myocardial infarction and death.  The patient understood these risks and wished to proceed with surgery.  I have personally discussed this patient with Dr. Patel of pulmonary medicine who has been most helpful in arranging this patient's care.    Patient Active Problem List   Diagnosis   • Colon cancer screening   • Epigastric pain   • Heartburn   • Lesion of colon   • History of colon polyps   • Family history of colon cancer   • Constipation   • Renal insufficiency   • Acute renal failure (ARF)  (CMS/HCC)   • Mass of upper lobe of right lung   • Pain in both lower extremities   • Essential hypertension   • Abnormal CT of the chest   • Lung nodule < 6cm on CT

## 2019-07-18 ENCOUNTER — APPOINTMENT (OUTPATIENT)
Dept: MAMMOGRAPHY | Facility: HOSPITAL | Age: 64
End: 2019-07-18

## 2019-07-18 ENCOUNTER — OFFICE VISIT (OUTPATIENT)
Dept: PULMONOLOGY | Facility: CLINIC | Age: 64
End: 2019-07-18

## 2019-07-18 VITALS
SYSTOLIC BLOOD PRESSURE: 124 MMHG | RESPIRATION RATE: 16 BRPM | OXYGEN SATURATION: 94 % | HEIGHT: 60 IN | HEART RATE: 92 BPM | DIASTOLIC BLOOD PRESSURE: 76 MMHG | BODY MASS INDEX: 32.2 KG/M2 | WEIGHT: 164 LBS

## 2019-07-18 DIAGNOSIS — F17.200 SMOKING: ICD-10-CM

## 2019-07-18 DIAGNOSIS — IMO0001 LUNG NODULE < 6CM ON CT: ICD-10-CM

## 2019-07-18 DIAGNOSIS — J44.9 CHRONIC OBSTRUCTIVE PULMONARY DISEASE, UNSPECIFIED COPD TYPE (HCC): ICD-10-CM

## 2019-07-18 DIAGNOSIS — J44.1 ACUTE EXACERBATION OF CHRONIC OBSTRUCTIVE PULMONARY DISEASE (COPD) (HCC): ICD-10-CM

## 2019-07-18 DIAGNOSIS — R93.89 ABNORMAL CT OF THE CHEST: Primary | ICD-10-CM

## 2019-07-18 PROCEDURE — 96372 THER/PROPH/DIAG INJ SC/IM: CPT | Performed by: INTERNAL MEDICINE

## 2019-07-18 PROCEDURE — 99214 OFFICE O/P EST MOD 30 MIN: CPT | Performed by: INTERNAL MEDICINE

## 2019-07-18 RX ORDER — TRAMADOL HYDROCHLORIDE 50 MG/1
TABLET ORAL EVERY 12 HOURS
COMMUNITY
End: 2019-08-29

## 2019-07-18 RX ORDER — METHYLPREDNISOLONE ACETATE 80 MG/ML
80 INJECTION, SUSPENSION INTRA-ARTICULAR; INTRALESIONAL; INTRAMUSCULAR; SOFT TISSUE ONCE
Status: COMPLETED | OUTPATIENT
Start: 2019-07-18 | End: 2019-07-18

## 2019-07-18 RX ORDER — CARVEDILOL 12.5 MG/1
12.5 TABLET ORAL 2 TIMES DAILY WITH MEALS
COMMUNITY

## 2019-07-18 RX ADMIN — METHYLPREDNISOLONE ACETATE 80 MG: 80 INJECTION, SUSPENSION INTRA-ARTICULAR; INTRALESIONAL; INTRAMUSCULAR; SOFT TISSUE at 11:37

## 2019-07-18 NOTE — PROGRESS NOTES
"Chief Complaint   Patient presents with   • Follow-up   • Shortness of Breath       Subjective   Lizbeth Johns is a 63 y.o. female.     History of Present Illness   Patient comes in today complaining of shortness of breath, cough and phlegm production which has been worse for the past few days.    she is not complaining of subjective chills.  The patient also denies fever.    The patient says that her sputum is mostly nonproductive but occasionally productive of thick clear sputum. She actually ran out of [place] 1 week ago and has noticed worsening symptoms.     Still smokes 1/2 PPD now. Is trying to cut down.     The following portions of the patient's history were reviewed and updated as appropriate: allergies, current medications, past family history, past medical history, past social history and past surgical history.    Review of Systems   HENT: Positive for sinus pressure. Negative for sneezing and sore throat.    Respiratory: Positive for cough, shortness of breath and wheezing.        Objective   Visit Vitals  /76   Pulse 92   Resp 16   Ht 152.4 cm (60\")   Wt 74.4 kg (164 lb)   LMP 03/16/2004   SpO2 94%   BMI 32.03 kg/m²       Physical Exam   Constitutional: She is oriented to person, place, and time. She appears well-developed and well-nourished.   Eyes: EOM are normal.   Neck: Neck supple.   Cardiovascular: Normal rate and regular rhythm.   Pulmonary/Chest: Effort normal. No respiratory distress.   Somewhat hyperresonant to percussion.  Somewhat decreased A/E with out wheezing noted.   Musculoskeletal: She exhibits no edema.   Neurological: She is alert and oriented to person, place, and time.   Skin: Skin is warm and dry.   Psychiatric: She has a normal mood and affect.   Vitals reviewed.    Assessment/Plan   Lizbeth was seen today for follow-up and shortness of breath.    Diagnoses and all orders for this visit:    Abnormal CT of the chest    Lung nodule < 6cm on CT    Chronic obstructive " pulmonary disease, unspecified COPD type (CMS/[facility], located within [facility])    Smoking    Acute exacerbation of chronic obstructive pulmonary disease (COPD) (CMS/[facility], located within [facility])  -     methylPREDNISolone acetate (DEPO-medrol) injection 80 mg    Other orders  -     tiotropium bromide-olodaterol (STIOLTO RESPIMAT) 2.5-2.5 MCG/ACT aerosol solution inhaler; Inhale 2 puffs Daily.         Return in about 6 weeks (around 8/29/2019) for Recheck, For Shannon.    DISCUSSION (if any):  For the symptoms of acute exacerbation of COPD, she was prescribed intramuscular steroids.    Patient maybe prescribed augmentin, if the patient develops any fever or if she develops purulent sputum.    Side effects have been discussed in detail.    Patient was asked to report to the emergency room if symptoms do not improve.    PET scan once again reviewed.     Will give her Stiolto samples to last a month.     She was encouraged to have her MRI of the brain done soon.     She will definitely need resection of the hypermetabolic right lung nodule.     Continue trying to quit smoking.     PFTs will allow for lobectomy.       Dictated utilizing Dragon dictation.    This document was electronically signed by Gudelia Patel MD on 07/18/19 at 11:25 AM

## 2019-07-19 DIAGNOSIS — Z00.6 EXAMINATION FOR NORMAL COMPARISON FOR CLINICAL RESEARCH: Primary | ICD-10-CM

## 2019-07-22 ENCOUNTER — MDT ASSESSMENT (OUTPATIENT)
Dept: ONCOLOGY | Facility: CLINIC | Age: 64
End: 2019-07-22

## 2019-07-25 LAB
ACID FAST STN SPEC: NEGATIVE
BACTERIA SPEC AEROBE CULT: NEGATIVE
SPECIMEN PREPARATION: NORMAL

## 2019-07-26 ENCOUNTER — LAB (OUTPATIENT)
Dept: LAB | Facility: HOSPITAL | Age: 64
End: 2019-07-26

## 2019-07-26 DIAGNOSIS — N17.9 ACUTE RENAL FAILURE, UNSPECIFIED ACUTE RENAL FAILURE TYPE (HCC): ICD-10-CM

## 2019-07-26 DIAGNOSIS — R91.8 MASS OF UPPER LOBE OF RIGHT LUNG: ICD-10-CM

## 2019-07-26 DIAGNOSIS — I10 ESSENTIAL HYPERTENSION: ICD-10-CM

## 2019-07-26 LAB
ALBUMIN SERPL-MCNC: 4.3 G/DL (ref 3.5–5)
ANION GAP SERPL CALCULATED.3IONS-SCNC: 11 MMOL/L (ref 10–20)
BUN BLD-MCNC: 23 MG/DL (ref 7–20)
BUN/CREAT SERPL: 20.9 (ref 7.1–23.5)
CALCIUM SPEC-SCNC: 9.8 MG/DL (ref 8.4–10.2)
CHLORIDE SERPL-SCNC: 104 MMOL/L (ref 98–107)
CO2 SERPL-SCNC: 29 MMOL/L (ref 26–30)
CREAT BLD-MCNC: 1.1 MG/DL (ref 0.6–1.3)
GFR SERPL CREATININE-BSD FRML MDRD: 60 ML/MIN/1.73
GLUCOSE BLD-MCNC: 94 MG/DL (ref 74–98)
PHOSPHATE SERPL-MCNC: 3.7 MG/DL (ref 2.5–4.5)
POTASSIUM BLD-SCNC: 4 MMOL/L (ref 3.5–5.1)
SODIUM BLD-SCNC: 140 MMOL/L (ref 137–145)

## 2019-07-26 PROCEDURE — 36415 COLL VENOUS BLD VENIPUNCTURE: CPT

## 2019-07-26 PROCEDURE — 80069 RENAL FUNCTION PANEL: CPT

## 2019-08-09 ENCOUNTER — TELEPHONE (OUTPATIENT)
Dept: GASTROENTEROLOGY | Facility: CLINIC | Age: 64
End: 2019-08-09

## 2019-08-09 DIAGNOSIS — Z00.6 EXAMINATION FOR NORMAL COMPARISON FOR CLINICAL RESEARCH: Primary | ICD-10-CM

## 2019-08-13 DIAGNOSIS — R91.8 MASS OF UPPER LOBE OF RIGHT LUNG: Primary | ICD-10-CM

## 2019-08-14 ENCOUNTER — PREP FOR SURGERY (OUTPATIENT)
Dept: OTHER | Facility: HOSPITAL | Age: 64
End: 2019-08-14

## 2019-08-14 DIAGNOSIS — R91.1 LUNG NODULE: Primary | ICD-10-CM

## 2019-08-14 RX ORDER — CHLORHEXIDINE GLUCONATE 500 MG/1
1 CLOTH TOPICAL EVERY 12 HOURS PRN
Status: CANCELLED | OUTPATIENT
Start: 2019-08-29

## 2019-08-26 ENCOUNTER — DOCUMENTATION (OUTPATIENT)
Dept: PULMONOLOGY | Facility: CLINIC | Age: 64
End: 2019-08-26

## 2019-08-26 NOTE — PROGRESS NOTES
Patient's daughter requested to be called. She had questions regarding the findings on the PET scan and her mother surgery.    I informed the patient's daughter that based on the radiological evidence, the lung mass should be considered malignant unless proven otherwise.    The patient's daughter says that the patient is scheduled for surgery within the next few days.    I encouraged her to discuss procedure related issues with the patient's surgeon.    As far as PFTs are concerned, the patient will definitely be able to tolerate lobectomy.    I also informed the patient's daughter that based on the results of the pathology, she may actually need to be considered for oncological evaluation.    This document was electronically signed by Gudelia Patel MD on 08/26/19 at 12:31 PM

## 2019-08-29 ENCOUNTER — HOSPITAL ENCOUNTER (OUTPATIENT)
Dept: GENERAL RADIOLOGY | Facility: HOSPITAL | Age: 64
Discharge: HOME | End: 2019-08-29
Admitting: PHYSICIAN ASSISTANT

## 2019-08-29 ENCOUNTER — ANESTHESIA EVENT (OUTPATIENT)
Dept: PERIOP | Facility: HOSPITAL | Age: 64
End: 2019-08-29

## 2019-08-29 ENCOUNTER — APPOINTMENT (OUTPATIENT)
Dept: PREADMISSION TESTING | Facility: HOSPITAL | Age: 64
End: 2019-08-29

## 2019-08-29 ENCOUNTER — HOSPITAL ENCOUNTER (OUTPATIENT)
Dept: PULMONOLOGY | Facility: HOSPITAL | Age: 64
Discharge: HOME | End: 2019-08-29

## 2019-08-29 VITALS — HEIGHT: 60 IN | BODY MASS INDEX: 33.11 KG/M2 | WEIGHT: 168.65 LBS

## 2019-08-29 DIAGNOSIS — R91.1 LUNG NODULE: ICD-10-CM

## 2019-08-29 LAB
ABO GROUP BLD: NORMAL
ALBUMIN SERPL-MCNC: 4.6 G/DL (ref 3.5–5.2)
ALP SERPL-CCNC: 111 U/L (ref 39–117)
ALT SERPL W P-5'-P-CCNC: 7 U/L (ref 1–33)
ANION GAP SERPL CALCULATED.3IONS-SCNC: 12 MMOL/L (ref 5–15)
AST SERPL-CCNC: 13 U/L (ref 1–32)
BASOPHILS # BLD AUTO: 0.02 10*3/MM3 (ref 0–0.2)
BASOPHILS NFR BLD AUTO: 0.3 % (ref 0–1.5)
BILIRUB CONJ SERPL-MCNC: <0.2 MG/DL (ref 0.2–0.3)
BILIRUB INDIRECT SERPL-MCNC: ABNORMAL MG/DL
BILIRUB SERPL-MCNC: 0.2 MG/DL (ref 0.2–1.2)
BLD GP AB SCN SERPL QL: NEGATIVE
BUN BLD-MCNC: 23 MG/DL (ref 8–23)
BUN/CREAT SERPL: 25.3 (ref 7–25)
CALCIUM SPEC-SCNC: 9.6 MG/DL (ref 8.6–10.5)
CHLORIDE SERPL-SCNC: 102 MMOL/L (ref 98–107)
CO2 SERPL-SCNC: 25 MMOL/L (ref 22–29)
CREAT BLD-MCNC: 0.91 MG/DL (ref 0.57–1)
DEPRECATED RDW RBC AUTO: 51.7 FL (ref 37–54)
EOSINOPHIL # BLD AUTO: 0.17 10*3/MM3 (ref 0–0.4)
EOSINOPHIL NFR BLD AUTO: 2.8 % (ref 0.3–6.2)
ERYTHROCYTE [DISTWIDTH] IN BLOOD BY AUTOMATED COUNT: 15.9 % (ref 12.3–15.4)
GFR SERPL CREATININE-BSD FRML MDRD: 75 ML/MIN/1.73
GLUCOSE BLD-MCNC: 92 MG/DL (ref 65–99)
HBA1C MFR BLD: 5.5 % (ref 4.8–5.6)
HCT VFR BLD AUTO: 35 % (ref 34–46.6)
HGB BLD-MCNC: 11.1 G/DL (ref 12–15.9)
IMM GRANULOCYTES # BLD AUTO: 0.01 10*3/MM3 (ref 0–0.05)
IMM GRANULOCYTES NFR BLD AUTO: 0.2 % (ref 0–0.5)
INR PPP: 0.94 (ref 0.85–1.16)
LYMPHOCYTES # BLD AUTO: 1.94 10*3/MM3 (ref 0.7–3.1)
LYMPHOCYTES NFR BLD AUTO: 31.8 % (ref 19.6–45.3)
MCH RBC QN AUTO: 28 PG (ref 26.6–33)
MCHC RBC AUTO-ENTMCNC: 31.7 G/DL (ref 31.5–35.7)
MCV RBC AUTO: 88.2 FL (ref 79–97)
MONOCYTES # BLD AUTO: 0.46 10*3/MM3 (ref 0.1–0.9)
MONOCYTES NFR BLD AUTO: 7.5 % (ref 5–12)
NEUTROPHILS # BLD AUTO: 3.5 10*3/MM3 (ref 1.7–7)
NEUTROPHILS NFR BLD AUTO: 57.4 % (ref 42.7–76)
NRBC BLD AUTO-RTO: 0 /100 WBC (ref 0–0.2)
PLATELET # BLD AUTO: 251 10*3/MM3 (ref 140–450)
PMV BLD AUTO: 9.5 FL (ref 6–12)
POTASSIUM BLD-SCNC: 4.1 MMOL/L (ref 3.5–5.2)
PROT SERPL-MCNC: 7 G/DL (ref 6–8.5)
PROTHROMBIN TIME: 12.1 SECONDS (ref 11.2–14.3)
RBC # BLD AUTO: 3.97 10*6/MM3 (ref 3.77–5.28)
RH BLD: POSITIVE
SODIUM BLD-SCNC: 139 MMOL/L (ref 136–145)
T&S EXPIRATION DATE: NORMAL
WBC NRBC COR # BLD: 6.1 10*3/MM3 (ref 3.4–10.8)

## 2019-08-29 PROCEDURE — 85610 PROTHROMBIN TIME: CPT | Performed by: PHYSICIAN ASSISTANT

## 2019-08-29 PROCEDURE — 83036 HEMOGLOBIN GLYCOSYLATED A1C: CPT | Performed by: PHYSICIAN ASSISTANT

## 2019-08-29 PROCEDURE — 86900 BLOOD TYPING SEROLOGIC ABO: CPT | Performed by: PHYSICIAN ASSISTANT

## 2019-08-29 PROCEDURE — 94010 BREATHING CAPACITY TEST: CPT

## 2019-08-29 PROCEDURE — 85025 COMPLETE CBC W/AUTO DIFF WBC: CPT | Performed by: PHYSICIAN ASSISTANT

## 2019-08-29 PROCEDURE — 94010 BREATHING CAPACITY TEST: CPT | Performed by: INTERNAL MEDICINE

## 2019-08-29 PROCEDURE — 86901 BLOOD TYPING SEROLOGIC RH(D): CPT | Performed by: PHYSICIAN ASSISTANT

## 2019-08-29 PROCEDURE — 86850 RBC ANTIBODY SCREEN: CPT | Performed by: PHYSICIAN ASSISTANT

## 2019-08-29 PROCEDURE — 80076 HEPATIC FUNCTION PANEL: CPT | Performed by: PHYSICIAN ASSISTANT

## 2019-08-29 PROCEDURE — 36415 COLL VENOUS BLD VENIPUNCTURE: CPT

## 2019-08-29 PROCEDURE — 80048 BASIC METABOLIC PNL TOTAL CA: CPT | Performed by: PHYSICIAN ASSISTANT

## 2019-08-29 PROCEDURE — 71046 X-RAY EXAM CHEST 2 VIEWS: CPT

## 2019-08-29 RX ORDER — CHLORHEXIDINE GLUCONATE 500 MG/1
1 CLOTH TOPICAL EVERY 12 HOURS PRN
Status: ACTIVE | OUTPATIENT
Start: 2019-08-29

## 2019-08-29 RX ORDER — SODIUM CHLORIDE 0.9 % (FLUSH) 0.9 %
3-10 SYRINGE (ML) INJECTION AS NEEDED
Status: CANCELLED | OUTPATIENT
Start: 2019-08-29

## 2019-08-29 RX ORDER — FAMOTIDINE 10 MG/ML
20 INJECTION, SOLUTION INTRAVENOUS ONCE
Status: CANCELLED | OUTPATIENT
Start: 2019-08-29 | End: 2019-08-29

## 2019-08-29 RX ORDER — SODIUM CHLORIDE 0.9 % (FLUSH) 0.9 %
3 SYRINGE (ML) INJECTION EVERY 12 HOURS SCHEDULED
Status: CANCELLED | OUTPATIENT
Start: 2019-08-29

## 2019-08-30 ENCOUNTER — APPOINTMENT (OUTPATIENT)
Dept: GENERAL RADIOLOGY | Facility: HOSPITAL | Age: 64
End: 2019-08-30

## 2019-08-30 ENCOUNTER — HOSPITAL ENCOUNTER (INPATIENT)
Facility: HOSPITAL | Age: 64
LOS: 14 days | Discharge: HOME HEALTH | End: 2019-09-13
Attending: THORACIC SURGERY (CARDIOTHORACIC VASCULAR SURGERY) | Admitting: THORACIC SURGERY (CARDIOTHORACIC VASCULAR SURGERY)

## 2019-08-30 ENCOUNTER — ANESTHESIA (OUTPATIENT)
Dept: PERIOP | Facility: HOSPITAL | Age: 64
End: 2019-08-30

## 2019-08-30 ENCOUNTER — APPOINTMENT (OUTPATIENT)
Dept: PULMONOLOGY | Facility: HOSPITAL | Age: 64
End: 2019-08-30

## 2019-08-30 DIAGNOSIS — Z74.09 IMPAIRED MOBILITY AND ADLS: Primary | ICD-10-CM

## 2019-08-30 DIAGNOSIS — R91.8 MASS OF UPPER LOBE OF RIGHT LUNG: ICD-10-CM

## 2019-08-30 DIAGNOSIS — R91.1 LUNG NODULE: ICD-10-CM

## 2019-08-30 DIAGNOSIS — Z78.9 IMPAIRED MOBILITY AND ADLS: Primary | ICD-10-CM

## 2019-08-30 LAB
ABO GROUP BLD: NORMAL
RH BLD: POSITIVE

## 2019-08-30 PROCEDURE — 25010000002 CEFUROXIME: Performed by: PHYSICIAN ASSISTANT

## 2019-08-30 PROCEDURE — 88305 TISSUE EXAM BY PATHOLOGIST: CPT | Performed by: THORACIC SURGERY (CARDIOTHORACIC VASCULAR SURGERY)

## 2019-08-30 PROCEDURE — 07B74ZX EXCISION OF THORAX LYMPHATIC, PERCUTANEOUS ENDOSCOPIC APPROACH, DIAGNOSTIC: ICD-10-PCS | Performed by: THORACIC SURGERY (CARDIOTHORACIC VASCULAR SURGERY)

## 2019-08-30 PROCEDURE — 86900 BLOOD TYPING SEROLOGIC ABO: CPT

## 2019-08-30 PROCEDURE — 71045 X-RAY EXAM CHEST 1 VIEW: CPT

## 2019-08-30 PROCEDURE — 25010000002 KETOROLAC TROMETHAMINE PER 15 MG: Performed by: INTERNAL MEDICINE

## 2019-08-30 PROCEDURE — 25010000002 ONDANSETRON PER 1 MG: Performed by: NURSE ANESTHETIST, CERTIFIED REGISTERED

## 2019-08-30 PROCEDURE — 99233 SBSQ HOSP IP/OBS HIGH 50: CPT | Performed by: INTERNAL MEDICINE

## 2019-08-30 PROCEDURE — 0BJ08ZZ INSPECTION OF TRACHEOBRONCHIAL TREE, VIA NATURAL OR ARTIFICIAL OPENING ENDOSCOPIC: ICD-10-PCS | Performed by: THORACIC SURGERY (CARDIOTHORACIC VASCULAR SURGERY)

## 2019-08-30 PROCEDURE — 31622 DX BRONCHOSCOPE/WASH: CPT | Performed by: THORACIC SURGERY (CARDIOTHORACIC VASCULAR SURGERY)

## 2019-08-30 PROCEDURE — 88334 PATH CONSLTJ SURG CYTO XM EA: CPT | Performed by: THORACIC SURGERY (CARDIOTHORACIC VASCULAR SURGERY)

## 2019-08-30 PROCEDURE — 88307 TISSUE EXAM BY PATHOLOGIST: CPT | Performed by: THORACIC SURGERY (CARDIOTHORACIC VASCULAR SURGERY)

## 2019-08-30 PROCEDURE — 88309 TISSUE EXAM BY PATHOLOGIST: CPT | Performed by: THORACIC SURGERY (CARDIOTHORACIC VASCULAR SURGERY)

## 2019-08-30 PROCEDURE — 25010000002 FENTANYL CITRATE (PF) 100 MCG/2ML SOLUTION: Performed by: NURSE ANESTHETIST, CERTIFIED REGISTERED

## 2019-08-30 PROCEDURE — 32663 THORACOSCOPY W/LOBECTOMY: CPT | Performed by: THORACIC SURGERY (CARDIOTHORACIC VASCULAR SURGERY)

## 2019-08-30 PROCEDURE — 0BTC4ZZ RESECTION OF RIGHT UPPER LUNG LOBE, PERCUTANEOUS ENDOSCOPIC APPROACH: ICD-10-PCS | Performed by: THORACIC SURGERY (CARDIOTHORACIC VASCULAR SURGERY)

## 2019-08-30 PROCEDURE — 25010000003 CEFUROXIME SODIUM 1.5 G RECONSTITUTED SOLUTION: Performed by: PHYSICIAN ASSISTANT

## 2019-08-30 PROCEDURE — 25010000002 DEXAMETHASONE PER 1 MG: Performed by: NURSE ANESTHETIST, CERTIFIED REGISTERED

## 2019-08-30 PROCEDURE — C2618 PROBE/NEEDLE, CRYO: HCPCS | Performed by: THORACIC SURGERY (CARDIOTHORACIC VASCULAR SURGERY)

## 2019-08-30 PROCEDURE — 25010000002 ATROPINE PER 0.01 MG: Performed by: NURSE ANESTHETIST, CERTIFIED REGISTERED

## 2019-08-30 PROCEDURE — 25010000003 LIDOCAINE 1 % SOLUTION: Performed by: NURSE ANESTHETIST, CERTIFIED REGISTERED

## 2019-08-30 PROCEDURE — 25010000002 MORPHINE PER 10 MG: Performed by: THORACIC SURGERY (CARDIOTHORACIC VASCULAR SURGERY)

## 2019-08-30 PROCEDURE — 25010000002 PHENYLEPHRINE PER 1 ML: Performed by: NURSE ANESTHETIST, CERTIFIED REGISTERED

## 2019-08-30 PROCEDURE — 32674 THORACOSCOPY LYMPH NODE EXC: CPT | Performed by: THORACIC SURGERY (CARDIOTHORACIC VASCULAR SURGERY)

## 2019-08-30 PROCEDURE — 0BBC4ZZ EXCISION OF RIGHT UPPER LUNG LOBE, PERCUTANEOUS ENDOSCOPIC APPROACH: ICD-10-PCS | Performed by: THORACIC SURGERY (CARDIOTHORACIC VASCULAR SURGERY)

## 2019-08-30 PROCEDURE — 94799 UNLISTED PULMONARY SVC/PX: CPT

## 2019-08-30 PROCEDURE — 88331 PATH CONSLTJ SURG 1 BLK 1SPC: CPT | Performed by: PATHOLOGY

## 2019-08-30 PROCEDURE — 25010000002 PROPOFOL 1000 MG/ML EMULSION: Performed by: NURSE ANESTHETIST, CERTIFIED REGISTERED

## 2019-08-30 PROCEDURE — 32668 THORACOSCOPY W/W RESECT DIAG: CPT | Performed by: THORACIC SURGERY (CARDIOTHORACIC VASCULAR SURGERY)

## 2019-08-30 PROCEDURE — 25010000002 PROPOFOL 10 MG/ML EMULSION: Performed by: NURSE ANESTHETIST, CERTIFIED REGISTERED

## 2019-08-30 PROCEDURE — 86901 BLOOD TYPING SEROLOGIC RH(D): CPT

## 2019-08-30 DEVICE — ARTICULATING RELOAD WITH TRI-STAPLE TECHNOLOGY
Type: IMPLANTABLE DEVICE | Site: LUNG | Status: FUNCTIONAL
Brand: ENDO GIA

## 2019-08-30 DEVICE — BLACK INTELLIGENT RELOAD
Type: IMPLANTABLE DEVICE | Site: LUNG | Status: FUNCTIONAL
Brand: TRI-STAPLE 2.0

## 2019-08-30 DEVICE — ARTICULATION RELOAD WITH TRI-STAPLE TECHNOLOGY
Type: IMPLANTABLE DEVICE | Site: LUNG | Status: FUNCTIONAL
Brand: ENDO GIA

## 2019-08-30 DEVICE — CURVED TIP INTELLIGENT RELOAD AND INTRODUCER
Type: IMPLANTABLE DEVICE | Site: LUNG | Status: FUNCTIONAL
Brand: TRI-STAPLE 2.0

## 2019-08-30 RX ORDER — FENTANYL CITRATE 50 UG/ML
50 INJECTION, SOLUTION INTRAMUSCULAR; INTRAVENOUS
Status: DISCONTINUED | OUTPATIENT
Start: 2019-08-30 | End: 2019-08-30 | Stop reason: HOSPADM

## 2019-08-30 RX ORDER — ONDANSETRON 2 MG/ML
INJECTION INTRAMUSCULAR; INTRAVENOUS AS NEEDED
Status: DISCONTINUED | OUTPATIENT
Start: 2019-08-30 | End: 2019-08-30 | Stop reason: SURG

## 2019-08-30 RX ORDER — EPHEDRINE SULFATE 50 MG/ML
5 INJECTION, SOLUTION INTRAVENOUS ONCE AS NEEDED
Status: DISCONTINUED | OUTPATIENT
Start: 2019-08-30 | End: 2019-08-30 | Stop reason: HOSPADM

## 2019-08-30 RX ORDER — PROMETHAZINE HYDROCHLORIDE 25 MG/1
25 SUPPOSITORY RECTAL ONCE AS NEEDED
Status: DISCONTINUED | OUTPATIENT
Start: 2019-08-30 | End: 2019-08-30 | Stop reason: HOSPADM

## 2019-08-30 RX ORDER — HYDROCODONE BITARTRATE AND ACETAMINOPHEN 7.5; 325 MG/1; MG/1
1 TABLET ORAL EVERY 4 HOURS PRN
Status: DISPENSED | OUTPATIENT
Start: 2019-08-30 | End: 2019-09-09

## 2019-08-30 RX ORDER — FENTANYL CITRATE 50 UG/ML
INJECTION, SOLUTION INTRAMUSCULAR; INTRAVENOUS AS NEEDED
Status: DISCONTINUED | OUTPATIENT
Start: 2019-08-30 | End: 2019-08-30 | Stop reason: SURG

## 2019-08-30 RX ORDER — LABETALOL HYDROCHLORIDE 5 MG/ML
INJECTION, SOLUTION INTRAVENOUS AS NEEDED
Status: DISCONTINUED | OUTPATIENT
Start: 2019-08-30 | End: 2019-08-30 | Stop reason: SURG

## 2019-08-30 RX ORDER — KETOROLAC TROMETHAMINE 30 MG/ML
30 INJECTION, SOLUTION INTRAMUSCULAR; INTRAVENOUS EVERY 6 HOURS PRN
Status: DISCONTINUED | OUTPATIENT
Start: 2019-08-30 | End: 2019-08-31

## 2019-08-30 RX ORDER — DEXAMETHASONE SODIUM PHOSPHATE 4 MG/ML
INJECTION, SOLUTION INTRA-ARTICULAR; INTRALESIONAL; INTRAMUSCULAR; INTRAVENOUS; SOFT TISSUE AS NEEDED
Status: DISCONTINUED | OUTPATIENT
Start: 2019-08-30 | End: 2019-08-30 | Stop reason: SURG

## 2019-08-30 RX ORDER — IPRATROPIUM BROMIDE AND ALBUTEROL SULFATE 2.5; .5 MG/3ML; MG/3ML
3 SOLUTION RESPIRATORY (INHALATION)
Status: DISCONTINUED | OUTPATIENT
Start: 2019-08-30 | End: 2019-09-13 | Stop reason: HOSPADM

## 2019-08-30 RX ORDER — ASPIRIN 81 MG/1
81 TABLET ORAL DAILY
Status: DISCONTINUED | OUTPATIENT
Start: 2019-08-31 | End: 2019-09-13 | Stop reason: HOSPADM

## 2019-08-30 RX ORDER — HEPARIN SODIUM 5000 [USP'U]/ML
5000 INJECTION, SOLUTION INTRAVENOUS; SUBCUTANEOUS EVERY 12 HOURS SCHEDULED
Status: DISCONTINUED | OUTPATIENT
Start: 2019-08-31 | End: 2019-09-13 | Stop reason: HOSPADM

## 2019-08-30 RX ORDER — PROPOFOL 10 MG/ML
VIAL (ML) INTRAVENOUS AS NEEDED
Status: DISCONTINUED | OUTPATIENT
Start: 2019-08-30 | End: 2019-08-30 | Stop reason: SURG

## 2019-08-30 RX ORDER — GLYCOPYRROLATE 0.2 MG/ML
INJECTION INTRAMUSCULAR; INTRAVENOUS AS NEEDED
Status: DISCONTINUED | OUTPATIENT
Start: 2019-08-30 | End: 2019-08-30 | Stop reason: SURG

## 2019-08-30 RX ORDER — ONDANSETRON 4 MG/1
4 TABLET, FILM COATED ORAL EVERY 6 HOURS PRN
Status: DISCONTINUED | OUTPATIENT
Start: 2019-08-30 | End: 2019-09-13 | Stop reason: HOSPADM

## 2019-08-30 RX ORDER — CARVEDILOL 3.12 MG/1
3.12 TABLET ORAL EVERY 12 HOURS SCHEDULED
Status: DISCONTINUED | OUTPATIENT
Start: 2019-08-30 | End: 2019-09-13 | Stop reason: HOSPADM

## 2019-08-30 RX ORDER — NEOSTIGMINE METHYLSULFATE 5 MG/5 ML
SYRINGE (ML) INTRAVENOUS AS NEEDED
Status: DISCONTINUED | OUTPATIENT
Start: 2019-08-30 | End: 2019-08-30 | Stop reason: SURG

## 2019-08-30 RX ORDER — SENNA AND DOCUSATE SODIUM 50; 8.6 MG/1; MG/1
2 TABLET, FILM COATED ORAL NIGHTLY
Status: DISCONTINUED | OUTPATIENT
Start: 2019-08-30 | End: 2019-09-13 | Stop reason: HOSPADM

## 2019-08-30 RX ORDER — CALCIUM CHLORIDE 100 MG/ML
INJECTION INTRAVENOUS; INTRAVENTRICULAR AS NEEDED
Status: DISCONTINUED | OUTPATIENT
Start: 2019-08-30 | End: 2019-08-30 | Stop reason: SURG

## 2019-08-30 RX ORDER — MORPHINE SULFATE 4 MG/ML
4 INJECTION, SOLUTION INTRAMUSCULAR; INTRAVENOUS EVERY 4 HOURS PRN
Status: DISCONTINUED | OUTPATIENT
Start: 2019-08-30 | End: 2019-08-31

## 2019-08-30 RX ORDER — PROMETHAZINE HYDROCHLORIDE 25 MG/ML
6.25 INJECTION, SOLUTION INTRAMUSCULAR; INTRAVENOUS ONCE AS NEEDED
Status: DISCONTINUED | OUTPATIENT
Start: 2019-08-30 | End: 2019-08-30 | Stop reason: HOSPADM

## 2019-08-30 RX ORDER — ATROPINE SULFATE 1 MG/ML
0.5 INJECTION, SOLUTION INTRAMUSCULAR; INTRAVENOUS; SUBCUTANEOUS ONCE AS NEEDED
Status: DISCONTINUED | OUTPATIENT
Start: 2019-08-30 | End: 2019-08-30 | Stop reason: HOSPADM

## 2019-08-30 RX ORDER — ONDANSETRON 2 MG/ML
4 INJECTION INTRAMUSCULAR; INTRAVENOUS EVERY 6 HOURS PRN
Status: DISCONTINUED | OUTPATIENT
Start: 2019-08-30 | End: 2019-09-13 | Stop reason: HOSPADM

## 2019-08-30 RX ORDER — LIDOCAINE HYDROCHLORIDE 10 MG/ML
0.5 INJECTION, SOLUTION EPIDURAL; INFILTRATION; INTRACAUDAL; PERINEURAL ONCE AS NEEDED
Status: COMPLETED | OUTPATIENT
Start: 2019-08-30 | End: 2019-08-30

## 2019-08-30 RX ORDER — SODIUM CHLORIDE 9 MG/ML
30 INJECTION, SOLUTION INTRAVENOUS CONTINUOUS
Status: DISCONTINUED | OUTPATIENT
Start: 2019-08-30 | End: 2019-08-31

## 2019-08-30 RX ORDER — ATRACURIUM BESYLATE 10 MG/ML
INJECTION, SOLUTION INTRAVENOUS AS NEEDED
Status: DISCONTINUED | OUTPATIENT
Start: 2019-08-30 | End: 2019-08-30 | Stop reason: SURG

## 2019-08-30 RX ORDER — ROPINIROLE 0.5 MG/1
0.5 TABLET, FILM COATED ORAL NIGHTLY
Status: DISCONTINUED | OUTPATIENT
Start: 2019-08-30 | End: 2019-09-13 | Stop reason: HOSPADM

## 2019-08-30 RX ORDER — PROMETHAZINE HYDROCHLORIDE 25 MG/1
25 TABLET ORAL ONCE AS NEEDED
Status: DISCONTINUED | OUTPATIENT
Start: 2019-08-30 | End: 2019-08-30 | Stop reason: HOSPADM

## 2019-08-30 RX ORDER — SODIUM CHLORIDE, SODIUM LACTATE, POTASSIUM CHLORIDE, CALCIUM CHLORIDE 600; 310; 30; 20 MG/100ML; MG/100ML; MG/100ML; MG/100ML
9 INJECTION, SOLUTION INTRAVENOUS CONTINUOUS
Status: DISCONTINUED | OUTPATIENT
Start: 2019-08-30 | End: 2019-08-30

## 2019-08-30 RX ORDER — NALOXONE HCL 0.4 MG/ML
0.4 VIAL (ML) INJECTION
Status: DISCONTINUED | OUTPATIENT
Start: 2019-08-30 | End: 2019-09-13 | Stop reason: HOSPADM

## 2019-08-30 RX ORDER — ATROPINE SULFATE 0.4 MG/ML
AMPUL (ML) INJECTION AS NEEDED
Status: DISCONTINUED | OUTPATIENT
Start: 2019-08-30 | End: 2019-08-30 | Stop reason: SURG

## 2019-08-30 RX ORDER — HYDROMORPHONE HYDROCHLORIDE 1 MG/ML
0.5 INJECTION, SOLUTION INTRAMUSCULAR; INTRAVENOUS; SUBCUTANEOUS
Status: DISCONTINUED | OUTPATIENT
Start: 2019-08-30 | End: 2019-08-30 | Stop reason: HOSPADM

## 2019-08-30 RX ORDER — FAMOTIDINE 20 MG/1
20 TABLET, FILM COATED ORAL
Status: COMPLETED | OUTPATIENT
Start: 2019-08-30 | End: 2019-09-02

## 2019-08-30 RX ORDER — LIDOCAINE HYDROCHLORIDE 10 MG/ML
INJECTION, SOLUTION INFILTRATION; PERINEURAL AS NEEDED
Status: DISCONTINUED | OUTPATIENT
Start: 2019-08-30 | End: 2019-08-30 | Stop reason: SURG

## 2019-08-30 RX ORDER — ESMOLOL HYDROCHLORIDE 10 MG/ML
INJECTION INTRAVENOUS AS NEEDED
Status: DISCONTINUED | OUTPATIENT
Start: 2019-08-30 | End: 2019-08-30 | Stop reason: SURG

## 2019-08-30 RX ORDER — FAMOTIDINE 20 MG/1
20 TABLET, FILM COATED ORAL ONCE
Status: COMPLETED | OUTPATIENT
Start: 2019-08-30 | End: 2019-08-30

## 2019-08-30 RX ORDER — HEPARIN SODIUM 5000 [USP'U]/ML
5000 INJECTION, SOLUTION INTRAVENOUS; SUBCUTANEOUS EVERY 12 HOURS SCHEDULED
Status: DISCONTINUED | OUTPATIENT
Start: 2019-08-31 | End: 2019-08-30

## 2019-08-30 RX ADMIN — EPHEDRINE SULFATE 10 MG: 50 INJECTION INTRAMUSCULAR; INTRAVENOUS; SUBCUTANEOUS at 13:01

## 2019-08-30 RX ADMIN — LABETALOL HYDROCHLORIDE 5 MG: 5 INJECTION, SOLUTION INTRAVENOUS at 11:28

## 2019-08-30 RX ADMIN — CARVEDILOL 3.12 MG: 3.12 TABLET, FILM COATED ORAL at 20:03

## 2019-08-30 RX ADMIN — FAMOTIDINE 20 MG: 20 TABLET ORAL at 18:03

## 2019-08-30 RX ADMIN — PHENYLEPHRINE HYDROCHLORIDE 100 MCG: 10 INJECTION INTRAVENOUS at 13:04

## 2019-08-30 RX ADMIN — HYDROCODONE BITARTRATE AND ACETAMINOPHEN 1 TABLET: 7.5; 325 TABLET ORAL at 18:03

## 2019-08-30 RX ADMIN — EPHEDRINE SULFATE 10 MG: 50 INJECTION INTRAMUSCULAR; INTRAVENOUS; SUBCUTANEOUS at 11:16

## 2019-08-30 RX ADMIN — PROPOFOL 150 MG: 10 INJECTION, EMULSION INTRAVENOUS at 10:43

## 2019-08-30 RX ADMIN — EPHEDRINE SULFATE 10 MG: 50 INJECTION INTRAMUSCULAR; INTRAVENOUS; SUBCUTANEOUS at 12:56

## 2019-08-30 RX ADMIN — EPHEDRINE SULFATE 10 MG: 50 INJECTION INTRAMUSCULAR; INTRAVENOUS; SUBCUTANEOUS at 11:03

## 2019-08-30 RX ADMIN — GLYCOPYRROLATE 0.4 MG: 0.2 INJECTION, SOLUTION INTRAMUSCULAR; INTRAVENOUS at 14:09

## 2019-08-30 RX ADMIN — ATRACURIUM BESYLATE 10 MG: 10 INJECTION, SOLUTION INTRAVENOUS at 13:15

## 2019-08-30 RX ADMIN — SODIUM CHLORIDE 30 ML/HR: 9 INJECTION, SOLUTION INTRAVENOUS at 15:48

## 2019-08-30 RX ADMIN — SENNOSIDES, DOCUSATE SODIUM 2 TABLET: 50; 8.6 TABLET, FILM COATED ORAL at 20:03

## 2019-08-30 RX ADMIN — CALCIUM CHLORIDE 500 MG: 100 INJECTION INTRAVENOUS; INTRAVENTRICULAR at 11:50

## 2019-08-30 RX ADMIN — LIDOCAINE HYDROCHLORIDE 50 MG: 10 INJECTION, SOLUTION INFILTRATION; PERINEURAL at 10:43

## 2019-08-30 RX ADMIN — ONDANSETRON 4 MG: 2 INJECTION INTRAMUSCULAR; INTRAVENOUS at 13:46

## 2019-08-30 RX ADMIN — FENTANYL CITRATE 50 MCG: 50 INJECTION INTRAMUSCULAR; INTRAVENOUS at 14:22

## 2019-08-30 RX ADMIN — PROPOFOL 25 MCG/KG/MIN: 10 INJECTION, EMULSION INTRAVENOUS at 10:53

## 2019-08-30 RX ADMIN — FENTANYL CITRATE 50 MCG: 50 INJECTION INTRAMUSCULAR; INTRAVENOUS at 14:57

## 2019-08-30 RX ADMIN — CEFUROXIME 1.5 G: 1.5 INJECTION, POWDER, FOR SOLUTION INTRAVENOUS at 10:40

## 2019-08-30 RX ADMIN — IPRATROPIUM BROMIDE AND ALBUTEROL SULFATE 3 ML: 2.5; .5 SOLUTION RESPIRATORY (INHALATION) at 19:18

## 2019-08-30 RX ADMIN — KETOROLAC TROMETHAMINE 30 MG: 30 INJECTION, SOLUTION INTRAMUSCULAR; INTRAVENOUS at 18:03

## 2019-08-30 RX ADMIN — ATRACURIUM BESYLATE 10 MG: 10 INJECTION, SOLUTION INTRAVENOUS at 12:09

## 2019-08-30 RX ADMIN — FENTANYL CITRATE 100 MCG: 50 INJECTION, SOLUTION INTRAMUSCULAR; INTRAVENOUS at 10:43

## 2019-08-30 RX ADMIN — FENTANYL CITRATE 50 MCG: 50 INJECTION INTRAMUSCULAR; INTRAVENOUS at 14:30

## 2019-08-30 RX ADMIN — DEXAMETHASONE SODIUM PHOSPHATE 8 MG: 4 INJECTION, SOLUTION INTRAMUSCULAR; INTRAVENOUS at 10:43

## 2019-08-30 RX ADMIN — LIDOCAINE HYDROCHLORIDE 0.2 ML: 10 INJECTION, SOLUTION EPIDURAL; INFILTRATION; INTRACAUDAL; PERINEURAL at 07:45

## 2019-08-30 RX ADMIN — FAMOTIDINE 20 MG: 20 TABLET ORAL at 08:00

## 2019-08-30 RX ADMIN — MORPHINE SULFATE 4 MG: 4 INJECTION INTRAVENOUS at 20:04

## 2019-08-30 RX ADMIN — ATRACURIUM BESYLATE 40 MG: 10 INJECTION, SOLUTION INTRAVENOUS at 10:43

## 2019-08-30 RX ADMIN — LABETALOL HYDROCHLORIDE 5 MG: 5 INJECTION, SOLUTION INTRAVENOUS at 11:21

## 2019-08-30 RX ADMIN — ATROPINE SULFATE 0.2 MG: 0.4 INJECTION, SOLUTION INTRAMUSCULAR; INTRAVENOUS; SUBCUTANEOUS at 11:51

## 2019-08-30 RX ADMIN — ESMOLOL HYDROCHLORIDE 50 MG: 10 INJECTION INTRAVENOUS at 10:43

## 2019-08-30 RX ADMIN — SODIUM CHLORIDE, POTASSIUM CHLORIDE, SODIUM LACTATE AND CALCIUM CHLORIDE 9 ML/HR: 600; 310; 30; 20 INJECTION, SOLUTION INTRAVENOUS at 07:45

## 2019-08-30 RX ADMIN — PHENYLEPHRINE HYDROCHLORIDE 100 MCG: 10 INJECTION INTRAVENOUS at 12:57

## 2019-08-30 RX ADMIN — ROPINIROLE 0.5 MG: 0.5 TABLET, FILM COATED ORAL at 20:03

## 2019-08-30 RX ADMIN — EPHEDRINE SULFATE 10 MG: 50 INJECTION INTRAMUSCULAR; INTRAVENOUS; SUBCUTANEOUS at 11:40

## 2019-08-30 RX ADMIN — EPHEDRINE SULFATE 10 MG: 50 INJECTION INTRAMUSCULAR; INTRAVENOUS; SUBCUTANEOUS at 11:50

## 2019-08-30 RX ADMIN — SODIUM CHLORIDE 1.5 G: 900 INJECTION INTRAVENOUS at 18:03

## 2019-08-30 RX ADMIN — Medication 4 MG: at 14:09

## 2019-08-30 RX ADMIN — MORPHINE SULFATE 4 MG: 4 INJECTION INTRAVENOUS at 15:38

## 2019-08-31 ENCOUNTER — APPOINTMENT (OUTPATIENT)
Dept: GENERAL RADIOLOGY | Facility: HOSPITAL | Age: 64
End: 2019-08-31

## 2019-08-31 LAB
ANION GAP SERPL CALCULATED.3IONS-SCNC: 13 MMOL/L (ref 5–15)
BASOPHILS # BLD AUTO: 0 10*3/MM3 (ref 0–0.2)
BASOPHILS NFR BLD AUTO: 0 % (ref 0–1.5)
BUN BLD-MCNC: 29 MG/DL (ref 8–23)
BUN/CREAT SERPL: 26.6 (ref 7–25)
CALCIUM SPEC-SCNC: 9 MG/DL (ref 8.6–10.5)
CHLORIDE SERPL-SCNC: 103 MMOL/L (ref 98–107)
CO2 SERPL-SCNC: 22 MMOL/L (ref 22–29)
CREAT BLD-MCNC: 1.09 MG/DL (ref 0.57–1)
DEPRECATED RDW RBC AUTO: 52.6 FL (ref 37–54)
EOSINOPHIL # BLD AUTO: 0 10*3/MM3 (ref 0–0.4)
EOSINOPHIL NFR BLD AUTO: 0 % (ref 0.3–6.2)
ERYTHROCYTE [DISTWIDTH] IN BLOOD BY AUTOMATED COUNT: 16.1 % (ref 12.3–15.4)
GFR SERPL CREATININE-BSD FRML MDRD: 61 ML/MIN/1.73
GLUCOSE BLD-MCNC: 131 MG/DL (ref 65–99)
HCT VFR BLD AUTO: 29.3 % (ref 34–46.6)
HGB BLD-MCNC: 9.2 G/DL (ref 12–15.9)
IMM GRANULOCYTES # BLD AUTO: 0.02 10*3/MM3 (ref 0–0.05)
IMM GRANULOCYTES NFR BLD AUTO: 0.2 % (ref 0–0.5)
LYMPHOCYTES # BLD AUTO: 0.61 10*3/MM3 (ref 0.7–3.1)
LYMPHOCYTES NFR BLD AUTO: 6.2 % (ref 19.6–45.3)
MCH RBC QN AUTO: 28 PG (ref 26.6–33)
MCHC RBC AUTO-ENTMCNC: 31.4 G/DL (ref 31.5–35.7)
MCV RBC AUTO: 89.3 FL (ref 79–97)
MONOCYTES # BLD AUTO: 0.84 10*3/MM3 (ref 0.1–0.9)
MONOCYTES NFR BLD AUTO: 8.5 % (ref 5–12)
NEUTROPHILS # BLD AUTO: 8.39 10*3/MM3 (ref 1.7–7)
NEUTROPHILS NFR BLD AUTO: 85.1 % (ref 42.7–76)
NRBC BLD AUTO-RTO: 0 /100 WBC (ref 0–0.2)
PLATELET # BLD AUTO: 222 10*3/MM3 (ref 140–450)
PMV BLD AUTO: 10.1 FL (ref 6–12)
POTASSIUM BLD-SCNC: 4.6 MMOL/L (ref 3.5–5.2)
RBC # BLD AUTO: 3.28 10*6/MM3 (ref 3.77–5.28)
SODIUM BLD-SCNC: 138 MMOL/L (ref 136–145)
WBC NRBC COR # BLD: 9.86 10*3/MM3 (ref 3.4–10.8)

## 2019-08-31 PROCEDURE — 25010000002 HEPARIN (PORCINE) PER 1000 UNITS: Performed by: THORACIC SURGERY (CARDIOTHORACIC VASCULAR SURGERY)

## 2019-08-31 PROCEDURE — 25010000002 MORPHINE PER 10 MG: Performed by: THORACIC SURGERY (CARDIOTHORACIC VASCULAR SURGERY)

## 2019-08-31 PROCEDURE — 99232 SBSQ HOSP IP/OBS MODERATE 35: CPT | Performed by: INTERNAL MEDICINE

## 2019-08-31 PROCEDURE — 25010000002 HYDROMORPHONE 1 MG/ML SOLUTION: Performed by: INTERNAL MEDICINE

## 2019-08-31 PROCEDURE — 25010000002 KETOROLAC TROMETHAMINE PER 15 MG: Performed by: INTERNAL MEDICINE

## 2019-08-31 PROCEDURE — 94799 UNLISTED PULMONARY SVC/PX: CPT

## 2019-08-31 PROCEDURE — 71045 X-RAY EXAM CHEST 1 VIEW: CPT

## 2019-08-31 PROCEDURE — 85025 COMPLETE CBC W/AUTO DIFF WBC: CPT | Performed by: PHYSICIAN ASSISTANT

## 2019-08-31 PROCEDURE — 80048 BASIC METABOLIC PNL TOTAL CA: CPT | Performed by: PHYSICIAN ASSISTANT

## 2019-08-31 PROCEDURE — 25010000003 CEFUROXIME SODIUM 1.5 G RECONSTITUTED SOLUTION: Performed by: PHYSICIAN ASSISTANT

## 2019-08-31 RX ADMIN — POLYETHYLENE GLYCOL 3350 17 G: 17 POWDER, FOR SOLUTION ORAL at 09:03

## 2019-08-31 RX ADMIN — CARVEDILOL 3.12 MG: 3.12 TABLET, FILM COATED ORAL at 20:16

## 2019-08-31 RX ADMIN — IPRATROPIUM BROMIDE AND ALBUTEROL SULFATE 3 ML: 2.5; .5 SOLUTION RESPIRATORY (INHALATION) at 15:56

## 2019-08-31 RX ADMIN — ASPIRIN 81 MG: 81 TABLET, COATED ORAL at 09:03

## 2019-08-31 RX ADMIN — MORPHINE SULFATE 4 MG: 4 INJECTION INTRAVENOUS at 01:59

## 2019-08-31 RX ADMIN — FAMOTIDINE 20 MG: 20 TABLET ORAL at 17:36

## 2019-08-31 RX ADMIN — FAMOTIDINE 20 MG: 20 TABLET ORAL at 06:48

## 2019-08-31 RX ADMIN — IPRATROPIUM BROMIDE AND ALBUTEROL SULFATE 3 ML: 2.5; .5 SOLUTION RESPIRATORY (INHALATION) at 12:41

## 2019-08-31 RX ADMIN — HEPARIN SODIUM 5000 UNITS: 5000 INJECTION INTRAVENOUS; SUBCUTANEOUS at 20:16

## 2019-08-31 RX ADMIN — IPRATROPIUM BROMIDE AND ALBUTEROL SULFATE 3 ML: 2.5; .5 SOLUTION RESPIRATORY (INHALATION) at 19:20

## 2019-08-31 RX ADMIN — HYDROMORPHONE HYDROCHLORIDE 1 MG: 1 INJECTION, SOLUTION INTRAMUSCULAR; INTRAVENOUS; SUBCUTANEOUS at 19:16

## 2019-08-31 RX ADMIN — HYDROMORPHONE HYDROCHLORIDE 1 MG: 1 INJECTION, SOLUTION INTRAMUSCULAR; INTRAVENOUS; SUBCUTANEOUS at 13:38

## 2019-08-31 RX ADMIN — HYDROCODONE BITARTRATE AND ACETAMINOPHEN 1 TABLET: 7.5; 325 TABLET ORAL at 00:10

## 2019-08-31 RX ADMIN — ROPINIROLE 0.5 MG: 0.5 TABLET, FILM COATED ORAL at 20:16

## 2019-08-31 RX ADMIN — SODIUM CHLORIDE 1.5 G: 900 INJECTION INTRAVENOUS at 01:59

## 2019-08-31 RX ADMIN — HYDROCODONE BITARTRATE AND ACETAMINOPHEN 1 TABLET: 7.5; 325 TABLET ORAL at 15:23

## 2019-08-31 RX ADMIN — CARVEDILOL 3.12 MG: 3.12 TABLET, FILM COATED ORAL at 09:03

## 2019-08-31 RX ADMIN — SENNOSIDES, DOCUSATE SODIUM 2 TABLET: 50; 8.6 TABLET, FILM COATED ORAL at 20:16

## 2019-08-31 RX ADMIN — HYDROCODONE BITARTRATE AND ACETAMINOPHEN 1 TABLET: 7.5; 325 TABLET ORAL at 10:15

## 2019-08-31 RX ADMIN — HYDROCODONE BITARTRATE AND ACETAMINOPHEN 1 TABLET: 7.5; 325 TABLET ORAL at 21:43

## 2019-08-31 RX ADMIN — KETOROLAC TROMETHAMINE 30 MG: 30 INJECTION, SOLUTION INTRAMUSCULAR; INTRAVENOUS at 09:00

## 2019-08-31 RX ADMIN — IPRATROPIUM BROMIDE AND ALBUTEROL SULFATE 3 ML: 2.5; .5 SOLUTION RESPIRATORY (INHALATION) at 07:14

## 2019-08-31 RX ADMIN — MORPHINE SULFATE 4 MG: 4 INJECTION INTRAVENOUS at 12:09

## 2019-08-31 RX ADMIN — HEPARIN SODIUM 5000 UNITS: 5000 INJECTION INTRAVENOUS; SUBCUTANEOUS at 09:02

## 2019-08-31 RX ADMIN — MORPHINE SULFATE 4 MG: 4 INJECTION INTRAVENOUS at 06:53

## 2019-08-31 RX ADMIN — SODIUM CHLORIDE, POTASSIUM CHLORIDE, SODIUM LACTATE AND CALCIUM CHLORIDE 500 ML: 600; 310; 30; 20 INJECTION, SOLUTION INTRAVENOUS at 16:11

## 2019-08-31 RX ADMIN — KETOROLAC TROMETHAMINE 30 MG: 30 INJECTION, SOLUTION INTRAMUSCULAR; INTRAVENOUS at 03:06

## 2019-09-01 ENCOUNTER — APPOINTMENT (OUTPATIENT)
Dept: GENERAL RADIOLOGY | Facility: HOSPITAL | Age: 64
End: 2019-09-01

## 2019-09-01 LAB
ALBUMIN SERPL-MCNC: 3.6 G/DL (ref 3.5–5.2)
ALBUMIN/GLOB SERPL: 1.2 G/DL
ALP SERPL-CCNC: 79 U/L (ref 39–117)
ALT SERPL W P-5'-P-CCNC: 7 U/L (ref 1–33)
ANION GAP SERPL CALCULATED.3IONS-SCNC: 11 MMOL/L (ref 5–15)
AST SERPL-CCNC: 16 U/L (ref 1–32)
BASOPHILS # BLD AUTO: 0.01 10*3/MM3 (ref 0–0.2)
BASOPHILS NFR BLD AUTO: 0.2 % (ref 0–1.5)
BILIRUB SERPL-MCNC: 0.2 MG/DL (ref 0.2–1.2)
BUN BLD-MCNC: 31 MG/DL (ref 8–23)
BUN/CREAT SERPL: 25.6 (ref 7–25)
CALCIUM SPEC-SCNC: 8.8 MG/DL (ref 8.6–10.5)
CHLORIDE SERPL-SCNC: 99 MMOL/L (ref 98–107)
CO2 SERPL-SCNC: 24 MMOL/L (ref 22–29)
CREAT BLD-MCNC: 1.21 MG/DL (ref 0.57–1)
DEPRECATED RDW RBC AUTO: 52.9 FL (ref 37–54)
EOSINOPHIL # BLD AUTO: 0.04 10*3/MM3 (ref 0–0.4)
EOSINOPHIL NFR BLD AUTO: 0.6 % (ref 0.3–6.2)
ERYTHROCYTE [DISTWIDTH] IN BLOOD BY AUTOMATED COUNT: 16.1 % (ref 12.3–15.4)
GFR SERPL CREATININE-BSD FRML MDRD: 54 ML/MIN/1.73
GLOBULIN UR ELPH-MCNC: 3 GM/DL
GLUCOSE BLD-MCNC: 94 MG/DL (ref 65–99)
HCT VFR BLD AUTO: 28 % (ref 34–46.6)
HGB BLD-MCNC: 8.8 G/DL (ref 12–15.9)
IMM GRANULOCYTES # BLD AUTO: 0.01 10*3/MM3 (ref 0–0.05)
IMM GRANULOCYTES NFR BLD AUTO: 0.2 % (ref 0–0.5)
LYMPHOCYTES # BLD AUTO: 1.15 10*3/MM3 (ref 0.7–3.1)
LYMPHOCYTES NFR BLD AUTO: 18.6 % (ref 19.6–45.3)
MAGNESIUM SERPL-MCNC: 2 MG/DL (ref 1.6–2.4)
MCH RBC QN AUTO: 28.2 PG (ref 26.6–33)
MCHC RBC AUTO-ENTMCNC: 31.4 G/DL (ref 31.5–35.7)
MCV RBC AUTO: 89.7 FL (ref 79–97)
MONOCYTES # BLD AUTO: 0.56 10*3/MM3 (ref 0.1–0.9)
MONOCYTES NFR BLD AUTO: 9.1 % (ref 5–12)
NEUTROPHILS # BLD AUTO: 4.4 10*3/MM3 (ref 1.7–7)
NEUTROPHILS NFR BLD AUTO: 71.3 % (ref 42.7–76)
NRBC BLD AUTO-RTO: 0 /100 WBC (ref 0–0.2)
PHOSPHATE SERPL-MCNC: 3.9 MG/DL (ref 2.5–4.5)
PLATELET # BLD AUTO: 202 10*3/MM3 (ref 140–450)
PMV BLD AUTO: 10 FL (ref 6–12)
POTASSIUM BLD-SCNC: 4.2 MMOL/L (ref 3.5–5.2)
PROT SERPL-MCNC: 6.6 G/DL (ref 6–8.5)
RBC # BLD AUTO: 3.12 10*6/MM3 (ref 3.77–5.28)
SODIUM BLD-SCNC: 134 MMOL/L (ref 136–145)
WBC NRBC COR # BLD: 6.17 10*3/MM3 (ref 3.4–10.8)

## 2019-09-01 PROCEDURE — 83735 ASSAY OF MAGNESIUM: CPT | Performed by: INTERNAL MEDICINE

## 2019-09-01 PROCEDURE — 80053 COMPREHEN METABOLIC PANEL: CPT | Performed by: INTERNAL MEDICINE

## 2019-09-01 PROCEDURE — 71045 X-RAY EXAM CHEST 1 VIEW: CPT

## 2019-09-01 PROCEDURE — 25010000002 HYDROMORPHONE 1 MG/ML SOLUTION: Performed by: INTERNAL MEDICINE

## 2019-09-01 PROCEDURE — 85025 COMPLETE CBC W/AUTO DIFF WBC: CPT | Performed by: INTERNAL MEDICINE

## 2019-09-01 PROCEDURE — 94799 UNLISTED PULMONARY SVC/PX: CPT

## 2019-09-01 PROCEDURE — 84100 ASSAY OF PHOSPHORUS: CPT | Performed by: INTERNAL MEDICINE

## 2019-09-01 PROCEDURE — 25010000002 HEPARIN (PORCINE) PER 1000 UNITS: Performed by: THORACIC SURGERY (CARDIOTHORACIC VASCULAR SURGERY)

## 2019-09-01 RX ADMIN — ROPINIROLE 0.5 MG: 0.5 TABLET, FILM COATED ORAL at 20:32

## 2019-09-01 RX ADMIN — IPRATROPIUM BROMIDE AND ALBUTEROL SULFATE 3 ML: 2.5; .5 SOLUTION RESPIRATORY (INHALATION) at 08:20

## 2019-09-01 RX ADMIN — HYDROMORPHONE HYDROCHLORIDE 1 MG: 1 INJECTION, SOLUTION INTRAMUSCULAR; INTRAVENOUS; SUBCUTANEOUS at 09:47

## 2019-09-01 RX ADMIN — HYDROCODONE BITARTRATE AND ACETAMINOPHEN 1 TABLET: 7.5; 325 TABLET ORAL at 08:46

## 2019-09-01 RX ADMIN — HYDROMORPHONE HYDROCHLORIDE 1 MG: 1 INJECTION, SOLUTION INTRAMUSCULAR; INTRAVENOUS; SUBCUTANEOUS at 23:13

## 2019-09-01 RX ADMIN — HYDROCODONE BITARTRATE AND ACETAMINOPHEN 1 TABLET: 7.5; 325 TABLET ORAL at 12:10

## 2019-09-01 RX ADMIN — ASPIRIN 81 MG: 81 TABLET, COATED ORAL at 08:45

## 2019-09-01 RX ADMIN — SENNOSIDES, DOCUSATE SODIUM 2 TABLET: 50; 8.6 TABLET, FILM COATED ORAL at 20:32

## 2019-09-01 RX ADMIN — HEPARIN SODIUM 5000 UNITS: 5000 INJECTION INTRAVENOUS; SUBCUTANEOUS at 20:33

## 2019-09-01 RX ADMIN — HYDROCODONE BITARTRATE AND ACETAMINOPHEN 1 TABLET: 7.5; 325 TABLET ORAL at 16:22

## 2019-09-01 RX ADMIN — FAMOTIDINE 20 MG: 20 TABLET ORAL at 08:45

## 2019-09-01 RX ADMIN — CARVEDILOL 3.12 MG: 3.12 TABLET, FILM COATED ORAL at 08:45

## 2019-09-01 RX ADMIN — HEPARIN SODIUM 5000 UNITS: 5000 INJECTION INTRAVENOUS; SUBCUTANEOUS at 08:45

## 2019-09-01 RX ADMIN — IPRATROPIUM BROMIDE AND ALBUTEROL SULFATE 3 ML: 2.5; .5 SOLUTION RESPIRATORY (INHALATION) at 13:08

## 2019-09-01 RX ADMIN — IPRATROPIUM BROMIDE AND ALBUTEROL SULFATE 3 ML: 2.5; .5 SOLUTION RESPIRATORY (INHALATION) at 20:41

## 2019-09-01 RX ADMIN — IPRATROPIUM BROMIDE AND ALBUTEROL SULFATE 3 ML: 2.5; .5 SOLUTION RESPIRATORY (INHALATION) at 16:36

## 2019-09-01 RX ADMIN — POLYETHYLENE GLYCOL 3350 17 G: 17 POWDER, FOR SOLUTION ORAL at 08:46

## 2019-09-01 RX ADMIN — HYDROMORPHONE HYDROCHLORIDE 1 MG: 1 INJECTION, SOLUTION INTRAMUSCULAR; INTRAVENOUS; SUBCUTANEOUS at 00:02

## 2019-09-01 RX ADMIN — HYDROCODONE BITARTRATE AND ACETAMINOPHEN 1 TABLET: 7.5; 325 TABLET ORAL at 20:32

## 2019-09-01 RX ADMIN — CARVEDILOL 3.12 MG: 3.12 TABLET, FILM COATED ORAL at 20:32

## 2019-09-01 RX ADMIN — FAMOTIDINE 20 MG: 20 TABLET ORAL at 16:22

## 2019-09-01 NOTE — PROGRESS NOTES
Discharge Planning Assessment  [facility]     Patient Name: Lizbeth Johns  MRN: [id number]  Today's Date: 9/1/2019    Admit Date: 8/30/2019    Discharge Needs Assessment     Row Name 09/01/19 6400       Living Environment    Lives With  friend(s)    Name(s) of Who Lives With Patient  Franklin Kinney    Current Living Arrangements  home/apartment/condo    Primary Care Provided by  self    Provides Primary Care For  no one    Family Caregiver if Needed  child(erica), adult    Family Caregiver Names  Kevyn Johns (daughter) [phone] and Tonja Hudson (daughter) [phone]    Able to Return to Prior Arrangements  yes       Resource/Environmental Concerns    Resource/Environmental Concerns  none    Transportation Concerns  car, none       Transition Planning    Patient/Family Anticipates Transition to  home with family    Patient/Family Anticipated Services at Transition  none    Transportation Anticipated  family or friend will provide       Discharge Needs Assessment    Readmission Within the Last 30 Days  no previous admission in last 30 days    Concerns to be Addressed  denies needs/concerns at this time    Equipment Currently Used at Home  none    Anticipated Changes Related to Illness  none    Equipment Needed After Discharge  none        Discharge Plan     Row Name 09/01/19 1185       Plan    Plan Comments  Spoke to patient at bedside. Lives with Franklin Kinney (friend) in [place]. Caregivers are Kevyn Johns (daughter) [phone] and Tonja Hudson (daughter) [phone]. Is independent with ADL's. No problems with insurance or medications. No DME at home. Denies any needs at this time. CM will continue to follow    Final Discharge Disposition Code  01 - home or self-care        Destination      No service coordination in this encounter.      Durable Medical Equipment      No service coordination in this encounter.      Dialysis/Infusion      No service coordination in this  encounter.      Home Medical Care      No service coordination in this encounter.      Therapy      No service coordination in this encounter.      Community Resources      No service coordination in this encounter.          Demographic Summary     Row Name 09/01/19 0937       General Information    Admission Type  inpatient    Arrived From  PACU/recovery room    Referral Source  admission list    Reason for Consult  discharge planning    Preferred Language  English     Used During This Interaction  no       Contact Information    Permission Granted to Share Info With      Contact Information Obtained for      Contact Information Comments  PCP is Emily Ferreira MD        Functional Status     Row Name 09/01/19 0939       Functional Status    Usual Activity Tolerance  good    Current Activity Tolerance  good       Functional Status, IADL    Medications  independent    Meal Preparation  independent    Housekeeping  independent    Laundry  independent    Shopping  independent       Mental Status    General Appearance WDL  WDL       Mental Status Summary    Recent Changes in Mental Status/Cognitive Functioning  no changes       Employment/    Employment Status  unemployed        Psychosocial    No documentation.       Abuse/Neglect    No documentation.       Legal    No documentation.       Substance Abuse    No documentation.       Patient Forms    No documentation.           Henok Bartlett RN

## 2019-09-01 NOTE — PLAN OF CARE
Problem: Patient Care Overview  Goal: Plan of Care Review  Outcome: Ongoing (interventions implemented as appropriate)   09/01/19 8061   Coping/Psychosocial   Plan of Care Reviewed With patient   OTHER   Outcome Summary pt resting quietly, chest tube to 20cm suction, will continue to monitor pt VSS, pt on RA       Problem: Skin Injury Risk (Adult)  Goal: Identify Related Risk Factors and Signs and Symptoms  Outcome: Ongoing (interventions implemented as appropriate)    Goal: Skin Health and Integrity  Outcome: Ongoing (interventions implemented as appropriate)

## 2019-09-01 NOTE — PROGRESS NOTES
Cardiothoracic Surgery Progress Note      POD # 2 s/p Right VATS upper lobectomy     LOS: 2 days      Subjective:  No complaints.  Pain controlled now.  Was significant this morning after no pain medications overnight    Objective:  Vital Signs  Temp:  [97.9 °F (36.6 °C)-98.5 °F (36.9 °C)] 98.4 °F (36.9 °C)  Heart Rate:  [53-83] 83  Resp:  [14-20] 18  BP: ()/(58-89) 113/77    Physical Exam:   General Appearance: alert, appears stated age and cooperative   Lungs: clear to auscultation, respirations regular, respirations even and respirations unlabored   Heart: regular rhythm & normal rate, normal S1, S2 and no murmur, no gallop, no rub   Skin: Incision c/d/i   Small air leak with cough  Results:    Results from last 7 days   Lab Units 09/01/19  0550   WBC 10*3/mm3 6.17   HEMOGLOBIN g/dL 8.8*   HEMATOCRIT % 28.0*   PLATELETS 10*3/mm3 202     Results from last 7 days   Lab Units 09/01/19  0550   SODIUM mmol/L 134*   POTASSIUM mmol/L 4.2   CHLORIDE mmol/L 99   CO2 mmol/L 24.0   BUN mg/dL 31*   CREATININE mg/dL 1.21*   GLUCOSE mg/dL 94   CALCIUM mg/dL 8.8       Assessment:  POD #2 s/p Right VATS upper lobectomy  Expected recovery    Plan:  260 mL or drainage in 24 hours  Continue chest tube until air leak resolved  Await final pathology  Ambulate  Pulmonary toilet    09/01/19  11:32 AM

## 2019-09-01 NOTE — NURSING NOTE
Patient AOX4, VSS. Patient received orders to tele, and bed available. Patient report called to receiving nurse and patient transferred to tele in wheelchair and on monitor.

## 2019-09-02 ENCOUNTER — APPOINTMENT (OUTPATIENT)
Dept: GENERAL RADIOLOGY | Facility: HOSPITAL | Age: 64
End: 2019-09-02

## 2019-09-02 PROCEDURE — 71045 X-RAY EXAM CHEST 1 VIEW: CPT

## 2019-09-02 PROCEDURE — 94799 UNLISTED PULMONARY SVC/PX: CPT

## 2019-09-02 PROCEDURE — 25010000002 HYDROMORPHONE 1 MG/ML SOLUTION: Performed by: INTERNAL MEDICINE

## 2019-09-02 PROCEDURE — 25010000002 HEPARIN (PORCINE) PER 1000 UNITS: Performed by: THORACIC SURGERY (CARDIOTHORACIC VASCULAR SURGERY)

## 2019-09-02 RX ADMIN — FAMOTIDINE 20 MG: 20 TABLET ORAL at 09:39

## 2019-09-02 RX ADMIN — IPRATROPIUM BROMIDE AND ALBUTEROL SULFATE 3 ML: 2.5; .5 SOLUTION RESPIRATORY (INHALATION) at 09:07

## 2019-09-02 RX ADMIN — HEPARIN SODIUM 5000 UNITS: 5000 INJECTION INTRAVENOUS; SUBCUTANEOUS at 09:42

## 2019-09-02 RX ADMIN — HYDROCODONE BITARTRATE AND ACETAMINOPHEN 1 TABLET: 7.5; 325 TABLET ORAL at 17:46

## 2019-09-02 RX ADMIN — POLYETHYLENE GLYCOL 3350 17 G: 17 POWDER, FOR SOLUTION ORAL at 09:38

## 2019-09-02 RX ADMIN — HEPARIN SODIUM 5000 UNITS: 5000 INJECTION INTRAVENOUS; SUBCUTANEOUS at 20:10

## 2019-09-02 RX ADMIN — CARVEDILOL 3.12 MG: 3.12 TABLET, FILM COATED ORAL at 20:10

## 2019-09-02 RX ADMIN — HYDROCODONE BITARTRATE AND ACETAMINOPHEN 1 TABLET: 7.5; 325 TABLET ORAL at 13:04

## 2019-09-02 RX ADMIN — HYDROMORPHONE HYDROCHLORIDE 1 MG: 1 INJECTION, SOLUTION INTRAMUSCULAR; INTRAVENOUS; SUBCUTANEOUS at 03:36

## 2019-09-02 RX ADMIN — ASPIRIN 81 MG: 81 TABLET, COATED ORAL at 09:39

## 2019-09-02 RX ADMIN — CARVEDILOL 3.12 MG: 3.12 TABLET, FILM COATED ORAL at 09:38

## 2019-09-02 RX ADMIN — HYDROMORPHONE HYDROCHLORIDE 1 MG: 1 INJECTION, SOLUTION INTRAMUSCULAR; INTRAVENOUS; SUBCUTANEOUS at 09:38

## 2019-09-02 RX ADMIN — HYDROCODONE BITARTRATE AND ACETAMINOPHEN 1 TABLET: 7.5; 325 TABLET ORAL at 07:17

## 2019-09-02 RX ADMIN — HYDROCODONE BITARTRATE AND ACETAMINOPHEN 1 TABLET: 7.5; 325 TABLET ORAL at 21:36

## 2019-09-02 RX ADMIN — IPRATROPIUM BROMIDE AND ALBUTEROL SULFATE 3 ML: 2.5; .5 SOLUTION RESPIRATORY (INHALATION) at 19:35

## 2019-09-02 RX ADMIN — HYDROCODONE BITARTRATE AND ACETAMINOPHEN 1 TABLET: 7.5; 325 TABLET ORAL at 03:21

## 2019-09-02 RX ADMIN — SENNOSIDES, DOCUSATE SODIUM 2 TABLET: 50; 8.6 TABLET, FILM COATED ORAL at 20:10

## 2019-09-02 RX ADMIN — ROPINIROLE 0.5 MG: 0.5 TABLET, FILM COATED ORAL at 20:10

## 2019-09-02 NOTE — PROGRESS NOTES
CTS Progress Note      POD 3 s/p Right VATS, upper lobectomy     LOS: 3 days   Patient Care Team:  Emily Ferreira MD as PCP - General (Family Medicine)  Yassine Brambila MD as PCP - Claims Attributed    Subjective  Patient awake and alert, sitting up in bed eating breakfast. Complains of mild soreness with deep inhalation, otherwise no new complaints of chest pain or shortness of breath. spO2 100% on RA.    Objective    Vital Signs  Temp:  [98.2 °F (36.8 °C)-98.6 °F (37 °C)] 98.3 °F (36.8 °C)  Heart Rate:  [61-85] 61  Resp:  [16-18] 18  BP: (103-125)/(67-85) 125/84    Physical Exam:   General Appearance: alert, appears stated age and cooperative   Lungs: Decreased breath sounds right apex   Heart: regular rhythm & normal rate, normal S1, S2, no murmur, no gallop, no rub and no click   Skin:  Incision c/d/i   Tiny air leak with cough  Results   Results from last 7 days   Lab Units 09/01/19  0550   WBC 10*3/mm3 6.17   HEMOGLOBIN g/dL 8.8*   HEMATOCRIT % 28.0*   PLATELETS 10*3/mm3 202     Results from last 7 days   Lab Units 09/01/19  0550   SODIUM mmol/L 134*   POTASSIUM mmol/L 4.2   CHLORIDE mmol/L 99   CO2 mmol/L 24.0   BUN mg/dL 31*   CREATININE mg/dL 1.21*   GLUCOSE mg/dL 94   CALCIUM mg/dL 8.8           Imaging Results (last 24 hours)     Procedure Component Value Units Date/Time    XR Chest 1 View [[id number]] Collected:  09/01/19 0829     Updated:  09/02/19 0000    Narrative:          EXAMINATION: XR CHEST, SINGLE VIEW - 09/01/2019     INDICATION: R91.1-Solitary pulmonary nodule; R91.8-Other nonspecific  abnormal finding of lung field.      COMPARISON: 08/31/2019     FINDINGS: There is increased right upper lung atelectasis. There is mild  but new right-sided subcutaneous emphysema. There is minimal if any  right apical pneumothorax. Left lung appears clear. Heart and  vasculature appear normal in size.           Impression:       New right upper lung atelectasis and mild right-sided  subcutaneous  emphysema.      DICTATED:   09/01/2019  EDITED/ls :   09/01/2019      This report was finalized on 9/1/2019 11:57 PM by DR. Nitin Corbin MD.             Assessment  POD #2 Right VATS, upper lobectomy    Mass of upper lobe of right lung    Lung nodule    CT output - 70ml in 24 hrs. Air leak still present    Plan   Continue chest tubes on waterseal  Await final pathology  Ambulate  Pulmonary toilet    09/02/19  8:18 AM

## 2019-09-02 NOTE — PLAN OF CARE
Problem: Patient Care Overview  Goal: Plan of Care Review  Outcome: Ongoing (interventions implemented as appropriate)   09/02/19 3024   Coping/Psychosocial   Plan of Care Reviewed With patient   Plan of Care Review   Progress no change   OTHER   Outcome Summary Pt rested intermittently during the night. VSS. RA. NSR. Pt ambulated 350ft around the unit with no problems noted. Pt c/o pain throughout the night. Prn norco and dilaudid alternated. CT remains to suction with 70ml output overnight. Will continue to monitor.

## 2019-09-03 ENCOUNTER — APPOINTMENT (OUTPATIENT)
Dept: GENERAL RADIOLOGY | Facility: HOSPITAL | Age: 64
End: 2019-09-03

## 2019-09-03 PROCEDURE — 71045 X-RAY EXAM CHEST 1 VIEW: CPT

## 2019-09-03 PROCEDURE — 94799 UNLISTED PULMONARY SVC/PX: CPT

## 2019-09-03 PROCEDURE — 25010000002 HEPARIN (PORCINE) PER 1000 UNITS: Performed by: THORACIC SURGERY (CARDIOTHORACIC VASCULAR SURGERY)

## 2019-09-03 PROCEDURE — 99024 POSTOP FOLLOW-UP VISIT: CPT | Performed by: PHYSICIAN ASSISTANT

## 2019-09-03 RX ORDER — BISACODYL 10 MG
10 SUPPOSITORY, RECTAL RECTAL ONCE
Status: COMPLETED | OUTPATIENT
Start: 2019-09-03 | End: 2019-09-03

## 2019-09-03 RX ADMIN — ROPINIROLE 0.5 MG: 0.5 TABLET, FILM COATED ORAL at 20:32

## 2019-09-03 RX ADMIN — ASPIRIN 81 MG: 81 TABLET, COATED ORAL at 09:15

## 2019-09-03 RX ADMIN — HEPARIN SODIUM 5000 UNITS: 5000 INJECTION INTRAVENOUS; SUBCUTANEOUS at 09:15

## 2019-09-03 RX ADMIN — IPRATROPIUM BROMIDE AND ALBUTEROL SULFATE 3 ML: 2.5; .5 SOLUTION RESPIRATORY (INHALATION) at 07:50

## 2019-09-03 RX ADMIN — IPRATROPIUM BROMIDE AND ALBUTEROL SULFATE 3 ML: 2.5; .5 SOLUTION RESPIRATORY (INHALATION) at 11:53

## 2019-09-03 RX ADMIN — CARVEDILOL 3.12 MG: 3.12 TABLET, FILM COATED ORAL at 09:15

## 2019-09-03 RX ADMIN — HYDROCODONE BITARTRATE AND ACETAMINOPHEN 1 TABLET: 7.5; 325 TABLET ORAL at 15:27

## 2019-09-03 RX ADMIN — HEPARIN SODIUM 5000 UNITS: 5000 INJECTION INTRAVENOUS; SUBCUTANEOUS at 20:32

## 2019-09-03 RX ADMIN — SENNOSIDES, DOCUSATE SODIUM 2 TABLET: 50; 8.6 TABLET, FILM COATED ORAL at 20:32

## 2019-09-03 RX ADMIN — HYDROCODONE BITARTRATE AND ACETAMINOPHEN 1 TABLET: 7.5; 325 TABLET ORAL at 20:32

## 2019-09-03 RX ADMIN — HYDROCODONE BITARTRATE AND ACETAMINOPHEN 1 TABLET: 7.5; 325 TABLET ORAL at 05:56

## 2019-09-03 RX ADMIN — IPRATROPIUM BROMIDE AND ALBUTEROL SULFATE 3 ML: 2.5; .5 SOLUTION RESPIRATORY (INHALATION) at 20:49

## 2019-09-03 RX ADMIN — IPRATROPIUM BROMIDE AND ALBUTEROL SULFATE 3 ML: 2.5; .5 SOLUTION RESPIRATORY (INHALATION) at 15:43

## 2019-09-03 RX ADMIN — CARVEDILOL 3.12 MG: 3.12 TABLET, FILM COATED ORAL at 20:32

## 2019-09-03 RX ADMIN — HYDROCODONE BITARTRATE AND ACETAMINOPHEN 1 TABLET: 7.5; 325 TABLET ORAL at 01:55

## 2019-09-03 RX ADMIN — POLYETHYLENE GLYCOL 3350 17 G: 17 POWDER, FOR SOLUTION ORAL at 09:15

## 2019-09-03 RX ADMIN — BISACODYL 10 MG: 10 SUPPOSITORY RECTAL at 15:27

## 2019-09-03 NOTE — PROGRESS NOTES
CTS Progress Note      POD 4 s/p right VATS with right upper lobectomy    Subjective  Up in chair having breakfast.  Post surgical pain  Feels like she needs to cough something up    Objective    Physical Exam:   Vital Signs   Temp:  [97.9 °F (36.6 °C)-98.8 °F (37.1 °C)] 98.7 °F (37.1 °C)  Heart Rate:  [58-86] 58  Resp:  [18-20] 18  BP: (127-159)/(77-92) 149/80   GEN: NAD   CV: Regular rate and rhythm no murmur rub gallop    RESP: Unlabored on room air decreased at the bases bilaterally otherwise clear to auscultation   EXT: Warm to the touch no significant peripheral edema   Incision: Surgical bandage in place clean and dry surrounding area soft nontender no erythema drainage     CT Output: 170 mL's documented last 24 hours   Air leak present with cough  Results     Results from last 7 days   Lab Units 09/01/19  0550   WBC 10*3/mm3 6.17   HEMOGLOBIN g/dL 8.8*   HEMATOCRIT % 28.0*   PLATELETS 10*3/mm3 202     Results from last 7 days   Lab Units 09/01/19  0550   SODIUM mmol/L 134*   POTASSIUM mmol/L 4.2   CHLORIDE mmol/L 99   CO2 mmol/L 24.0   BUN mg/dL 31*   CREATININE mg/dL 1.21*   GLUCOSE mg/dL 94   CALCIUM mg/dL 8.8     Results from last 7 days   Lab Units 08/29/19  1249   INR  0.94       CXR: Right side subcutaneous emphysema noted.  Persistent and consistent right apical pneumothorax, similar to previous study      Assessment/Plan   Postoperative day 4 status post video-assisted thoracoscopy with a right upper lobectomy--pulmonary pathology consistent with non-small cell carcinoma  Right apical pneumothorax with small air leak    Mass of upper lobe of right lung    Lung nodule        Plan   Continue chest tubes to suction until air leak and pneumothorax resolves  Daily chest x-ray  Continue rehabilitation with aggressive pulmonary toilet and increasing activity levels  Await final pathology  Flutter valve  Suppository    09/03/19  7:29 AM

## 2019-09-03 NOTE — PROGRESS NOTES
Continued Stay Note  [facility]     Patient Name: Lizbeth Johns  MRN: [id number]  Today's Date: 9/3/2019    Admit Date: 8/30/2019    Discharge Plan     Row Name 09/03/19 1418       Plan    Plan  HOME    Patient/Family in Agreement with Plan  yes    Plan Comments  Met with pt at bedside to f/u DCP.  Goal remains to return home upon DC.  No immediate needs identified/voiced.  CM will cont to follow.      Final Discharge Disposition Code  01 - home or self-care        Discharge Codes    No documentation.       Expected Discharge Date and Time     Expected Discharge Date Expected Discharge Time    Sep 5, 2019             Kaitlynn Hobbs RN

## 2019-09-03 NOTE — PLAN OF CARE
Problem: Patient Care Overview  Goal: Plan of Care Review  Outcome: Ongoing (interventions implemented as appropriate)   09/03/19 0333   Coping/Psychosocial   Plan of Care Reviewed With patient   Plan of Care Review   Progress improving   OTHER   Outcome Summary vss, pain controlled with oral meds, freq ambulation in us,CT to waterseal, no issues will monitor       Problem: Lung Surgery (via Thoracotomy) (Adult)  Goal: Signs and Symptoms of Listed Potential Problems Will be Absent, Minimized or Managed (Lung Surgery)  Outcome: Ongoing (interventions implemented as appropriate)

## 2019-09-03 NOTE — PLAN OF CARE
Problem: Patient Care Overview  Goal: Plan of Care Review  Outcome: Ongoing (interventions implemented as appropriate)   09/03/19 5485   Coping/Psychosocial   Plan of Care Reviewed With patient   Plan of Care Review   Progress no change   OTHER   Outcome Summary Pt requested pain medication one time this shift. Reported relief with PRN medication. Pt amulated in us today. CT to -20 of suction. VSS. Will con tinue to monitor.

## 2019-09-04 ENCOUNTER — APPOINTMENT (OUTPATIENT)
Dept: GENERAL RADIOLOGY | Facility: HOSPITAL | Age: 64
End: 2019-09-04

## 2019-09-04 LAB
CYTO UR: NORMAL
LAB AP CASE REPORT: NORMAL
LAB AP CLINICAL INFORMATION: NORMAL
Lab: NORMAL
PATH REPORT.FINAL DX SPEC: NORMAL
PATH REPORT.GROSS SPEC: NORMAL

## 2019-09-04 PROCEDURE — 25010000002 HEPARIN (PORCINE) PER 1000 UNITS: Performed by: THORACIC SURGERY (CARDIOTHORACIC VASCULAR SURGERY)

## 2019-09-04 PROCEDURE — 71045 X-RAY EXAM CHEST 1 VIEW: CPT

## 2019-09-04 PROCEDURE — 94799 UNLISTED PULMONARY SVC/PX: CPT

## 2019-09-04 PROCEDURE — 25010000002 MORPHINE PER 10 MG: Performed by: PHYSICIAN ASSISTANT

## 2019-09-04 RX ORDER — MORPHINE SULFATE 2 MG/ML
2 INJECTION, SOLUTION INTRAMUSCULAR; INTRAVENOUS ONCE
Status: COMPLETED | OUTPATIENT
Start: 2019-09-04 | End: 2019-09-04

## 2019-09-04 RX ORDER — ACETAMINOPHEN 325 MG/1
650 TABLET ORAL EVERY 6 HOURS PRN
Status: DISCONTINUED | OUTPATIENT
Start: 2019-09-04 | End: 2019-09-13 | Stop reason: HOSPADM

## 2019-09-04 RX ADMIN — ROPINIROLE 0.5 MG: 0.5 TABLET, FILM COATED ORAL at 19:38

## 2019-09-04 RX ADMIN — MORPHINE SULFATE 2 MG: 2 INJECTION, SOLUTION INTRAMUSCULAR; INTRAVENOUS at 11:13

## 2019-09-04 RX ADMIN — MORPHINE SULFATE 2 MG: 2 INJECTION, SOLUTION INTRAMUSCULAR; INTRAVENOUS at 01:53

## 2019-09-04 RX ADMIN — HYDROCODONE BITARTRATE AND ACETAMINOPHEN 1 TABLET: 7.5; 325 TABLET ORAL at 00:34

## 2019-09-04 RX ADMIN — HYDROCODONE BITARTRATE AND ACETAMINOPHEN 1 TABLET: 7.5; 325 TABLET ORAL at 07:51

## 2019-09-04 RX ADMIN — HEPARIN SODIUM 5000 UNITS: 5000 INJECTION INTRAVENOUS; SUBCUTANEOUS at 19:37

## 2019-09-04 RX ADMIN — IPRATROPIUM BROMIDE AND ALBUTEROL SULFATE 3 ML: 2.5; .5 SOLUTION RESPIRATORY (INHALATION) at 15:42

## 2019-09-04 RX ADMIN — HYDROCODONE BITARTRATE AND ACETAMINOPHEN 1 TABLET: 7.5; 325 TABLET ORAL at 23:58

## 2019-09-04 RX ADMIN — IPRATROPIUM BROMIDE AND ALBUTEROL SULFATE 3 ML: 2.5; .5 SOLUTION RESPIRATORY (INHALATION) at 20:18

## 2019-09-04 RX ADMIN — POLYETHYLENE GLYCOL 3350 17 G: 17 POWDER, FOR SOLUTION ORAL at 08:46

## 2019-09-04 RX ADMIN — IPRATROPIUM BROMIDE AND ALBUTEROL SULFATE 3 ML: 2.5; .5 SOLUTION RESPIRATORY (INHALATION) at 12:08

## 2019-09-04 RX ADMIN — SENNOSIDES, DOCUSATE SODIUM 2 TABLET: 50; 8.6 TABLET, FILM COATED ORAL at 19:37

## 2019-09-04 RX ADMIN — IPRATROPIUM BROMIDE AND ALBUTEROL SULFATE 3 ML: 2.5; .5 SOLUTION RESPIRATORY (INHALATION) at 06:52

## 2019-09-04 RX ADMIN — ASPIRIN 81 MG: 81 TABLET, COATED ORAL at 08:46

## 2019-09-04 RX ADMIN — HYDROCODONE BITARTRATE AND ACETAMINOPHEN 1 TABLET: 7.5; 325 TABLET ORAL at 20:36

## 2019-09-04 RX ADMIN — HYDROCODONE BITARTRATE AND ACETAMINOPHEN 1 TABLET: 7.5; 325 TABLET ORAL at 12:18

## 2019-09-04 RX ADMIN — ACETAMINOPHEN 650 MG: 325 TABLET ORAL at 20:03

## 2019-09-04 RX ADMIN — HYDROCODONE BITARTRATE AND ACETAMINOPHEN 1 TABLET: 7.5; 325 TABLET ORAL at 16:26

## 2019-09-04 RX ADMIN — CARVEDILOL 3.12 MG: 3.12 TABLET, FILM COATED ORAL at 19:39

## 2019-09-04 RX ADMIN — CARVEDILOL 3.12 MG: 3.12 TABLET, FILM COATED ORAL at 08:46

## 2019-09-04 RX ADMIN — HEPARIN SODIUM 5000 UNITS: 5000 INJECTION INTRAVENOUS; SUBCUTANEOUS at 08:46

## 2019-09-04 NOTE — PLAN OF CARE
Problem: Patient Care Overview  Goal: Plan of Care Review  Outcome: Ongoing (interventions implemented as appropriate)   09/04/19 1410   Coping/Psychosocial   Plan of Care Reviewed With patient   Plan of Care Review   Progress no change   OTHER   Outcome Summary vss, air leak present, pain not resolved with prescribed meds

## 2019-09-04 NOTE — PLAN OF CARE
Problem: Patient Care Overview  Goal: Plan of Care Review  Outcome: Ongoing (interventions implemented as appropriate)   09/04/19 0057   Coping/Psychosocial   Plan of Care Reviewed With patient   Plan of Care Review   Progress no change

## 2019-09-04 NOTE — PROGRESS NOTES
Cardiothoracic Surgery Progress Note      POD 5 s/p right VATS with right upper lobectomy     LOS: 5 days      Subjective:  Pain.  Otherwise, no complaints    Objective:  Vital Signs  Temp:  [97.6 °F (36.4 °C)-98.7 °F (37.1 °C)] 97.6 °F (36.4 °C)  Heart Rate:  [54-72] 65  Resp:  [16-18] 18  BP: (149-150)/(80-92) 150/83    Physical Exam:   General Appearance: alert, appears stated age and cooperative   Lungs: clear to auscultation, respirations regular, respirations even and respirations unlabored   Heart: regular rhythm & normal rate, normal S1, S2 and no murmur, no gallop, no rub   Skin: Incision c/d/i   Air leak with marginal cough  Results:  Results from last 7 days   Lab Units 09/01/19  0550   WBC 10*3/mm3 6.17   HEMOGLOBIN g/dL 8.8*   HEMATOCRIT % 28.0*   PLATELETS 10*3/mm3 202     Results from last 7 days   Lab Units 09/01/19  0550   SODIUM mmol/L 134*   POTASSIUM mmol/L 4.2   CHLORIDE mmol/L 99   CO2 mmol/L 24.0   BUN mg/dL 31*   CREATININE mg/dL 1.21*   GLUCOSE mg/dL 94   CALCIUM mg/dL 8.8       Assessment:  POD 5 s/p right VATS with right upper lobectomy  Marginal pulmonary toilet    Plan:  Pulmonary toilet with IS, flutter valve and percussive vest (q2 while awake for vest)  Continue chest tube to suction  Ambulate  Still awaiting final pathology    Brian Barreto MD  09/04/19  7:21 AM

## 2019-09-04 NOTE — PROGRESS NOTES
Continued Stay Note  [facility]     Patient Name: Lizbeth Johns  MRN: [id number]  Today's Date: 9/4/2019    Admit Date: 8/30/2019    Discharge Plan     Row Name 09/04/19 1121       Plan    Plan  Home    Patient/Family in Agreement with Plan  yes    Plan Comments  Spoke with patient in room.  Her plan is still to return home at discharge.  She denies any needs at this time.  CM will continue to follow.  Anita Sow RN [phone]        Discharge Codes    No documentation.       Expected Discharge Date and Time     Expected Discharge Date Expected Discharge Time    Sep 7, 2019             Anita Sow RN

## 2019-09-05 ENCOUNTER — APPOINTMENT (OUTPATIENT)
Dept: GENERAL RADIOLOGY | Facility: HOSPITAL | Age: 64
End: 2019-09-05

## 2019-09-05 LAB
ANION GAP SERPL CALCULATED.3IONS-SCNC: 12 MMOL/L (ref 5–15)
BUN BLD-MCNC: 19 MG/DL (ref 8–23)
BUN/CREAT SERPL: 17.9 (ref 7–25)
CALCIUM SPEC-SCNC: 9.6 MG/DL (ref 8.6–10.5)
CHLORIDE SERPL-SCNC: 98 MMOL/L (ref 98–107)
CO2 SERPL-SCNC: 26 MMOL/L (ref 22–29)
CREAT BLD-MCNC: 1.06 MG/DL (ref 0.57–1)
GFR SERPL CREATININE-BSD FRML MDRD: 63 ML/MIN/1.73
GLUCOSE BLD-MCNC: 107 MG/DL (ref 65–99)
POTASSIUM BLD-SCNC: 4.5 MMOL/L (ref 3.5–5.2)
SODIUM BLD-SCNC: 136 MMOL/L (ref 136–145)

## 2019-09-05 PROCEDURE — 94799 UNLISTED PULMONARY SVC/PX: CPT

## 2019-09-05 PROCEDURE — 25010000002 HEPARIN (PORCINE) PER 1000 UNITS: Performed by: THORACIC SURGERY (CARDIOTHORACIC VASCULAR SURGERY)

## 2019-09-05 PROCEDURE — 93010 ELECTROCARDIOGRAM REPORT: CPT | Performed by: INTERNAL MEDICINE

## 2019-09-05 PROCEDURE — 93005 ELECTROCARDIOGRAM TRACING: CPT | Performed by: THORACIC SURGERY (CARDIOTHORACIC VASCULAR SURGERY)

## 2019-09-05 PROCEDURE — 80048 BASIC METABOLIC PNL TOTAL CA: CPT | Performed by: PHYSICIAN ASSISTANT

## 2019-09-05 PROCEDURE — 71045 X-RAY EXAM CHEST 1 VIEW: CPT

## 2019-09-05 PROCEDURE — 99024 POSTOP FOLLOW-UP VISIT: CPT | Performed by: PHYSICIAN ASSISTANT

## 2019-09-05 PROCEDURE — 25010000002 KETOROLAC TROMETHAMINE PER 15 MG: Performed by: PHYSICIAN ASSISTANT

## 2019-09-05 PROCEDURE — 25010000002 MORPHINE PER 10 MG: Performed by: PHYSICIAN ASSISTANT

## 2019-09-05 RX ORDER — CYCLOBENZAPRINE HCL 10 MG
10 TABLET ORAL ONCE
Status: COMPLETED | OUTPATIENT
Start: 2019-09-05 | End: 2019-09-05

## 2019-09-05 RX ORDER — MAGNESIUM CARB/ALUMINUM HYDROX 105-160MG
150 TABLET,CHEWABLE ORAL DAILY PRN
Status: DISCONTINUED | OUTPATIENT
Start: 2019-09-05 | End: 2019-09-13 | Stop reason: HOSPADM

## 2019-09-05 RX ORDER — KETOROLAC TROMETHAMINE 30 MG/ML
15 INJECTION, SOLUTION INTRAMUSCULAR; INTRAVENOUS EVERY 6 HOURS PRN
Status: DISPENSED | OUTPATIENT
Start: 2019-09-05 | End: 2019-09-06

## 2019-09-05 RX ORDER — BISACODYL 10 MG
10 SUPPOSITORY, RECTAL RECTAL DAILY PRN
Status: DISCONTINUED | OUTPATIENT
Start: 2019-09-05 | End: 2019-09-13 | Stop reason: HOSPADM

## 2019-09-05 RX ORDER — MORPHINE SULFATE 2 MG/ML
2 INJECTION, SOLUTION INTRAMUSCULAR; INTRAVENOUS ONCE
Status: COMPLETED | OUTPATIENT
Start: 2019-09-05 | End: 2019-09-05

## 2019-09-05 RX ADMIN — HYDROCODONE BITARTRATE AND ACETAMINOPHEN 1 TABLET: 7.5; 325 TABLET ORAL at 03:55

## 2019-09-05 RX ADMIN — Medication 10 MG: at 09:16

## 2019-09-05 RX ADMIN — ACETAMINOPHEN 650 MG: 325 TABLET ORAL at 04:46

## 2019-09-05 RX ADMIN — IPRATROPIUM BROMIDE AND ALBUTEROL SULFATE 3 ML: 2.5; .5 SOLUTION RESPIRATORY (INHALATION) at 17:24

## 2019-09-05 RX ADMIN — CYCLOBENZAPRINE HYDROCHLORIDE 10 MG: 10 TABLET, FILM COATED ORAL at 22:51

## 2019-09-05 RX ADMIN — MORPHINE SULFATE 2 MG: 2 INJECTION, SOLUTION INTRAMUSCULAR; INTRAVENOUS at 05:02

## 2019-09-05 RX ADMIN — HEPARIN SODIUM 5000 UNITS: 5000 INJECTION INTRAVENOUS; SUBCUTANEOUS at 09:17

## 2019-09-05 RX ADMIN — KETOROLAC TROMETHAMINE 15 MG: 30 INJECTION, SOLUTION INTRAMUSCULAR; INTRAVENOUS at 20:51

## 2019-09-05 RX ADMIN — HEPARIN SODIUM 5000 UNITS: 5000 INJECTION INTRAVENOUS; SUBCUTANEOUS at 20:07

## 2019-09-05 RX ADMIN — IPRATROPIUM BROMIDE AND ALBUTEROL SULFATE 3 ML: 2.5; .5 SOLUTION RESPIRATORY (INHALATION) at 08:52

## 2019-09-05 RX ADMIN — HYDROCODONE BITARTRATE AND ACETAMINOPHEN 1 TABLET: 7.5; 325 TABLET ORAL at 20:12

## 2019-09-05 RX ADMIN — IPRATROPIUM BROMIDE AND ALBUTEROL SULFATE 3 ML: 2.5; .5 SOLUTION RESPIRATORY (INHALATION) at 13:31

## 2019-09-05 RX ADMIN — CARVEDILOL 3.12 MG: 3.12 TABLET, FILM COATED ORAL at 20:07

## 2019-09-05 RX ADMIN — HYDROCODONE BITARTRATE AND ACETAMINOPHEN 1 TABLET: 7.5; 325 TABLET ORAL at 13:07

## 2019-09-05 RX ADMIN — KETOROLAC TROMETHAMINE 15 MG: 30 INJECTION, SOLUTION INTRAMUSCULAR; INTRAVENOUS at 12:06

## 2019-09-05 RX ADMIN — HYDROCODONE BITARTRATE AND ACETAMINOPHEN 1 TABLET: 7.5; 325 TABLET ORAL at 09:16

## 2019-09-05 RX ADMIN — POLYETHYLENE GLYCOL 3350 17 G: 17 POWDER, FOR SOLUTION ORAL at 09:16

## 2019-09-05 RX ADMIN — SENNOSIDES, DOCUSATE SODIUM 2 TABLET: 50; 8.6 TABLET, FILM COATED ORAL at 20:07

## 2019-09-05 RX ADMIN — ROPINIROLE 0.5 MG: 0.5 TABLET, FILM COATED ORAL at 20:07

## 2019-09-05 RX ADMIN — IPRATROPIUM BROMIDE AND ALBUTEROL SULFATE 3 ML: 2.5; .5 SOLUTION RESPIRATORY (INHALATION) at 19:13

## 2019-09-05 RX ADMIN — CARVEDILOL 3.12 MG: 3.12 TABLET, FILM COATED ORAL at 09:16

## 2019-09-05 NOTE — PLAN OF CARE
Problem: Patient Care Overview  Goal: Plan of Care Review  Outcome: Ongoing (interventions implemented as appropriate)   09/05/19 1450   Coping/Psychosocial   Plan of Care Reviewed With patient   Plan of Care Review   Progress no change   OTHER   Outcome Summary VSS, bowel regimen started, suppository administered.       Problem: Skin Injury Risk (Adult)  Goal: Skin Health and Integrity  Outcome: Ongoing (interventions implemented as appropriate)   09/05/19 1450   Skin Injury Risk (Adult)   Skin Health and Integrity making progress toward outcome

## 2019-09-05 NOTE — PROGRESS NOTES
Cardiothoracic Surgery Progress Note      POD 6 s/p right VATS with right upper lobectomy     LOS: 6 days      Subjective:  Patient reports continued incisional pain and pain chest tube insertion sites.  Patient also states she has not been ambulating and has not had a bowel movement.  Denies dyspnea.    Objective:  Vital Signs  Temp:  [97.6 °F (36.4 °C)-98.5 °F (36.9 °C)] 98.5 °F (36.9 °C)  Heart Rate:  [56-81] 81  Resp:  [14-18] 18  BP: (135-147)/(77-81) 146/77    Physical Exam:   General Appearance: Well-developed, well-nourished no acute distress   Lungs: Decreased bibasilar breath sounds with no wheezes, rales or rhonchi.  Persistent air leak noted.   Heart: Regular rate and rhythm no murmurs, rubs or gallops   Skin: Incision c/d/i   Chest tube output: 90 mL / 24 hours  Results:    Results from last 7 days   Lab Units 09/01/19  0550   WBC 10*3/mm3 6.17   HEMOGLOBIN g/dL 8.8*   HEMATOCRIT % 28.0*   PLATELETS 10*3/mm3 202     Results from last 7 days   Lab Units 09/01/19  0550   SODIUM mmol/L 134*   POTASSIUM mmol/L 4.2   CHLORIDE mmol/L 99   CO2 mmol/L 24.0   BUN mg/dL 31*   CREATININE mg/dL 1.21*   GLUCOSE mg/dL 94   CALCIUM mg/dL 8.8       Assessment:  POD 6 s/p right VATS with right upper lobectomy  Marginal pulmonary toilet    Plan:  Continue with aggressive pulmonary toilet, incentive spirometer and flutter valve with percussive vest  Leave chest tubes to suction due to persistent air leak  Mobilize  PT/OT  Chest x-ray in a.m.  Bowel regimen to promote bowel movement.  Await final pathology      BELLO Anderson  09/05/19  8:01 AM

## 2019-09-05 NOTE — PLAN OF CARE
Problem: Patient Care Overview  Goal: Plan of Care Review  Outcome: Ongoing (interventions implemented as appropriate)   09/04/19 2240   Coping/Psychosocial   Plan of Care Reviewed With patient   Plan of Care Review   Progress improving   OTHER   Outcome Summary Patient stating her pain is 8/10, requesting more pain medication. PA notified. CT to wall suction with a small air leak. VSS. Will continue to monitor.        Problem: Skin Injury Risk (Adult)  Goal: Identify Related Risk Factors and Signs and Symptoms  Outcome: Ongoing (interventions implemented as appropriate)   09/04/19 2240   Skin Injury Risk (Adult)   Related Risk Factors (Skin Injury Risk) advanced age;medical devices

## 2019-09-06 ENCOUNTER — APPOINTMENT (OUTPATIENT)
Dept: GENERAL RADIOLOGY | Facility: HOSPITAL | Age: 64
End: 2019-09-06

## 2019-09-06 PROCEDURE — 25010000002 KETOROLAC TROMETHAMINE PER 15 MG: Performed by: PHYSICIAN ASSISTANT

## 2019-09-06 PROCEDURE — 94799 UNLISTED PULMONARY SVC/PX: CPT

## 2019-09-06 PROCEDURE — 25010000002 HEPARIN (PORCINE) PER 1000 UNITS: Performed by: THORACIC SURGERY (CARDIOTHORACIC VASCULAR SURGERY)

## 2019-09-06 PROCEDURE — 71045 X-RAY EXAM CHEST 1 VIEW: CPT

## 2019-09-06 PROCEDURE — 97162 PT EVAL MOD COMPLEX 30 MIN: CPT

## 2019-09-06 RX ORDER — CYCLOBENZAPRINE HCL 10 MG
10 TABLET ORAL EVERY 12 HOURS PRN
Status: DISCONTINUED | OUTPATIENT
Start: 2019-09-06 | End: 2019-09-13 | Stop reason: HOSPADM

## 2019-09-06 RX ADMIN — CARVEDILOL 3.12 MG: 3.12 TABLET, FILM COATED ORAL at 09:07

## 2019-09-06 RX ADMIN — ROPINIROLE 0.5 MG: 0.5 TABLET, FILM COATED ORAL at 20:50

## 2019-09-06 RX ADMIN — SENNOSIDES, DOCUSATE SODIUM 2 TABLET: 50; 8.6 TABLET, FILM COATED ORAL at 20:50

## 2019-09-06 RX ADMIN — ASPIRIN 81 MG: 81 TABLET, COATED ORAL at 09:07

## 2019-09-06 RX ADMIN — HYDROCODONE BITARTRATE AND ACETAMINOPHEN 1 TABLET: 7.5; 325 TABLET ORAL at 09:46

## 2019-09-06 RX ADMIN — IPRATROPIUM BROMIDE AND ALBUTEROL SULFATE 3 ML: 2.5; .5 SOLUTION RESPIRATORY (INHALATION) at 15:29

## 2019-09-06 RX ADMIN — HYDROCODONE BITARTRATE AND ACETAMINOPHEN 1 TABLET: 7.5; 325 TABLET ORAL at 02:27

## 2019-09-06 RX ADMIN — IPRATROPIUM BROMIDE AND ALBUTEROL SULFATE 3 ML: 2.5; .5 SOLUTION RESPIRATORY (INHALATION) at 08:51

## 2019-09-06 RX ADMIN — POLYETHYLENE GLYCOL 3350 17 G: 17 POWDER, FOR SOLUTION ORAL at 09:07

## 2019-09-06 RX ADMIN — HEPARIN SODIUM 5000 UNITS: 5000 INJECTION INTRAVENOUS; SUBCUTANEOUS at 09:08

## 2019-09-06 RX ADMIN — CYCLOBENZAPRINE HYDROCHLORIDE 10 MG: 10 TABLET, FILM COATED ORAL at 22:14

## 2019-09-06 RX ADMIN — HYDROCODONE BITARTRATE AND ACETAMINOPHEN 1 TABLET: 7.5; 325 TABLET ORAL at 17:19

## 2019-09-06 RX ADMIN — CARVEDILOL 3.12 MG: 3.12 TABLET, FILM COATED ORAL at 20:50

## 2019-09-06 RX ADMIN — IPRATROPIUM BROMIDE AND ALBUTEROL SULFATE 3 ML: 2.5; .5 SOLUTION RESPIRATORY (INHALATION) at 18:52

## 2019-09-06 RX ADMIN — HEPARIN SODIUM 5000 UNITS: 5000 INJECTION INTRAVENOUS; SUBCUTANEOUS at 20:50

## 2019-09-06 RX ADMIN — KETOROLAC TROMETHAMINE 15 MG: 30 INJECTION, SOLUTION INTRAMUSCULAR; INTRAVENOUS at 09:46

## 2019-09-06 RX ADMIN — HYDROCODONE BITARTRATE AND ACETAMINOPHEN 1 TABLET: 7.5; 325 TABLET ORAL at 21:09

## 2019-09-06 RX ADMIN — HYDROCODONE BITARTRATE AND ACETAMINOPHEN 1 TABLET: 7.5; 325 TABLET ORAL at 13:32

## 2019-09-06 RX ADMIN — IPRATROPIUM BROMIDE AND ALBUTEROL SULFATE 3 ML: 2.5; .5 SOLUTION RESPIRATORY (INHALATION) at 13:44

## 2019-09-06 NOTE — PLAN OF CARE
Problem: Patient Care Overview  Goal: Plan of Care Review  Outcome: Ongoing (interventions implemented as appropriate)   09/06/19 1300   Coping/Psychosocial   Plan of Care Reviewed With patient   OTHER   Outcome Summary PT eval initiated patient is able to ambulate 350 ft with CGA and stable vitals. anticipate patient to go home with friends upon D/C

## 2019-09-06 NOTE — THERAPY EVALUATION
Patient Name: Lizbeth Johns  : 1955    MRN: [id number]                              Today's Date: 2019       Admit Date: 2019    Visit Dx:     ICD-10-CM ICD-9-CM   1. Lung nodule R91.1 793.11   2. Mass of upper lobe of right lung R91.8 786.6     Patient Active Problem List   Diagnosis   • Colon cancer screening   • Epigastric pain   • Heartburn   • Lesion of colon   • History of colon polyps   • Family history of colon cancer   • Constipation   • Renal insufficiency   • Acute renal failure (ARF) (CMS/HCC)   • Mass of upper lobe of right lung   • Pain in both lower extremities   • Essential hypertension   • Abnormal CT of the chest   • Lung nodule < 6cm on CT   • Lung nodule     Past Medical History:   Diagnosis Date   • Acid reflux    • Arthritis    • Cataract, bilateral    • COPD (chronic obstructive pulmonary disease) (CMS/HCC)    • Hypertension    • Leg cramps    • Lung mass     right   • SOB (shortness of breath)     xray showed spot on lungs     Past Surgical History:   Procedure Laterality Date   • BRONCHOSCOPY N/A 2019    Procedure: BRONCHOSCOPY WITH FLUOROSCOPY, BIOPSY, BRUSHINGS, AND WASHINGS;  Surgeon: Gudelia Patel MD;  Location: [facility] OR;  Service: Pulmonary   • COLONOSCOPY N/A 2017    Procedure: COLONOSCOPY with hot and cold snare polypectomies,  hot biopsy polypectomies, biopsies, resolution clip placement;  Surgeon: Zackery Siddiqui MD;  Location: [facility] ENDOSCOPY;  Service:    • COLONOSCOPY N/A 2018    Procedure: COLONOSCOPY WITH HOT SNARE POLYPECTOMY X 7; COLD SNARE POLYPECTOMY X 3; HOT BIOPSY POLYPECTOMY X 20; COLD BIOPSY POLYPECTOMY X 2; THERMAL ABLATION OF COLON POLYPS X 23; CLIP PLACEMENT X 2; BIOPSIES;  Surgeon: Zackery Siddiqui MD;  Location: [facility] ENDOSCOPY;  Service: Gastroenterology   • COLONOSCOPY N/A 2019    Procedure: COLONOSCOPY with cold biopsy polypectomies and cold biopsy;  Surgeon: Zackery Siddiqui MD;  Location: [facility] ENDOSCOPY;   Service: Gastroenterology   • ENDOSCOPY  06/13/2019   • MULTIPLE TOOTH EXTRACTIONS      full extraction   • ORIF TIBIA/FIBULA FRACTURES Left    • THORACOSCOPY Right 8/30/2019    Procedure: BRONCHOSCOPY,  THORACOSCOPY VIDEO ASSISTED with right upper lobe wedge RESECTION , RIGHT UPPER lobectomy with mediastinal lymph node dissection AND INTERCOSTAL CRYOABLATION OF RIGHT CHEST;  Surgeon: Brian Barreto MD;  Location: [facility];  Service: Cardiothoracic   • TUBAL ABDOMINAL LIGATION       General Information     Row Name 09/06/19 1428          PT Evaluation Time/Intention    Document Type  evaluation  -RADHA     Mode of Treatment  physical therapy  -RADHA     Row Name 09/06/19 1428          General Information    Patient Profile Reviewed?  yes  -RADHA     Prior Level of Function  independent:;ADL's;gait;transfer;bed mobility  -RADHA     Existing Precautions/Restrictions  other (see comments) still has right chest tube on water seal  -RADHA     Barriers to Rehab  none identified  -RADHA     Row Name 09/06/19 1428          Relationship/Environment    Lives With  friend(s)  -RADHA     Row Name 09/06/19 1428          Resource/Environmental Concerns    Current Living Arrangements  other (see comments)  -RADHA     Row Name 09/06/19 1428          Home Main Entrance    Number of Stairs, Main Entrance  none  -RADHA     Row Name 09/06/19 1428          Cognitive Assessment/Intervention- PT/OT    Orientation Status (Cognition)  oriented x 4  -RADHA     Row Name 09/06/19 1428          Safety Issues, Functional Mobility    Safety Issues Affecting Function (Mobility)  safety precaution awareness;safety precautions follow-through/compliance  -RADHA     Impairments Affecting Function (Mobility)  balance;endurance/activity tolerance;shortness of breath  -RADHA       User Key  (r) = Recorded By, (t) = Taken By, (c) = Cosigned By    Initials Name Provider Type    Kathi Dhillon, PT Physical Therapist        Mobility     Row Name 09/06/19 1429          Bed Mobility  Assessment/Treatment    Comment (Bed Mobility)  patient is OOB and returns to the chair  -RADHA     Row Name 09/06/19 1429          Sit-Stand Transfer    Sit-Stand [place] (Transfers)  contact guard  -RADHA     Row Name 09/06/19 1429          Gait/Stairs Assessment/Training    Gait/Stairs Assessment/Training  gait/ambulation independence  -RADHA     [place] Level (Gait)  contact guard  -RADHA     Distance in Feet (Gait)  350  -RADHA     Pattern (Gait)  step-through  -RADHA     Deviations/Abnormal Patterns (Gait)  stride length decreased  -RADHA     Comment (Gait/Stairs)  patient has stable vitals with activity  -RADHA       User Key  (r) = Recorded By, (t) = Taken By, (c) = Cosigned By    Initials Name Provider Type    RADHA Kathi Hill, PT Physical Therapist        Obj/Interventions     Row Name 09/06/19 1430          General ROM    GENERAL ROM COMMENTS  no ROM deficits  -RADHA     Row Name 09/06/19 1430          MMT (Manual Muscle Testing)    General MMT Comments  generalized weakness 4+/5  -RADHA     Row Name 09/06/19 1430          Therapeutic Exercise    Lower Extremity Range of Motion (Therapeutic Exercise)  hip flexion/extension, bilateral;knee flexion/extension, bilateral;ankle dorsiflexion/plantar flexion, bilateral  -RADHA     Exercise Type (Therapeutic Exercise)  AROM (active range of motion)  -RADHA     Sets/Reps (Therapeutic Exercise)  1/10  -RADHA     Expected Outcome (Therapeutic Exercise)  facilitate normal movement patterns;improve functional stability;improve functional tolerance, self-care activity  -RADHA     Row Name 09/06/19 1430          Static Sitting Balance    Level of [place] (Unsupported Sitting, Static Balance)  contact guard assist  -RADHA     Sitting Position (Unsupported Sitting, Static Balance)  sitting in chair  -RADHA     Time Able to Maintain Position (Unsupported Sitting, Static Balance)  more than 5 minutes  -RADHA     Row Name 09/06/19 1430          Dynamic Sitting Balance    Level of [place], Reaches  Outside Midline (Sitting, Dynamic Balance)  contact guard assist  -RADHA     Sitting Position, Reaches Outside Midline (Sitting, Dynamic Balance)  sitting in chair  -RADHA     Row Name 09/06/19 1430          Static Standing Balance    Level of [place] (Supported Standing, Static Balance)  contact guard assist  -RADHA     Time Able to Maintain Position (Supported Standing, Static Balance)  1 to 2 minutes  -RADHA     Row Name 09/06/19 1430          Dynamic Standing Balance    Level of [place], Reaches Outside Midline (Standing, Dynamic Balance)  contact guard assist  -RADHA     Time Able to Maintain Position, Reaches Outside Midline (Standing, Dynamic Balance)  more than 5 minutes  -RADHA       User Key  (r) = Recorded By, (t) = Taken By, (c) = Cosigned By    Initials Name Provider Type    Kathi Dhillon A, PT Physical Therapist        Goals/Plan     [facility] Name 09/06/19 1434          Bed Mobility Goal 1 (PT)    Activity/Assistive Device (Bed Mobility Goal 1, PT)  sit to supine/supine to sit  -RADHA     [place] Level/Cues Needed (Bed Mobility Goal 1, PT)  independent  -RADHA     Time Frame (Bed Mobility Goal 1, PT)  long term goal (LTG);10 days  -RADHA     Progress/Outcomes (Bed Mobility Goal 1, PT)  goal ongoing  -RADHA     Row Name 09/06/19 1434          Transfer Goal 1 (PT)    Activity/Assistive Device (Transfer Goal 1, PT)  sit-to-stand/stand-to-sit  -RADHA     [place] Level/Cues Needed (Transfer Goal 1, PT)  independent  -RADHA     Time Frame (Transfer Goal 1, PT)  long term goal (LTG);10 days  -RADHA     Progress/Outcome (Transfer Goal 1, PT)  goal ongoing  -[facility] Name 09/06/19 1434          Gait Training Goal 1 (PT)    Activity/Assistive Device (Gait Training Goal 1, PT)  gait (walking locomotion)  -RADHA     [place] Level (Gait Training Goal 1, PT)  independent  -RADHA     Distance (Gait Goal 1, PT)  500  -RADHA     Time Frame (Gait Training Goal 1, PT)  long term goal (LTG);10 days  -RADHA     Progress/Outcome (Gait Training  Goal 1, PT)  goal ongoing  -RADHA     [facility] Name 09/06/19 1434          Patient Education Goal (PT)    Activity (Patient Education Goal, PT)  HEP  -RADHA     [place]/Cues/Accuracy (Memory Goal 2, PT)  verbalizes understanding  -RADHA     Time Frame (Patient Education Goal, PT)  long term goal (LTG);10 days  -RADHA     Progress/Outcome (Patient Education Goal, PT)  goal ongoing  -RADHA       User Key  (r) = Recorded By, (t) = Taken By, (c) = Cosigned By    Initials Name Provider Type    Kathi Dhillon, PT Physical Therapist        Clinical Impression     Row Name 09/06/19 1432          Pain Assessment    Additional Documentation  Pain Scale: Numbers Pre/Post-Treatment (Group)  -RADHA     Row Name 09/06/19 1432          Pain Scale: Numbers Pre/Post-Treatment    Pain Scale: Numbers, Pretreatment  5/10  -RADHA     Pain Location - Side  Right  -RADHA     Pain Location - Orientation  incisional  -RADHA     Pain Location  chest  -RADHA     Pain Intervention(s)  Ambulation/increased activity;Repositioned;Splinting  -[facility] Name 09/06/19 1432          Plan of Care Review    Plan of Care Reviewed With  patient  -RADHA     Row Name 09/06/19 1432          Physical Therapy Clinical Impression    Patient/Family Goals Statement (PT Clinical Impression)  patient to go home with family assist  -RADHA     Criteria for Skilled Interventions Met (PT Clinical Impression)  yes;treatment indicated  -RADHA     Rehab Potential (PT Clinical Summary)  good, to achieve stated therapy goals  -RADHA     Row Name 09/06/19 1432          Vital Signs    Pre Systolic BP Rehab  107  -RADHA     Pre Treatment Diastolic BP  70  -RADHA     Post Systolic BP Rehab  118  -RADHA     Post Treatment Diastolic BP  70  -RADHA     Pretreatment Heart Rate (beats/min)  70  -RADHA     Posttreatment Heart Rate (beats/min)  76  -RADHA     Pre SpO2 (%)  100  -RADHA     O2 Delivery Pre Treatment  room air  -RADHA     Post SpO2 (%)  100  -RADHA     O2 Delivery Post Treatment  room air  -RADHA     Pre Patient Position  Sitting   -RADHA     Intra Patient Position  Standing  -RADHA     Post Patient Position  Sitting  -RADHA     Row Name 09/06/19 1432          Positioning and Restraints    Pre-Treatment Position  sitting in chair/recliner  -RADHA     Post Treatment Position  chair  -RADHA     In Chair  notified nsg;reclined;sitting;call light within reach  -RADHA       User Key  (r) = Recorded By, (t) = Taken By, (c) = Cosigned By    Initials Name Provider Type    Kathi Dhillon PT Physical Therapist        Outcome Measures     Row Name 09/06/19 1437          How much help from another person do you currently need...    Turning from your back to your side while in flat bed without using bedrails?  3  -RADHA     Moving from lying on back to sitting on the side of a flat bed without bedrails?  3  -RADHA     Moving to and from a bed to a chair (including a wheelchair)?  3  -RADHA     Standing up from a chair using your arms (e.g., wheelchair, bedside chair)?  3  -RADHA     Climbing 3-5 steps with a railing?  3  -RADHA     To walk in hospital room?  3  -RADHA     AM-PAC 6 Clicks Score (PT)  18  -RADHA     Row Name 09/06/19 1437          Functional Assessment    Outcome Measure Options  AM-PAC 6 Clicks Basic Mobility (PT)  -RADHA       User Key  (r) = Recorded By, (t) = Taken By, (c) = Cosigned By    Initials Name Provider Type    Kathi Dhillon PT Physical Therapist        Physical Therapy Education     Title: PT OT SLP Therapies (In Progress)     Topic: Physical Therapy (In Progress)     Point: Mobility training (In Progress)     Learning Progress Summary           Patient Acceptance, E, NR by RADHA at 9/6/2019  1:00 PM                   Point: Home exercise program (In Progress)     Learning Progress Summary           Patient Acceptance, E, NR by RADHA at 9/6/2019  1:00 PM                   Point: Body mechanics (In Progress)     Learning Progress Summary           Patient Acceptance, E, NR by RADHA at 9/6/2019  1:00 PM                   Point: Precautions (In Progress)      Learning Progress Summary           Patient Acceptance, E, NR by RADHA at 9/6/2019  1:00 PM                               User Key     Initials Effective Dates Name Provider Type Discipline    RADHA 06/19/15 -  Kathi Hill PT Physical Therapist PT              PT Recommendation and Plan  Planned Therapy Interventions (PT Eval): balance training, bed mobility training, gait training, home exercise program, transfer training, strengthening  Plan of Care Reviewed With: patient  Outcome Summary: PT eval initiated patient is able to ambulate 350 ft with CGA and stable vitals. anticipate patient to go home with friends upon D/C     Time Calculation:   PT Charges     Row Name 09/06/19 1300             Time Calculation    Start Time  1300  -RADHA      PT Received On  09/06/19  -RADHA      PT Goal Re-Cert Due Date  09/16/19  -RADHA        User Key  (r) = Recorded By, (t) = Taken By, (c) = Cosigned By    Initials Name Provider Type    Kathi Dhillon PT Physical Therapist        Therapy Charges for Today     Code Description Service Date Service Provider Modifiers Qty    79811663692 HC PT EVAL MOD COMPLEXITY 4 9/6/2019 Kathi Hill PT GP 1          PT G-Codes  Outcome Measure Options: AM-PAC 6 Clicks Basic Mobility (PT)  AM-PAC 6 Clicks Score (PT): 18    Kathi Hill PT  9/6/2019

## 2019-09-06 NOTE — PROGRESS NOTES
Continued Stay Note  [facility]     Patient Name: Lizbeth Johns  MRN: [id number]  Today's Date: 9/6/2019    Admit Date: 8/30/2019    Discharge Plan     Row Name 09/06/19 1553       Plan    Plan  Home    Patient/Family in Agreement with Plan  yes    Plan Comments  Per discussion in MDR, patient will be here through the weekend. She has a chest tube to suction. Met with patient in the room to let her know CM is following her POC for DC planning. CM will f/u with patient on Monday.     Final Discharge Disposition Code  01 - home or self-care        Discharge Codes    No documentation.       Expected Discharge Date and Time     Expected Discharge Date Expected Discharge Time    Sep 9, 2019             Olivia Paredes RN

## 2019-09-06 NOTE — PROGRESS NOTES
"Adult Nutrition  Assessment/PES    Patient Name:  Lizbeth Johns  YOB: 1955  MRN: [id number]  Admit Date:  8/30/2019    Assessment Date:  9/6/2019    Comments:      Reason for Assessment     Row Name 09/06/19 0736          Reason for Assessment    Reason For Assessment  other (see comments) LOS/15\"                   Nutrition Prescription Ordered     Row Name 09/06/19 0736          Nutrition Prescription PO    Current PO Diet  Regular     Common Modifiers  Cardiac                 Problem/Interventions:  Problem 1     Row Name 09/06/19 0737          Nutrition Diagnoses Problem 1    Problem 1  Nutrition Appropriate for Condition at this Time     Signs/Symptoms (evidenced by)  PO Intake     Percent (%) intake recorded  100 %     Over number of meals  3                       Education/Evaluation     Row Name 09/06/19 0737          Monitor/Evaluation    Monitor  Per protocol           Electronically signed by:  Luz Elena Schroeder RD  09/06/19 7:37 AM  "

## 2019-09-06 NOTE — PROGRESS NOTES
Cardiothoracic Surgery Progress Note      POD 7 s/p right VATS with right upper lobectomy     LOS: 7 days      Subjective:  Patient reports continued incisional pain    Objective:  Vital Signs  Temp:  [98.3 °F (36.8 °C)-98.7 °F (37.1 °C)] 98.7 °F (37.1 °C)  Heart Rate:  [57-78] 62  Resp:  [15-18] 18  BP: (104-133)/(65-88) 133/76    Physical Exam:   General Appearance: Well-developed, well-nourished no acute distress   Lungs: Decreased bibasilar breath sounds with no wheezes, rales or rhonchi.  Persistent air leak noted with respirations.   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

## 2019-09-07 ENCOUNTER — APPOINTMENT (OUTPATIENT)
Dept: GENERAL RADIOLOGY | Facility: HOSPITAL | Age: 64
End: 2019-09-07

## 2019-09-07 PROCEDURE — 97116 GAIT TRAINING THERAPY: CPT

## 2019-09-07 PROCEDURE — 71045 X-RAY EXAM CHEST 1 VIEW: CPT

## 2019-09-07 PROCEDURE — 25010000002 HEPARIN (PORCINE) PER 1000 UNITS: Performed by: THORACIC SURGERY (CARDIOTHORACIC VASCULAR SURGERY)

## 2019-09-07 PROCEDURE — 94799 UNLISTED PULMONARY SVC/PX: CPT

## 2019-09-07 RX ADMIN — IPRATROPIUM BROMIDE AND ALBUTEROL SULFATE 3 ML: 2.5; .5 SOLUTION RESPIRATORY (INHALATION) at 13:18

## 2019-09-07 RX ADMIN — CYCLOBENZAPRINE HYDROCHLORIDE 10 MG: 10 TABLET, FILM COATED ORAL at 22:26

## 2019-09-07 RX ADMIN — HYDROCODONE BITARTRATE AND ACETAMINOPHEN 1 TABLET: 7.5; 325 TABLET ORAL at 08:12

## 2019-09-07 RX ADMIN — IPRATROPIUM BROMIDE AND ALBUTEROL SULFATE 3 ML: 2.5; .5 SOLUTION RESPIRATORY (INHALATION) at 15:32

## 2019-09-07 RX ADMIN — HEPARIN SODIUM 5000 UNITS: 5000 INJECTION INTRAVENOUS; SUBCUTANEOUS at 20:20

## 2019-09-07 RX ADMIN — HYDROCODONE BITARTRATE AND ACETAMINOPHEN 1 TABLET: 7.5; 325 TABLET ORAL at 11:56

## 2019-09-07 RX ADMIN — CARVEDILOL 3.12 MG: 3.12 TABLET, FILM COATED ORAL at 20:19

## 2019-09-07 RX ADMIN — IPRATROPIUM BROMIDE AND ALBUTEROL SULFATE 3 ML: 2.5; .5 SOLUTION RESPIRATORY (INHALATION) at 19:50

## 2019-09-07 RX ADMIN — HYDROCODONE BITARTRATE AND ACETAMINOPHEN 1 TABLET: 7.5; 325 TABLET ORAL at 03:50

## 2019-09-07 RX ADMIN — POLYETHYLENE GLYCOL 3350 17 G: 17 POWDER, FOR SOLUTION ORAL at 08:12

## 2019-09-07 RX ADMIN — HYDROCODONE BITARTRATE AND ACETAMINOPHEN 1 TABLET: 7.5; 325 TABLET ORAL at 16:03

## 2019-09-07 RX ADMIN — ROPINIROLE 0.5 MG: 0.5 TABLET, FILM COATED ORAL at 20:19

## 2019-09-07 RX ADMIN — IPRATROPIUM BROMIDE AND ALBUTEROL SULFATE 3 ML: 2.5; .5 SOLUTION RESPIRATORY (INHALATION) at 07:40

## 2019-09-07 RX ADMIN — SENNOSIDES, DOCUSATE SODIUM 2 TABLET: 50; 8.6 TABLET, FILM COATED ORAL at 20:18

## 2019-09-07 RX ADMIN — ASPIRIN 81 MG: 81 TABLET, COATED ORAL at 08:12

## 2019-09-07 RX ADMIN — HYDROCODONE BITARTRATE AND ACETAMINOPHEN 1 TABLET: 7.5; 325 TABLET ORAL at 20:20

## 2019-09-07 RX ADMIN — HEPARIN SODIUM 5000 UNITS: 5000 INJECTION INTRAVENOUS; SUBCUTANEOUS at 08:12

## 2019-09-07 RX ADMIN — CARVEDILOL 3.12 MG: 3.12 TABLET, FILM COATED ORAL at 08:12

## 2019-09-07 NOTE — THERAPY TREATMENT NOTE
Patient Name: Lizbeth Johns  : 1955    MRN: [id number]                              Today's Date: 2019       Admit Date: 2019    Visit Dx:     ICD-10-CM ICD-9-CM   1. Lung nodule R91.1 793.11   2. Mass of upper lobe of right lung R91.8 786.6     Patient Active Problem List   Diagnosis   • Colon cancer screening   • Epigastric pain   • Heartburn   • Lesion of colon   • History of colon polyps   • Family history of colon cancer   • Constipation   • Renal insufficiency   • Acute renal failure (ARF) (CMS/HCC)   • Mass of upper lobe of right lung   • Pain in both lower extremities   • Essential hypertension   • Abnormal CT of the chest   • Lung nodule < 6cm on CT   • Lung nodule     Past Medical History:   Diagnosis Date   • Acid reflux    • Arthritis    • Cataract, bilateral    • COPD (chronic obstructive pulmonary disease) (CMS/HCC)    • Hypertension    • Leg cramps    • Lung mass     right   • SOB (shortness of breath)     xray showed spot on lungs     Past Surgical History:   Procedure Laterality Date   • BRONCHOSCOPY N/A 2019    Procedure: BRONCHOSCOPY WITH FLUOROSCOPY, BIOPSY, BRUSHINGS, AND WASHINGS;  Surgeon: Gudelia Patel MD;  Location: [facility] OR;  Service: Pulmonary   • COLONOSCOPY N/A 2017    Procedure: COLONOSCOPY with hot and cold snare polypectomies,  hot biopsy polypectomies, biopsies, resolution clip placement;  Surgeon: Zackrey Siddiqui MD;  Location: [facility] ENDOSCOPY;  Service:    • COLONOSCOPY N/A 2018    Procedure: COLONOSCOPY WITH HOT SNARE POLYPECTOMY X 7; COLD SNARE POLYPECTOMY X 3; HOT BIOPSY POLYPECTOMY X 20; COLD BIOPSY POLYPECTOMY X 2; THERMAL ABLATION OF COLON POLYPS X 23; CLIP PLACEMENT X 2; BIOPSIES;  Surgeon: Zackery Siddiqui MD;  Location: [facility] ENDOSCOPY;  Service: Gastroenterology   • COLONOSCOPY N/A 2019    Procedure: COLONOSCOPY with cold biopsy polypectomies and cold biopsy;  Surgeon: Zackery Siddiqui MD;  Location: [facility] ENDOSCOPY;   Service: Gastroenterology   • ENDOSCOPY  06/13/2019   • MULTIPLE TOOTH EXTRACTIONS      full extraction   • ORIF TIBIA/FIBULA FRACTURES Left    • THORACOSCOPY Right 8/30/2019    Procedure: BRONCHOSCOPY,  THORACOSCOPY VIDEO ASSISTED with right upper lobe wedge RESECTION , RIGHT UPPER lobectomy with mediastinal lymph node dissection AND INTERCOSTAL CRYOABLATION OF RIGHT CHEST;  Surgeon: Brian Barreto MD;  Location: [facility];  Service: Cardiothoracic   • TUBAL ABDOMINAL LIGATION       General Information     Row Name 09/07/19 1109          PT Evaluation Time/Intention    Document Type  therapy note (daily note)  -     Mode of Treatment  individual therapy  -     Row Name 09/07/19 1109          Safety Issues, Functional Mobility    Impairments Affecting Function (Mobility)  balance;endurance/activity tolerance;shortness of breath  -       User Key  (r) = Recorded By, (t) = Taken By, (c) = Cosigned By    Initials Name Provider Type     Kaitlynn Forrest, PT Physical Therapist        Mobility     Row Name 09/07/19 1136          Bed Mobility Assessment/Treatment    Bed Mobility Assessment/Treatment  supine-sit  -     Supine-Sit [place] (Bed Mobility)  supervision  -     Assistive Device (Bed Mobility)  bed rails;head of bed elevated  -     Comment (Bed Mobility)  extra time and effort needed  -     Row Name 09/07/19 1136          Transfer Assessment/Treatment    Comment (Transfers)  good safety  -     Row Name 09/07/19 1136          Sit-Stand Transfer    Sit-Stand [place] (Transfers)  supervision;1 person assist  -     Assistive Device (Sit-Stand Transfers)  walker, front-wheeled  -     Row Name 09/07/19 1136          Gait/Stairs Assessment/Training    63815 - Gait Training Minutes   23  -SJ     Gait/Stairs Assessment/Training  gait/ambulation independence  -     [place] Level (Gait)  stand by assist  -     Distance in Feet (Gait)  400  -SJ     Pattern (Gait)  step-through  -SJ      Deviations/Abnormal Patterns (Gait)  stride length decreased;gait speed decreased;chaitanya decreased  -     Comment (Gait/Stairs)  slow gait speed due to pain at chest tube site, SOA. Pt req a few standing rest breaks.  -       User Key  (r) = Recorded By, (t) = Taken By, (c) = Cosigned By    Initials Name Provider Type    Kaitlynn Hirsch, HUE Physical Therapist        Obj/Interventions     Row Name 09/07/19 1138          Static Sitting Balance    Level of [place] (Unsupported Sitting, Static Balance)  independent  -SJ     Sitting Position (Unsupported Sitting, Static Balance)  sitting on edge of bed  -     Row Name 09/07/19 1138          Static Standing Balance    Level of [place] (Supported Standing, Static Balance)  supervision  -       User Key  (r) = Recorded By, (t) = Taken By, (c) = Cosigned By    Initials Name Provider Type    Kaitlynn Hirsch, PT Physical Therapist        Goals/Plan    No documentation.       Clinical Impression     Row Name 09/07/19 1138          Pain Scale: Numbers Pre/Post-Treatment    Pain Scale: Numbers, Pretreatment  3/10  -SJ     Pain Scale: Numbers, Post-Treatment  4/10  -SJ     Pain Location - Orientation  incisional  -SJ     Pain Location  chest  -SJ     Pain Intervention(s)  Repositioned;Ambulation/increased activity  -     Row Name 09/07/19 1138          Vital Signs    Pre Systolic BP Rehab  118  -SJ     Pre Treatment Diastolic BP  78  -SJ     Pretreatment Heart Rate (beats/min)  66  -SJ     Posttreatment Heart Rate (beats/min)  69  -SJ     Pre SpO2 (%)  100  -SJ     O2 Delivery Pre Treatment  room air  -SJ     Post SpO2 (%)  100  -SJ     O2 Delivery Post Treatment  room air  -     Row Name 09/07/19 1138          Positioning and Restraints    Pre-Treatment Position  in bed  -SJ     Post Treatment Position  chair  -SJ     In Chair  notified nsg;reclined;call light within reach;encouraged to call for assist;waffle cushion;heels elevated  -SJ        User Key  (r) = Recorded By, (t) = Taken By, (c) = Cosigned By    Initials Name Provider Type    SJ Kaitlynn Forrest PT Physical Therapist        Outcome Measures    No documentation.       Physical Therapy Education     Title: PT OT SLP Therapies (In Progress)     Topic: Physical Therapy (In Progress)     Point: Mobility training (In Progress)     Learning Progress Summary           Patient Acceptance, E,D, NR by  at 9/7/2019 11:41 AM    Acceptance, E, NR by RADHA at 9/6/2019  1:00 PM                   Point: Home exercise program (In Progress)     Learning Progress Summary           Patient Acceptance, E, NR by RADHA at 9/6/2019  1:00 PM                   Point: Body mechanics (In Progress)     Learning Progress Summary           Patient Acceptance, E, NR by  at 9/6/2019  1:00 PM                   Point: Precautions (In Progress)     Learning Progress Summary           Patient Acceptance, E, NR by RADHA at 9/6/2019  1:00 PM                               User Key     Initials Effective Dates Name Provider Type Discipline     06/19/15 -  Kathi Hill PT Physical Therapist PT     06/19/15 -  Kaitlynn Forrest PT Physical Therapist PT              PT Recommendation and Plan     Plan of Care Reviewed With: patient  Progress: improving  Outcome Summary: Pt transferring with more independence, able to ambulate 400ft with SBA with slow speed due to pain and SOB. VSS. Continue POC.     Time Calculation:   PT Charges     Row Name 09/07/19 1136 09/07/19 1109          Time Calculation    Start Time  --  1109  -SJ     PT Received On  --  09/07/19  -     PT Goal Re-Cert Due Date  --  09/16/19  -        Time Calculation- PT    Total Timed Code Minutes- PT  --  23 minute(s)  -        Timed Charges    22864 - Gait Training Minutes   23  -SJ  23  -SJ       User Key  (r) = Recorded By, (t) = Taken By, (c) = Cosigned By    Initials Name Provider Type    Kaitlynn Hirsch PT Physical Therapist        Therapy Charges  for Today     Code Description Service Date Service Provider Modifiers Qty    72733112837 HC GAIT TRAINING EA 15 MIN 9/7/2019 Kaitlynn Forrest, PT GP 2          PT G-Codes  Outcome Measure Options: AM-PAC 6 Clicks Basic Mobility (PT)  AM-PAC 6 Clicks Score (PT): 18    Kaitlynn Forrest, PT  9/7/2019

## 2019-09-07 NOTE — PROGRESS NOTES
Cardiothoracic Surgery Progress Note      POD 8 s/p right VATS with right upper lobectomy     LOS: 8 days      Subjective:  Patient reports continued incisional pain    Objective:  Vital Signs  Temp:  [98.6 °F (37 °C)-98.7 °F (37.1 °C)] 98.6 °F (37 °C)  Heart Rate:  [57-85] 70  Resp:  [16-18] 18  BP: (107-113)/(55-75) 108/55    Physical Exam:   General Appearance: Well-developed, well-nourished no acute distress   Lungs: Decreased bibasilar breath sounds with no wheezes, rales or rhonchi.  Persistent air leak noted with respirations.   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

## 2019-09-07 NOTE — PLAN OF CARE
Problem: Patient Care Overview  Goal: Plan of Care Review  Outcome: Ongoing (interventions implemented as appropriate)   09/07/19 1139   Coping/Psychosocial   Plan of Care Reviewed With patient   Plan of Care Review   Progress improving   OTHER   Outcome Summary Pt transferring with more independence, able to ambulate 400ft with SBA with slow speed due to pain and SOB. VSS. Continue POC.

## 2019-09-07 NOTE — PLAN OF CARE
Problem: Patient Care Overview  Goal: Plan of Care Review  Outcome: Ongoing (interventions implemented as appropriate)   09/07/19 0416   Coping/Psychosocial   Plan of Care Reviewed With patient   Plan of Care Review   Progress improving       Problem: Skin Injury Risk (Adult)  Goal: Skin Health and Integrity  Outcome: Ongoing (interventions implemented as appropriate)   09/07/19 0416   Skin Injury Risk (Adult)   Skin Health and Integrity making progress toward outcome

## 2019-09-08 ENCOUNTER — APPOINTMENT (OUTPATIENT)
Dept: GENERAL RADIOLOGY | Facility: HOSPITAL | Age: 64
End: 2019-09-08

## 2019-09-08 PROCEDURE — 97166 OT EVAL MOD COMPLEX 45 MIN: CPT | Performed by: OCCUPATIONAL THERAPIST

## 2019-09-08 PROCEDURE — 94799 UNLISTED PULMONARY SVC/PX: CPT

## 2019-09-08 PROCEDURE — 25010000002 HEPARIN (PORCINE) PER 1000 UNITS: Performed by: THORACIC SURGERY (CARDIOTHORACIC VASCULAR SURGERY)

## 2019-09-08 PROCEDURE — 71045 X-RAY EXAM CHEST 1 VIEW: CPT

## 2019-09-08 RX ADMIN — IPRATROPIUM BROMIDE AND ALBUTEROL SULFATE 3 ML: 2.5; .5 SOLUTION RESPIRATORY (INHALATION) at 08:20

## 2019-09-08 RX ADMIN — HYDROCODONE BITARTRATE AND ACETAMINOPHEN 1 TABLET: 7.5; 325 TABLET ORAL at 13:18

## 2019-09-08 RX ADMIN — HYDROCODONE BITARTRATE AND ACETAMINOPHEN 1 TABLET: 7.5; 325 TABLET ORAL at 04:25

## 2019-09-08 RX ADMIN — CYCLOBENZAPRINE HYDROCHLORIDE 10 MG: 10 TABLET, FILM COATED ORAL at 21:08

## 2019-09-08 RX ADMIN — SENNOSIDES, DOCUSATE SODIUM 2 TABLET: 50; 8.6 TABLET, FILM COATED ORAL at 20:47

## 2019-09-08 RX ADMIN — POLYETHYLENE GLYCOL 3350 17 G: 17 POWDER, FOR SOLUTION ORAL at 08:48

## 2019-09-08 RX ADMIN — HYDROCODONE BITARTRATE AND ACETAMINOPHEN 1 TABLET: 7.5; 325 TABLET ORAL at 17:01

## 2019-09-08 RX ADMIN — IPRATROPIUM BROMIDE AND ALBUTEROL SULFATE 3 ML: 2.5; .5 SOLUTION RESPIRATORY (INHALATION) at 11:19

## 2019-09-08 RX ADMIN — HEPARIN SODIUM 5000 UNITS: 5000 INJECTION INTRAVENOUS; SUBCUTANEOUS at 20:48

## 2019-09-08 RX ADMIN — ASPIRIN 81 MG: 81 TABLET, COATED ORAL at 08:48

## 2019-09-08 RX ADMIN — HYDROCODONE BITARTRATE AND ACETAMINOPHEN 1 TABLET: 7.5; 325 TABLET ORAL at 20:47

## 2019-09-08 RX ADMIN — HYDROCODONE BITARTRATE AND ACETAMINOPHEN 1 TABLET: 7.5; 325 TABLET ORAL at 00:12

## 2019-09-08 RX ADMIN — CARVEDILOL 3.12 MG: 3.12 TABLET, FILM COATED ORAL at 08:48

## 2019-09-08 RX ADMIN — CARVEDILOL 3.12 MG: 3.12 TABLET, FILM COATED ORAL at 20:47

## 2019-09-08 RX ADMIN — ROPINIROLE 0.5 MG: 0.5 TABLET, FILM COATED ORAL at 20:47

## 2019-09-08 RX ADMIN — IPRATROPIUM BROMIDE AND ALBUTEROL SULFATE 3 ML: 2.5; .5 SOLUTION RESPIRATORY (INHALATION) at 15:39

## 2019-09-08 RX ADMIN — HYDROCODONE BITARTRATE AND ACETAMINOPHEN 1 TABLET: 7.5; 325 TABLET ORAL at 08:48

## 2019-09-08 RX ADMIN — HEPARIN SODIUM 5000 UNITS: 5000 INJECTION INTRAVENOUS; SUBCUTANEOUS at 08:48

## 2019-09-08 RX ADMIN — IPRATROPIUM BROMIDE AND ALBUTEROL SULFATE 3 ML: 2.5; .5 SOLUTION RESPIRATORY (INHALATION) at 19:43

## 2019-09-08 NOTE — PROGRESS NOTES
Cardiothoracic Surgery Progress Note      POD 9 s/p right VATS with right upper lobectomy     LOS: 9 days      Subjective:  Patient reports continued incisional pain    Objective:  Vital Signs  Temp:  [98.1 °F (36.7 °C)-98.4 °F (36.9 °C)] 98.1 °F (36.7 °C)  Heart Rate:  [57-76] 63  Resp:  [16-20] 20  BP: (110-125)/(69-79) 125/79    Physical Exam:   General Appearance: Well-developed, well-nourished no acute distress   Lungs: Decreased bibasilar breath sounds with no wheezes, rales or rhonchi.  Persistent air leak noted with respirations.   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

## 2019-09-08 NOTE — THERAPY EVALUATION
Acute Care - Occupational Therapy Initial Evaluation  [facility]     Patient Name: Lizbeth Johns  : 1955  MRN: [id number]  Today's Date: 2019  Onset of Illness/Injury or Date of Surgery: 19  Date of Referral to OT: 19  Referring Physician: Dr. Barreto    Admit Date: 2019       ICD-10-CM ICD-9-CM   1. Impaired mobility and ADLs Z74.09 799.89   2. Lung nodule R91.1 793.11   3. Mass of upper lobe of right lung R91.8 786.6     Patient Active Problem List   Diagnosis   • Colon cancer screening   • Epigastric pain   • Heartburn   • Lesion of colon   • History of colon polyps   • Family history of colon cancer   • Constipation   • Renal insufficiency   • Acute renal failure (ARF) (CMS/HCC)   • Mass of upper lobe of right lung   • Pain in both lower extremities   • Essential hypertension   • Abnormal CT of the chest   • Lung nodule < 6cm on CT   • Lung nodule     Past Medical History:   Diagnosis Date   • Acid reflux    • Arthritis    • Cataract, bilateral    • COPD (chronic obstructive pulmonary disease) (CMS/HCC)    • Hypertension    • Leg cramps    • Lung mass     right   • SOB (shortness of breath)     xray showed spot on lungs     Past Surgical History:   Procedure Laterality Date   • BRONCHOSCOPY N/A 2019    Procedure: BRONCHOSCOPY WITH FLUOROSCOPY, BIOPSY, BRUSHINGS, AND WASHINGS;  Surgeon: Gudelia Patel MD;  Location: [facility] OR;  Service: Pulmonary   • COLONOSCOPY N/A 2017    Procedure: COLONOSCOPY with hot and cold snare polypectomies,  hot biopsy polypectomies, biopsies, resolution clip placement;  Surgeon: Zackery Siddiqui MD;  Location: [facility] ENDOSCOPY;  Service:    • COLONOSCOPY N/A 2018    Procedure: COLONOSCOPY WITH HOT SNARE POLYPECTOMY X 7; COLD SNARE POLYPECTOMY X 3; HOT BIOPSY POLYPECTOMY X 20; COLD BIOPSY POLYPECTOMY X 2; THERMAL ABLATION OF COLON POLYPS X 23; CLIP PLACEMENT X 2; BIOPSIES;  Surgeon: Zackery Siddiqui MD;  Location: [facility] ENDOSCOPY;   Service: Gastroenterology   • COLONOSCOPY N/A 6/19/2019    Procedure: COLONOSCOPY with cold biopsy polypectomies and cold biopsy;  Surgeon: Zackery Siddiqui MD;  Location: [facility] ENDOSCOPY;  Service: Gastroenterology   • ENDOSCOPY  06/13/2019   • MULTIPLE TOOTH EXTRACTIONS      full extraction   • ORIF TIBIA/FIBULA FRACTURES Left    • THORACOSCOPY Right 8/30/2019    Procedure: BRONCHOSCOPY,  THORACOSCOPY VIDEO ASSISTED with right upper lobe wedge RESECTION , RIGHT UPPER lobectomy with mediastinal lymph node dissection AND INTERCOSTAL CRYOABLATION OF RIGHT CHEST;  Surgeon: Brian Barreto MD;  Location: [facility] OR;  Service: Cardiothoracic   • TUBAL ABDOMINAL LIGATION            OT ASSESSMENT FLOWSHEET (last 12 hours)      Occupational Therapy Evaluation     Row Name 09/08/19 1500                   OT Evaluation Time/Intention    Subjective Information  complains of;pain  -AR        Document Type  evaluation  -AR        Mode of Treatment  occupational therapy  -AR        Patient Effort  adequate  -AR        Symptoms Noted During/After Treatment  increased pain  -AR        Comment  RN notified  -AR           General Information    Patient Profile Reviewed?  yes  -AR        Onset of Illness/Injury or Date of Surgery  08/30/19  -AR        Referring Physician  Dr. Barreto  -AR        Patient Observations  agree to therapy;alert  -AR        Patient/Family Observations  pt in chair, daughter at bedside  -AR        Prior Level of Function  independent:;all household mobility;community mobility;gait;transfer;ADL's  -AR        Equipment Currently Used at Home  none  -AR        Pertinent History of Current Functional Problem  Pt is a 63 yof POD#9 VATS with upper lobectomy.   -AR        Existing Precautions/Restrictions  fall;other (see comments) chest tube  -AR        Risks Reviewed  patient and family:;LOB;nausea/vomiting;dizziness;increased discomfort;change in vital signs;increased drainage;lines disloged  -AR        Benefits  Reviewed  patient and family:;improve function;increase independence;increase balance;increase strength;decrease pain;decrease risk of DVT;increase knowledge  -AR        Barriers to Rehab  medically complex  -AR           Relationship/Environment    Primary Source of Support/Comfort  child(erica);friend  -AR        Lives With  friend(s)  -AR        Concerns About Impact on Relationships  Anticiaptes assist from family and friends at home  -AR           Resource/Environmental Concerns    Current Living Arrangements  home/apartment/condo  -AR        Resource/Environmental Concerns  none  -AR        Transportation Concerns  car, none  -AR           Home Main Entrance    Number of Stairs, Main Entrance  none  -AR           Cognitive Assessment/Interventions    Additional Documentation  Cognitive Assessment/Intervention (Group)  -AR           Cognitive Assessment/Intervention- PT/OT    Affect/Mental Status (Cognitive)  WNL  -AR        Orientation Status (Cognition)  oriented x 4  -AR        Follows Commands (Cognition)  WNL  -AR        Cognitive Function (Cognitive)  WNL  -AR           Safety Issues, Functional Mobility    Impairments Affecting Function (Mobility)  pain;range of motion (ROM);strength  -AR           Transfer Assessment/Treatment    Transfer Assessment/Treatment  sit-stand transfer;stand-sit transfer  -AR        Comment (Transfers)  limited with pain at chest tube site  -AR           Sit-Stand Transfer    Sit-Stand [place] (Transfers)  supervision  -AR           Stand-Sit Transfer    Stand-Sit [place] (Transfers)  supervision  -AR           ADL Assessment/Intervention    BADL Assessment/Intervention  bathing;lower body dressing  -AR           Bathing Assessment/Intervention    Bathing [place] Level  bathing skills;lower body;maximum assist (25% patient effort)  -AR        Bathing Position  supported sitting  -AR        Comment (Bathing)  Max assist to simulatel limited by pain and ROM  limitation  -AR           Lower Body Dressing Assessment/Training    Lower Body Dressing [place] Level  doff;don;socks;maximum assist (25% patient effort)  -AR        Lower Body Dressing Position  supported sitting  -AR           BADL Safety/Performance    Impairments, BADL Safety/Performance  pain;range of motion;strength  -AR           General ROM    GENERAL ROM COMMENTS  RUE deferred d/t pain, LUE WFL  -AR           MMT (Manual Muscle Testing)    General MMT Comments  RUE deferred, LUE WFL  -AR           Motor Assessment/Interventions    Additional Documentation  Balance (Group);Balance Interventions (Group)  -AR           Balance    Balance  static sitting balance;static standing balance  -AR           Static Sitting Balance    Level of [place] (Unsupported Sitting, Static Balance)  independent  -AR        Sitting Position (Unsupported Sitting, Static Balance)  sitting in chair  -AR           Static Standing Balance    Level of [place] (Supported Standing, Static Balance)  supervision  -AR        Time Able to Maintain Position (Supported Standing, Static Balance)  30 to 45 seconds  -AR           Positioning and Restraints    Pre-Treatment Position  sitting in chair/recliner  -AR        Post Treatment Position  chair  -AR        In Chair  notified nsg;reclined;call light within reach;encouraged to call for assist;with family/caregiver;legs elevated  -AR           Pain Scale: Numbers Pre/Post-Treatment    Pain Scale: Numbers, Pretreatment  10/10  -AR        Pain Scale: Numbers, Post-Treatment  10/10  -AR        Pain Location - Side  Right  -AR        Pain Location - Orientation  incisional  -AR        Pain Location  chest  -AR        Pain Intervention(s)  Repositioned notified RN  -AR           Wound 08/30/19 1320 Right chest Incision    Wound - Properties Group Date first assessed: 08/30/19  -RB Time first assessed: 1320  -RB Side: Right  -RB Location: chest  -RB Primary Wound Type: Incision  -RB        Plan of Care Review    Plan of Care Reviewed With  patient  -AR           Clinical Impression (OT)    Date of Referral to OT  09/06/19  -AR        OT Diagnosis  decreased independence with ADLS  -AR        Patient/Family Goals Statement (OT Eval)  return home  -AR        Criteria for Skilled Therapeutic Interventions Met (OT Eval)  yes;treatment indicated  -AR        Rehab Potential (OT Eval)  good, to achieve stated therapy goals  -AR        Therapy Frequency (OT Eval)  daily  -AR        Anticipated Discharge Disposition (OT)  home with assist;home with home health  -AR           Vital Signs    Pre Systolic BP Rehab  107  -AR        Pre Treatment Diastolic BP  65  -AR        Pretreatment Heart Rate (beats/min)  71  -AR        Posttreatment Heart Rate (beats/min)  69  -AR        Pre SpO2 (%)  96  -AR        O2 Delivery Pre Treatment  room air  -AR        Post SpO2 (%)  98  -AR        O2 Delivery Post Treatment  room air  -AR        Pre Patient Position  Sitting  -AR        Intra Patient Position  Standing  -AR        Post Patient Position  Sitting  -AR           OT Goals    Transfer Goal Selection (OT)  transfer, OT goal 1  -AR        Dressing Goal Selection (OT)  dressing, OT goal 1  -AR        Toileting Goal Selection (OT)  toileting, OT goal 1  -AR           Transfer Goal 1 (OT)    Activity/Assistive Device (Transfer Goal 1, OT)  sit-to-stand/stand-to-sit;toilet  -AR        [place] Level/Cues Needed (Transfer Goal 1, OT)  independent  -AR        Time Frame (Transfer Goal 1, OT)  short term goal (STG);5 days  -AR        Progress/Outcome (Transfer Goal 1, OT)  goal ongoing  -AR           Dressing Goal 1 (OT)    Activity/Assistive Device (Dressing Goal 1, OT)  lower body dressing;reacher;sock-aid  -AR        [place]/Cues Needed (Dressing Goal 1, OT)  conditional independence  -AR        Time Frame (Dressing Goal 1, OT)  short term goal (STG);1 week  -AR        Progress/Outcome (Dressing Goal 1, OT)   goal ongoing  -AR           Toileting Goal 1 (OT)    Activity/Device (Toileting Goal 1, OT)  toileting skills, all  -AR        [place] Level/Cues Needed (Toileting Goal 1, OT)  independent  -AR        Time Frame (Toileting Goal 1, OT)  short term goal (STG);5 days  -AR        Progress/Outcome (Toileting Goal 1, OT)  goal ongoing  -AR           Living Environment    Home Accessibility  wheelchair accessible no steps, walk-in and tub shower  -AR          User Key  (r) = Recorded By, (t) = Taken By, (c) = Cosigned By    Initials Name Effective Dates    AR Lesa Eden OT 06/22/15 -     Jailyn Harvey RN 07/02/19 -          Occupational Therapy Education     Title: PT OT SLP Therapies (In Progress)     Topic: Occupational Therapy (Done)     Point: ADL training (Done)     Description: Instruct learner(s) on proper safety adaptation and remediation techniques during self care or transfers.   Instruct in proper use of assistive devices.    Learning Progress Summary           Patient Acceptance, E, VU by AR at 9/8/2019  3:00 PM   Family Acceptance, E, VU by AR at 9/8/2019  3:00 PM                   Point: Precautions (Done)     Description: Instruct learner(s) on prescribed precautions during self-care and functional transfers.    Learning Progress Summary           Patient Acceptance, E, VU by AR at 9/8/2019  3:00 PM   Family Acceptance, E, VU by AR at 9/8/2019  3:00 PM                   Point: Body mechanics (Done)     Description: Instruct learner(s) on proper positioning and spine alignment during self-care, functional mobility activities and/or exercises.    Learning Progress Summary           Patient Acceptance, E, VU by AR at 9/8/2019  3:00 PM   Family Acceptance, E, VU by AR at 9/8/2019  3:00 PM                               User Key     Initials Effective Dates Name Provider Type Discipline    AR 06/22/15 -  Lesa Eden OT Occupational Therapist OT                  OT Recommendation and  Plan  Outcome Summary/Treatment Plan (OT)  Anticipated Discharge Disposition (OT): home with assist, home with home health  Therapy Frequency (OT Eval): daily  Plan of Care Review  Plan of Care Reviewed With: patient  Plan of Care Reviewed With: patient  Outcome Summary: Pt limited with 10/10 incisional pain at chest tube site. She completed transfer training with [facility], required max assist LB ADLs d/t pain and ROM limitation. Pt will benefit from AE to assist with LB ADLS, deferred issuing and training on use this date d/t increase pain and pt tearful. Pt desires DC home with assist from family and friends.     Outcome Measures     Row Name 09/08/19 1500             How much help from another is currently needed...    Putting on and taking off regular lower body clothing?  2  -AR      Bathing (including washing, rinsing, and drying)  2  -AR      Toileting (which includes using toilet bed pan or urinal)  3  -AR      Putting on and taking off regular upper body clothing  3  -AR      Taking care of personal grooming (such as brushing teeth)  3  -AR      Eating meals  3  -AR      AM-PAC 6 Clicks Score (OT)  16  -AR         Functional Assessment    Outcome Measure Options  AM-PAC 6 Clicks Daily Activity (OT)  -AR        User Key  (r) = Recorded By, (t) = Taken By, (c) = Cosigned By    Initials Name Provider Type    Lesa Jeff OT Occupational Therapist          Time Calculation:   Time Calculation- OT     Row Name 09/08/19 1500             Time Calculation- OT    OT Start Time  1500  -AR      OT Received On  09/08/19  -AR      OT Goal Re-Cert Due Date  09/18/19  -AR        User Key  (r) = Recorded By, (t) = Taken By, (c) = Cosigned By    Initials Name Provider Type    Lesa Jeff OT Occupational Therapist        Therapy Charges for Today     Code Description Service Date Service Provider Modifiers Qty    [id number]  OT EVAL MOD COMPLEXITY 4 9/8/2019 Lesa Eden OT GO 1                Lesa Eden, OT  9/8/2019

## 2019-09-08 NOTE — PLAN OF CARE
Problem: Patient Care Overview  Goal: Plan of Care Review  Outcome: Ongoing (interventions implemented as appropriate)   09/08/19 1500   Coping/Psychosocial   Plan of Care Reviewed With patient   OTHER   Outcome Summary Pt limited with 10/10 incisional pain at chest tube site. She completed transfer training with C[facility], required max assist LB ADLs d/t pain and ROM limitation. Pt will benefit from AE to assist with LB ADLS, deferred issuing and training on use this date d/t increase pain and pt tearful. Pt desires DC home with assist from family and friends.

## 2019-09-08 NOTE — PLAN OF CARE
Problem: Patient Care Overview  Goal: Plan of Care Review  Outcome: Ongoing (interventions implemented as appropriate)   09/08/19 0429   Coping/Psychosocial   Plan of Care Reviewed With patient   Plan of Care Review   Progress improving       Problem: Skin Injury Risk (Adult)  Goal: Skin Health and Integrity  Outcome: Ongoing (interventions implemented as appropriate)   09/08/19 0429   Skin Injury Risk (Adult)   Skin Health and Integrity making progress toward outcome

## 2019-09-09 ENCOUNTER — APPOINTMENT (OUTPATIENT)
Dept: GENERAL RADIOLOGY | Facility: HOSPITAL | Age: 64
End: 2019-09-09

## 2019-09-09 ENCOUNTER — APPOINTMENT (OUTPATIENT)
Dept: MAMMOGRAPHY | Facility: HOSPITAL | Age: 64
End: 2019-09-09

## 2019-09-09 PROCEDURE — 97116 GAIT TRAINING THERAPY: CPT

## 2019-09-09 PROCEDURE — 94799 UNLISTED PULMONARY SVC/PX: CPT

## 2019-09-09 PROCEDURE — 99024 POSTOP FOLLOW-UP VISIT: CPT | Performed by: PHYSICIAN ASSISTANT

## 2019-09-09 PROCEDURE — 25010000002 HEPARIN (PORCINE) PER 1000 UNITS: Performed by: THORACIC SURGERY (CARDIOTHORACIC VASCULAR SURGERY)

## 2019-09-09 PROCEDURE — 71045 X-RAY EXAM CHEST 1 VIEW: CPT

## 2019-09-09 PROCEDURE — 97110 THERAPEUTIC EXERCISES: CPT

## 2019-09-09 RX ORDER — HYDROCODONE BITARTRATE AND ACETAMINOPHEN 7.5; 325 MG/1; MG/1
1 TABLET ORAL EVERY 4 HOURS PRN
Status: DISCONTINUED | OUTPATIENT
Start: 2019-09-09 | End: 2019-09-13 | Stop reason: HOSPADM

## 2019-09-09 RX ADMIN — ROPINIROLE 0.5 MG: 0.5 TABLET, FILM COATED ORAL at 21:37

## 2019-09-09 RX ADMIN — HYDROCODONE BITARTRATE AND ACETAMINOPHEN 1 TABLET: 7.5; 325 TABLET ORAL at 21:37

## 2019-09-09 RX ADMIN — HYDROCODONE BITARTRATE AND ACETAMINOPHEN 1 TABLET: 7.5; 325 TABLET ORAL at 10:14

## 2019-09-09 RX ADMIN — CYCLOBENZAPRINE HYDROCHLORIDE 10 MG: 10 TABLET, FILM COATED ORAL at 09:13

## 2019-09-09 RX ADMIN — HYDROCODONE BITARTRATE AND ACETAMINOPHEN 1 TABLET: 7.5; 325 TABLET ORAL at 00:31

## 2019-09-09 RX ADMIN — IPRATROPIUM BROMIDE AND ALBUTEROL SULFATE 3 ML: 2.5; .5 SOLUTION RESPIRATORY (INHALATION) at 07:33

## 2019-09-09 RX ADMIN — IPRATROPIUM BROMIDE AND ALBUTEROL SULFATE 3 ML: 2.5; .5 SOLUTION RESPIRATORY (INHALATION) at 13:12

## 2019-09-09 RX ADMIN — ACETAMINOPHEN 650 MG: 325 TABLET ORAL at 02:18

## 2019-09-09 RX ADMIN — HYDROCODONE BITARTRATE AND ACETAMINOPHEN 1 TABLET: 7.5; 325 TABLET ORAL at 14:21

## 2019-09-09 RX ADMIN — ASPIRIN 81 MG: 81 TABLET, COATED ORAL at 09:13

## 2019-09-09 RX ADMIN — HYDROCODONE BITARTRATE AND ACETAMINOPHEN 1 TABLET: 7.5; 325 TABLET ORAL at 06:08

## 2019-09-09 RX ADMIN — HYDROCODONE BITARTRATE AND ACETAMINOPHEN 1 TABLET: 7.5; 325 TABLET ORAL at 18:08

## 2019-09-09 RX ADMIN — CARVEDILOL 3.12 MG: 3.12 TABLET, FILM COATED ORAL at 21:37

## 2019-09-09 RX ADMIN — SENNOSIDES, DOCUSATE SODIUM 2 TABLET: 50; 8.6 TABLET, FILM COATED ORAL at 21:37

## 2019-09-09 RX ADMIN — CARVEDILOL 3.12 MG: 3.12 TABLET, FILM COATED ORAL at 09:13

## 2019-09-09 RX ADMIN — IPRATROPIUM BROMIDE AND ALBUTEROL SULFATE 3 ML: 2.5; .5 SOLUTION RESPIRATORY (INHALATION) at 20:49

## 2019-09-09 RX ADMIN — POLYETHYLENE GLYCOL 3350 17 G: 17 POWDER, FOR SOLUTION ORAL at 09:13

## 2019-09-09 RX ADMIN — HEPARIN SODIUM 5000 UNITS: 5000 INJECTION INTRAVENOUS; SUBCUTANEOUS at 21:38

## 2019-09-09 RX ADMIN — CYCLOBENZAPRINE HYDROCHLORIDE 10 MG: 10 TABLET, FILM COATED ORAL at 21:37

## 2019-09-09 RX ADMIN — HEPARIN SODIUM 5000 UNITS: 5000 INJECTION INTRAVENOUS; SUBCUTANEOUS at 09:13

## 2019-09-09 NOTE — PROGRESS NOTES
Continued Stay Note  [facility]     Patient Name: Lizbeth Johns  MRN: [id number]  Today's Date: 9/9/2019    Admit Date: 8/30/2019    Discharge Plan     Row Name 09/09/19 1208       Plan    Plan  HOME    Patient/Family in Agreement with Plan  yes    Plan Comments  Met with pt at bedside to f/u DCP.  Plan remains to return home upon DC.  No immediate needs identified/voiced.  CM will cont to follow.    Final Discharge Disposition Code  01 - home or self-care        Discharge Codes    No documentation.       Expected Discharge Date and Time     Expected Discharge Date Expected Discharge Time    Sep 11, 2019             Kaitlynn Hobbs RN

## 2019-09-09 NOTE — PLAN OF CARE
Problem: Patient Care Overview  Goal: Plan of Care Review  Outcome: Ongoing (interventions implemented as appropriate)   09/09/19 2207   Coping/Psychosocial   Plan of Care Reviewed With patient   Plan of Care Review   Progress improving   OTHER   Outcome Summary Pt ambulates in us x 600 ft with CGA, using RWx. Pt limited by pain at chest tube site. Pt advised to continue walking without walker if tolerable. Tolerated ther ex.

## 2019-09-09 NOTE — THERAPY TREATMENT NOTE
Patient Name: Lizbeth Johns  : 1955    MRN: [id number]                              Today's Date: 2019       Admit Date: 2019    Visit Dx:     ICD-10-CM ICD-9-CM   1. Impaired mobility and ADLs Z74.09 799.89   2. Lung nodule R91.1 793.11   3. Mass of upper lobe of right lung R91.8 786.6     Patient Active Problem List   Diagnosis   • Colon cancer screening   • Epigastric pain   • Heartburn   • Lesion of colon   • History of colon polyps   • Family history of colon cancer   • Constipation   • Renal insufficiency   • Acute renal failure (ARF) (CMS/HCC)   • Mass of upper lobe of right lung   • Pain in both lower extremities   • Essential hypertension   • Abnormal CT of the chest   • Lung nodule < 6cm on CT   • Lung nodule     Past Medical History:   Diagnosis Date   • Acid reflux    • Arthritis    • Cataract, bilateral    • COPD (chronic obstructive pulmonary disease) (CMS/HCC)    • Hypertension    • Leg cramps    • Lung mass     right   • SOB (shortness of breath)     xray showed spot on lungs     Past Surgical History:   Procedure Laterality Date   • BRONCHOSCOPY N/A 2019    Procedure: BRONCHOSCOPY WITH FLUOROSCOPY, BIOPSY, BRUSHINGS, AND WASHINGS;  Surgeon: Gudelia Patel MD;  Location: [facility] OR;  Service: Pulmonary   • COLONOSCOPY N/A 2017    Procedure: COLONOSCOPY with hot and cold snare polypectomies,  hot biopsy polypectomies, biopsies, resolution clip placement;  Surgeon: Zackery Siddiqui MD;  Location: [facility] ENDOSCOPY;  Service:    • COLONOSCOPY N/A 2018    Procedure: COLONOSCOPY WITH HOT SNARE POLYPECTOMY X 7; COLD SNARE POLYPECTOMY X 3; HOT BIOPSY POLYPECTOMY X 20; COLD BIOPSY POLYPECTOMY X 2; THERMAL ABLATION OF COLON POLYPS X 23; CLIP PLACEMENT X 2; BIOPSIES;  Surgeon: Zackery Siddiqui MD;  Location: [facility] ENDOSCOPY;  Service: Gastroenterology   • COLONOSCOPY N/A 2019    Procedure: COLONOSCOPY with cold biopsy polypectomies and cold biopsy;  Surgeon: Chen  Zackery MARROQUIN MD;  Location: [facility] ENDOSCOPY;  Service: Gastroenterology   • ENDOSCOPY  06/13/2019   • MULTIPLE TOOTH EXTRACTIONS      full extraction   • ORIF TIBIA/FIBULA FRACTURES Left    • THORACOSCOPY Right 8/30/2019    Procedure: BRONCHOSCOPY,  THORACOSCOPY VIDEO ASSISTED with right upper lobe wedge RESECTION , RIGHT UPPER lobectomy with mediastinal lymph node dissection AND INTERCOSTAL CRYOABLATION OF RIGHT CHEST;  Surgeon: Brian Barreto MD;  Location: [facility] OR;  Service: Cardiothoracic   • TUBAL ABDOMINAL LIGATION       General Information     Row Name 09/09/19 0951          PT Evaluation Time/Intention    Document Type  therapy note (daily note)  -EJ     Mode of Treatment  physical therapy  -EJ     Row Name 09/09/19 0951          General Information    Patient Profile Reviewed?  yes  -EJ     Existing Precautions/Restrictions  fall;other (see comments) chest tube, on suction when in room  -EJ     Barriers to Rehab  medically complex  -EJ     Row Name 09/09/19 0951          Cognitive Assessment/Intervention- PT/OT    Orientation Status (Cognition)  oriented x 4  -EJ     Row Name 09/09/19 0951          Safety Issues, Functional Mobility    Safety Issues Affecting Function (Mobility)  safety precaution awareness;safety precautions follow-through/compliance  -EJ     Impairments Affecting Function (Mobility)  pain;range of motion (ROM);strength  -EJ       User Key  (r) = Recorded By, (t) = Taken By, (c) = Cosigned By    Initials Name Provider Type    EJ La Benavides PT Physical Therapist        Mobility     Row Name 09/09/19 0954          Bed Mobility Assessment/Treatment    Bed Mobility Assessment/Treatment  supine-sit  -EJ     Supine-Sit [place] (Bed Mobility)  supervision  -EJ     Assistive Device (Bed Mobility)  bed rails;head of bed elevated  -EJ     Row Name 09/09/19 0954          Sit-Stand Transfer    Sit-Stand [place] (Transfers)  stand by assist  -EJ     Assistive Device (Sit-Stand  Transfers)  walker, front-wheeled  -EJ     Row Name 09/09/19 0954          Gait/Stairs Assessment/Training    Gait/Stairs Assessment/Training  gait/ambulation independence  -EJ     [place] Level (Gait)  stand by assist  -EJ     Distance in Feet (Gait)  600  -EJ     Pattern (Gait)  swing-through  -EJ     Deviations/Abnormal Patterns (Gait)  gait speed decreased;chaitanya decreased;stride length decreased  -EJ     Comment (Gait/Stairs)  slow speed with c/o pain at chest tube site. Not standing rest breaks needed.  -EJ       User Key  (r) = Recorded By, (t) = Taken By, (c) = Cosigned By    Initials Name Provider Type    La James PT Physical Therapist        Obj/Interventions     Row Name 09/09/19 0957          Therapeutic Exercise    Lower Extremity (Therapeutic Exercise)  SLR (straight leg raise), bilateral  -EJ     Lower Extremity Range of Motion (Therapeutic Exercise)  ankle dorsiflexion/plantar flexion, bilateral;hip abduction/adduction, bilateral;hip internal/external rotation, bilateral  -EJ     Sets/Reps (Therapeutic Exercise)  1/10  -EJ     Row Name 09/09/19 0957          Static Sitting Balance    Level of [place] (Unsupported Sitting, Static Balance)  independent  -EJ       User Key  (r) = Recorded By, (t) = Taken By, (c) = Cosigned By    Initials Name Provider Type    La James PT Physical Therapist        Goals/Plan    No documentation.       Clinical Impression     Row Name 09/09/19 0958          Pain Assessment    Additional Documentation  Pain Scale: FACES Pre/Post-Treatment (Group)  -EJ     [facility] Name 09/09/19 0958          Pain Scale: Numbers Pre/Post-Treatment    Pain Location - Side  Right  -EJ     Pain Location - Orientation  incisional  -EJ     Pain Location  chest  -EJ     Row Name 09/09/19 0958          Pain Scale: FACES Pre/Post-Treatment    Pain: FACES Scale, Pretreatment  4-->hurts little more  -EJ     Pain: FACES Scale, Post-Treatment  6-->hurts even more  -EJ      Row Name 09/09/19 0958          Plan of Care Review    Plan of Care Reviewed With  patient  -VILLA     Row Name 09/09/19 0958          Vital Signs    Pre Systolic BP Rehab  124  -EJ     Pre Treatment Diastolic BP  70  -EJ     Pretreatment Heart Rate (beats/min)  78  -EJ     Posttreatment Heart Rate (beats/min)  81  -EJ     Pre SpO2 (%)  100  -EJ     O2 Delivery Pre Treatment  room air  -EJ     Post SpO2 (%)  100  -EJ     O2 Delivery Post Treatment  room air  -EJ     Row Name 09/09/19 0958          Positioning and Restraints    Pre-Treatment Position  in bed  -EJ     Post Treatment Position  chair  -EJ     In Chair  reclined;call light within reach;encouraged to call for assist  -EJ       User Key  (r) = Recorded By, (t) = Taken By, (c) = Cosigned By    Initials Name Provider Type    La James PT Physical Therapist        Outcome Measures    No documentation.       Physical Therapy Education     Title: PT OT SLP Therapies (Done)     Topic: Physical Therapy (Done)     Point: Mobility training (Done)     Learning Progress Summary           Patient Acceptance, E, VU,NR by VILLA at 9/9/2019  9:59 AM    Acceptance, E,D, NR by DUSTY at 9/7/2019 11:41 AM    Acceptance, E, NR by RADHA at 9/6/2019  1:00 PM                   Point: Home exercise program (Done)     Learning Progress Summary           Patient Acceptance, E, VU,NR by VILLA at 9/9/2019  9:59 AM    Acceptance, E, NR by RADHA at 9/6/2019  1:00 PM                   Point: Body mechanics (Done)     Learning Progress Summary           Patient Acceptance, E, VU,NR by VILLA at 9/9/2019  9:59 AM    Acceptance, E, NR by RADHA at 9/6/2019  1:00 PM                   Point: Precautions (Done)     Learning Progress Summary           Patient Acceptance, E, VU,NR by VILLA at 9/9/2019  9:59 AM    Acceptance, E, NR by RADHA at 9/6/2019  1:00 PM                               User Key     Initials Effective Dates Name Provider Type [facility] 06/19/15 -  Kathi Hill PT Physical  Therapist PT    EJ 11/20/18 -  La Benavides PT Physical Therapist PT     06/19/15 -  Kaitlynn Forrest PT Physical Therapist PT              PT Recommendation and Plan     Plan of Care Reviewed With: patient  Progress: improving  Outcome Summary: Pt ambulates in us x 600 ft with CGA, using RWx. Pt limited by pain at chest tube site. Pt advised to continue walking without walker if tolerable. Tolerated ther ex.      Time Calculation:   PT Charges     Row Name 09/09/19 1001             Time Calculation    Start Time  0915  -EJ      PT Received On  09/09/19  -EJ         Time Calculation- PT    Total Timed Code Minutes- PT  25 minute(s)  -EJ         Timed Charges    32826 - PT Therapeutic Exercise Minutes  7  -EJ      95714 - Gait Training Minutes   18  -EJ        User Key  (r) = Recorded By, (t) = Taken By, (c) = Cosigned By    Initials Name Provider Type     La Benavides, PT Physical Therapist        Therapy Charges for Today     Code Description Service Date Service Provider Modifiers Qty    26855258809 HC PT THER PROC EA 15 MIN 9/9/2019 La Benavides, PT GP 1    29933018575 HC GAIT TRAINING EA 15 MIN 9/9/2019 La Benavides, PT GP 1          PT G-Codes  Outcome Measure Options: AM-PAC 6 Clicks Daily Activity (OT)  AM-PAC 6 Clicks Score (PT): 18  AM-PAC 6 Clicks Score (OT): 16    La Anderson PT  9/9/2019

## 2019-09-09 NOTE — PLAN OF CARE
Problem: Patient Care Overview  Goal: Plan of Care Review  Outcome: Ongoing (interventions implemented as appropriate)   09/08/19 0429 09/09/19 0200 09/09/19 0506   Coping/Psychosocial   Plan of Care Reviewed With --  patient --    Plan of Care Review   Progress improving --  --    OTHER   Outcome Summary --  --  Patient continues to have major pain issues, Chest tubes remain in place and at 20 cm sxn r/t an air leak. Vss , will continue to monitor      09/08/19 0429 09/09/19 0200 09/09/19 0506   Coping/Psychosocial   Plan of Care Reviewed With --  patient --    Plan of Care Review   Progress improving --  --    OTHER   Outcome Summary --  --  Patient continues to have major pain issues, Chest tubes remain in place and at 20 cm sxn r/t an air leak. Vss , will continue to monitor       Problem: Lung Surgery (via Thoracotomy) (Adult)  Goal: Signs and Symptoms of Listed Potential Problems Will be Absent, Minimized or Managed (Lung Surgery)  Outcome: Ongoing (interventions implemented as appropriate)

## 2019-09-09 NOTE — PROGRESS NOTES
CTS Progress Note      POD 10 s/p Bronchoscopy, Right VATS with right upper lobectomy     LOS: 10 days     Subjective  No acute events overnight.  Sitting up in bed.  VSS. On room air.  Reports incisional pain.  No other complaints.    Objective    Vital Signs  Temp:  [97.6 °F (36.4 °C)-98.3 °F (36.8 °C)] 98.3 °F (36.8 °C)  Heart Rate:  [62-89] 67  Resp:  [16-20] 18  BP: (118-160)/(73-86) 124/73    Physical Exam:   General Appearance: alert, appears stated age and cooperative   Lungs: Decreased lung sounds right side   Heart: regular rhythm & normal rate, normal S1, S2 and no murmur, no gallop, no rub   Chest: sternum stable   Skin: Incision c/d/i   CT:  215 cc / 24 hours.  Air leak with cough.     Results       Results from last 7 days   Lab Units 09/05/19  0756   SODIUM mmol/L 136   POTASSIUM mmol/L 4.5   CHLORIDE mmol/L 98   CO2 mmol/L 26.0   BUN mg/dL 19   CREATININE mg/dL 1.06*   GLUCOSE mg/dL 107*   CALCIUM mg/dL 9.6       Imaging Results (last 24 hours)     Procedure Component Value Units Date/Time    XR Chest 1 View [[id number]] Updated:  09/09/19 0534    XR Chest 1 View [[id number]] Collected:  09/08/19 0734     Updated:  09/08/19 1636    Narrative:          EXAMINATION: XR CHEST 1 VW - 09/08/2019      INDICATION:  R91.1-Solitary pulmonary nodule; R91.8-Other nonspecific  abnormal finding of lung field.      COMPARISON: Chest radiograph 09/07/2019.     FINDINGS: Single portable chest radiograph is submitted for review.  There is redemonstration of two large-bore right-sided thoracotomy tubes  projecting to the right apex, slightly repositioned. Persistent now  smaller right pneumothorax is identified measuring up to 1.3 cm in size.  Similar right chest wall subcutaneous air is identified. The remainder  of the right lung appears well aerated. The left lung is well aerated.  No pleural effusion. Visually the upper abdomen is unrevealing. No acute  osseous abnormality.           Impression:           Appropriately positioned large-bore right-sided thoracotomy tubes with a  now smaller but persistent right apical pneumothorax measuring  approximately 1.3 cm in size.     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  Will need to establish stability of apical pneumothorax before discharge with pneumostat  Daily CXR  Ambulate TID  Aggressive Pulm toilet-flutter valve and percussive vest    09/09/19  8:20 AM

## 2019-09-10 ENCOUNTER — APPOINTMENT (OUTPATIENT)
Dept: GENERAL RADIOLOGY | Facility: HOSPITAL | Age: 64
End: 2019-09-10

## 2019-09-10 PROCEDURE — 71045 X-RAY EXAM CHEST 1 VIEW: CPT

## 2019-09-10 PROCEDURE — 97530 THERAPEUTIC ACTIVITIES: CPT

## 2019-09-10 PROCEDURE — 94799 UNLISTED PULMONARY SVC/PX: CPT

## 2019-09-10 PROCEDURE — 97110 THERAPEUTIC EXERCISES: CPT

## 2019-09-10 PROCEDURE — 97535 SELF CARE MNGMENT TRAINING: CPT

## 2019-09-10 PROCEDURE — 97116 GAIT TRAINING THERAPY: CPT

## 2019-09-10 PROCEDURE — 25010000002 HEPARIN (PORCINE) PER 1000 UNITS: Performed by: THORACIC SURGERY (CARDIOTHORACIC VASCULAR SURGERY)

## 2019-09-10 RX ADMIN — CYCLOBENZAPRINE HYDROCHLORIDE 10 MG: 10 TABLET, FILM COATED ORAL at 08:54

## 2019-09-10 RX ADMIN — HYDROCODONE BITARTRATE AND ACETAMINOPHEN 1 TABLET: 7.5; 325 TABLET ORAL at 01:43

## 2019-09-10 RX ADMIN — CARVEDILOL 3.12 MG: 3.12 TABLET, FILM COATED ORAL at 22:11

## 2019-09-10 RX ADMIN — HYDROCODONE BITARTRATE AND ACETAMINOPHEN 1 TABLET: 7.5; 325 TABLET ORAL at 06:28

## 2019-09-10 RX ADMIN — IPRATROPIUM BROMIDE AND ALBUTEROL SULFATE 3 ML: 2.5; .5 SOLUTION RESPIRATORY (INHALATION) at 15:41

## 2019-09-10 RX ADMIN — HYDROCODONE BITARTRATE AND ACETAMINOPHEN 1 TABLET: 7.5; 325 TABLET ORAL at 18:07

## 2019-09-10 RX ADMIN — HYDROCODONE BITARTRATE AND ACETAMINOPHEN 1 TABLET: 7.5; 325 TABLET ORAL at 10:24

## 2019-09-10 RX ADMIN — POLYETHYLENE GLYCOL 3350 17 G: 17 POWDER, FOR SOLUTION ORAL at 08:54

## 2019-09-10 RX ADMIN — HYDROCODONE BITARTRATE AND ACETAMINOPHEN 1 TABLET: 7.5; 325 TABLET ORAL at 14:45

## 2019-09-10 RX ADMIN — SENNOSIDES, DOCUSATE SODIUM 2 TABLET: 50; 8.6 TABLET, FILM COATED ORAL at 22:10

## 2019-09-10 RX ADMIN — HEPARIN SODIUM 5000 UNITS: 5000 INJECTION INTRAVENOUS; SUBCUTANEOUS at 22:11

## 2019-09-10 RX ADMIN — ASPIRIN 81 MG: 81 TABLET, COATED ORAL at 08:54

## 2019-09-10 RX ADMIN — IPRATROPIUM BROMIDE AND ALBUTEROL SULFATE 3 ML: 2.5; .5 SOLUTION RESPIRATORY (INHALATION) at 07:27

## 2019-09-10 RX ADMIN — CYCLOBENZAPRINE HYDROCHLORIDE 10 MG: 10 TABLET, FILM COATED ORAL at 22:10

## 2019-09-10 RX ADMIN — IPRATROPIUM BROMIDE AND ALBUTEROL SULFATE 3 ML: 2.5; .5 SOLUTION RESPIRATORY (INHALATION) at 20:24

## 2019-09-10 RX ADMIN — IPRATROPIUM BROMIDE AND ALBUTEROL SULFATE 3 ML: 2.5; .5 SOLUTION RESPIRATORY (INHALATION) at 10:56

## 2019-09-10 RX ADMIN — HEPARIN SODIUM 5000 UNITS: 5000 INJECTION INTRAVENOUS; SUBCUTANEOUS at 08:54

## 2019-09-10 RX ADMIN — HYDROCODONE BITARTRATE AND ACETAMINOPHEN 1 TABLET: 7.5; 325 TABLET ORAL at 22:10

## 2019-09-10 RX ADMIN — CARVEDILOL 3.12 MG: 3.12 TABLET, FILM COATED ORAL at 08:54

## 2019-09-10 RX ADMIN — ROPINIROLE 0.5 MG: 0.5 TABLET, FILM COATED ORAL at 22:10

## 2019-09-10 NOTE — PLAN OF CARE
Problem: Patient Care Overview  Goal: Plan of Care Review  Outcome: Ongoing (interventions implemented as appropriate)   09/09/19 0959 09/09/19 2000 09/10/19 0519   Coping/Psychosocial   Plan of Care Reviewed With --  patient --    Plan of Care Review   Progress improving --  --    OTHER   Outcome Summary --  --  Patient continue to have chest tube x2 to 20 cm sxn. Patient continues to c/o pain and requires medication q 4 hrs. Vss, will continue to monitor.       Problem: Skin Injury Risk (Adult)  Goal: Identify Related Risk Factors and Signs and Symptoms  Outcome: Ongoing (interventions implemented as appropriate)      Problem: Lung Surgery (via Thoracotomy) (Adult)  Goal: Signs and Symptoms of Listed Potential Problems Will be Absent, Minimized or Managed (Lung Surgery)  Outcome: Ongoing (interventions implemented as appropriate)

## 2019-09-10 NOTE — PLAN OF CARE
Problem: Patient Care Overview  Goal: Plan of Care Review  Outcome: Ongoing (interventions implemented as appropriate)   09/10/19 0875   Coping/Psychosocial   Plan of Care Reviewed With patient   Plan of Care Review   Progress improving   OTHER   Outcome Summary Pt progressing towards goals well, CGA functional mobility at RW 20 ft progressing to SBA at RW at times, CGA toileting tasks no LOB, issued sockaid and reacher with LBD training improving to min A donning socks and cond I doffing socks; cont IPOT per POC

## 2019-09-10 NOTE — THERAPY TREATMENT NOTE
Acute Care - Occupational Therapy Treatment Note  [facility]     Patient Name: Lizbeth Johns  : 1955  MRN: [id number]  Today's Date: 9/10/2019  Onset of Illness/Injury or Date of Surgery: 19  Date of Referral to OT: 19  Referring Physician: Dr. Barreto    Admit Date: 2019       ICD-10-CM ICD-9-CM   1. Impaired mobility and ADLs Z74.09 799.89   2. Lung nodule R91.1 793.11   3. Mass of upper lobe of right lung R91.8 786.6     Patient Active Problem List   Diagnosis   • Colon cancer screening   • Epigastric pain   • Heartburn   • Lesion of colon   • History of colon polyps   • Family history of colon cancer   • Constipation   • Renal insufficiency   • Acute renal failure (ARF) (CMS/HCC)   • Mass of upper lobe of right lung   • Pain in both lower extremities   • Essential hypertension   • Abnormal CT of the chest   • Lung nodule < 6cm on CT   • Lung nodule     Past Medical History:   Diagnosis Date   • Acid reflux    • Arthritis    • Cataract, bilateral    • COPD (chronic obstructive pulmonary disease) (CMS/HCC)    • Hypertension    • Leg cramps    • Lung mass     right   • SOB (shortness of breath)     xray showed spot on lungs     Past Surgical History:   Procedure Laterality Date   • BRONCHOSCOPY N/A 2019    Procedure: BRONCHOSCOPY WITH FLUOROSCOPY, BIOPSY, BRUSHINGS, AND WASHINGS;  Surgeon: Gudelia Patel MD;  Location: [facility] OR;  Service: Pulmonary   • COLONOSCOPY N/A 2017    Procedure: COLONOSCOPY with hot and cold snare polypectomies,  hot biopsy polypectomies, biopsies, resolution clip placement;  Surgeon: Zackery Siddiqui MD;  Location: [facility] ENDOSCOPY;  Service:    • COLONOSCOPY N/A 2018    Procedure: COLONOSCOPY WITH HOT SNARE POLYPECTOMY X 7; COLD SNARE POLYPECTOMY X 3; HOT BIOPSY POLYPECTOMY X 20; COLD BIOPSY POLYPECTOMY X 2; THERMAL ABLATION OF COLON POLYPS X 23; CLIP PLACEMENT X 2; BIOPSIES;  Surgeon: Zackery Siddiqui MD;  Location: [facility] ENDOSCOPY;   Service: Gastroenterology   • COLONOSCOPY N/A 6/19/2019    Procedure: COLONOSCOPY with cold biopsy polypectomies and cold biopsy;  Surgeon: Zackery Siddiqui MD;  Location: [facility] ENDOSCOPY;  Service: Gastroenterology   • ENDOSCOPY  06/13/2019   • MULTIPLE TOOTH EXTRACTIONS      full extraction   • ORIF TIBIA/FIBULA FRACTURES Left    • THORACOSCOPY Right 8/30/2019    Procedure: BRONCHOSCOPY,  THORACOSCOPY VIDEO ASSISTED with right upper lobe wedge RESECTION , RIGHT UPPER lobectomy with mediastinal lymph node dissection AND INTERCOSTAL CRYOABLATION OF RIGHT CHEST;  Surgeon: Brian Barreto MD;  Location: [facility] OR;  Service: Cardiothoracic   • TUBAL ABDOMINAL LIGATION         Therapy Treatment    Rehabilitation Treatment Summary     Row Name 09/10/19 0821             Treatment Time/Intention    Discipline  occupational therapist  -KF      Document Type  therapy note (daily note)  -KF      Subjective Information  complains of;weakness;fatigue;pain  -KF      Mode of Treatment  individual therapy;occupational therapy  -KF      Patient/Family Observations  no family present  -KF      Therapy Frequency (OT Eval)  daily  -KF      Patient Effort  good  -KF      Existing Precautions/Restrictions  fall;other (see comments) CT on suction when in room   -KF      Equipment Issued to Patient  reacher;sock aid  -KF      Recorded by [KF] Abbey Miranda, OT 09/10/19 0857      Row Name 09/10/19 0821             Vital Signs    Pre Systolic BP Rehab  99  -KF      Pre Treatment Diastolic BP  59  -KF      Post Systolic BP Rehab  106  -KF      Post Treatment Diastolic BP  61  -KF      Posttreatment Heart Rate (beats/min)  78  -KF      Pre SpO2 (%)  96  -KF      O2 Delivery Pre Treatment  room air  -KF      Post SpO2 (%)  95  -KF      O2 Delivery Post Treatment  room air  -KF      Pre Patient Position  Supine  -KF      Intra Patient Position  Standing  -KF      Post Patient Position  Sitting  -KF      Rest Breaks   1  -KF      Recorded  by [KF] Abbey Miranda, OT 09/10/19 0857      Row Name 09/10/19 0821             Cognitive Assessment/Intervention    Additional Documentation  Cognitive Assessment/Intervention (Group)  -KF      Recorded by [KF] Abbey Miranda, OT 09/10/19 0857      Row Name 09/10/19 0821             Cognitive Assessment/Intervention- PT/OT    Affect/Mental Status (Cognitive)  WNL  -KF      Orientation Status (Cognition)  oriented x 4  -KF      Follows Commands (Cognition)  WNL  -KF      Cognitive Function (Cognitive)  WFL;safety deficit  -KF      Safety Deficit (Cognitive)  mild deficit;safety precautions awareness  -KF      Cognitive Interventions (Cognitive)  occupation/activity based interventions;process/task specific training  -KF      Recorded by [KF] Abbey Miranda, OT 09/10/19 0857      Row Name 09/10/19 0821             Safety Issues, Functional Mobility    Safety Issues Affecting Function (Mobility)  insight into deficits/self awareness;positioning of assistive device;safety precaution awareness  -KF      Impairments Affecting Function (Mobility)  pain;range of motion (ROM);strength;endurance/activity tolerance  -KF      Recorded by [KF] Abbey Miranda, OT 09/10/19 0857      Row Name 09/10/19 0821             Bed Mobility Assessment/Treatment    Bed Mobility Assessment/Treatment  supine-sit;scooting/bridging  -KF      Scooting/Bridging [place] (Bed Mobility)  supervision;verbal cues  -KF      Supine-Sit [place] (Bed Mobility)  verbal cues;supervision  -KF      Bed Mobility, Safety Issues  other (see comments) assist for CT  -KF      Assistive Device (Bed Mobility)  head of bed elevated  -KF      Recorded by [KF] Abbey Miranda, OT 09/10/19 0857      Row Name 09/10/19 0821             Functional Mobility    Functional Mobility- Ind. Level  contact guard assist  -KF      Functional Mobility- Device  rolling walker  -KF      Functional Mobility-Distance (Feet)  20  -KF      Functional Mobility-  Safety Issues  step length decreased  -KF      Recorded by [KF] Abbey Miranda, OT 09/10/19 0857      Row Name 09/10/19 0821             Transfer Assessment/Treatment    Transfer Assessment/Treatment  bed-chair transfer;sit-stand transfer;stand-sit transfer;toilet transfer  -KF      Comment (Transfers)  cues for RW placement   -KF      Recorded by [KF] Abbey Miranda, OT 09/10/19 0857      Row Name 09/10/19 0821             Bed-Chair Transfer    Bed-Chair [place] (Transfers)  contact guard  -KF      Assistive Device (Bed-Chair Transfers)  walker, front-wheeled  -KF      Recorded by [KF] Abbey Miranda, OT 09/10/19 0857      Row Name 09/10/19 0821             Sit-Stand Transfer    Sit-Stand [place] (Transfers)  stand by assist  -KF      Assistive Device (Sit-Stand Transfers)  walker, front-wheeled  -KF      Recorded by [KF] Abbey Miranda, OT 09/10/19 0857      Row Name 09/10/19 0821             Stand-Sit Transfer    Stand-Sit [place] (Transfers)  supervision  -KF      Assistive Device (Stand-Sit Transfers)  walker, front-wheeled  -KF      Recorded by [KF] Abbey Miranda, OT 09/10/19 0857      Row Name 09/10/19 0821             Toilet Transfer    Type (Toilet Transfer)  sit-stand;stand-sit  -KF      [place] Level (Toilet Transfer)  stand by assist;verbal cues  -KF      Assistive Device (Toilet Transfer)  commode;grab bars/safety frame;walker, front-wheeled  -KF      Recorded by [KF] Abbey Miranda, OT 09/10/19 0857      Row Name 09/10/19 0821             ADL Assessment/Intervention    10050 - OT Self Care/Mgmt Minutes  20  -KF      BADL Assessment/Intervention  lower body dressing;toileting  -KF      Recorded by [KF] Abbey Miranda, OT 09/10/19 0857      Row Name 09/10/19 0821             Lower Body Dressing Assessment/Training    Lower Body Dressing [place] Level  don;socks;minimum assist (75% patient effort);conditional independence;doff  -KF      Assistive  Devices (Lower Body Dressing)  reacher;sock-aid  -KF      Lower Body Dressing Position  unsupported sitting  -KF      Comment (Lower Body Dressing)  progressed to cond I doffing and min A to don   -KF      Recorded by [KF] Abbey Miranda, OT 09/10/19 0857      Row Name 09/10/19 0821             Toileting Assessment/Training    [place] Level (Toileting)  adjust/manage clothing;perform perineal hygiene;contact guard assist  -KF      Assistive Devices (Toileting)  commode;grab bar/safety frame  -KF      Toileting Position  supported sitting;supported standing  -KF      Recorded by [KF] Abbey Miranda, OT 09/10/19 0857      Row Name 09/10/19 0821             BADL Safety/Performance    Impairments, BADL Safety/Performance  balance;endurance/activity tolerance;strength;pain  -KF      Skilled BADL Treatment/Intervention  BADL process/adaptation training;cognitive/safety deficit modifications  -KF      Progress in BADL Status  improvement noted  -KF      Recorded by [KF] Abbey Miranda, OT 09/10/19 0857      Row Name 09/10/19 0821             Motor Skills Assessment/Interventions    Additional Documentation  Balance (Group);Balance Interventions (Group)  -KF      Recorded by [KF] Abbey Miranda, OT 09/10/19 0857      Row Name 09/10/19 0821             Balance    Balance  static sitting balance;static standing balance;dynamic standing balance;dynamic sitting balance  -KF      Recorded by [KF] Abbey Miranda, OT 09/10/19 0857      Row Name 09/10/19 0821             Static Sitting Balance    Level of [place] (Unsupported Sitting, Static Balance)  independent  -KF      Sitting Position (Unsupported Sitting, Static Balance)  sitting on edge of bed  -KF      Time Able to Maintain Position (Unsupported Sitting, Static Balance)  4 to 5 minutes  -KF      Recorded by [KF] Abbey Miranda, OT 09/10/19 0857      Row Name 09/10/19 0821             Dynamic Sitting Balance    Level of [place], Reaches  Outside Midline (Sitting, Dynamic Balance)  independent  -KF      Sitting Position, Reaches Outside Midline (Sitting, Dynamic Balance)  sitting in chair;sitting on edge of bed  -KF      Comment, Reaches Outside Midline (Sitting, Dynamic Balance)  no LOB  -KF      Recorded by [KF] Abbey Miranda, OT 09/10/19 0857      Row Name 09/10/19 0821             Static Standing Balance    Level of [place] (Supported Standing, Static Balance)  supervision  -KF      Time Able to Maintain Position (Supported Standing, Static Balance)  2 to 3 minutes  -KF      Assistive Device Utilized (Supported Standing, Static Balance)  walker, rolling  -KF      Recorded by [KF] Abbey Miranda, OT 09/10/19 0857      Row Name 09/10/19 0821             Dynamic Standing Balance    Level of [place], Reaches Outside Midline (Standing, Dynamic Balance)  contact guard assist;standby assist  -KF      Time Able to Maintain Position, Reaches Outside Midline (Standing, Dynamic Balance)  2 to 3 minutes  -KF      Assistive Device Utilized (Supported Standing, Dynamic Balance)  walker, rolling  -KF      Comment, Reaches Outside Midline (Standing, Dynamic Balance)  progressing to SBA  -KF      Recorded by [KF] Abbey Miranda, OT 09/10/19 0857      Row Name 09/10/19 0821             Positioning and Restraints    Pre-Treatment Position  in bed  -KF      Post Treatment Position  chair  -KF      In Chair  notified nsg;reclined;sitting;call light within reach;encouraged to call for assist;waffle cushion;legs elevated CT on suction  -KF      Recorded by [KF] Abbey Miranda, OT 09/10/19 0857      Row Name 09/10/19 0821             Pain Assessment    Additional Documentation  Pain Scale: Numbers Pre/Post-Treatment (Group)  -KF      Recorded by [KF] Abbey Miranda, OT 09/10/19 0857      Row Name 09/10/19 0821             Pain Scale: Numbers Pre/Post-Treatment    Pain Scale: Numbers, Pretreatment  7/10  -KF      Pain Scale: Numbers,  Post-Treatment  7/10  -KF      Pain Location - Side  Right  -KF      Pain Location - Orientation  incisional  -KF      Pain Location  chest  -KF      Pain Intervention(s)  Repositioned;Ambulation/increased activity  -KF      Recorded by [KF] Abbey Miranda OT 09/10/19 0857      Row Name                Wound 08/30/19 1320 Right chest Incision    Wound - Properties Group Date first assessed: 08/30/19 [RB] Time first assessed: 1320 [RB] Side: Right [RB] Location: chest [RB] Primary Wound Type: Incision [RB] Recorded by:  [RB] Jailyn Dunn RN 08/30/19 1320    Row Name 09/10/19 0821             Plan of Care Review    Plan of Care Reviewed With  patient  -KF      Recorded by [KF] Abbey Miranda OT 09/10/19 0857      Row Name 09/10/19 0821             Outcome Summary/Treatment Plan (OT)    Daily Summary of Progress (OT)  progress toward functional goals as expected  -KF      Recorded by [KF] Abbey Miranda OT 09/10/19 0857        User Key  (r) = Recorded By, (t) = Taken By, (c) = Cosigned By    Initials Name Effective Dates Discipline    KF Abbey Miranda OT 04/03/18 -  OT    RB Jailyn Dunn RN 07/02/19 -  Nurse        Wound 08/30/19 1320 Right chest Incision (Active)   Dressing Appearance dry;intact;no drainage 9/10/2019  2:25 AM   Closure SANNA 9/10/2019  2:25 AM   Base dressing in place, unable to visualize 9/10/2019  2:25 AM   Drainage Amount none 9/10/2019  2:25 AM   Dressing Care, Wound gauze 9/9/2019  6:00 PM     Rehab Goal Summary     Row Name 09/10/19 0821             Transfer Goal 1 (OT)    Progress/Outcome (Transfer Goal 1, OT)  good progress toward goal;goal ongoing  -KF         Dressing Goal 1 (OT)    Progress/Outcome (Dressing Goal 1, OT)  good progress toward goal;goal partially met issued reacher and sockaid met for socks  -KF         Toileting Goal 1 (OT)    Progress/Outcome (Toileting Goal 1, OT)  good progress toward goal;goal ongoing  -KF        User Key  (r) = Recorded By, (t) =  Taken By, (c) = Cosigned By    Initials Name Provider Type Discipline     Abbey Miranda, OT Occupational Therapist OT        Occupational Therapy Education     Title: PT OT SLP Therapies (Done)     Topic: Occupational Therapy (Done)     Point: ADL training (Done)     Description: Instruct learner(s) on proper safety adaptation and remediation techniques during self care or transfers.   Instruct in proper use of assistive devices.    Learning Progress Summary           Patient Acceptance, E,TB,D, VU,DU by KF at 9/10/2019  8:21 AM    Comment:  AE training LBD retraining, toileting retraining with RW, RW positioning with ADLs    Acceptance, E, VU by AR at 9/8/2019  3:00 PM   Family Acceptance, E, VU by AR at 9/8/2019  3:00 PM                   Point: Precautions (Done)     Description: Instruct learner(s) on prescribed precautions during self-care and functional transfers.    Learning Progress Summary           Patient Acceptance, E,TB,D, VU,DU by KF at 9/10/2019  8:21 AM    Comment:  AE training LBD retraining, toileting retraining with RW, RW positioning with ADLs    Acceptance, E, VU by AR at 9/8/2019  3:00 PM   Family Acceptance, E, VU by AR at 9/8/2019  3:00 PM                   Point: Body mechanics (Done)     Description: Instruct learner(s) on proper positioning and spine alignment during self-care, functional mobility activities and/or exercises.    Learning Progress Summary           Patient Acceptance, E,TB,D, VU,DU by KF at 9/10/2019  8:21 AM    Comment:  AE training LBD retraining, toileting retraining with RW, RW positioning with ADLs    Acceptance, E, VU by AR at 9/8/2019  3:00 PM   Family Acceptance, E, VU by AR at 9/8/2019  3:00 PM                               User Key     Initials Effective Dates Name Provider Type Discipline    AR 06/22/15 -  Lesa Eden, OT Occupational Therapist OT     04/03/18 -  Abbey Miranda, OT Occupational Therapist OT                OT Recommendation  and Plan  Outcome Summary/Treatment Plan (OT)  Daily Summary of Progress (OT): progress toward functional goals as expected  Therapy Frequency (OT Eval): daily  Daily Summary of Progress (OT): progress toward functional goals as expected  Plan of Care Review  Plan of Care Reviewed With: patient  Plan of Care Reviewed With: patient  Outcome Summary: Pt progressing towards goals well, CGA functional mobility at RW 20 ft progressing to SBA at RW at times, CGA toileting tasks no LOB, issued sockaid and reacher with LBD training improving to min A donning socks and cond I doffing socks; cont IPOT per POC    Outcome Measures     Row Name 09/10/19 0821 09/08/19 1500          How much help from another is currently needed...    Putting on and taking off regular lower body clothing?  3  -KF  2  -AR     Bathing (including washing, rinsing, and drying)  2  -KF  2  -AR     Toileting (which includes using toilet bed pan or urinal)  3  -KF  3  -AR     Putting on and taking off regular upper body clothing  3  -KF  3  -AR     Taking care of personal grooming (such as brushing teeth)  3  -KF  3  -AR     Eating meals  4  -KF  3  -AR     AM-PAC 6 Clicks Score (OT)  18  -KF  16  -AR        Functional Assessment    Outcome Measure Options  AM-PAC 6 Clicks Daily Activity (OT)  -KF  AM-PAC 6 Clicks Daily Activity (OT)  -AR       User Key  (r) = Recorded By, (t) = Taken By, (c) = Cosigned By    Initials Name Provider Type    Lesa Jeff, OT Occupational Therapist    Abbey Bear OT Occupational Therapist           Time Calculation:   Time Calculation- OT     Row Name 09/10/19 0821             Time Calculation- OT    OT Start Time  0821  -KF      Total Timed Code Minutes- OT  25 minute(s)  -KF      OT Received On  09/10/19  -KF      OT Goal Re-Cert Due Date  09/18/19  -KF         Timed Charges    96309 - OT Therapeutic Activity Minutes  5  -KF      89482 - OT Self Care/Mgmt Minutes  20  -KF        User Key  (r) =  Recorded By, (t) = Taken By, (c) = Cosigned By    Initials Name Provider Type     Abbey Miranda, OT Occupational Therapist        Therapy Charges for Today     Code Description Service Date Service Provider Modifiers Qty    [id number]  OT THERAPEUTIC ACT EA 15 MIN 9/10/2019 Abbey Miranda OT GO 1    [id number]  OT SELF CARE/MGMT/TRAIN EA 15 MIN 9/10/2019 Abbey Miranda OT GO 1               Abbey Miranda OT  9/10/2019

## 2019-09-10 NOTE — THERAPY TREATMENT NOTE
Patient Name: Lizbeth Johns  : 1955    MRN: [id number]                              Today's Date: 9/10/2019       Admit Date: 2019    Visit Dx:     ICD-10-CM ICD-9-CM   1. Impaired mobility and ADLs Z74.09 799.89   2. Lung nodule R91.1 793.11   3. Mass of upper lobe of right lung R91.8 786.6     Patient Active Problem List   Diagnosis   • Colon cancer screening   • Epigastric pain   • Heartburn   • Lesion of colon   • History of colon polyps   • Family history of colon cancer   • Constipation   • Renal insufficiency   • Acute renal failure (ARF) (CMS/HCC)   • Mass of upper lobe of right lung   • Pain in both lower extremities   • Essential hypertension   • Abnormal CT of the chest   • Lung nodule < 6cm on CT   • Lung nodule     Past Medical History:   Diagnosis Date   • Acid reflux    • Arthritis    • Cataract, bilateral    • COPD (chronic obstructive pulmonary disease) (CMS/HCC)    • Hypertension    • Leg cramps    • Lung mass     right   • SOB (shortness of breath)     xray showed spot on lungs     Past Surgical History:   Procedure Laterality Date   • BRONCHOSCOPY N/A 2019    Procedure: BRONCHOSCOPY WITH FLUOROSCOPY, BIOPSY, BRUSHINGS, AND WASHINGS;  Surgeon: Gudelia Patel MD;  Location: [facility] OR;  Service: Pulmonary   • COLONOSCOPY N/A 2017    Procedure: COLONOSCOPY with hot and cold snare polypectomies,  hot biopsy polypectomies, biopsies, resolution clip placement;  Surgeon: Zackery Siddiqui MD;  Location: [facility] ENDOSCOPY;  Service:    • COLONOSCOPY N/A 2018    Procedure: COLONOSCOPY WITH HOT SNARE POLYPECTOMY X 7; COLD SNARE POLYPECTOMY X 3; HOT BIOPSY POLYPECTOMY X 20; COLD BIOPSY POLYPECTOMY X 2; THERMAL ABLATION OF COLON POLYPS X 23; CLIP PLACEMENT X 2; BIOPSIES;  Surgeon: Zackery Siddiqui MD;  Location: [facility] ENDOSCOPY;  Service: Gastroenterology   • COLONOSCOPY N/A 2019    Procedure: COLONOSCOPY with cold biopsy polypectomies and cold biopsy;  Surgeon: Chen  Zackery MARROQUIN MD;  Location: [facility] ENDOSCOPY;  Service: Gastroenterology   • ENDOSCOPY  06/13/2019   • MULTIPLE TOOTH EXTRACTIONS      full extraction   • ORIF TIBIA/FIBULA FRACTURES Left    • THORACOSCOPY Right 8/30/2019    Procedure: BRONCHOSCOPY,  THORACOSCOPY VIDEO ASSISTED with right upper lobe wedge RESECTION , RIGHT UPPER lobectomy with mediastinal lymph node dissection AND INTERCOSTAL CRYOABLATION OF RIGHT CHEST;  Surgeon: Brian Barreto MD;  Location: [facility] OR;  Service: Cardiothoracic   • TUBAL ABDOMINAL LIGATION       General Information     Row Name 09/10/19 1330          PT Evaluation Time/Intention    Document Type  therapy note (daily note)  -AS     Mode of Treatment  physical therapy  -AS     Row Name 09/10/19 1330          General Information    Patient Profile Reviewed?  yes  -AS     Existing Precautions/Restrictions  fall;other (see comments) CT on suction but can be removed for ambulation in hallway  -AS     Barriers to Rehab  medically complex  -AS     Row Name 09/10/19 1330          Cognitive Assessment/Intervention- PT/OT    Orientation Status (Cognition)  oriented x 4  -AS     Row Name 09/10/19 1330          Safety Issues, Functional Mobility    Impairments Affecting Function (Mobility)  pain;strength;endurance/activity tolerance  -AS       User Key  (r) = Recorded By, (t) = Taken By, (c) = Cosigned By    Initials Name Provider Type    AS Patti Chiang, KAREN Physical Therapy Assistant        Mobility     Row Name 09/10/19 1331          Bed Mobility Assessment/Treatment    Comment (Bed Mobility)  not tested, patient sitting UIC pre/post tx  -AS     Row Name 09/10/19 1331          Transfer Assessment/Treatment    Comment (Transfers)  cues for hand placement  -AS     Row Name 09/10/19 1331          Sit-Stand Transfer    Sit-Stand [place] (Transfers)  verbal cues;stand by assist  -AS     Assistive Device (Sit-Stand Transfers)  walker, front-wheeled  -AS     Row Name 09/10/19 2041           Gait/Stairs Assessment/Training    Gait/Stairs Assessment/Training  gait/ambulation assistive device  -AS     [place] Level (Gait)  verbal cues;stand by assist  -AS     Distance in Feet (Gait)  350  -AS     Pattern (Gait)  step-through  -AS     Deviations/Abnormal Patterns (Gait)  gait speed decreased;chaitanya decreased;stride length decreased  -AS     Comment (Gait/Stairs)  patient ambulated 350 feet with use of rolling walker, pillow positioned on right side CT sight for comfort.   -AS       User Key  (r) = Recorded By, (t) = Taken By, (c) = Cosigned By    Initials Name Provider Type    AS Patti Chiang PTA Physical Therapy Assistant        Obj/Interventions     Row Name 09/10/19 1334          Therapeutic Exercise    Lower Extremity (Therapeutic Exercise)  LAQ (long arc quad), bilateral;marching while seated  -AS     Lower Extremity Range of Motion (Therapeutic Exercise)  ankle dorsiflexion/plantar flexion, bilateral  -AS     Exercise Type (Therapeutic Exercise)  AROM (active range of motion)  -AS     Position (Therapeutic Exercise)  seated  -AS     Sets/Reps (Therapeutic Exercise)  1/10  -AS       User Key  (r) = Recorded By, (t) = Taken By, (c) = Cosigned By    Initials Name Provider Type    AS Patti Chiang PTA Physical Therapy Assistant        Goals/Plan    No documentation.       Clinical Impression     Row Name 09/10/19 1388          Pain Assessment    Additional Documentation  Pain Scale: Numbers Pre/Post-Treatment (Group)  -AS     Row Name 09/10/19 3259          Pain Scale: Numbers Pre/Post-Treatment    Pain Scale: Numbers, Pretreatment  5/10  -AS     Pain Scale: Numbers, Post-Treatment  5/10  -AS     Pain Location - Side  Right  -AS     Pain Location - Orientation  incisional  -AS     Pain Location  chest  -AS     Pain Intervention(s)  Ambulation/increased activity;Repositioned  -AS       User Key  (r) = Recorded By, (t) = Taken By, (c) = Cosigned By    Initials Name Provider Type     AS Patti Chiang PTA Physical Therapy Assistant        Outcome Measures     Row Name 09/10/19 1335          How much help from another person do you currently need...    Turning from your back to your side while in flat bed without using bedrails?  3  -AS     Moving from lying on back to sitting on the side of a flat bed without bedrails?  3  -AS     Moving to and from a bed to a chair (including a wheelchair)?  3  -AS     Standing up from a chair using your arms (e.g., wheelchair, bedside chair)?  3  -AS     Climbing 3-5 steps with a railing?  3  -AS     To walk in hospital room?  3  -AS     AM-PAC 6 Clicks Score (PT)  18  -AS     Row Name 09/10/19 1338          Functional Assessment    Outcome Measure Options  AM-PAC 6 Clicks Basic Mobility (PT)  -AS       User Key  (r) = Recorded By, (t) = Taken By, (c) = Cosigned By    Initials Name Provider Type    AS Patti Chiang PTA Physical Therapy Assistant        Physical Therapy Education     Title: PT OT SLP Therapies (In Progress)     Topic: Physical Therapy (In Progress)     Point: Mobility training (In Progress)     Learning Progress Summary           Patient Acceptance, E, NR by AS at 9/10/2019  1:34 PM    Acceptance, E, VU,NR by VILLA at 9/9/2019  9:59 AM    Acceptance, E,D, NR by DUSTY at 9/7/2019 11:41 AM    Acceptance, E, NR by RADHA at 9/6/2019  1:00 PM                   Point: Home exercise program (In Progress)     Learning Progress Summary           Patient Acceptance, E, NR by AS at 9/10/2019  1:34 PM    Acceptance, E, VU,NR by VILLA at 9/9/2019  9:59 AM    Acceptance, E, NR by RADHA at 9/6/2019  1:00 PM                   Point: Body mechanics (In Progress)     Learning Progress Summary           Patient Acceptance, E, NR by AS at 9/10/2019  1:34 PM    Acceptance, E, VU,NR by VILLA at 9/9/2019  9:59 AM    Acceptance, E, NR by RADHA at 9/6/2019  1:00 PM                   Point: Precautions (In Progress)     Learning Progress Summary           Patient Acceptance, E,  NR by AS at 9/10/2019  1:34 PM    Acceptance, E, MEHDI,NR by EJ at 9/9/2019  9:59 AM    Acceptance, ELIA, NR by RADHA at 9/6/2019  1:00 PM                               User Key     Initials Effective Dates Name Provider Type Discipline    RADHA 06/19/15 -  Kathi Hill, PT Physical Therapist PT    EJ 11/20/18 -  La Benavides, PT Physical Therapist PT    SJ 06/19/15 -  Kaitlynn Forrest, PT Physical Therapist PT    AS 06/22/15 -  Patti Chiang PTA Physical Therapy Assistant PT              PT Recommendation and Plan     Plan of Care Reviewed With: patient  Progress: improving  Outcome Summary: patient ambulated 350 feet with use of rolling walker, pillow positioned on right side CT sight for comfort. B LE exercises performed in seated position. Distance limited by weakness and SOA.     Time Calculation:   PT Charges     Row Name 09/10/19 1335             Time Calculation    Start Time  1300  -AS      PT Received On  09/10/19  -AS      PT Goal Re-Cert Due Date  09/16/19  -AS         Timed Charges    68142 - PT Therapeutic Exercise Minutes  8  -AS      47209 - Gait Training Minutes   15  -AS        User Key  (r) = Recorded By, (t) = Taken By, (c) = Cosigned By    Initials Name Provider Type    AS Patti Chiang PTA Physical Therapy Assistant        Therapy Charges for Today     Code Description Service Date Service Provider Modifiers Qty    33259633521 HC PT THER PROC EA 15 MIN 9/10/2019 Patti Chiang PTA GP 1    15040966231 HC GAIT TRAINING EA 15 MIN 9/10/2019 Patti Chiang PTA GP 1          PT G-Codes  Outcome Measure Options: AM-PAC 6 Clicks Basic Mobility (PT)  AM-PAC 6 Clicks Score (PT): 18  AM-PAC 6 Clicks Score (OT): 18    Patti Chiang PTA  9/10/2019

## 2019-09-10 NOTE — PLAN OF CARE
Problem: Patient Care Overview  Goal: Plan of Care Review  Outcome: Ongoing (interventions implemented as appropriate)   09/10/19 4772   Coping/Psychosocial   Plan of Care Reviewed With patient   Plan of Care Review   Progress improving   OTHER   Outcome Summary patient ambulated 350 feet with use of rolling walker, pillow positioned on right side CT sight for comfort. B LE exercises performed in seated position. Distance limited by weakness and SOA.

## 2019-09-10 NOTE — PROGRESS NOTES
CTS Progress Note      POD 11 s/p Bronchoscopy, Right VATS with right upper lobectomy     LOS: 11 days     Subjective  No acute events overnight.  Reports incisional pain.  No other complaints.    Objective    Vital Signs  Temp:  [98.2 °F (36.8 °C)-98.4 °F (36.9 °C)] 98.2 °F (36.8 °C)  Heart Rate:  [59-79] 79  Resp:  [16-18] 16  BP: ()/(56-79) 99/59    Physical Exam:   General Appearance: alert, appears stated age and cooperative   Lungs: Decreased lung sounds right side   Heart: regular rhythm & normal rate, normal S1, S2 and no murmur, no gallop, no rub   Chest: sternum stable   Skin: Incision c/d/i   CT:  130 cc / 24 hours.  Air leak with cough.     Results       Results from last 7 days   Lab Units 09/05/19  0756   SODIUM mmol/L 136   POTASSIUM mmol/L 4.5   CHLORIDE mmol/L 98   CO2 mmol/L 26.0   BUN mg/dL 19   CREATININE mg/dL 1.06*   GLUCOSE mg/dL 107*   CALCIUM mg/dL 9.6       Imaging Results (last 24 hours)     Procedure Component Value Units Date/Time    XR Chest 1 View [[id number]] Collected:  09/10/19 0757     Updated:  09/10/19 0758    Narrative:          EXAMINATION: XR CHEST 1 VW-      INDICATION: s/p lobectomy; Z74.09-Other reduced mobility; R91.1-Solitary  pulmonary nodule; R91.8-Other nonspecific abnormal finding of lung field        COMPARISON: 9/9/2019     FINDINGS: Right chest tubes remain in place without discrete  pneumothorax clearly visualized. Right hemithorax has volume loss  similar to prior with asymmetric elevation the right hemidiaphragm  however no focal consolidation or effusion developing. Decreasing right  subcutaneous chest wall emphysema.           Impression:       Persistent volume loss the right hemithorax however no  discrete pneumothorax is clearly evident with right chest tubes remain  in place.          XR Chest 1 View [[id number]] Collected:  09/09/19 0847     Updated:  09/09/19 1756    Narrative:       EXAMINATION: XR CHEST 1 VW-09/09/2019:      INDICATION: S/P  lobectomy; Z74.09-Other reduced mobility; R91.1-Solitary  pulmonary nodule; R91.8-Other nonspecific abnormal finding of lung  field.      COMPARISON: 09/08/2019.     FINDINGS: Portable chest reveals 2 chest tubes identified on the right  with tiny apical pneumothorax present. Findings are stable and unchanged  with subcutaneous emphysema seen throughout the right chest wall. The  left lung is clear.           Impression:       Tiny apical pneumothorax stable on the right with 2 chest  tubes in place. Subcutaneous emphysema seen along the right chest wall.     D:  09/09/2019  E:  09/09/2019     This report was finalized on 9/9/2019 5:53 PM by Dr. Ana Paula Wells MD.             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   Will attempt waterseal again tomorrow  Daily CXR  Ambulate TID  Aggressive Pulm toilet-flutter valve and percussive vest    09/10/19  9:08 AM

## 2019-09-11 ENCOUNTER — APPOINTMENT (OUTPATIENT)
Dept: GENERAL RADIOLOGY | Facility: HOSPITAL | Age: 64
End: 2019-09-11

## 2019-09-11 PROCEDURE — 97535 SELF CARE MNGMENT TRAINING: CPT

## 2019-09-11 PROCEDURE — 99024 POSTOP FOLLOW-UP VISIT: CPT | Performed by: PHYSICIAN ASSISTANT

## 2019-09-11 PROCEDURE — 71045 X-RAY EXAM CHEST 1 VIEW: CPT

## 2019-09-11 PROCEDURE — 94799 UNLISTED PULMONARY SVC/PX: CPT

## 2019-09-11 PROCEDURE — 97530 THERAPEUTIC ACTIVITIES: CPT

## 2019-09-11 PROCEDURE — 25010000002 HEPARIN (PORCINE) PER 1000 UNITS: Performed by: THORACIC SURGERY (CARDIOTHORACIC VASCULAR SURGERY)

## 2019-09-11 RX ADMIN — IPRATROPIUM BROMIDE AND ALBUTEROL SULFATE 3 ML: 2.5; .5 SOLUTION RESPIRATORY (INHALATION) at 11:55

## 2019-09-11 RX ADMIN — HYDROCODONE BITARTRATE AND ACETAMINOPHEN 1 TABLET: 7.5; 325 TABLET ORAL at 07:00

## 2019-09-11 RX ADMIN — CARVEDILOL 3.12 MG: 3.12 TABLET, FILM COATED ORAL at 08:34

## 2019-09-11 RX ADMIN — HEPARIN SODIUM 5000 UNITS: 5000 INJECTION INTRAVENOUS; SUBCUTANEOUS at 08:34

## 2019-09-11 RX ADMIN — ASPIRIN 81 MG: 81 TABLET, COATED ORAL at 08:34

## 2019-09-11 RX ADMIN — IPRATROPIUM BROMIDE AND ALBUTEROL SULFATE 3 ML: 2.5; .5 SOLUTION RESPIRATORY (INHALATION) at 19:43

## 2019-09-11 RX ADMIN — CARVEDILOL 3.12 MG: 3.12 TABLET, FILM COATED ORAL at 19:59

## 2019-09-11 RX ADMIN — POLYETHYLENE GLYCOL 3350 17 G: 17 POWDER, FOR SOLUTION ORAL at 08:34

## 2019-09-11 RX ADMIN — HYDROCODONE BITARTRATE AND ACETAMINOPHEN 1 TABLET: 7.5; 325 TABLET ORAL at 12:26

## 2019-09-11 RX ADMIN — HYDROCODONE BITARTRATE AND ACETAMINOPHEN 1 TABLET: 7.5; 325 TABLET ORAL at 17:26

## 2019-09-11 RX ADMIN — ROPINIROLE 0.5 MG: 0.5 TABLET, FILM COATED ORAL at 19:58

## 2019-09-11 RX ADMIN — HYDROCODONE BITARTRATE AND ACETAMINOPHEN 1 TABLET: 7.5; 325 TABLET ORAL at 21:40

## 2019-09-11 RX ADMIN — SENNOSIDES, DOCUSATE SODIUM 2 TABLET: 50; 8.6 TABLET, FILM COATED ORAL at 19:59

## 2019-09-11 RX ADMIN — CYCLOBENZAPRINE HYDROCHLORIDE 10 MG: 10 TABLET, FILM COATED ORAL at 21:40

## 2019-09-11 RX ADMIN — IPRATROPIUM BROMIDE AND ALBUTEROL SULFATE 3 ML: 2.5; .5 SOLUTION RESPIRATORY (INHALATION) at 15:49

## 2019-09-11 RX ADMIN — IPRATROPIUM BROMIDE AND ALBUTEROL SULFATE 3 ML: 2.5; .5 SOLUTION RESPIRATORY (INHALATION) at 07:52

## 2019-09-11 RX ADMIN — HEPARIN SODIUM 5000 UNITS: 5000 INJECTION INTRAVENOUS; SUBCUTANEOUS at 19:59

## 2019-09-11 RX ADMIN — HYDROCODONE BITARTRATE AND ACETAMINOPHEN 1 TABLET: 7.5; 325 TABLET ORAL at 02:34

## 2019-09-11 NOTE — PROGRESS NOTES
"Adult Nutrition  Assessment/PES    Patient Name:  Lizbeth Johns  YOB: 1955  MRN: [id number]  Admit Date:  8/30/2019    Assessment Date:  9/11/2019    Comments:      Reason for Assessment     Row Name 09/11/19 0732          Reason for Assessment    Reason For Assessment  follow-up protocol 15\"                   Nutrition Prescription Ordered     Row Name 09/11/19 0732          Nutrition Prescription PO    Current PO Diet  Regular     Common Modifiers  Cardiac                 Problem/Interventions:  Problem 1     Row Name 09/11/19 0732          Nutrition Diagnoses Problem 1    Problem 1  Nutrition Appropriate for Condition at this Time     Signs/Symptoms (evidenced by)  PO Intake     Percent (%) intake recorded  67 %     Over number of meals  3                       Education/Evaluation     Row Name 09/11/19 0732          Monitor/Evaluation    Monitor  Per protocol           Electronically signed by:  Luz Elena Schroeder RD  09/11/19 7:32 AM  "

## 2019-09-11 NOTE — PROGRESS NOTES
Continued Stay Note  [facility]     Patient Name: Lizbeth Johns  MRN: [id number]  Today's Date: 9/11/2019    Admit Date: 8/30/2019    Discharge Plan     Row Name 09/11/19 0937       Plan    Plan  HOME    Patient/Family in Agreement with Plan  yes    Plan Comments  Met with pt at bedside to f/u DCP.  Still not medically ready for DC.  Goal remains to return home upon DC.  CM will cont to follow for any needs.      Final Discharge Disposition Code  01 - home or self-care        Discharge Codes    No documentation.       Expected Discharge Date and Time     Expected Discharge Date Expected Discharge Time    Sep 12, 2019             Kaitlynn Hobbs RN

## 2019-09-11 NOTE — PLAN OF CARE
Problem: Patient Care Overview  Goal: Plan of Care Review  Outcome: Ongoing (interventions implemented as appropriate)   09/10/19 1335 09/11/19 0400 09/11/19 0551   Coping/Psychosocial   Plan of Care Reviewed With --  patient --    Plan of Care Review   Progress improving --  --    OTHER   Outcome Summary --  --  Patient has slept well. Continues to have pain issues. Patient continues to have air leak. Vss will continue to monitor..       Problem: Skin Injury Risk (Adult)  Goal: Identify Related Risk Factors and Signs and Symptoms  Outcome: Ongoing (interventions implemented as appropriate)      Problem: Lung Surgery (via Thoracotomy) (Adult)  Goal: Signs and Symptoms of Listed Potential Problems Will be Absent, Minimized or Managed (Lung Surgery)  Outcome: Ongoing (interventions implemented as appropriate)

## 2019-09-11 NOTE — PLAN OF CARE
Problem: Patient Care Overview  Goal: Plan of Care Review  Outcome: Ongoing (interventions implemented as appropriate)   09/11/19 1017   Coping/Psychosocial   Plan of Care Reviewed With patient   Plan of Care Review   Progress improving   OTHER   Outcome Summary Pt ambulates 600 ft with CGA, progression to SBA without AD. Pt tolerates ther ex in sitting. Less pain pre/post therapy than yesterday.

## 2019-09-11 NOTE — THERAPY TREATMENT NOTE
Patient Name: Lizbeth Johns  : 1955    MRN: [id number]                              Today's Date: 2019       Admit Date: 2019    Visit Dx:     ICD-10-CM ICD-9-CM   1. Impaired mobility and ADLs Z74.09 799.89   2. Lung nodule R91.1 793.11   3. Mass of upper lobe of right lung R91.8 786.6     Patient Active Problem List   Diagnosis   • Colon cancer screening   • Epigastric pain   • Heartburn   • Lesion of colon   • History of colon polyps   • Family history of colon cancer   • Constipation   • Renal insufficiency   • Acute renal failure (ARF) (CMS/HCC)   • Mass of upper lobe of right lung   • Pain in both lower extremities   • Essential hypertension   • Abnormal CT of the chest   • Lung nodule < 6cm on CT   • Lung nodule     Past Medical History:   Diagnosis Date   • Acid reflux    • Arthritis    • Cataract, bilateral    • COPD (chronic obstructive pulmonary disease) (CMS/HCC)    • Hypertension    • Leg cramps    • Lung mass     right   • SOB (shortness of breath)     xray showed spot on lungs     Past Surgical History:   Procedure Laterality Date   • BRONCHOSCOPY N/A 2019    Procedure: BRONCHOSCOPY WITH FLUOROSCOPY, BIOPSY, BRUSHINGS, AND WASHINGS;  Surgeon: Gudelia Patel MD;  Location: [facility] OR;  Service: Pulmonary   • COLONOSCOPY N/A 2017    Procedure: COLONOSCOPY with hot and cold snare polypectomies,  hot biopsy polypectomies, biopsies, resolution clip placement;  Surgeon: Zacekry Siddiqui MD;  Location: [facility] ENDOSCOPY;  Service:    • COLONOSCOPY N/A 2018    Procedure: COLONOSCOPY WITH HOT SNARE POLYPECTOMY X 7; COLD SNARE POLYPECTOMY X 3; HOT BIOPSY POLYPECTOMY X 20; COLD BIOPSY POLYPECTOMY X 2; THERMAL ABLATION OF COLON POLYPS X 23; CLIP PLACEMENT X 2; BIOPSIES;  Surgeon: Zackery Siddiqui MD;  Location: [facility] ENDOSCOPY;  Service: Gastroenterology   • COLONOSCOPY N/A 2019    Procedure: COLONOSCOPY with cold biopsy polypectomies and cold biopsy;  Surgeon: Chen  Zackery MARROQUIN MD;  Location: [facility] ENDOSCOPY;  Service: Gastroenterology   • ENDOSCOPY  06/13/2019   • MULTIPLE TOOTH EXTRACTIONS      full extraction   • ORIF TIBIA/FIBULA FRACTURES Left    • THORACOSCOPY Right 8/30/2019    Procedure: BRONCHOSCOPY,  THORACOSCOPY VIDEO ASSISTED with right upper lobe wedge RESECTION , RIGHT UPPER lobectomy with mediastinal lymph node dissection AND INTERCOSTAL CRYOABLATION OF RIGHT CHEST;  Surgeon: Brian Barreto MD;  Location: [facility] OR;  Service: Cardiothoracic   • TUBAL ABDOMINAL LIGATION       General Information     Row Name 09/11/19 1011          PT Evaluation Time/Intention    Document Type  therapy note (daily note)  -EJ     Mode of Treatment  physical therapy  -EJ     Row Name 09/11/19 1011          General Information    Patient Profile Reviewed?  yes  -EJ     Existing Precautions/Restrictions  other (see comments) Chest tube, waterseal  -EJ     Row Name 09/11/19 1011          Cognitive Assessment/Intervention- PT/OT    Orientation Status (Cognition)  oriented x 4  -EJ     Row Name 09/11/19 1011          Safety Issues, Functional Mobility    Impairments Affecting Function (Mobility)  endurance/activity tolerance;shortness of breath  -EJ       User Key  (r) = Recorded By, (t) = Taken By, (c) = Cosigned By    Initials Name Provider Type    EJ La Benavides PT Physical Therapist        Mobility     Row Name 09/11/19 1012          Bed Mobility Assessment/Treatment    Comment (Bed Mobility)  UIC  -EJ     Row Name 09/11/19 1012          Sit-Stand Transfer    Sit-Stand [place] (Transfers)  independent  -EJ     Row Name 09/11/19 1012          Gait/Stairs Assessment/Training    Gait/Stairs Assessment/Training  gait/ambulation assistive device  -EJ     [place] Level (Gait)  verbal cues;stand by assist  -EJ     Distance in Feet (Gait)  600  -EJ     Pattern (Gait)  step-through  -EJ     Comment (Gait/Stairs)  pt ambulated 600 ft with no AD, pillow positioned on R side CT  site.  -EJ       User Key  (r) = Recorded By, (t) = Taken By, (c) = Cosigned By    Initials Name Provider Type    EJ La Benavides PT Physical Therapist        Obj/Interventions     Row Name 09/11/19 1015          Therapeutic Exercise    Lower Extremity (Therapeutic Exercise)  LAQ (long arc quad), bilateral;marching while seated  -EJ     Lower Extremity Range of Motion (Therapeutic Exercise)  ankle dorsiflexion/plantar flexion, bilateral  -EJ     Position (Therapeutic Exercise)  seated  -EJ     Sets/Reps (Therapeutic Exercise)  1/10  -EJ       User Key  (r) = Recorded By, (t) = Taken By, (c) = Cosigned By    Initials Name Provider Type     La Benavides PT Physical Therapist        Goals/Plan    No documentation.       Clinical Impression     Row Name 09/11/19 1015          Pain Assessment    Additional Documentation  Pain Scale: FACES Pre/Post-Treatment (Group)  -EJ     Row Name 09/11/19 1015          Pain Scale: Numbers Pre/Post-Treatment    Pain Scale: Numbers, Pretreatment  0/10 - no pain  -EJ     Pain Location - Side  Right  -EJ     Pain Location - Orientation  proximal  -EJ     Pain Location  chest  -EJ     Row Name 09/11/19 1015          Pain Scale: FACES Pre/Post-Treatment    Pain: FACES Scale, Post-Treatment  2-->hurts little bit  -EJ     Row Name 09/11/19 1015          Vital Signs    Pre SpO2 (%)  96  -EJ     O2 Delivery Pre Treatment  room air  -EJ     Post SpO2 (%)  94  -EJ     O2 Delivery Post Treatment  room air  -EJ     Pre Patient Position  Sitting  -EJ     Intra Patient Position  Standing  -EJ     Post Patient Position  Sitting  -EJ     Row Name 09/11/19 1015          Positioning and Restraints    Pre-Treatment Position  sitting in chair/recliner  -EJ     Post Treatment Position  chair  -EJ     In Chair  reclined;call light within reach;encouraged to call for assist;waffle cushion;RUE elevated;LUE elevated  -EJ       User Key  (r) = Recorded By, (t) = Taken By, (c) = Cosigned By     Initials Name Provider Type    La James PT Physical Therapist        Outcome Measures     Row Name 09/11/19 1018          How much help from another person do you currently need...    Turning from your back to your side while in flat bed without using bedrails?  3  -EJ     Moving from lying on back to sitting on the side of a flat bed without bedrails?  3  -EJ     Moving to and from a bed to a chair (including a wheelchair)?  4  -EJ     Standing up from a chair using your arms (e.g., wheelchair, bedside chair)?  4  -EJ     Climbing 3-5 steps with a railing?  3  -EJ     To walk in hospital room?  3  -EJ     AM-PAC 6 Clicks Score (PT)  20  -EJ     Row Name 09/11/19 1018          Functional Assessment    Outcome Measure Options  AM-PAC 6 Clicks Basic Mobility (PT)  -       User Key  (r) = Recorded By, (t) = Taken By, (c) = Cosigned By    Initials Name Provider Type    La James PT Physical Therapist        Physical Therapy Education     Title: PT OT SLP Therapies (Done)     Topic: Physical Therapy (Done)     Point: Mobility training (Done)     Learning Progress Summary           Patient Acceptance, E, VU,NR by VILLA at 9/11/2019 10:16 AM    Acceptance, E, NR by AS at 9/10/2019  1:34 PM    Acceptance, E, VU,NR by VILLA at 9/9/2019  9:59 AM    Acceptance, E,D, NR by DUSTY at 9/7/2019 11:41 AM    Acceptance, E, NR by RADHA at 9/6/2019  1:00 PM                   Point: Home exercise program (Done)     Learning Progress Summary           Patient Acceptance, E, VU,NR by VILLA at 9/11/2019 10:16 AM    Acceptance, E, NR by AS at 9/10/2019  1:34 PM    Acceptance, E, VU,NR by VILLA at 9/9/2019  9:59 AM    Acceptance, E, NR by RADHA at 9/6/2019  1:00 PM                   Point: Body mechanics (Done)     Learning Progress Summary           Patient Acceptance, E, VU,NR by VILLA at 9/11/2019 10:16 AM    Acceptance, E, NR by AS at 9/10/2019  1:34 PM    Acceptance, E, VU,NR by VILLA at 9/9/2019  9:59 AM    Acceptance, E, NR by RADHA at  9/6/2019  1:00 PM                   Point: Precautions (Done)     Learning Progress Summary           Patient Acceptance, E, VU,NR by EJ at 9/11/2019 10:16 AM    Acceptance, E, NR by AS at 9/10/2019  1:34 PM    Acceptance, E, VU,NR by EJ at 9/9/2019  9:59 AM    Acceptance, E, NR by RADHA at 9/6/2019  1:00 PM                               User Key     Initials Effective Dates Name Provider Type Discipline    RADHA 06/19/15 -  Kathi Hill, PT Physical Therapist PT    EJ 11/20/18 -  La Benavides, PT Physical Therapist PT     06/19/15 -  Kaitlynn Forrest, PT Physical Therapist PT    AS 06/22/15 -  Patti Chiang, PTA Physical Therapy Assistant PT              PT Recommendation and Plan     Plan of Care Reviewed With: patient  Progress: improving  Outcome Summary: Pt ambulates 600 ft with CGA, progression to SBA without AD. Pt tolerates ther ex in sitting. Less pain pre/post therapy than yesterday.     Time Calculation:   PT Charges     Row Name 09/11/19 1018             Time Calculation    Start Time  0844  -EJ      PT Received On  09/11/19  -      PT Goal Re-Cert Due Date  09/16/19  -         Time Calculation- PT    Total Timed Code Minutes- PT  17 minute(s)  -         Timed Charges    67839 - PT Therapeutic Activity Minutes  17  -EJ        User Key  (r) = Recorded By, (t) = Taken By, (c) = Cosigned By    Initials Name Provider Type     La Benaivdes, PT Physical Therapist        Therapy Charges for Today     Code Description Service Date Service Provider Modifiers Qty    38640597908 HC PT THERAPEUTIC ACT EA 15 MIN 9/11/2019 La Benavides PT GP 1          PT G-Codes  Outcome Measure Options: AM-PAC 6 Clicks Basic Mobility (PT)  AM-PAC 6 Clicks Score (PT): 20  AM-PAC 6 Clicks Score (OT): 20    La Anderson PT  9/11/2019

## 2019-09-11 NOTE — PROGRESS NOTES
CTS Progress Note      POD 12 s/p Bronchoscopy, Right VATS with right upper lobectomy     LOS: 12 days     Subjective  Ambulated 350 feet yesterday with PT. VSS.   Reports incisional pain.  No other complaints.    Objective    Vital Signs  Temp:  [97.9 °F (36.6 °C)-98.2 °F (36.8 °C)] 97.9 °F (36.6 °C)  Heart Rate:  [59-86] 64  Resp:  [16-18] 18  BP: ()/(59-81) 122/81    Physical Exam:   General Appearance: alert, appears stated age and cooperative   Lungs: Decreased lung sounds right side   Heart: regular rhythm & normal rate, normal S1, S2 and no murmur, no gallop, no rub   Chest: sternum stable   Skin: Incision c/d/i   CT:  22 cc / 12 hours.  Air leak with cough.     Results       Results from last 7 days   Lab Units 09/05/19  0756   SODIUM mmol/L 136   POTASSIUM mmol/L 4.5   CHLORIDE mmol/L 98   CO2 mmol/L 26.0   BUN mg/dL 19   CREATININE mg/dL 1.06*   GLUCOSE mg/dL 107*   CALCIUM mg/dL 9.6       Imaging Results (last 24 hours)     Procedure Component Value Units Date/Time    XR Chest 1 View [[id number]] Updated:  09/11/19 0456    XR Chest 1 View [[id number]] Collected:  09/10/19 0757     Updated:  09/10/19 1034    Narrative:       EXAMINATION: XR CHEST 1 VW-      INDICATION: Status post lobectomy; Z74.09-Other reduced mobility;  R91.1-Solitary pulmonary nodule; R91.8-Other nonspecific abnormal  finding of lung field.      COMPARISON: 09/09/2019.     FINDINGS: Right chest tubes remain in place without discrete  pneumothorax clearly visualized. Right hemithorax has volume loss  similar to prior with asymmetric elevation the right hemidiaphragm,  however, no focal consolidation or effusion development. Decreasing  right subcutaneous chest wall emphysema.           Impression:       Persistent volume loss of the right hemithorax, however, no  discrete pneumothorax is clearly evident with right chest tubes remain  in place.     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

## 2019-09-11 NOTE — PLAN OF CARE
Problem: Patient Care Overview  Goal: Plan of Care Review  Outcome: Ongoing (interventions implemented as appropriate)   09/11/19 9595   Coping/Psychosocial   Plan of Care Reviewed With patient   Plan of Care Review   Progress improving   OTHER   Outcome Summary Pt progressing well with ADLs, cond I STS at RW no LOB, cond I for socks, undergarment, and pants now with reacher and sock aid; cont IPOT per POC

## 2019-09-11 NOTE — THERAPY TREATMENT NOTE
Acute Care - Occupational Therapy Treatment Note  [facility]     Patient Name: Lizbeth Johns  : 1955  MRN: [id number]  Today's Date: 2019  Onset of Illness/Injury or Date of Surgery: 19  Date of Referral to OT: 19  Referring Physician: Dr. Barreto    Admit Date: 2019       ICD-10-CM ICD-9-CM   1. Impaired mobility and ADLs Z74.09 799.89   2. Lung nodule R91.1 793.11   3. Mass of upper lobe of right lung R91.8 786.6     Patient Active Problem List   Diagnosis   • Colon cancer screening   • Epigastric pain   • Heartburn   • Lesion of colon   • History of colon polyps   • Family history of colon cancer   • Constipation   • Renal insufficiency   • Acute renal failure (ARF) (CMS/HCC)   • Mass of upper lobe of right lung   • Pain in both lower extremities   • Essential hypertension   • Abnormal CT of the chest   • Lung nodule < 6cm on CT   • Lung nodule     Past Medical History:   Diagnosis Date   • Acid reflux    • Arthritis    • Cataract, bilateral    • COPD (chronic obstructive pulmonary disease) (CMS/HCC)    • Hypertension    • Leg cramps    • Lung mass     right   • SOB (shortness of breath)     xray showed spot on lungs     Past Surgical History:   Procedure Laterality Date   • BRONCHOSCOPY N/A 2019    Procedure: BRONCHOSCOPY WITH FLUOROSCOPY, BIOPSY, BRUSHINGS, AND WASHINGS;  Surgeon: Gudelia Patel MD;  Location: [facility] OR;  Service: Pulmonary   • COLONOSCOPY N/A 2017    Procedure: COLONOSCOPY with hot and cold snare polypectomies,  hot biopsy polypectomies, biopsies, resolution clip placement;  Surgeon: Zackery Siddiqui MD;  Location: [facility] ENDOSCOPY;  Service:    • COLONOSCOPY N/A 2018    Procedure: COLONOSCOPY WITH HOT SNARE POLYPECTOMY X 7; COLD SNARE POLYPECTOMY X 3; HOT BIOPSY POLYPECTOMY X 20; COLD BIOPSY POLYPECTOMY X 2; THERMAL ABLATION OF COLON POLYPS X 23; CLIP PLACEMENT X 2; BIOPSIES;  Surgeon: Zackery Siddiqui MD;  Location: [facility] ENDOSCOPY;   Service: Gastroenterology   • COLONOSCOPY N/A 6/19/2019    Procedure: COLONOSCOPY with cold biopsy polypectomies and cold biopsy;  Surgeon: Zackery Siddiqui MD;  Location: [facility] ENDOSCOPY;  Service: Gastroenterology   • ENDOSCOPY  06/13/2019   • MULTIPLE TOOTH EXTRACTIONS      full extraction   • ORIF TIBIA/FIBULA FRACTURES Left    • THORACOSCOPY Right 8/30/2019    Procedure: BRONCHOSCOPY,  THORACOSCOPY VIDEO ASSISTED with right upper lobe wedge RESECTION , RIGHT UPPER lobectomy with mediastinal lymph node dissection AND INTERCOSTAL CRYOABLATION OF RIGHT CHEST;  Surgeon: Brian Barreto MD;  Location: [facility] OR;  Service: Cardiothoracic   • TUBAL ABDOMINAL LIGATION         Therapy Treatment    Rehabilitation Treatment Summary     Row Name 09/11/19 0930             Treatment Time/Intention    Discipline  occupational therapist  -KF      Document Type  therapy note (daily note)  -KF      Subjective Information  no complaints  -KF      Mode of Treatment  individual therapy;occupational therapy  -KF      Therapy Frequency (OT Eval)  daily  -KF      Patient Effort  excellent  -KF      Existing Precautions/Restrictions  fall;other (see comments) CT   -KF      Recorded by [KF] Abbey Miranda OT 09/11/19 0953      Row Name 09/11/19 0930             Vital Signs    Pre Systolic BP Rehab  -- RN cleared vitals stable no dizziness  -KF      Pre SpO2 (%)  96  -KF      O2 Delivery Pre Treatment  room air  -KF      Pre Patient Position  Sitting  -KF      Intra Patient Position  Standing  -KF      Post Patient Position  Sitting  -KF      Recorded by [KF] Abbey Miranda OT 09/11/19 0953      Row Name 09/11/19 0930             Cognitive Assessment/Intervention    Additional Documentation  Cognitive Assessment/Intervention (Group)  -KF      Recorded by [KF] Abbey Miranda OT 09/11/19 0953      Row Name 09/11/19 0930             Cognitive Assessment/Intervention- PT/OT    Affect/Mental Status (Cognitive)  WNL  -KF       Orientation Status (Cognition)  oriented x 4  -KF      Follows Commands (Cognition)  WNL  -KF      Cognitive Function (Cognitive)  WFL  -KF      Safety Deficit (Cognitive)  mild deficit;insight into deficits/self awareness;awareness of need for assistance  -KF      Cognitive Interventions (Cognitive)  process/task specific training;occupation/activity based interventions  -KF      Recorded by [KF] Abbey Miranda, OT 09/11/19 0953      Row Name 09/11/19 0930             Safety Issues, Functional Mobility    Safety Issues Affecting Function (Mobility)  judgment;insight into deficits/self awareness  -KF      Impairments Affecting Function (Mobility)  strength;endurance/activity tolerance;balance  -KF      Recorded by [KF] Abbey Miranda, OT 09/11/19 0953      Row Name 09/11/19 0930             Bed Mobility Assessment/Treatment    Comment (Bed Mobility)  UIC  -KF      Recorded by [KF] Abbey Miranda, OT 09/11/19 0953      Row Name 09/11/19 0930             Functional Mobility    Functional Mobility- Comment  deferred to PT  -KF      Recorded by [KF] Abbey Miranda, OT 09/11/19 0953      Row Name 09/11/19 0930             Transfer Assessment/Treatment    Transfer Assessment/Treatment  sit-stand transfer;stand-sit transfer  -KF      Comment (Transfers)  good safety at chair and RW  -KF      Recorded by [KF] Abbey Miranda, OT 09/11/19 0953      Row Name 09/11/19 0930             Sit-Stand Transfer    Sit-Stand [place] (Transfers)  conditional independence  -KF      Assistive Device (Sit-Stand Transfers)  walker, front-wheeled  -KF      Recorded by [KF] Abbey Miranda, OT 09/11/19 0953      Row Name 09/11/19 0930             Stand-Sit Transfer    Stand-Sit [place] (Transfers)  conditional independence  -KF      Assistive Device (Stand-Sit Transfers)  walker, front-wheeled  -KF      Recorded by [KF] Abbey Miranda, OT 09/11/19 0953      Row Name 09/11/19 0930             ADL  Assessment/Intervention    90444 - OT Self Care/Mgmt Minutes  13  -KF      BADL Assessment/Intervention  upper body dressing;lower body dressing  -KF      Recorded by [KF] Abbey Miranda, OT 09/11/19 0953      Row Name 09/11/19 0930             Upper Body Dressing Assessment/Training    Upper Body Dressing [place] Level  don;front opening garment;set up  -KF      Upper Body Dressing Position  supported standing  -KF      Comment (Upper Body Dressing)  no LOB   -KF      Recorded by [KF] Abbey Miranda, OT 09/11/19 0953      Row Name 09/11/19 0930             Lower Body Dressing Assessment/Training    Lower Body Dressing [place] Level  don;doff;socks;conditional independence;pants/bottoms;undergarment  -KF      Assistive Devices (Lower Body Dressing)  reacher;sock-aid  -KF      Lower Body Dressing Position  unsupported sitting;supported standing  -KF      Comment (Lower Body Dressing)  at RW  -KF      Recorded by [KF] Abbey Miranda, OT 09/11/19 0953      Row Name 09/11/19 0930             BADL Safety/Performance    Impairments, BADL Safety/Performance  balance;endurance/activity tolerance;strength  -KF      Skilled BADL Treatment/Intervention  BADL process/adaptation training;adaptive equipment training  -KF      Progress in BADL Status  improvement noted  -KF      Recorded by [KF] Abbey Miranda, OT 09/11/19 0953      Row Name 09/11/19 0930             Motor Skills Assessment/Interventions    Additional Documentation  Balance (Group);Balance Interventions (Group)  -KF      Recorded by [KF] Abbey Miranda, OT 09/11/19 0953      Row Name 09/11/19 0930             Balance    Balance  static sitting balance;static standing balance;dynamic sitting balance;dynamic standing balance  -KF      Recorded by [KF] Abbey Miranda, OT 09/11/19 0953      Row Name 09/11/19 0930             Static Sitting Balance    Level of [place] (Unsupported Sitting, Static Balance)  independent  -KF       Sitting Position (Unsupported Sitting, Static Balance)  sitting in chair  -KF      Recorded by [KF] Abbey Miranda, OT 09/11/19 0953      Row Name 09/11/19 0930             Dynamic Sitting Balance    Level of [place], Reaches Outside Midline (Sitting, Dynamic Balance)  independent  -KF      Sitting Position, Reaches Outside Midline (Sitting, Dynamic Balance)  sitting in chair  -KF      Recorded by [KF] Abbey Miranda, OT 09/11/19 0953      Row Name 09/11/19 0930             Static Standing Balance    Level of [place] (Supported Standing, Static Balance)  conditional independence  -KF      Time Able to Maintain Position (Supported Standing, Static Balance)  2 to 3 minutes  -KF      Assistive Device Utilized (Supported Standing, Static Balance)  walker, rolling  -KF      Recorded by [KF] Abbey Miranda, OT 09/11/19 0953      Row Name 09/11/19 0930             Dynamic Standing Balance    Level of [place], Reaches Outside Midline (Standing, Dynamic Balance)  standby assist  -KF      Time Able to Maintain Position, Reaches Outside Midline (Standing, Dynamic Balance)  1 to 2 minutes  -KF      Assistive Device Utilized (Supported Standing, Dynamic Balance)  walker, rolling  -KF      Comment, Reaches Outside Midline (Standing, Dynamic Balance)  no LOB throughout with transfers, and clothing management   -KF      Recorded by [KF] Abbey Miranda, OT 09/11/19 0953      Row Name 09/11/19 0930             Positioning and Restraints    Pre-Treatment Position  sitting in chair/recliner  -KF      Post Treatment Position  chair  -KF      In Chair  notified nsg;reclined;sitting;call light within reach;encouraged to call for assist;waffle cushion;legs elevated  -KF      Recorded by [KF] Abbey Miranda, OT 09/11/19 0953      Row Name 09/11/19 0930             Pain Assessment    Additional Documentation  Pain Scale: Numbers Pre/Post-Treatment (Group)  -KF      Recorded by [KF] Abbey Miranda, OT  09/11/19 0953      Row Name 09/11/19 0930             Pain Scale: Numbers Pre/Post-Treatment    Pain Scale: Numbers, Pretreatment  0/10 - no pain  -KF      Pain Scale: Numbers, Post-Treatment  0/10 - no pain  -KF      Pain Intervention(s)  Repositioned;Ambulation/increased activity  -KF      Recorded by [KF] Abbey Miranda, OT 09/11/19 0953      Row Name                Wound 08/30/19 1320 Right chest Incision    Wound - Properties Group Date first assessed: 08/30/19 [RB] Time first assessed: 1320 [RB] Side: Right [RB] Location: chest [RB] Primary Wound Type: Incision [RB] Recorded by:  [RB] Jailyn Dunn RN 08/30/19 1320    Row Name 09/11/19 0930             Plan of Care Review    Plan of Care Reviewed With  patient  -KF      Recorded by [KF] Abbey Miranda, OT 09/11/19 0953      Row Name 09/11/19 0930             Outcome Summary/Treatment Plan (OT)    Daily Summary of Progress (OT)  progress toward functional goals as expected;progress toward functional goals is good  -KF      Recorded by [KF] Abbey Miranda, OT 09/11/19 0953        User Key  (r) = Recorded By, (t) = Taken By, (c) = Cosigned By    Initials Name Effective Dates Discipline    KF Abbey Miranda, OT 04/03/18 -  OT    RB Jailyn Dunn RN 07/02/19 -  Nurse        Wound 08/30/19 1320 Right chest Incision (Active)   Dressing Appearance dry;intact;no drainage 9/11/2019  2:00 AM   Closure SANNA 9/11/2019  2:00 AM   Base dressing in place, unable to visualize 9/11/2019  2:00 AM   Drainage Amount none 9/11/2019  2:00 AM   Dressing Care, Wound gauze 9/10/2019  4:00 PM   Periwound Care, Wound dry periwound area maintained 9/10/2019  4:00 PM     Rehab Goal Summary     Row Name 09/11/19 0930             Transfer Goal 1 (OT)    Progress/Outcome (Transfer Goal 1, OT)  good progress toward goal cond I with RW  -KF         Dressing Goal 1 (OT)    Progress/Outcome (Dressing Goal 1, OT)  goal partially met socks, underwear, and pants demonstrated  -KF          Toileting Goal 1 (OT)    Progress/Outcome (Toileting Goal 1, OT)  good progress toward goal  -        User Key  (r) = Recorded By, (t) = Taken By, (c) = Cosigned By    Initials Name Provider Type Discipline    Abbey Bear, OT Occupational Therapist OT        Occupational Therapy Education     Title: PT OT SLP Therapies (In Progress)     Topic: Occupational Therapy (Done)     Point: ADL training (Done)     Description: Instruct learner(s) on proper safety adaptation and remediation techniques during self care or transfers.   Instruct in proper use of assistive devices.    Learning Progress Summary           Patient Acceptance, E,TB,D, VU,DU by KF at 9/11/2019  9:30 AM    Comment:  ADL retraining with AE for LBD    Acceptance, E,TB,D, VU,DU by KF at 9/10/2019  8:21 AM    Comment:  AE training LBD retraining, toileting retraining with RW, RW positioning with ADLs    Acceptance, E, VU by AR at 9/8/2019  3:00 PM   Family Acceptance, E, VU by AR at 9/8/2019  3:00 PM                   Point: Precautions (Done)     Description: Instruct learner(s) on prescribed precautions during self-care and functional transfers.    Learning Progress Summary           Patient Acceptance, E,TB,D, VU,DU by KF at 9/11/2019  9:30 AM    Comment:  ADL retraining with AE for LBD    Acceptance, E,TB,D, VU,DU by KF at 9/10/2019  8:21 AM    Comment:  AE training LBD retraining, toileting retraining with RW, RW positioning with ADLs    Acceptance, E, VU by AR at 9/8/2019  3:00 PM   Family Acceptance, E, VU by AR at 9/8/2019  3:00 PM                   Point: Body mechanics (Done)     Description: Instruct learner(s) on proper positioning and spine alignment during self-care, functional mobility activities and/or exercises.    Learning Progress Summary           Patient Acceptance, E,TB,D, VU,DU by KF at 9/11/2019  9:30 AM    Comment:  ADL retraining with AE for LBD    Acceptance, E,TB,D, VU,DU by KF at 9/10/2019  8:21 AM    Comment:   AE training LBD retraining, toileting retraining with RW, RW positioning with ADLs    Acceptance, E, VU by AR at 9/8/2019  3:00 PM   Family Acceptance, E, VU by AR at 9/8/2019  3:00 PM                               User Key     Initials Effective Dates Name Provider Type Discipline    AR 06/22/15 -  Lesa Eden, OT Occupational Therapist OT     04/03/18 -  Abbey Miranda, OT Occupational Therapist OT                OT Recommendation and Plan  Outcome Summary/Treatment Plan (OT)  Daily Summary of Progress (OT): progress toward functional goals as expected, progress toward functional goals is good  Therapy Frequency (OT Eval): daily  Daily Summary of Progress (OT): progress toward functional goals as expected, progress toward functional goals is good  Plan of Care Review  Plan of Care Reviewed With: patient  Plan of Care Reviewed With: patient  Outcome Summary: Pt progressing well with ADLs, cond I STS at RW no LOB, cond I for socks, undergarment, and pants now with reacher and sock aid; cont IPOT per POC  Outcome Measures     Row Name 09/11/19 0930 09/10/19 0821 09/08/19 1500       How much help from another is currently needed...    Putting on and taking off regular lower body clothing?  4  -KF  3  -KF  2  -AR    Bathing (including washing, rinsing, and drying)  3  -KF  2  -KF  2  -AR    Toileting (which includes using toilet bed pan or urinal)  3  -KF  3  -KF  3  -AR    Putting on and taking off regular upper body clothing  3  -KF  3  -KF  3  -AR    Taking care of personal grooming (such as brushing teeth)  3  -KF  3  -KF  3  -AR    Eating meals  4  -KF  4  -KF  3  -AR    AM-PAC 6 Clicks Score (OT)  20  -KF  18  -KF  16  -AR       Functional Assessment    Outcome Measure Options  AM-PAC 6 Clicks Daily Activity (OT)  -KF  AM-PAC 6 Clicks Daily Activity (OT)  -KF  AM-PAC 6 Clicks Daily Activity (OT)  -AR      User Key  (r) = Recorded By, (t) = Taken By, (c) = Cosigned By    Initials Name Provider Type     Lesa Jeff, OT Occupational Therapist    Abbey Bear, OT Occupational Therapist           Time Calculation:   Time Calculation- OT     Row Name 09/11/19 0930             Time Calculation- OT    OT Start Time  0930  -KF      Total Timed Code Minutes- OT  13 minute(s)  -KF      OT Received On  09/11/19  -KF      OT Goal Re-Cert Due Date  09/18/19  -KF         Timed Charges    51774 - OT Self Care/Mgmt Minutes  13  -KF        User Key  (r) = Recorded By, (t) = Taken By, (c) = Cosigned By    Initials Name Provider Type    Abbey Bear, OT Occupational Therapist        Therapy Charges for Today     Code Description Service Date Service Provider Modifiers Qty    18327815746 HC OT THERAPEUTIC ACT EA 15 MIN 9/10/2019 Abbey Miranda OT GO 1    14337529559 HC OT SELF CARE/MGMT/TRAIN EA 15 MIN 9/10/2019 Abbey Miranda OT GO 1    94447308324 HC OT SELF CARE/MGMT/TRAIN EA 15 MIN 9/11/2019 Abbey Miranda OT GO 1               Abbey Miranda OT  9/11/2019

## 2019-09-12 ENCOUNTER — APPOINTMENT (OUTPATIENT)
Dept: GENERAL RADIOLOGY | Facility: HOSPITAL | Age: 64
End: 2019-09-12

## 2019-09-12 LAB
ANION GAP SERPL CALCULATED.3IONS-SCNC: 14 MMOL/L (ref 5–15)
BUN BLD-MCNC: 30 MG/DL (ref 8–23)
BUN/CREAT SERPL: 29.4 (ref 7–25)
CALCIUM SPEC-SCNC: 9.9 MG/DL (ref 8.6–10.5)
CHLORIDE SERPL-SCNC: 99 MMOL/L (ref 98–107)
CO2 SERPL-SCNC: 25 MMOL/L (ref 22–29)
CREAT BLD-MCNC: 1.02 MG/DL (ref 0.57–1)
GFR SERPL CREATININE-BSD FRML MDRD: 66 ML/MIN/1.73
GLUCOSE BLD-MCNC: 89 MG/DL (ref 65–99)
POTASSIUM BLD-SCNC: 4.7 MMOL/L (ref 3.5–5.2)
SODIUM BLD-SCNC: 138 MMOL/L (ref 136–145)

## 2019-09-12 PROCEDURE — 99024 POSTOP FOLLOW-UP VISIT: CPT | Performed by: PHYSICIAN ASSISTANT

## 2019-09-12 PROCEDURE — 71045 X-RAY EXAM CHEST 1 VIEW: CPT

## 2019-09-12 PROCEDURE — 97530 THERAPEUTIC ACTIVITIES: CPT

## 2019-09-12 PROCEDURE — 80048 BASIC METABOLIC PNL TOTAL CA: CPT | Performed by: THORACIC SURGERY (CARDIOTHORACIC VASCULAR SURGERY)

## 2019-09-12 PROCEDURE — 94799 UNLISTED PULMONARY SVC/PX: CPT

## 2019-09-12 PROCEDURE — 25010000002 HEPARIN (PORCINE) PER 1000 UNITS: Performed by: THORACIC SURGERY (CARDIOTHORACIC VASCULAR SURGERY)

## 2019-09-12 RX ADMIN — ROPINIROLE 0.5 MG: 0.5 TABLET, FILM COATED ORAL at 20:24

## 2019-09-12 RX ADMIN — POLYETHYLENE GLYCOL 3350 17 G: 17 POWDER, FOR SOLUTION ORAL at 09:33

## 2019-09-12 RX ADMIN — CARVEDILOL 3.12 MG: 3.12 TABLET, FILM COATED ORAL at 09:32

## 2019-09-12 RX ADMIN — HYDROCODONE BITARTRATE AND ACETAMINOPHEN 1 TABLET: 7.5; 325 TABLET ORAL at 05:51

## 2019-09-12 RX ADMIN — HYDROCODONE BITARTRATE AND ACETAMINOPHEN 1 TABLET: 7.5; 325 TABLET ORAL at 20:24

## 2019-09-12 RX ADMIN — HEPARIN SODIUM 5000 UNITS: 5000 INJECTION INTRAVENOUS; SUBCUTANEOUS at 20:23

## 2019-09-12 RX ADMIN — IPRATROPIUM BROMIDE AND ALBUTEROL SULFATE 3 ML: 2.5; .5 SOLUTION RESPIRATORY (INHALATION) at 20:53

## 2019-09-12 RX ADMIN — HEPARIN SODIUM 5000 UNITS: 5000 INJECTION INTRAVENOUS; SUBCUTANEOUS at 09:32

## 2019-09-12 RX ADMIN — HYDROCODONE BITARTRATE AND ACETAMINOPHEN 1 TABLET: 7.5; 325 TABLET ORAL at 23:57

## 2019-09-12 RX ADMIN — SENNOSIDES, DOCUSATE SODIUM 2 TABLET: 50; 8.6 TABLET, FILM COATED ORAL at 20:24

## 2019-09-12 RX ADMIN — CYCLOBENZAPRINE HYDROCHLORIDE 10 MG: 10 TABLET, FILM COATED ORAL at 09:32

## 2019-09-12 RX ADMIN — IPRATROPIUM BROMIDE AND ALBUTEROL SULFATE 3 ML: 2.5; .5 SOLUTION RESPIRATORY (INHALATION) at 09:23

## 2019-09-12 RX ADMIN — ASPIRIN 81 MG: 81 TABLET, COATED ORAL at 09:32

## 2019-09-12 RX ADMIN — HYDROCODONE BITARTRATE AND ACETAMINOPHEN 1 TABLET: 7.5; 325 TABLET ORAL at 16:10

## 2019-09-12 RX ADMIN — CARVEDILOL 3.12 MG: 3.12 TABLET, FILM COATED ORAL at 20:23

## 2019-09-12 RX ADMIN — IPRATROPIUM BROMIDE AND ALBUTEROL SULFATE 3 ML: 2.5; .5 SOLUTION RESPIRATORY (INHALATION) at 15:32

## 2019-09-12 RX ADMIN — HYDROCODONE BITARTRATE AND ACETAMINOPHEN 1 TABLET: 7.5; 325 TABLET ORAL at 09:32

## 2019-09-12 RX ADMIN — IPRATROPIUM BROMIDE AND ALBUTEROL SULFATE 3 ML: 2.5; .5 SOLUTION RESPIRATORY (INHALATION) at 12:26

## 2019-09-12 NOTE — PLAN OF CARE
Problem: Patient Care Overview  Goal: Plan of Care Review  Outcome: Ongoing (interventions implemented as appropriate)   09/12/19 0126   Coping/Psychosocial   Plan of Care Reviewed With patient   Plan of Care Review   Progress improving   OTHER   Outcome Summary Patient resting in bed with daughter at bedside. Chest tube to water seal. Requesting pain medication when it is available. VSS. Will continue to monitor.        Problem: Skin Injury Risk (Adult)  Goal: Identify Related Risk Factors and Signs and Symptoms  Outcome: Ongoing (interventions implemented as appropriate)   09/12/19 0126   Skin Injury Risk (Adult)   Related Risk Factors (Skin Injury Risk) advanced age;medical devices

## 2019-09-12 NOTE — PLAN OF CARE
Problem: Patient Care Overview  Goal: Plan of Care Review  Outcome: Ongoing (interventions implemented as appropriate)   09/12/19 1110   Coping/Psychosocial   Plan of Care Reviewed With patient   Plan of Care Review   Progress improving   OTHER   Outcome Summary Pt ambulates 600 ft without AD, independently in us (chest tube carried by PT). Pt has no stairs at home. PT to d/c this date. Pt encoraged to continue ambulation with NSG.

## 2019-09-12 NOTE — THERAPY DISCHARGE NOTE
Patient Name: Lizbeth Johns  : 1955    MRN: [id number]                              Today's Date: 2019       Admit Date: 2019    Visit Dx:     ICD-10-CM ICD-9-CM   1. Impaired mobility and ADLs Z74.09 799.89   2. Lung nodule R91.1 793.11   3. Mass of upper lobe of right lung R91.8 786.6     Patient Active Problem List   Diagnosis   • Colon cancer screening   • Epigastric pain   • Heartburn   • Lesion of colon   • History of colon polyps   • Family history of colon cancer   • Constipation   • Renal insufficiency   • Acute renal failure (ARF) (CMS/HCC)   • Mass of upper lobe of right lung   • Pain in both lower extremities   • Essential hypertension   • Abnormal CT of the chest   • Lung nodule < 6cm on CT   • Lung nodule     Past Medical History:   Diagnosis Date   • Acid reflux    • Arthritis    • Cataract, bilateral    • COPD (chronic obstructive pulmonary disease) (CMS/HCC)    • Hypertension    • Leg cramps    • Lung mass     right   • SOB (shortness of breath)     xray showed spot on lungs     Past Surgical History:   Procedure Laterality Date   • BRONCHOSCOPY N/A 2019    Procedure: BRONCHOSCOPY WITH FLUOROSCOPY, BIOPSY, BRUSHINGS, AND WASHINGS;  Surgeon: Gudelia Patel MD;  Location: [facility] OR;  Service: Pulmonary   • COLONOSCOPY N/A 2017    Procedure: COLONOSCOPY with hot and cold snare polypectomies,  hot biopsy polypectomies, biopsies, resolution clip placement;  Surgeon: Zackery Siddiqui MD;  Location: [facility] ENDOSCOPY;  Service:    • COLONOSCOPY N/A 2018    Procedure: COLONOSCOPY WITH HOT SNARE POLYPECTOMY X 7; COLD SNARE POLYPECTOMY X 3; HOT BIOPSY POLYPECTOMY X 20; COLD BIOPSY POLYPECTOMY X 2; THERMAL ABLATION OF COLON POLYPS X 23; CLIP PLACEMENT X 2; BIOPSIES;  Surgeon: Zackery Siddiqui MD;  Location: [facility] ENDOSCOPY;  Service: Gastroenterology   • COLONOSCOPY N/A 2019    Procedure: COLONOSCOPY with cold biopsy polypectomies and cold biopsy;  Surgeon: Chen  Zackery MARROQUIN MD;  Location: [facility] ENDOSCOPY;  Service: Gastroenterology   • ENDOSCOPY  06/13/2019   • MULTIPLE TOOTH EXTRACTIONS      full extraction   • ORIF TIBIA/FIBULA FRACTURES Left    • THORACOSCOPY Right 8/30/2019    Procedure: BRONCHOSCOPY,  THORACOSCOPY VIDEO ASSISTED with right upper lobe wedge RESECTION , RIGHT UPPER lobectomy with mediastinal lymph node dissection AND INTERCOSTAL CRYOABLATION OF RIGHT CHEST;  Surgeon: Brian Barreto MD;  Location: [facility] OR;  Service: Cardiothoracic   • TUBAL ABDOMINAL LIGATION       General Information     Row Name 09/12/19 1105          PT Evaluation Time/Intention    Document Type  therapy note (daily note);discharge evaluation/summary  -EJ     Mode of Treatment  physical therapy  -EJ     Row Name 09/12/19 1105          General Information    Patient Profile Reviewed?  yes  -EJ     Existing Precautions/Restrictions  other (see comments) Chest tube, waterseal  -EJ     Row Name 09/12/19 1105          Cognitive Assessment/Intervention- PT/OT    Orientation Status (Cognition)  oriented x 4  -EJ       User Key  (r) = Recorded By, (t) = Taken By, (c) = Cosigned By    Initials Name Provider Type    EJ La Benavides PT Physical Therapist        Mobility     Row Name 09/12/19 1105          Bed Mobility Assessment/Treatment    Comment (Bed Mobility)  IC  -EJ     Row Name 09/12/19 1105          Sit-Stand Transfer    Sit-Stand [place] (Transfers)  independent  -EJ     Row Name 09/12/19 1105          Gait/Stairs Assessment/Training    [place] Level (Gait)  independent  -EJ     Distance in Feet (Gait)  600  -EJ     Pattern (Gait)  step-through  -EJ     Comment (Gait/Stairs)  600 ft, no AD, no cuing for safety needed. Pillow positioned on R side CT site.  -EJ       User Key  (r) = Recorded By, (t) = Taken By, (c) = Cosigned By    Initials Name Provider Type    La James PT Physical Therapist        Obj/Interventions    No documentation.       Goals/Plan      Row Name 09/12/19 1108          Bed Mobility Goal 1 (PT)    Progress/Outcomes (Bed Mobility Goal 1, PT)  goal met  -EJ     Row Name 09/12/19 1108          Transfer Goal 1 (PT)    Progress/Outcome (Transfer Goal 1, PT)  goal met  -EJ     Row Name 09/12/19 1108          Gait Training Goal 1 (PT)    Progress/Outcome (Gait Training Goal 1, PT)  goal met  -EJ       User Key  (r) = Recorded By, (t) = Taken By, (c) = Cosigned By    Initials Name Provider Type    La James PT Physical Therapist        Clinical Impression     Row Name 09/12/19 1106          Pain Assessment    Additional Documentation  Pain Scale: Numbers Pre/Post-Treatment (Group)  -EJ     Row Name 09/12/19 1106          Pain Scale: Numbers Pre/Post-Treatment    Pain Scale: Numbers, Pretreatment  0/10 - no pain  -EJ     Pain Scale: Numbers, Post-Treatment  0/10 - no pain  -EJ     Pain Intervention(s)  Ambulation/increased activity;Repositioned  -EJ     Row Name 09/12/19 1106          Vital Signs    Pre Patient Position  Sitting  -EJ     Intra Patient Position  Standing  -EJ     Post Patient Position  Sitting  -EJ     Row Name 09/12/19 1106          Positioning and Restraints    In Chair  reclined;exit alarm on;encouraged to call for assist;waffle cushion;RUE elevated;LUE elevated  -EJ       User Key  (r) = Recorded By, (t) = Taken By, (c) = Cosigned By    Initials Name Provider Type    La James PT Physical Therapist        Outcome Measures     Row Name 09/12/19 1112          How much help from another person do you currently need...    Turning from your back to your side while in flat bed without using bedrails?  4  -EJ     Moving from lying on back to sitting on the side of a flat bed without bedrails?  4  -EJ     Moving to and from a bed to a chair (including a wheelchair)?  4  -EJ     Standing up from a chair using your arms (e.g., wheelchair, bedside chair)?  4  -EJ     Climbing 3-5 steps with a railing?  4  -EJ     To walk  in hospital room?  4  -EJ     AM-PAC 6 Clicks Score (PT)  24  -EJ     Row Name 09/12/19 1112          Functional Assessment    Outcome Measure Options  AM-PAC 6 Clicks Basic Mobility (PT)  -       User Key  (r) = Recorded By, (t) = Taken By, (c) = Cosigned By    Initials Name Provider Type    La James PT Physical Therapist        Physical Therapy Education     Title: PT OT SLP Therapies (Done)     Topic: Physical Therapy (Done)     Point: Mobility training (Done)     Learning Progress Summary           Patient Acceptance, E, VU,DU by VILLA at 9/12/2019 11:10 AM    Acceptance, E, VU,NR by VILLA at 9/11/2019 10:16 AM    Acceptance, E, NR by AS at 9/10/2019  1:34 PM    Acceptance, E, VU,NR by VILLA at 9/9/2019  9:59 AM    Acceptance, E,D, NR by DUSTY at 9/7/2019 11:41 AM    Acceptance, E, NR by RADHA at 9/6/2019  1:00 PM                   Point: Home exercise program (Done)     Learning Progress Summary           Patient Acceptance, E, VU,DU by VILLA at 9/12/2019 11:10 AM    Acceptance, E, VU,NR by VILLA at 9/11/2019 10:16 AM    Acceptance, E, NR by AS at 9/10/2019  1:34 PM    Acceptance, E, VU,NR by VILLA at 9/9/2019  9:59 AM    Acceptance, E, NR by RADHA at 9/6/2019  1:00 PM                   Point: Body mechanics (Done)     Learning Progress Summary           Patient Acceptance, E, VU,DU by VILLA at 9/12/2019 11:10 AM    Acceptance, E, VU,NR by VILLA at 9/11/2019 10:16 AM    Acceptance, E, NR by AS at 9/10/2019  1:34 PM    Acceptance, E, VU,NR by VILLA at 9/9/2019  9:59 AM    Acceptance, E, NR by RADHA at 9/6/2019  1:00 PM                   Point: Precautions (Done)     Learning Progress Summary           Patient Acceptance, E, VU,DU by VILLA at 9/12/2019 11:10 AM    Acceptance, E, VU,NR by VILLA at 9/11/2019 10:16 AM    Acceptance, E, NR by AS at 9/10/2019  1:34 PM    Acceptance, E, VU,NR by VILLA at 9/9/2019  9:59 AM    Acceptance, ELIA NR by RADHA at 9/6/2019  1:00 PM                               User Key     Initials Effective Dates Name Provider  Type Discipline    RADHA 06/19/15 -  Kathi Hill, PT Physical Therapist PT    EJ 11/20/18 -  La Benavides, PT Physical Therapist PT    SJ 06/19/15 -  Kaitlynn Forrest, PT Physical Therapist PT    AS 06/22/15 -  Patti Chiang, PTA Physical Therapy Assistant PT              PT Recommendation and Plan     Plan of Care Reviewed With: patient  Progress: improving  Outcome Summary: Pt ambulates 600 ft without AD, independently in us (chest tube carried by PT). Pt has no stairs at home. PT to d/c this date.  Pt encoraged to continue ambulation with NSG.      Time Calculation:   PT Charges     Row Name 09/12/19 1112             Time Calculation    Start Time  1050  -EJ      PT Received On  09/12/19  -EJ      PT Goal Re-Cert Due Date  09/16/19  -EJ         Time Calculation- PT    Total Timed Code Minutes- PT  12 minute(s)  -EJ         Timed Charges    57506 - PT Therapeutic Activity Minutes  12  -EJ        User Key  (r) = Recorded By, (t) = Taken By, (c) = Cosigned By    Initials Name Provider Type    EJ La Benavides, PT Physical Therapist        Therapy Charges for Today     Code Description Service Date Service Provider Modifiers Qty    62087350037 HC PT THERAPEUTIC ACT EA 15 MIN 9/11/2019 La Benavides, PT GP 1    40008896720 HC PT THERAPEUTIC ACT EA 15 MIN 9/12/2019 La Benavides, PT GP 1          PT G-Codes  Outcome Measure Options: AM-PAC 6 Clicks Basic Mobility (PT)  AM-PAC 6 Clicks Score (PT): 24  AM-PAC 6 Clicks Score (OT): 20    PT Discharge Summary  Reason for Discharge: Independent, All goals achieved  Discharge Destination: Home with assist    La Anderson, PT  9/12/2019

## 2019-09-12 NOTE — PROGRESS NOTES
CTS Progress Note      POD 13 s/p bronchoscopy, right VATS and right upper lobectomy.       LOS: 13 days   Patient Care Team:  Emily Ferreira MD as PCP - General (Family Medicine)  Yassine Brambila MD as PCP - Claims Attributed    Subjective  Ambulated with PT today.  Pain under good control.    Objective    Vital Signs  Temp:  [98.4 °F (36.9 °C)-98.7 °F (37.1 °C)] 98.7 °F (37.1 °C)  Heart Rate:  [69-81] 75  Resp:  [16-18] 18  BP: (125-140)/(74-84) 140/84    Physical Exam:   General Appearance: alert, appears stated age and cooperative   Lungs: Decreased breath sounds right apex   Heart: regular rhythm & normal rate, normal S1, S2, no murmur, no gallop, no rub and no click   Skin: Incision c/d/i   Air leak with cough  Results       Results from last 7 days   Lab Units 09/12/19  0527   SODIUM mmol/L 138   POTASSIUM mmol/L 4.7   CHLORIDE mmol/L 99   CO2 mmol/L 25.0   BUN mg/dL 30*   CREATININE mg/dL 1.02*   GLUCOSE mg/dL 89   CALCIUM mg/dL 9.9           Imaging Results (last 24 hours)     Procedure Component Value Units Date/Time    XR Chest 1 View [[id number]] Collected:  09/12/19 0835     Updated:  09/12/19 0901    Narrative:       EXAMINATION: XR CHEST 1 VW- 09/12/2019     INDICATION: s/p lobectomy; Z74.09-Other reduced mobility; R91.1-Solitary  pulmonary nodule; R91.8-Other nonspecific abnormal finding of lung field        COMPARISON: 09/11/2019     FINDINGS: Right apical pneumothorax may be a little larger,  approximately 24 mm compared to 15 mm of apical pleural separation on  yesterday's study. Mild right-sided subcutaneous emphysema however, is  unchanged. No new pulmonary parenchymal disease is seen. No effusion or  edema is seen. Heart is normal in size.           Impression:       Small right apical pneumothorax, which appears a little  larger than yesterday's study. Otherwise stable exam.     D:  09/12/2019  E:  09/12/2019          XR Chest 1 View [[id number]] Collected:  09/11/19 0911     Updated:  09/11/19  1151    Narrative:       EXAMINATION: XR CHEST 1 VW- 09/11/2019     INDICATION: s/p lobectomy; Z74.09-Other reduced mobility; R91.1-Solitary  pulmonary nodule; R91.8-Other nonspecific abnormal finding of lung field        COMPARISON: 09/10/2019     FINDINGS: Cardiac silhouette is normal. There is volume loss on the  right. Two right chest tubes are in position. There is no acute airspace  disease. There is a small right apical pneumothorax.           Impression:       There are postoperative findings with no evidence of acute  airspace disease and there is a tiny right apical pneumothorax.     D:  09/11/2019  E:  09/11/2019     This report was finalized on 9/11/2019 11:48 AM by Dr. Kentrell Aguirre MD.       XR Chest 1 View [[id number]] Collected:  09/08/19 0734     Updated:  09/11/19 1117    Narrative:          EXAMINATION: XR CHEST 1 VW - 09/08/2019      INDICATION:  R91.1-Solitary pulmonary nodule; R91.8-Other nonspecific  abnormal finding of lung field.      COMPARISON: Chest radiograph 09/07/2019.     FINDINGS: Single portable chest radiograph is submitted for review.  There is redemonstration of two large-bore right-sided thoracotomy tubes  projecting to the right apex, slightly repositioned. Persistent now  smaller right pneumothorax is identified measuring up to 1.3 cm in size.  Similar right chest wall subcutaneous air is identified. The remainder  of the right lung appears well aerated. The left lung is well aerated.  No pleural effusion. Visually the upper abdomen is unrevealing. No acute  osseous abnormality.           Impression:          Appropriately positioned large-bore right-sided thoracotomy tubes with a  now smaller but persistent right apical pneumothorax measuring  approximately 1.3 cm in size.     DICTATED:   09/08/2019  EDITED/ls :   09/08/2019      This report was finalized on 9/11/2019 11:14 AM by Dr. Bacilio Driscoll MD.             Assessment  POD 13 s/p bronchoscopy, right VATS and right  upper lobectomy.    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  Continues with a small right apical pneumothorax    Plan   Continue chest tube to water seal, ambulate  Place to pneumo-stat tomorrow if lung stable, then probably discharge home with chest tube.    09/12/19  9:03 AM

## 2019-09-13 ENCOUNTER — APPOINTMENT (OUTPATIENT)
Dept: GENERAL RADIOLOGY | Facility: HOSPITAL | Age: 64
End: 2019-09-13

## 2019-09-13 VITALS
TEMPERATURE: 98.4 F | HEIGHT: 60 IN | WEIGHT: 171.4 LBS | OXYGEN SATURATION: 97 % | BODY MASS INDEX: 33.65 KG/M2 | DIASTOLIC BLOOD PRESSURE: 99 MMHG | SYSTOLIC BLOOD PRESSURE: 116 MMHG | HEART RATE: 80 BPM | RESPIRATION RATE: 16 BRPM

## 2019-09-13 PROCEDURE — 71045 X-RAY EXAM CHEST 1 VIEW: CPT

## 2019-09-13 PROCEDURE — 97530 THERAPEUTIC ACTIVITIES: CPT | Performed by: OCCUPATIONAL THERAPIST

## 2019-09-13 PROCEDURE — 25010000002 HEPARIN (PORCINE) PER 1000 UNITS: Performed by: THORACIC SURGERY (CARDIOTHORACIC VASCULAR SURGERY)

## 2019-09-13 PROCEDURE — 94799 UNLISTED PULMONARY SVC/PX: CPT

## 2019-09-13 PROCEDURE — 99024 POSTOP FOLLOW-UP VISIT: CPT | Performed by: PHYSICIAN ASSISTANT

## 2019-09-13 RX ORDER — HYDROCODONE BITARTRATE AND ACETAMINOPHEN 7.5; 325 MG/1; MG/1
1 TABLET ORAL EVERY 4 HOURS PRN
Qty: 30 TABLET | Refills: 0 | Status: SHIPPED | OUTPATIENT
Start: 2019-09-13 | End: 2019-10-01

## 2019-09-13 RX ADMIN — ASPIRIN 81 MG: 81 TABLET, COATED ORAL at 08:19

## 2019-09-13 RX ADMIN — CYCLOBENZAPRINE HYDROCHLORIDE 10 MG: 10 TABLET, FILM COATED ORAL at 08:19

## 2019-09-13 RX ADMIN — IPRATROPIUM BROMIDE AND ALBUTEROL SULFATE 3 ML: 2.5; .5 SOLUTION RESPIRATORY (INHALATION) at 07:39

## 2019-09-13 RX ADMIN — HYDROCODONE BITARTRATE AND ACETAMINOPHEN 1 TABLET: 7.5; 325 TABLET ORAL at 12:55

## 2019-09-13 RX ADMIN — CARVEDILOL 3.12 MG: 3.12 TABLET, FILM COATED ORAL at 08:19

## 2019-09-13 RX ADMIN — HYDROCODONE BITARTRATE AND ACETAMINOPHEN 1 TABLET: 7.5; 325 TABLET ORAL at 08:19

## 2019-09-13 RX ADMIN — HEPARIN SODIUM 5000 UNITS: 5000 INJECTION INTRAVENOUS; SUBCUTANEOUS at 08:19

## 2019-09-13 RX ADMIN — POLYETHYLENE GLYCOL 3350 17 G: 17 POWDER, FOR SOLUTION ORAL at 08:19

## 2019-09-13 NOTE — PROGRESS NOTES
Case Management Discharge Note    Final Note: Met with pt at bedside to f/u DCP.  Plan is to return home today with pneumostat.  Pt has requested  services to assist with maintaing and draining.  Pt has not provider preference.  New referral called to [facility].      Destination      No service has been selected for the patient.      Durable Medical Equipment      No service has been selected for the patient.      Dialysis/Infusion      No service has been selected for the patient.      Home Medical Care - Selection Complete      Service Provider Request Status Selected Services Address Phone Number Fax Number    [facility] Selected Home Health Services [address] [id number] [phone]      Therapy      No service has been selected for the patient.      Community Resources      No service has been selected for the patient.             Final Discharge Disposition Code: 06 - home with home health care

## 2019-09-13 NOTE — THERAPY DISCHARGE NOTE
Acute Care - Occupational Therapy Treatment Note/Discharge  [facility]     Patient Name: Lizbeth Johns  : 1955  MRN: [id number]  Today's Date: 2019  Onset of Illness/Injury or Date of Surgery: 19  Date of Referral to OT: 19  Referring Physician: Dr. Barreto      Admit Date: 2019    Visit Dx:     ICD-10-CM ICD-9-CM   1. Impaired mobility and ADLs Z74.09 799.89   2. Lung nodule R91.1 793.11   3. Mass of upper lobe of right lung R91.8 786.6     Patient Active Problem List   Diagnosis   • Colon cancer screening   • Epigastric pain   • Heartburn   • Lesion of colon   • History of colon polyps   • Family history of colon cancer   • Constipation   • Renal insufficiency   • Acute renal failure (ARF) (CMS/HCC)   • Mass of upper lobe of right lung   • Pain in both lower extremities   • Essential hypertension   • Abnormal CT of the chest   • Lung nodule < 6cm on CT   • Lung nodule       Therapy Treatment    Rehabilitation Treatment Summary     Row Name 19 1313             Treatment Time/Intention    Discipline  occupational therapist  -SD      Document Type  therapy note (daily note)  -SD      Subjective Information  no complaints  -SD      Mode of Treatment  occupational therapy  -SD      Patient/Family Observations  Pt. sitting up in b/s chair. Pt.'s dtr. present.  -SD      Care Plan Review  care plan/treatment goals reviewed;patient/other agree to care plan  -SD      Care Plan Review, Other Participant(s)  daughter  -SD      Total Minutes, Occupational Therapy Treatment  13  -SD      Patient Effort  good  -SD      Comment  pt. preparing to go home  -SD      Recorded by [SD] Mary Jane Kang OT 19 0689      Row Name 19 1313             Vital Signs    Pre Patient Position  Sitting  -SD      Intra Patient Position  Standing  -SD      Post Patient Position  Sitting  -SD      Recorded by [SD] Mary Jane Kang OT 19 7237      Row Name 19 1314              Cognitive Assessment/Intervention- PT/OT    Affect/Mental Status (Cognitive)  WNL  -SD      Orientation Status (Cognition)  oriented x 4  -SD      Follows Commands (Cognition)  WNL  -SD      Cognitive Function (Cognitive)  WNL  -SD      Safety Deficit (Cognitive)  mild deficit;safety precautions awareness  -SD      Personal Safety Interventions  fall prevention program maintained;nonskid shoes/slippers when out of bed  -SD      Recorded by [SD] Mary Jane Kang, OT 09/13/19 1335      Row Name 09/13/19 1313             Sit-Stand Transfer    Sit-Stand [place] (Transfers)  stand by assist pt. stepped on/off weight scale with SBA  -SD      Recorded by [SD] Mary Jane Kang, OT 09/13/19 1335      Row Name 09/13/19 1313             Stand-Sit Transfer    Stand-Sit [place] (Transfers)  stand by assist  -SD      Recorded by [SD] Mary Jane Kang OT 09/13/19 1335      Row Name 09/13/19 1313             Motor Skills Assessment/Interventions    Additional Documentation  Therapeutic Exercise (Group)  -SD      Recorded by [SD] Mary Jane Kang OT 09/13/19 1335      Row Name 09/13/19 1313             Therapeutic Exercise    76036 - OT Therapeutic Exercise Minutes  5  -SD      22292 - OT Therapeutic Activity Minutes  8  -SD      Recorded by [SD] Mary Jane Kang OT 09/13/19 1335      Row Name 09/13/19 1313             Therapeutic Exercise    Upper Extremity (Therapeutic Exercise)  bicep curl, bilateral;tricep extension, bilateral  -SD      Upper Extremity Range of Motion (Therapeutic Exercise)  shoulder horizontal abduction/adduction, bilateral  -SD      Weight/Resistance (Therapeutic Exercise)  yellow;other (see comments) theraband  -SD      Exercise Type (Therapeutic Exercise)  resistive exercises  -SD      Position (Therapeutic Exercise)  seated  -SD      Sets/Reps (Therapeutic Exercise)  10x  -SD      Expected Outcome (Therapeutic Exercise)  improve functional tolerance,  self-care activity  -SD      Recorded by [SD] Mary Jane Kang OT 09/13/19 1335      Row Name 09/13/19 1313             Static Sitting Balance    Level of [place] (Unsupported Sitting, Static Balance)  independent  -SD      Sitting Position (Unsupported Sitting, Static Balance)  sitting in chair  -SD      Time Able to Maintain Position (Unsupported Sitting, Static Balance)  more than 5 minutes  -SD      Recorded by [SD] Mary Jane Kang OT 09/13/19 1335      Row Name 09/13/19 1313             Static Standing Balance    Level of [place] (Supported Standing, Static Balance)  standby assist  -SD      Time Able to Maintain Position (Supported Standing, Static Balance)  1 to 2 minutes  -SD      Recorded by [SD] Mary Jane Kang OT 09/13/19 1335      Row [facility] 09/13/19 1313             Positioning and Restraints    Pre-Treatment Position  sitting in chair/recliner  -SD      Post Treatment Position  chair  -SD      In Chair  sitting;call light within reach;encouraged to call for assist;with family/caregiver  -SD      Recorded by [SD] Mary Jane Kang OT 09/13/19 1335      Row Name 09/13/19 1313             Pain Scale: Numbers Pre/Post-Treatment    Pain Scale: Numbers, Pretreatment  0/10 - no pain  -SD      Pain Scale: Numbers, Post-Treatment  2/10  -SD      Pain Location - Side  Right  -SD      Pain Location - Orientation  incisional  -SD      Pain Location  chest  -SD      Pain Intervention(s)  Repositioned  -SD      Recorded by [SD] Mary Jane Kang OT 09/13/19 1335      Row Name                Wound 08/30/19 1320 Right chest Incision    Wound - Properties Group Date first assessed: 08/30/19 [RB] Time first assessed: 1320 [RB] Side: Right [RB] Location: chest [RB] Primary Wound Type: Incision [RB] Recorded by:  [RB] Jailyn Dunn RN 08/30/19 1320    Row Name 09/13/19 1313             Coping    Observed Emotional State  accepting;calm;cooperative  -SD      Verbalized Emotional  State  acceptance  -SD      Recorded by [SD] Mary Jane Kang, OT 09/13/19 1335      Row Name 09/13/19 1313             Plan of Care Review    Plan of Care Reviewed With  patient;daughter  -SD      Recorded by [SD] Mary Jane Kang, OT 09/13/19 1335      Row Name 09/13/19 1313             Outcome Summary/Treatment Plan (OT)    Daily Summary of Progress (OT)  progress toward functional goals is good  -SD      Plan for Continued Treatment (OT)  cont OT POC  -SD      Anticipated Discharge Disposition (OT)  home with assist;home with home health  -SD      Reason for Discharge (OT Discharge Summary)  patient discharged from this facility  -SD      Recorded by [SD] Mary Jane Kang, OT 09/13/19 1337        User Key  (r) = Recorded By, (t) = Taken By, (c) = Cosigned By    Initials Name Effective Dates Discipline    SD Mary Jane Kang, OT 06/08/18 -  OT    Jailyn Harvey RN 07/02/19 -  Nurse        Wound 08/30/19 1320 Right chest Incision (Active)   Dressing Appearance dry;intact;no drainage 9/13/2019 12:00 PM   Closure Sutures 9/13/2019 12:00 PM   Base dry 9/13/2019 12:00 PM   Drainage Amount none 9/13/2019 12:00 PM   Care, Wound cleansed with;sterile normal saline 9/13/2019 12:00 PM   Dressing Care, Wound dressing changed;gauze;petroleum-based 9/13/2019 12:00 PM   Periwound Care, Wound dry periwound area maintained 9/12/2019  8:00 PM           Occupational Therapy Education     Title: PT OT SLP Therapies (Done)     Topic: Occupational Therapy (Done)     Point: ADL training (Done)     Description: Instruct learner(s) on proper safety adaptation and remediation techniques during self care or transfers.   Instruct in proper use of assistive devices.    Learning Progress Summary           Patient Acceptance, E,TB,D, VU,DU by KF at 9/11/2019  9:30 AM    Comment:  ADL retraining with AE for LBD    Acceptance, E,TB,D, VU,DU by KF at 9/10/2019  8:21 AM    Comment:  AE training LBD retraining, toileting  retraining with RW, RW positioning with ADLs    Acceptance, E, VU by AR at 9/8/2019  3:00 PM   Family Acceptance, E, VU by AR at 9/8/2019  3:00 PM                   Point: Precautions (Done)     Description: Instruct learner(s) on prescribed precautions during self-care and functional transfers.    Learning Progress Summary           Patient Acceptance, E,TB,D, VU,DU by KF at 9/11/2019  9:30 AM    Comment:  ADL retraining with AE for LBD    Acceptance, E,TB,D, VU,DU by KF at 9/10/2019  8:21 AM    Comment:  AE training LBD retraining, toileting retraining with RW, RW positioning with ADLs    Acceptance, E, VU by AR at 9/8/2019  3:00 PM   Family Acceptance, E, VU by AR at 9/8/2019  3:00 PM                   Point: Body mechanics (Done)     Description: Instruct learner(s) on proper positioning and spine alignment during self-care, functional mobility activities and/or exercises.    Learning Progress Summary           Patient Acceptance, E,TB,D, VU,DU by KF at 9/11/2019  9:30 AM    Comment:  ADL retraining with AE for LBD    Acceptance, E,TB,D, VU,DU by KF at 9/10/2019  8:21 AM    Comment:  AE training LBD retraining, toileting retraining with RW, RW positioning with ADLs    Acceptance, E, VU by AR at 9/8/2019  3:00 PM   Family Acceptance, E, VU by AR at 9/8/2019  3:00 PM                               User Key     Initials Effective Dates Name Provider Type Discipline    AR 06/22/15 -  Lesa Eden, OT Occupational Therapist OT     04/03/18 -  Abbey Miranda OT Occupational Therapist OT                OT Recommendation and Plan  Outcome Summary/Treatment Plan (OT)  Daily Summary of Progress (OT): progress toward functional goals is good  Plan for Continued Treatment (OT): cont OT POC  Anticipated Discharge Disposition (OT): home with assist, home with home health  Reason for Discharge (OT Discharge Summary): patient discharged from this facility  Daily Summary of Progress (OT): progress toward functional  goals is good  Plan of Care Review  Plan of Care Reviewed With: patient, daughter  Plan of Care Reviewed With: patient, daughter    Outcome Measures     Row Name 09/13/19 1313 09/11/19 0930          How much help from another is currently needed...    Putting on and taking off regular lower body clothing?  3  -SD  4  -KF     Bathing (including washing, rinsing, and drying)  3  -SD  3  -KF     Toileting (which includes using toilet bed pan or urinal)  3  -SD  3  -KF     Putting on and taking off regular upper body clothing  3  -SD  3  -KF     Taking care of personal grooming (such as brushing teeth)  4  -SD  3  -KF     Eating meals  4  -SD  4  -KF     AM-PAC 6 Clicks Score (OT)  20  -SD  20  -KF        Functional Assessment    Outcome Measure Options  AM-PAC 6 Clicks Daily Activity (OT)  -SD  AM-PAC 6 Clicks Daily Activity (OT)  -KF       User Key  (r) = Recorded By, (t) = Taken By, (c) = Cosigned By    Initials Name Provider Type    Mary Jane Sales, OT Occupational Therapist    Abbey Bear, OT Occupational Therapist           Time Calculation:    Time Calculation- OT     Row Name 09/13/19 1313             Time Calculation- OT    OT Start Time  1313  -SD      OT Stop Time  1326  -SD      OT Time Calculation (min)  13 min  -SD      OT Received On  09/13/19  -SD      OT Goal Re-Cert Due Date  09/18/19  -SD         Timed Charges    06310 - OT Therapeutic Exercise Minutes  5  -SD      90905 - OT Therapeutic Activity Minutes  8  -SD        User Key  (r) = Recorded By, (t) = Taken By, (c) = Cosigned By    Initials Name Provider Type    Mary Jane Sales, OT Occupational Therapist        Therapy Suggested Charges     Code   Minutes Charges    47087 (CPT®) Hc Ot Neuromusc Re Education Ea 15 Min      64314 (CPT®) Hc Ot Ther Proc Ea 15 Min 5     43018 (CPT®) Hc Ot Therapeutic Act Ea 15 Min 8 1    52641 (CPT®) Hc Ot Manual Therapy Ea 15 Min      16361 (CPT®) Hc Ot Iontophoresis Ea 15 Min      18518  (CPT®) Hc Ot Elec Stim Ea-Per 15 Min      83129 (CPT®) Hc Ot Ultrasound Ea 15 Min      83814 (CPT®) Hc Ot Self Care/Mgmt/Train Ea 15 Min      Total  13 1        Therapy Charges for Today     Code Description Service Date Service Provider Modifiers Qty    49847547386 HC OT THERAPEUTIC ACT EA 15 MIN 9/13/2019 Mary Jane Kang OT GO 1               OT Discharge Summary  Anticipated Discharge Disposition (OT): home with assist, home with home health    Mary Jane Kang OT  9/13/2019

## 2019-09-13 NOTE — PLAN OF CARE
Problem: Patient Care Overview  Goal: Plan of Care Review   09/13/19 0438   Coping/Psychosocial   Plan of Care Reviewed With patient;daughter   Plan of Care Review   Progress improving   OTHER   Outcome Summary VSS. No acute issues noted overnight. Rested well. Pt did require prn pain medication which she reports effective in pain control. Chest tube to waterseal with airleak and fluctuation still present. Encouraged increased activity/ambulation and incentive spirometer use. Will continue to monitor.

## 2019-09-13 NOTE — PROGRESS NOTES
CTS Progress Note      POD 12 s/p bronchoscopy, right VATS with right upper lobectomy.       LOS: 14 days   Patient Care Team:  Emily Ferreira MD as PCP - General (Family Medicine)  Yassine Brambila MD as PCP - Claims Attributed    Subjective  Up and about, feels much better today  And under good control    CC:     Objective    Vital Signs  Temp:  [98.3 °F (36.8 °C)-98.6 °F (37 °C)] 98.4 °F (36.9 °C)  Heart Rate:  [63-91] 80  Resp:  [16-18] 16  BP: (108-124)/(69-99) 116/99    Physical Exam:   General Appearance: alert, appears stated age and cooperative   Lungs: Decreased breath sounds right apex otherwise clear, good cough   Heart: regular rhythm & normal rate, normal S1, S2, no murmur, no gallop, no rub and no click   Skin: Warm, dry, incision c/d/i     Results       Results from last 7 days   Lab Units 09/12/19  0527   SODIUM mmol/L 138   POTASSIUM mmol/L 4.7   CHLORIDE mmol/L 99   CO2 mmol/L 25.0   BUN mg/dL 30*   CREATININE mg/dL 1.02*   GLUCOSE mg/dL 89   CALCIUM mg/dL 9.9           Imaging Results (last 24 hours)     Procedure Component Value Units Date/Time    XR Chest 1 View [[id number]] Collected:  09/13/19 0819     Updated:  09/13/19 0849    Narrative:       EXAMINATION: XR CHEST 1 VW-      INDICATION: Status post lobectomy; Z74.09-Other reduced mobility;  R91.1-Solitary pulmonary nodule; R91.8-Other nonspecific abnormal  finding of lung field.      COMPARISON: Chest radiograph 09/12/2019.     FINDINGS: Single portable chest radiograph was submitted for review.   Two large bore right-sided thoracotomy tubes are identified extending to  the right lung apex in similar position.  There remains a small right  apical pneumothorax measuring approximately 2.5 cm, similar to  09/12/2019. The remainder of the right lung is well aerated. The left  lung is clear. Right-sided chest wall subcutaneous emphysema is  identified. Visualized upper abdomen is reviewed.       Impression:       Similar position of two large  bore right-sided thoracotomy  tubes with similar small right apical pneumothorax.     D:  09/13/2019  E:  09/13/2019       XR Chest 1 View [[id number]] Collected:  09/12/19 0835     Updated:  09/12/19 1508    Narrative:       EXAMINATION: XR CHEST 1 VW- 09/12/2019     INDICATION: s/p lobectomy; Z74.09-Other reduced mobility; R91.1-Solitary  pulmonary nodule; R91.8-Other nonspecific abnormal finding of lung field        COMPARISON: 09/11/2019     FINDINGS: Right apical pneumothorax may be a little larger,  approximately 24 mm compared to 15 mm of apical pleural separation on  yesterday's study. Mild right-sided subcutaneous emphysema however, is  unchanged. No new pulmonary parenchymal disease is seen. No effusion or  edema is seen. Heart is normal in size.           Impression:       Small right apical pneumothorax, which appears a little  larger than yesterday's study. Otherwise stable exam.     D:  09/12/2019  E:  09/12/2019     This report was finalized on 9/12/2019 3:04 PM by DR. Nitin Corbin MD.             Assessment      Mass of upper lobe of right lung    Lung nodule  Chest x-ray shows a small apical pneumothorax same as yesterday  She is doing well off suction      Plan   Will place pneumo stat and discharge home today.    BELLO Fitzgerald  09/13/19  9:56 AM

## 2019-09-14 ENCOUNTER — READMISSION MANAGEMENT (OUTPATIENT)
Dept: CALL CENTER | Facility: HOSPITAL | Age: 64
End: 2019-09-14

## 2019-09-14 NOTE — OUTREACH NOTE
Prep Survey      Responses   Facility patient discharged from?  [facility]   Is patient eligible?  Yes   Discharge diagnosis  Mass of upper lobe of right lung   Does the patient have one of the following disease processes/diagnoses(primary or secondary)?  Cardiothoracic surgery   Does the patient have Home health ordered?  No   Is there a DME ordered?  No   Prep survey completed?  Yes          Heidi Rust RN

## 2019-09-16 ENCOUNTER — HOSPITAL ENCOUNTER (INPATIENT)
Facility: HOSPITAL | Age: 64
LOS: 1 days | Discharge: HOME | End: 2019-09-18
Attending: EMERGENCY MEDICINE | Admitting: FAMILY MEDICINE

## 2019-09-16 ENCOUNTER — HOSPITAL ENCOUNTER (EMERGENCY)
Facility: HOSPITAL | Age: 64
Discharge: HOME | End: 2019-09-16
Attending: EMERGENCY MEDICINE | Admitting: EMERGENCY MEDICINE

## 2019-09-16 ENCOUNTER — APPOINTMENT (OUTPATIENT)
Dept: GENERAL RADIOLOGY | Facility: HOSPITAL | Age: 64
End: 2019-09-16

## 2019-09-16 ENCOUNTER — TELEPHONE (OUTPATIENT)
Dept: CARDIAC SURGERY | Facility: CLINIC | Age: 64
End: 2019-09-16

## 2019-09-16 VITALS
TEMPERATURE: 98.2 F | SYSTOLIC BLOOD PRESSURE: 136 MMHG | WEIGHT: 169 LBS | OXYGEN SATURATION: 96 % | HEIGHT: 60 IN | BODY MASS INDEX: 33.18 KG/M2 | DIASTOLIC BLOOD PRESSURE: 88 MMHG | RESPIRATION RATE: 20 BRPM | HEART RATE: 89 BPM

## 2019-09-16 DIAGNOSIS — R07.9 RIGHT-SIDED CHEST PAIN: Primary | ICD-10-CM

## 2019-09-16 DIAGNOSIS — Z87.09 HISTORY OF COPD: ICD-10-CM

## 2019-09-16 DIAGNOSIS — Z86.79 HISTORY OF HYPERTENSION: ICD-10-CM

## 2019-09-16 DIAGNOSIS — J93.82 AIR LEAK: ICD-10-CM

## 2019-09-16 DIAGNOSIS — T85.698A: Primary | ICD-10-CM

## 2019-09-16 DIAGNOSIS — Z98.890 H/O CHEST TUBE PLACEMENT: ICD-10-CM

## 2019-09-16 DIAGNOSIS — R52 INTRACTABLE PAIN: ICD-10-CM

## 2019-09-16 DIAGNOSIS — F17.200 TOBACCO DEPENDENCE: ICD-10-CM

## 2019-09-16 DIAGNOSIS — C34.11 PRIMARY ADENOCARCINOMA OF UPPER LOBE OF RIGHT LUNG (HCC): ICD-10-CM

## 2019-09-16 PROCEDURE — 99285 EMERGENCY DEPT VISIT HI MDM: CPT

## 2019-09-16 PROCEDURE — 71045 X-RAY EXAM CHEST 1 VIEW: CPT

## 2019-09-16 PROCEDURE — 99283 EMERGENCY DEPT VISIT LOW MDM: CPT

## 2019-09-16 RX ORDER — HYDROCODONE BITARTRATE AND ACETAMINOPHEN 5; 325 MG/1; MG/1
1 TABLET ORAL ONCE
Status: COMPLETED | OUTPATIENT
Start: 2019-09-16 | End: 2019-09-16

## 2019-09-16 RX ADMIN — HYDROCODONE BITARTRATE AND ACETAMINOPHEN 1 TABLET: 5; 325 TABLET ORAL at 04:50

## 2019-09-16 NOTE — DISCHARGE SUMMARY
CTS Discharge Summary    Patient Care Team:  Emily Ferreira MD as PCP - General (Family Medicine)  Yassine Brambila MD as PCP - Claims Attributed      Date of Admission: 8/30/2019  6:12 AM  Date of Discharge: 9/13/2019    Discharge Diagnosis  Past Medical History:   Diagnosis Date   • Acid reflux    • Arthritis    • Cataract, bilateral    • COPD (chronic obstructive pulmonary disease) (CMS/HCC)    • Hypertension    • Leg cramps    • Lung mass     right   • SOB (shortness of breath)     xray showed spot on lungs     Patient Active Problem List   Diagnosis   • Colon cancer screening   • Epigastric pain   • Heartburn   • Lesion of colon   • History of colon polyps   • Family history of colon cancer   • Constipation   • Renal insufficiency   • Acute renal failure (ARF) (CMS/HCC)   • Mass of upper lobe of right lung   • Pain in both lower extremities   • Essential hypertension   • Abnormal CT of the chest   • Lung nodule < 6cm on CT   • Lung nodule         Mass of upper lobe of right lung    Lung nodule      History of Present Illness63-year-old -American female with a history of hypertension, hyperlipidemia, COPD and active tobacco abuse who presented with a PET active 3.2 cm spiculated right lung nodule concerning for malignancy.  Her adrenal nodule was not PET active and was felt to be unrelated after review with radiologist Dr. Nitin Corbin.  The patient was felt to be a reasonable candidate for bronchoscopy, right VATS, upper lobe wedge resection, possible lobectomy, mediastinal lymph node dissection and intercostal nerve blocks with cryoablation. The risks and benefits of surgery were discussed with the patient including pain, bleeding, infection, air leak, myocardial infarction and death. The patient understood these risks and wished to proceed with surgery.         Hospital Course  Patient is a 63 y.o. female admitted for a spiculated right lung nodule.  After admission patient taken operating suite and  underwent a bronchoscopy then of thoracoscopy with a right upper lobe wedge then completion right upper lobectomy.  Patient then had an intercostal cryoablation of the right chest along with a lymph node dissection.  Patient tolerated procedure well.  Was closed, taken to the recovery room then to the intensive care unit.  Overnight was seen by the hospital intensivist on the intensive care unit.  The following morning pain was under good control.  Patient had had 260 mL of drainage out the previous 24 hours continued with a small air leak.  Postop day #3 patient transferred up to telemetry.  Continues with minimal drainage from the chest tubes, however, patient continues with a persistent air leak.  Chest tubes were placed under waterseal.  Pathology came back:  Final Diagnosis   1. RIGHT UPPER LOBE WEDGE EXCISION:  Infiltrating moderately differentiated adenocarcinoma.  Tumor size 3.5x2.8x2.5 cm.  No pleural involvement identified.   No lymphvascular invasion identified. See Template.  2. RIGHT UPPER LOBE COMPLETION LOBECTOMY:  Pulmonary parenchymal fibrosis and emphysematous change; no tumor identified.   Bronchial and vascular margins of excision negative for tumor.  3. MEDIASTINAL LYMPH NODE RIGHT CHEST, STATION 2R:  Sinus histiocytosis and anthracosis; no tumor or granulomas identified (0/1).  4. MEDIASTINAL LYMPH NODE, STATION 4R:  Sinus histiocytosis and anthracosis; no tumor identified (0/1).  5. SUBMITTED AS MEDIASTINAL LYMPH NODE, STATION 7:  Sinus histiocytosis and anthracosis; no tumor seen (0/1).     LUNG CARCINOMA  SPECIMEN TYPE/PROCEDURE:  Wedge excision with completion lobectomy   SPECIMEN INTEGRITY:  Intact   LATERALITY (Left or Right):  Right   TUMOR FOCALITY:  Unifocal   TUMOR SITE:  Right upper lobe   TUMOR SIZE (greatest dimension, other than invasive adenocarcinoma with lepidic component):  3.5x2.8x2.5 cm   TOTAL TUMOR SIZE (Invasive and lepidic components): 3.5x2.8x2.5 cm   INVASIVE TUMOR  "SIZE (Invasive adenocarcinoma with lepidic component): 3.5x2.8x2.5 cm   HISTOLOGIC TYPE:  Adenocarcinoma   HISTOLOGIC GRADE:  Grade 2   VISCERAL PLEURAL INVASION: Absent   DIRECT INVASION OF ADJACENT STRUCTURES (No adjacent structures or specify): Absent   BRONCHIAL MARGIN:  Negative   VASCULAR MARGIN:  Negative   LYMPHVASCULAR INVASION:  Absent   PARENCHYMAL MARGIN:  Negative   CHEST WALL MARGIN:  Unknown   OTHER TISSUE MARGIN:  N/A   CLOSEST MARGIN (Specify): Pleura   DISTANCE OF INVASIVE CARCINOMA FROM CLOSEST MARGIN: <1 mm  DISTANCE OF CARCINOMA IN-SITU FOR CLOSEST MARGIN: N/A   REGIONAL LYMPH NODE STATUS:  Submitted   NUMBER OF LYMPH NODES EXAMINED: 3  LINDA STATIONS EXAMINED: 2R, 4R, station 7, fragments of hilar nodes  NUMBER OF LYMPH NODES INVOLVED: 0  LINDA STATIONS INVOLVED: None  EXTRANODAL TUMOR SPREAD:  N/A   TREATMENT EFFECT:  None  OTHER METASTATIC SITES: Unknown    OTHER NEOPLASM SITES: Unknown   ADDITIONAL PATHOLOGIC FINDINGS:  Emphysematous changes in the fibrosis  ANCILLARY STUDIES: None   BIOMARKER REPORTING SPECIMEN ADEQUACY:  Tissue exists for additional molecular markers upon request  AJCC PATHOLOGIC STAGE:  (COMPLETED BY PATHOLOGIST, BASED ONLY ON TISSUE FINDINGS, MORE EXTENSIVE DISEASE MAY NOT BE KNOWN TO THE PATHOLOGIST)  pT= 2a  pN= 0   AJCC PATHOLOGIC STAGE:  IB   DGD/mbc    Electronically signed by Storm Acosta MD on 9/4/2019 at 1757   Intraoperative Consultation    Frozen section and touch preparation: Verbal report given to Dr. Barreto via speakerphone on 8/30/2019 at 11:54 AM  FROZEN SECTION AND TOUCH PREPARATION DIAGNOSIS: Non-small cell carcinoma, favor adenocarcinoma (PCC)     Frozen section: Verbal report given to Dr. Barreto via speakerphone on 8/30/2019 at 1:20 PM.  FROZEN SECTION DIAGNOSIS: Spec\"B\" FS2 - right upper lobe,for bronchial margin - negative for tumor. (DGD)         By 9/4/2019 patient continues with a flutter valve and a percussive vest to help with incentive " spirometry.  She is encouraged to ambulate, weak at first however slowly improving.  Postop day #7 she is a little constipated given a bowel regiment with good results.  She continues with a persistent air leak.  Continues with minimal drainage.  Is ambulating much better at this point.  By 9/9/2019 chest x-ray showed a very small apical pneumothorax but she is doing fairly well with her chest tube off suction.  She is converted to a pneumo stat.  A recheck of chest x-ray shows no increase of apical pneumothorax.  And she is tolerating it well.  She is thus discharged home.  She is to follow-up with Dr. Barreto's office in 2 weeks time for recheck a chest x-ray and pneumo stat to see if it can be removed.    Procedures Performed  Procedure(s):  BRONCHOSCOPY,  THORACOSCOPY VIDEO ASSISTED with right upper lobe wedge RESECTION , RIGHT UPPER lobectomy with mediastinal lymph node dissection AND INTERCOSTAL CRYOABLATION OF RIGHT CHEST       Consults:   Consults     No orders found from 8/1/2019 to 8/31/2019.            Discharge Medications     Discharge Medications      New Medications      Instructions Start Date   HYDROcodone-acetaminophen 7.5-325 MG per tablet  Commonly known as:  NORCO   1 tablet, Oral, Every 4 Hours PRN         Continue These Medications      Instructions Start Date   aspirin 81 MG EC tablet   81 mg, Oral, Daily      carvedilol 3.125 MG tablet  Commonly known as:  COREG   3.125 mg, Oral, Every 12 Hours Scheduled      rOPINIRole 0.5 MG tablet  Commonly known as:  REQUIP   0.5 mg, Oral, Nightly, Take 1 hour before bedtime.      tiotropium bromide-olodaterol 2.5-2.5 MCG/ACT aerosol solution inhaler  Commonly known as:  STIOLTO RESPIMAT   2 puffs, Inhalation, Daily             Discharge Diet:   Diet Instructions     Diet: Cardiac; Thin      Discharge Diet:  Cardiac    Fluid Consistency:  Thin          Activity at Discharge:   Activity Instructions     Bedrest With Bathroom Privileges      Lifting  Restrictions      Type of Restriction:  Lifting    Lifting Restrictions:  Lifting Restriction (Indicate Limit)    Weight Limit (Pounds):  10    Length of Lifting Restriction:  3 weeks        Do not drive while taking narcotics    Follow-up Appointments  Future Appointments   Date Time Provider Department Center   9/30/2019  3:00 PM Shannon Plunkett APRN [facility] None   10/1/2019 12:45 PM Kole Jackman PA-C [facility] None   10/1/2019  3:45 PM Zackery Siddiqui MD [facility] None   10/24/2019  2:30 PM [facility] 1  [facility]     This discharge took less than 30 minutes to compile.     BELLO Fitzgerald  09/16/19  9:51 AM

## 2019-09-16 NOTE — TELEPHONE ENCOUNTER
----- Message from Brian Barreto MD sent at 9/13/2019  2:56 PM EDT -----  Please arrange for office follow-up in [facility] in 2 weeks for pneumostat removal.  Thanks

## 2019-09-17 ENCOUNTER — APPOINTMENT (OUTPATIENT)
Dept: GENERAL RADIOLOGY | Facility: HOSPITAL | Age: 64
End: 2019-09-17

## 2019-09-17 ENCOUNTER — APPOINTMENT (OUTPATIENT)
Dept: CT IMAGING | Facility: HOSPITAL | Age: 64
End: 2019-09-17

## 2019-09-17 ENCOUNTER — READMISSION MANAGEMENT (OUTPATIENT)
Dept: CALL CENTER | Facility: HOSPITAL | Age: 64
End: 2019-09-17

## 2019-09-17 DIAGNOSIS — C34.91 PRIMARY ADENOCARCINOMA OF RIGHT LUNG (HCC): Primary | ICD-10-CM

## 2019-09-17 PROBLEM — R07.9 RIGHT-SIDED CHEST PAIN: Status: ACTIVE | Noted: 2019-09-17

## 2019-09-17 PROBLEM — J93.82 AIR LEAK: Status: ACTIVE | Noted: 2019-09-17

## 2019-09-17 PROBLEM — J44.9 COPD (CHRONIC OBSTRUCTIVE PULMONARY DISEASE): Status: ACTIVE | Noted: 2019-09-17

## 2019-09-17 PROBLEM — C18.9 COLON CANCER (HCC): Status: ACTIVE | Noted: 2019-09-17

## 2019-09-17 PROBLEM — R59.0 MEDIASTINAL LYMPHADENOPATHY: Status: ACTIVE | Noted: 2019-09-17

## 2019-09-17 PROBLEM — Z72.0 TOBACCO ABUSE: Status: ACTIVE | Noted: 2019-09-17

## 2019-09-17 LAB
ALBUMIN SERPL-MCNC: 4.6 G/DL (ref 3.5–5.2)
ALBUMIN/GLOB SERPL: 1.3 G/DL
ALP SERPL-CCNC: 125 U/L (ref 39–117)
ALT SERPL W P-5'-P-CCNC: 16 U/L (ref 1–33)
ANION GAP SERPL CALCULATED.3IONS-SCNC: 12 MMOL/L (ref 5–15)
AST SERPL-CCNC: 23 U/L (ref 1–32)
BASOPHILS # BLD AUTO: 0.04 10*3/MM3 (ref 0–0.2)
BASOPHILS NFR BLD AUTO: 0.4 % (ref 0–1.5)
BILIRUB SERPL-MCNC: <0.2 MG/DL (ref 0.2–1.2)
BUN BLD-MCNC: 23 MG/DL (ref 8–23)
BUN BLDA-MCNC: 27 MG/DL (ref 8–26)
BUN/CREAT SERPL: 24.2 (ref 7–25)
CA-I BLDA-SCNC: 1.21 MMOL/L (ref 1.2–1.32)
CALCIUM SPEC-SCNC: 10 MG/DL (ref 8.6–10.5)
CHLORIDE BLDA-SCNC: 101 MMOL/L (ref 98–109)
CHLORIDE SERPL-SCNC: 97 MMOL/L (ref 98–107)
CO2 BLDA-SCNC: 28 MMOL/L (ref 24–29)
CO2 SERPL-SCNC: 28 MMOL/L (ref 22–29)
CREAT BLD-MCNC: 0.95 MG/DL (ref 0.57–1)
CREAT BLDA-MCNC: 1 MG/DL (ref 0.6–1.3)
DEPRECATED RDW RBC AUTO: 56.3 FL (ref 37–54)
EOSINOPHIL # BLD AUTO: 0.93 10*3/MM3 (ref 0–0.4)
EOSINOPHIL NFR BLD AUTO: 8.8 % (ref 0.3–6.2)
ERYTHROCYTE [DISTWIDTH] IN BLOOD BY AUTOMATED COUNT: 16.8 % (ref 12.3–15.4)
GFR SERPL CREATININE-BSD FRML MDRD: 72 ML/MIN/1.73
GLOBULIN UR ELPH-MCNC: 3.6 GM/DL
GLUCOSE BLD-MCNC: 92 MG/DL (ref 65–99)
GLUCOSE BLDC GLUCOMTR-MCNC: 100 MG/DL (ref 70–130)
HCT VFR BLD AUTO: 32.6 % (ref 34–46.6)
HCT VFR BLDA CALC: 33 % (ref 38–51)
HGB BLD-MCNC: 10.2 G/DL (ref 12–15.9)
HGB BLDA-MCNC: 11.2 G/DL (ref 12–17)
IMM GRANULOCYTES # BLD AUTO: 0.04 10*3/MM3 (ref 0–0.05)
IMM GRANULOCYTES NFR BLD AUTO: 0.4 % (ref 0–0.5)
LYMPHOCYTES # BLD AUTO: 1.83 10*3/MM3 (ref 0.7–3.1)
LYMPHOCYTES NFR BLD AUTO: 17.2 % (ref 19.6–45.3)
MCH RBC QN AUTO: 28.3 PG (ref 26.6–33)
MCHC RBC AUTO-ENTMCNC: 31.3 G/DL (ref 31.5–35.7)
MCV RBC AUTO: 90.3 FL (ref 79–97)
MONOCYTES # BLD AUTO: 1.03 10*3/MM3 (ref 0.1–0.9)
MONOCYTES NFR BLD AUTO: 9.7 % (ref 5–12)
NEUTROPHILS # BLD AUTO: 6.75 10*3/MM3 (ref 1.7–7)
NEUTROPHILS NFR BLD AUTO: 63.5 % (ref 42.7–76)
NRBC BLD AUTO-RTO: 0 /100 WBC (ref 0–0.2)
PLATELET # BLD AUTO: 466 10*3/MM3 (ref 140–450)
PMV BLD AUTO: 9.7 FL (ref 6–12)
POTASSIUM BLD-SCNC: 4.4 MMOL/L (ref 3.5–5.2)
POTASSIUM BLDA-SCNC: 4.3 MMOL/L (ref 3.5–4.9)
PROT SERPL-MCNC: 8.2 G/DL (ref 6–8.5)
RBC # BLD AUTO: 3.61 10*6/MM3 (ref 3.77–5.28)
SODIUM BLD-SCNC: 137 MMOL/L (ref 136–145)
SODIUM BLDA-SCNC: 136 MMOL/L (ref 138–146)
WBC NRBC COR # BLD: 10.62 10*3/MM3 (ref 3.4–10.8)

## 2019-09-17 PROCEDURE — 99223 1ST HOSP IP/OBS HIGH 75: CPT | Performed by: FAMILY MEDICINE

## 2019-09-17 PROCEDURE — 93005 ELECTROCARDIOGRAM TRACING: CPT | Performed by: PHYSICIAN ASSISTANT

## 2019-09-17 PROCEDURE — 99223 1ST HOSP IP/OBS HIGH 75: CPT | Performed by: INTERNAL MEDICINE

## 2019-09-17 PROCEDURE — 25010000002 MORPHINE PER 10 MG: Performed by: EMERGENCY MEDICINE

## 2019-09-17 PROCEDURE — 80047 BASIC METABLC PNL IONIZED CA: CPT

## 2019-09-17 PROCEDURE — 25010000002 HEPARIN (PORCINE) PER 1000 UNITS: Performed by: FAMILY MEDICINE

## 2019-09-17 PROCEDURE — 85014 HEMATOCRIT: CPT

## 2019-09-17 PROCEDURE — 71275 CT ANGIOGRAPHY CHEST: CPT

## 2019-09-17 PROCEDURE — 25010000002 MORPHINE PER 10 MG: Performed by: FAMILY MEDICINE

## 2019-09-17 PROCEDURE — 80053 COMPREHEN METABOLIC PANEL: CPT | Performed by: PHYSICIAN ASSISTANT

## 2019-09-17 PROCEDURE — 0 IOPAMIDOL PER 1 ML: Performed by: EMERGENCY MEDICINE

## 2019-09-17 PROCEDURE — 99024 POSTOP FOLLOW-UP VISIT: CPT | Performed by: PHYSICIAN ASSISTANT

## 2019-09-17 PROCEDURE — 71045 X-RAY EXAM CHEST 1 VIEW: CPT

## 2019-09-17 PROCEDURE — 85025 COMPLETE CBC W/AUTO DIFF WBC: CPT | Performed by: PHYSICIAN ASSISTANT

## 2019-09-17 RX ORDER — SODIUM CHLORIDE 0.9 % (FLUSH) 0.9 %
10 SYRINGE (ML) INJECTION AS NEEDED
Status: DISCONTINUED | OUTPATIENT
Start: 2019-09-17 | End: 2019-09-18 | Stop reason: HOSPADM

## 2019-09-17 RX ORDER — ROPINIROLE 0.5 MG/1
0.5 TABLET, FILM COATED ORAL NIGHTLY
Status: DISCONTINUED | OUTPATIENT
Start: 2019-09-17 | End: 2019-09-18 | Stop reason: HOSPADM

## 2019-09-17 RX ORDER — HEPARIN SODIUM 5000 [USP'U]/ML
5000 INJECTION, SOLUTION INTRAVENOUS; SUBCUTANEOUS EVERY 8 HOURS SCHEDULED
Status: DISCONTINUED | OUTPATIENT
Start: 2019-09-17 | End: 2019-09-18 | Stop reason: HOSPADM

## 2019-09-17 RX ORDER — CYCLOBENZAPRINE HCL 10 MG
5 TABLET ORAL ONCE
Status: COMPLETED | OUTPATIENT
Start: 2019-09-17 | End: 2019-09-17

## 2019-09-17 RX ORDER — HYDROCODONE BITARTRATE AND ACETAMINOPHEN 7.5; 325 MG/1; MG/1
1 TABLET ORAL EVERY 6 HOURS PRN
Status: DISCONTINUED | OUTPATIENT
Start: 2019-09-17 | End: 2019-09-18 | Stop reason: HOSPADM

## 2019-09-17 RX ORDER — ONDANSETRON 4 MG/1
4 TABLET, FILM COATED ORAL EVERY 6 HOURS PRN
Status: DISCONTINUED | OUTPATIENT
Start: 2019-09-17 | End: 2019-09-18 | Stop reason: HOSPADM

## 2019-09-17 RX ORDER — SODIUM CHLORIDE 0.9 % (FLUSH) 0.9 %
10 SYRINGE (ML) INJECTION EVERY 12 HOURS SCHEDULED
Status: DISCONTINUED | OUTPATIENT
Start: 2019-09-17 | End: 2019-09-18 | Stop reason: HOSPADM

## 2019-09-17 RX ORDER — ONDANSETRON 4 MG/1
4 TABLET, ORALLY DISINTEGRATING ORAL ONCE
Status: COMPLETED | OUTPATIENT
Start: 2019-09-17 | End: 2019-09-17

## 2019-09-17 RX ORDER — CARVEDILOL 3.12 MG/1
3.12 TABLET ORAL EVERY 12 HOURS SCHEDULED
Status: DISCONTINUED | OUTPATIENT
Start: 2019-09-17 | End: 2019-09-18 | Stop reason: HOSPADM

## 2019-09-17 RX ORDER — ASPIRIN 81 MG/1
81 TABLET ORAL DAILY
Status: DISCONTINUED | OUTPATIENT
Start: 2019-09-17 | End: 2019-09-18 | Stop reason: HOSPADM

## 2019-09-17 RX ORDER — HYDROCODONE BITARTRATE AND ACETAMINOPHEN 7.5; 325 MG/1; MG/1
1 TABLET ORAL ONCE
Status: COMPLETED | OUTPATIENT
Start: 2019-09-17 | End: 2019-09-17

## 2019-09-17 RX ORDER — MORPHINE SULFATE 2 MG/ML
2 INJECTION, SOLUTION INTRAMUSCULAR; INTRAVENOUS
Status: DISCONTINUED | OUTPATIENT
Start: 2019-09-17 | End: 2019-09-18 | Stop reason: HOSPADM

## 2019-09-17 RX ORDER — MORPHINE SULFATE 2 MG/ML
2 INJECTION, SOLUTION INTRAMUSCULAR; INTRAVENOUS ONCE
Status: COMPLETED | OUTPATIENT
Start: 2019-09-17 | End: 2019-09-17

## 2019-09-17 RX ADMIN — ONDANSETRON 4 MG: 4 TABLET, ORALLY DISINTEGRATING ORAL at 01:09

## 2019-09-17 RX ADMIN — IOPAMIDOL 60 ML: 755 INJECTION, SOLUTION INTRAVENOUS at 04:20

## 2019-09-17 RX ADMIN — MORPHINE SULFATE 2 MG: 2 INJECTION, SOLUTION INTRAMUSCULAR; INTRAVENOUS at 08:28

## 2019-09-17 RX ADMIN — ROPINIROLE 0.5 MG: 0.5 TABLET, FILM COATED ORAL at 21:56

## 2019-09-17 RX ADMIN — MORPHINE SULFATE 2 MG: 2 INJECTION, SOLUTION INTRAMUSCULAR; INTRAVENOUS at 06:04

## 2019-09-17 RX ADMIN — HYDROCODONE BITARTRATE AND ACETAMINOPHEN 1 TABLET: 7.5; 325 TABLET ORAL at 01:08

## 2019-09-17 RX ADMIN — HEPARIN SODIUM 5000 UNITS: 5000 INJECTION, SOLUTION INTRAVENOUS; SUBCUTANEOUS at 21:56

## 2019-09-17 RX ADMIN — CYCLOBENZAPRINE HYDROCHLORIDE 5 MG: 10 TABLET, FILM COATED ORAL at 03:41

## 2019-09-17 RX ADMIN — SODIUM CHLORIDE, PRESERVATIVE FREE 10 ML: 5 INJECTION INTRAVENOUS at 21:57

## 2019-09-17 RX ADMIN — HEPARIN SODIUM 5000 UNITS: 5000 INJECTION, SOLUTION INTRAVENOUS; SUBCUTANEOUS at 16:59

## 2019-09-17 RX ADMIN — HYDROCODONE BITARTRATE AND ACETAMINOPHEN 1 TABLET: 7.5; 325 TABLET ORAL at 08:28

## 2019-09-17 RX ADMIN — MORPHINE SULFATE 2 MG: 2 INJECTION, SOLUTION INTRAMUSCULAR; INTRAVENOUS at 03:40

## 2019-09-17 RX ADMIN — HYDROCODONE BITARTRATE AND ACETAMINOPHEN 1 TABLET: 7.5; 325 TABLET ORAL at 16:59

## 2019-09-17 RX ADMIN — HYDROCODONE BITARTRATE AND ACETAMINOPHEN 1 TABLET: 7.5; 325 TABLET ORAL at 23:10

## 2019-09-17 RX ADMIN — CARVEDILOL 3.12 MG: 3.12 TABLET, FILM COATED ORAL at 10:15

## 2019-09-17 RX ADMIN — ASPIRIN 81 MG: 81 TABLET, COATED ORAL at 10:15

## 2019-09-17 NOTE — CONSULTS
Subjective     CHIEF COMPLAINT: Shortness of breath and pain at chest tube site    HISTORY OF PRESENT ILLNESS:  The patient is a 63 y.o. female, referred by Malika Rod MD for new diagnosis of non-small cell lung cancer.  Patient status post right upper lobe resection done by Dr. Barreto August 30, 2019 final pathology revealed 3.4 cm adenocarcinoma in the right upper lobe with a clear surgical margins and negative hilar and mediastinal lymph nodes.  Patient was discharged home after prolonged admission September 13, 2019.  She presented again yesterday to [facility] emergency room complaining of pain at the right chest tube site.  She felt it got dislocated.  She was admitted to hospitalist service and I was consulted to further assist in her care.  When I saw the patient today she is laying comfortable in bed.  She is feeling better compared to yesterday.  Chest tube has been removed earlier on today.  He still having mild pain at surgical site.  Her lung cancer was incidentally noted on chest x-ray during hospital admission for fatigue weakness and nonspecific symptoms.  She did not have biopsy prior to her procedure although she got diagnosed intraoperatively.    REVIEW OF SYSTEMS:  A 14 point review of systems was performed and is negative except as noted above.    Past Medical History:   Diagnosis Date   • Acid reflux    • Arthritis    • Cataract, bilateral    • COPD (chronic obstructive pulmonary disease) (CMS/[facility])    • Hypertension    • Leg cramps    • Lung mass     right   • SOB (shortness of breath)     xray showed spot on lungs       Current Facility-Administered Medications on File Prior to Encounter   Medication Dose Route Frequency Provider Last Rate Last Dose   • Chlorhexidine Gluconate Cloth 2 % pads 1 application  1 application Topical Q12H PRN Candelaria Novak PA-C         Current Outpatient Medications on File Prior to Encounter   Medication Sig Dispense Refill   • aspirin 81 MG  EC tablet Take 81 mg by mouth Daily.     • carvedilol (COREG) 3.125 MG tablet Take 3.125 mg by mouth Every 12 (Twelve) Hours.     • HYDROcodone-acetaminophen (NORCO) 7.5-325 MG per tablet Take 1 tablet by mouth Every 4 (Four) Hours As Needed for Moderate Pain . 30 tablet 0   • rOPINIRole (REQUIP) 0.5 MG tablet Take 1 tablet by mouth Every Night. Take 1 hour before bedtime. 30 tablet 0   • tiotropium bromide-olodaterol (STIOLTO RESPIMAT) 2.5-2.5 MCG/ACT aerosol solution inhaler Inhale 2 puffs Daily. 1 inhaler 5       No Known Allergies    Past Surgical History:   Procedure Laterality Date   • BRONCHOSCOPY N/A 6/13/2019    Procedure: BRONCHOSCOPY WITH FLUOROSCOPY, BIOPSY, BRUSHINGS, AND WASHINGS;  Surgeon: Gudelia Patel MD;  Location: [facility] OR;  Service: Pulmonary   • COLONOSCOPY N/A 2/23/2017    Procedure: COLONOSCOPY with hot and cold snare polypectomies,  hot biopsy polypectomies, biopsies, resolution clip placement;  Surgeon: Zackery Siddiqui MD;  Location: [facility] ENDOSCOPY;  Service:    • COLONOSCOPY N/A 6/4/2018    Procedure: COLONOSCOPY WITH HOT SNARE POLYPECTOMY X 7; COLD SNARE POLYPECTOMY X 3; HOT BIOPSY POLYPECTOMY X 20; COLD BIOPSY POLYPECTOMY X 2; THERMAL ABLATION OF COLON POLYPS X 23; CLIP PLACEMENT X 2; BIOPSIES;  Surgeon: Zackery Siddiqui MD;  Location: [facility] ENDOSCOPY;  Service: Gastroenterology   • COLONOSCOPY N/A 6/19/2019    Procedure: COLONOSCOPY with cold biopsy polypectomies and cold biopsy;  Surgeon: Zackery Siddiqui MD;  Location: [facility] ENDOSCOPY;  Service: Gastroenterology   • ENDOSCOPY  06/13/2019   • MULTIPLE TOOTH EXTRACTIONS      full extraction   • ORIF TIBIA/FIBULA FRACTURES Left    • THORACOSCOPY Right 8/30/2019    Procedure: BRONCHOSCOPY,  THORACOSCOPY VIDEO ASSISTED with right upper lobe wedge RESECTION , RIGHT UPPER lobectomy with mediastinal lymph node dissection AND INTERCOSTAL CRYOABLATION OF RIGHT CHEST;  Surgeon: Brian Barreto MD;  Location: [facility] OR;  Service:  Cardiothoracic   • TUBAL ABDOMINAL LIGATION         OB History   No data available       Social History     Socioeconomic History   • Marital status:      Spouse name: Not on file   • Number of children: 4   • Years of education: Not on file   • Highest education level: Not on file   Occupational History     Employer: UNEMPLOYED   Tobacco Use   • Smoking status: Current Every Day Smoker     Packs/day: 1.00     Years: 50.00     Pack years: 50.00     Types: Cigarettes   • Smokeless tobacco: Never Used   • Tobacco comment: cutting down on cigarettes   Substance and Sexual Activity   • Alcohol use: Yes     Alcohol/week: 1.2 oz     Types: 2 Glasses of wine per week     Comment: once in a while    • Drug use: No   • Sexual activity: Defer   Social History Narrative    Lives in [place], [place]       Family History   Problem Relation Age of Onset   • Cirrhosis Brother    • Liver disease Brother    • Colon cancer Brother         Possibly colon cancer, patient unsure.   • Cancer Mother    • No Known Problems Father    • Stomach cancer Neg Hx    • Esophageal cancer Neg Hx        Objective     Vitals:    09/17/19 0610 09/17/19 0753 09/17/19 1015 09/17/19 1127   BP: 109/73 141/81 101/65 (!) 85/50   BP Location:  Left arm  Left arm   Patient Position:  Lying  Lying   Pulse: 85 69 65    Resp: 14 16  16   Temp:  97.7 °F (36.5 °C)  98 °F (36.7 °C)   TempSrc:  Oral  Oral   SpO2: 99% 95%     Weight:       Height:                ECOG Performance Status: 1 - Symptomatic but completely ambulatory  General: well appearing female in no acute distress  Neuro/Psych: A&O x 3, gait steady, appropriate affect, strength 5/5 in all muscle groups  HEENT: sclerae anicteric, oropharynx clear  Lymphatics: no cervical, supraclavicular, or axillary adenopathy  Cardiovascular: regular rate and rhythm, no murmurs  Lungs: clear to auscultation bilaterally  Abdomen: soft, nontender, nondistended.  No palpable organomegaly  Extremities: no lower  extremity edema  Skin: no rashes, lesions, bruising, or petechiae      Admission on 09/16/2019   Component Date Value Ref Range Status   • Glucose 09/17/2019 92  65 - 99 mg/dL Final   • BUN 09/17/2019 23  8 - 23 mg/dL Final   • Creatinine 09/17/2019 0.95  0.57 - 1.00 mg/dL Final   • Sodium 09/17/2019 137  136 - 145 mmol/L Final   • Potassium 09/17/2019 4.4  3.5 - 5.2 mmol/L Final   • Chloride 09/17/2019 97* 98 - 107 mmol/L Final   • CO2 09/17/2019 28.0  22.0 - 29.0 mmol/L Final   • Calcium 09/17/2019 10.0  8.6 - 10.5 mg/dL Final   • Total Protein 09/17/2019 8.2  6.0 - 8.5 g/dL Final   • Albumin 09/17/2019 4.60  3.50 - 5.20 g/dL Final   • ALT (SGPT) 09/17/2019 16  1 - 33 U/L Final   • AST (SGOT) 09/17/2019 23  1 - 32 U/L Final    Specimen hemolyzed.  Results may be affected.   • Alkaline Phosphatase 09/17/2019 125* 39 - 117 U/L Final   • Total Bilirubin 09/17/2019 <0.2* 0.2 - 1.2 mg/dL Final   • eGFR   Amer 09/17/2019 72  >60 mL/min/1.73 Final   • Globulin 09/17/2019 3.6  gm/dL Final   • A/G Ratio 09/17/2019 1.3  g/dL Final   • BUN/Creatinine Ratio 09/17/2019 24.2  7.0 - 25.0 Final   • Anion Gap 09/17/2019 12.0  5.0 - 15.0 mmol/L Final   • WBC 09/17/2019 10.62  3.40 - 10.80 10*3/mm3 Final   • RBC 09/17/2019 3.61* 3.77 - 5.28 10*6/mm3 Final   • Hemoglobin 09/17/2019 10.2* 12.0 - 15.9 g/dL Final   • Hematocrit 09/17/2019 32.6* 34.0 - 46.6 % Final   • MCV 09/17/2019 90.3  79.0 - 97.0 fL Final   • MCH 09/17/2019 28.3  26.6 - 33.0 pg Final   • MCHC 09/17/2019 31.3* 31.5 - 35.7 g/dL Final   • RDW 09/17/2019 16.8* 12.3 - 15.4 % Final   • RDW-SD 09/17/2019 56.3* 37.0 - 54.0 fl Final   • MPV 09/17/2019 9.7  6.0 - 12.0 fL Final   • Platelets 09/17/2019 466* 140 - 450 10*3/mm3 Final   • Neutrophil % 09/17/2019 63.5  42.7 - 76.0 % Final   • Lymphocyte % 09/17/2019 17.2* 19.6 - 45.3 % Final   • Monocyte % 09/17/2019 9.7  5.0 - 12.0 % Final   • Eosinophil % 09/17/2019 8.8* 0.3 - 6.2 % Final   • Basophil % 09/17/2019 0.4   0.0 - 1.5 % Final   • Immature Grans % 09/17/2019 0.4  0.0 - 0.5 % Final   • Neutrophils, Absolute 09/17/2019 6.75  1.70 - 7.00 10*3/mm3 Final   • Lymphocytes, Absolute 09/17/2019 1.83  0.70 - 3.10 10*3/mm3 Final   • Monocytes, Absolute 09/17/2019 1.03* 0.10 - 0.90 10*3/mm3 Final   • Eosinophils, Absolute 09/17/2019 0.93* 0.00 - 0.40 10*3/mm3 Final   • Basophils, Absolute 09/17/2019 0.04  0.00 - 0.20 10*3/mm3 Final   • Immature Grans, Absolute 09/17/2019 0.04  0.00 - 0.05 10*3/mm3 Final   • nRBC 09/17/2019 0.0  0.0 - 0.2 /100 WBC Final   • Glucose 09/17/2019 100  70 - 130 mg/dL Final   • BUN 09/17/2019 27* 8 - 26 mg/dL Final   • Creatinine 09/17/2019 1.00  0.60 - 1.30 mg/dL Final   • Sodium 09/17/2019 136* 138 - 146 mmol/L Final   • Potassium 09/17/2019 4.3  3.5 - 4.9 mmol/L Final   • Chloride 09/17/2019 101  98 - 109 mmol/L Final   • Total CO2 09/17/2019 28  24 - 29 mmol/L Final   • Hemoglobin 09/17/2019 11.2* 12.0 - 17.0 g/dL Final    Serial Number: [id number]:  [id number]   • Hematocrit 09/17/2019 33* 38 - 51 % Final   • Ionized Calcium 09/17/2019 1.21  1.20 - 1.32 mmol/L Final        No results found.    ASSESSMENT 63 years old female with stage Ib adenocarcinoma of the right lung    PROBLEM LIST   1.  Right upper lobe lung adenocarcinoma T2a N0 M0 stage Ib disease:  A.  Status post surgical resection with a clear margins and no high risk features done by Dr. Barreto August 30, 2019  2.  Normocytic anemia  3.  Postoperative pain    PLAN  1.  I reviewed the patient's chart including admission notable results and imaging report and pathology report.  2.  The patient will follow-up with me in [facility] in 3 months with surveillance CT scan.  3.  We discussed the role of adjuvant chemotherapy in her setting.  Adjuvant chemotherapy is an option for stage Ib disease based on NCCN guidelines this is mainly for patients who have high risk features that would include wedge resection instead of lobectomy, and  appropriate lymph node sampling, 40 future tumors, lymphovascular invasion's, or large tumors.  Patient be does not have any high risk features and I would recommend against adjuvant chemotherapy at this point.  I will sign off please call me with questions.    Vinnie José MD    9/17/2019

## 2019-09-17 NOTE — PROGRESS NOTES
CTS Progress Note      POD 16 s/p bronchoscopy, right VATS with a right upper lobectomy.       LOS: 0 days   Patient Care Team:  Emily Ferreira MD as PCP - General (Family Medicine)  Yassine Bramblia MD as PCP - Claims Attributed    Subjective  Pleasant 63-year-old -American female well-known to the service secondary to recent right upper lobectomy.  She had a prolonged air leak and was discharged home with the pneumo stat attached to the chest tubes.  She did well for about 48 hours.  However, as of last night she started having increasing amount of pain on her right lateral chest.  Went into the emergency room in [place].  She was then transferred up to [facility] for further intervention.         This morning, she is resting well.  Had to awaken her.  States that the pain is a little bit better.  Chest x-ray this morning shows a tiny rim of pneumothorax along the lateral wall.    CC:     Objective    Vital Signs  Temp:  [97.6 °F (36.4 °C)-98.2 °F (36.8 °C)] 97.7 °F (36.5 °C)  Heart Rate:  [69-85] 69  Resp:  [14-18] 16  BP: (105-141)/(69-81) 141/81    Physical Exam:   General Appearance: alert, appears stated age and cooperative   Lungs: Decreased breath sounds right base.   Heart: regular rhythm & normal rate, normal S1, S2, no murmur, no gallop, no rub and no click   Skin: Warm, dry, incision c/d/i     Results   Results from last 7 days   Lab Units 09/17/19  0333 09/17/19  0329   WBC 10*3/mm3  --  10.62   HEMOGLOBIN g/dL  --  10.2*   HEMOGLOBIN, POC g/dL 11.2*  --    HEMATOCRIT %  --  32.6*   HEMATOCRIT POC % 33*  --    PLATELETS 10*3/mm3  --  466*     Results from last 7 days   Lab Units 09/17/19  0333 09/17/19  0329   SODIUM mmol/L  --  137   POTASSIUM mmol/L  --  4.4   CHLORIDE mmol/L  --  97*   CO2 mmol/L  --  28.0   BUN mg/dL  --  23   CREATININE mg/dL 1.00 0.95   GLUCOSE mg/dL  --  92   CALCIUM mg/dL  --  10.0           Imaging Results (last 24 hours)     Procedure Component Value  Units Date/Time    CT Angiogram Chest With Contrast [[id number]] Collected:  09/17/19 0436     Updated:  09/17/19 0438    Narrative:       CTA Chest    INDICATION:   63-year-old female with right-sided chest pain today. Possible chest tube malfunction. Recent partial right pneumonectomy 8/30/2019 for adenocarcinoma.    TECHNIQUE:   CT angiogram of the chest with IV contrast. 3-D reconstructions were obtained and reviewed.   Radiation dose reduction techniques included automated exposure control or exposure modulation based on body size. Count of known CT and cardiac nuc med studies  performed in previous 12 months: 0.     COMPARISON:   None available.    FINDINGS:   There is adequate opacification of the pulmonary arteries with no filling defects. Thoracic aorta normal in course and caliber without dissection. Heart size normal. No pericardial effusion. There are a few minimally prominent nonspecific mediastinal  lymph nodes. Metastatic disease is not excluded.    Postsurgical changes from right upper lobe resection. There are 2 chest tubes noted in the right hemithorax. Trace right pleural effusion. No pneumothorax. Right chest wall subcutaneous emphysema. The left lung is clear.    Visualized upper abdomen demonstrates multiple partially imaged bilateral renal cysts. There is an indeterminate left adrenal nodule measuring 2.3 x 1.8 cm. Metastatic disease is not excluded. No acute osseous abnormality. No aggressive osseous lesions.      Impression:       1. Negative for pulmonary embolus.  2. Negative for thoracic aortic aneurysm/dissection.  3. Postsurgical changes from right upper lobectomy. 2 chest tubes in the right hemithorax. No pneumothorax. Right chest wall subcutaneous emphysema.  4. Trace right pleural effusion.  5. A few minimally prominent mediastinal lymph nodes. Metastatic disease is not excluded.  6. Indeterminate left adrenal nodule measuring 2.3 x 1.8 cm. Metastatic disease is not  excluded.    Signer Name: Josse Adorno MD   Signed: 9/17/2019 4:36 AM   Workstation Name: [facility]    [facility]    XR Chest 1 View [[id number]] Collected:  09/17/19 0151     Updated:  09/17/19 0153    Narrative:       CR Chest 1 Vw    INDICATION:   63-year-old female with right chest wall pain status post recent right upper lobectomy.     COMPARISON:    Chest x-ray 9/13/2019    FINDINGS:  2 portable AP view(s) of the chest.  2 right-sided chest tubes are in place. Very tiny residual right apical pneumothorax. Right-sided volume loss from prior right upper lobectomy. Left lung is clear. Heart size and mediastinum are within normal limits  area      Impression:       Stable right-sided chest tubes with tiny residual right apical pneumothorax.    Signer Name: Josse Adorno MD   Signed: 9/17/2019 1:51 AM   [facility]: [facility]    [facility]          Assessment      Air leak    Essential hypertension    Right-sided chest pain    Tobacco abuse    Primary adenocarcinoma of right lung (CMS/HCC)    COPD (chronic obstructive pulmonary disease) (CMS/HCC)    Mediastinal lymphadenopathy  Pain right-sided status post right VATS right upper lobectomy.  Prolonged postop air leak.  COPD  Ongoing cigarette abuse    Plan   I have taken her chest tube off the pneumo stat.  Have placed it to a Pleur-evac.  She has a fair cough, with no airleak at present.  Chest x-ray shows a tiny rim of pneumothorax on the lateral wall.  We will hook her to suction to see if we can get rid of that.  If no airleak tomorrow chest tubes can be removed and she can be discharged back home from our standpoint.        BELLO Fitzgerald  09/17/19  9:41 AM

## 2019-09-17 NOTE — PROGRESS NOTES
[facility] Medicine Services  PROGRESS NOTE    Patient Name: Lizbeth Johns  : 1955  MRN: [id number]    Date of Admission: 2019  Primary Care Physician: Emily Ferreira MD    Subjective   Subjective     CC:  Right-sided chest pain    HPI:  Patient is a 63-year-old who underwent a right wedge resection of pulmonary mass, pathology was positive for adenocarcinoma.  She was discharged home with a chest tube but continued to have pain and shortness of air.  She represented to the emergency department for evaluation.  A CT of her chest revealed some emphysema in the chest wall.  She was felt to have a small air leak.  She was admitted for treatment of this as well as pain control.  Her CT of the chest also revealed some mediastinal lymphadenopathy.  I have consulted oncology for evaluation and to establish care.  The patient states with the IV morphine she was given in the emergency department she is feeling better.    Review of Systems  General: No fever, chills, fatigue  ENT: No sore throat, trouble swallowing or changes in vision  Respiratory: Positive shortness of breath, cough, denies wheezing or fast breathing  Cardiovascular: Positive chest pain, denies palpitations, positive dyspnea with exertion  Gastrointestinal: No nausea vomiting abdominal pain  Musculoskeletal: Positive difficulty walking, positive weakness after surgery  Vascular: No cyanosis or clubbing  Lymphatic: No peripheral edema, positive lymphadenopathy seen on CT scan  Neurologic: No headache, confusion, dizziness  Psychiatric: Positive anxiety.     Objective   Objective     Vital Signs:   Temp:  [97.6 °F (36.4 °C)-98.2 °F (36.8 °C)] 97.7 °F (36.5 °C)  Heart Rate:  [69-85] 69  Resp:  [14-18] 16  BP: (105-141)/(69-81) 141/81        Physical Exam:  Constitutional: No acute distress, awake, alert, nontoxic, normal body habitus  Eyes: Pupils equal, sclerae anicteric, no conjunctival injection  HENT: NCAT,  mucous membranes moist  Neck: Supple, no thyromegaly, no lymphadenopathy  Respiratory: Decreased breath sounds in the right lung anteriorly, poor effort due to pain, nonlabored respirations   Cardiovascular: RRR  Gastrointestinal: Positive bowel sounds, soft, nontender, nondistended  Musculoskeletal: No peripheral edema, normal muscle tone for age  Psychiatric: Appropriate affect, good insight and judgement, cooperative  Neurologic: Oriented x 3, movements symmetric BUE and BLE, Cranial Nerves grossly intact to confrontation, speech clear and fluent  Results Reviewed:    Results from last 7 days   Lab Units 09/17/19  0333 09/17/19  0329   WBC 10*3/mm3  --  10.62   HEMOGLOBIN g/dL  --  10.2*   HEMOGLOBIN, POC g/dL 11.2*  --    HEMATOCRIT %  --  32.6*   HEMATOCRIT POC % 33*  --    PLATELETS 10*3/mm3  --  466*     Results from last 7 days   Lab Units 09/17/19  0333 09/17/19 0329 09/12/19  0527   SODIUM mmol/L  --  137 138   POTASSIUM mmol/L  --  4.4 4.7   CHLORIDE mmol/L  --  97* 99   CO2 mmol/L  --  28.0 25.0   BUN mg/dL  --  23 30*   CREATININE mg/dL 1.00 0.95 1.02*   GLUCOSE mg/dL  --  92 89   CALCIUM mg/dL  --  10.0 9.9   ALT (SGPT) U/L  --  16  --    AST (SGOT) U/L  --  23  --      Estimated Creatinine Clearance: 51.2 mL/min (by C-G formula based on SCr of 1 mg/dL).    Microbiology Results Abnormal     None          Imaging Results (last 24 hours)     Procedure Component Value Units Date/Time    CT Angiogram Chest With Contrast [[id number]] Collected:  09/17/19 0436     Updated:  09/17/19 0438    Narrative:       CTA Chest    INDICATION:   63-year-old female with right-sided chest pain today. Possible chest tube malfunction. Recent partial right pneumonectomy 8/30/2019 for adenocarcinoma.    TECHNIQUE:   CT angiogram of the chest with IV contrast. 3-D reconstructions were obtained and reviewed.   Radiation dose reduction techniques included automated exposure control or exposure modulation based on body size.  Count of known CT and cardiac nuc med studies  performed in previous 12 months: 0.     COMPARISON:   None available.    FINDINGS:   There is adequate opacification of the pulmonary arteries with no filling defects. Thoracic aorta normal in course and caliber without dissection. Heart size normal. No pericardial effusion. There are a few minimally prominent nonspecific mediastinal  lymph nodes. Metastatic disease is not excluded.    Postsurgical changes from right upper lobe resection. There are 2 chest tubes noted in the right hemithorax. Trace right pleural effusion. No pneumothorax. Right chest wall subcutaneous emphysema. The left lung is clear.    Visualized upper abdomen demonstrates multiple partially imaged bilateral renal cysts. There is an indeterminate left adrenal nodule measuring 2.3 x 1.8 cm. Metastatic disease is not excluded. No acute osseous abnormality. No aggressive osseous lesions.      Impression:       1. Negative for pulmonary embolus.  2. Negative for thoracic aortic aneurysm/dissection.  3. Postsurgical changes from right upper lobectomy. 2 chest tubes in the right hemithorax. No pneumothorax. Right chest wall subcutaneous emphysema.  4. Trace right pleural effusion.  5. A few minimally prominent mediastinal lymph nodes. Metastatic disease is not excluded.  6. Indeterminate left adrenal nodule measuring 2.3 x 1.8 cm. Metastatic disease is not excluded.    Signer Name: Josse Adorno MD   Signed: 9/17/2019 4:36 AM   Workstation Name: [email or web address]-    [facility]    XR Chest 1 View [[id number]] Collected:  09/17/19 0151     Updated:  09/17/19 0153    Narrative:       CR Chest 1 Vw    INDICATION:   63-year-old female with right chest wall pain status post recent right upper lobectomy.     COMPARISON:    Chest x-ray 9/13/2019    FINDINGS:  2 portable AP view(s) of the chest.  2 right-sided chest tubes are in place. Very tiny residual right apical pneumothorax. Right-sided  volume loss from prior right upper lobectomy. Left lung is clear. Heart size and mediastinum are within normal limits  area      Impression:       Stable right-sided chest tubes with tiny residual right apical pneumothorax.    Signer Name: Josse Adorno MD   Signed: 9/17/2019 1:51 AM   Workstation Name: [email or web address]-    [facility] [place]               I have reviewed the medications:  Scheduled Meds:    aspirin 81 mg Oral Daily   carvedilol 3.125 mg Oral Q12H   heparin (porcine) 5,000 Units Subcutaneous Q8H   rOPINIRole 0.5 mg Oral Nightly   sodium chloride 10 mL Intravenous Q12H     Continuous Infusions:   PRN Meds:.HYDROcodone-acetaminophen  •  Morphine  •  ondansetron  •  [COMPLETED] Insert peripheral IV **AND** sodium chloride  •  sodium chloride      Assessment/Plan   Assessment / Plan     Active Hospital Problems    Diagnosis  POA   • **Air leak [J93.82]  Yes   • Right-sided chest pain [R07.9]  Yes   • Tobacco abuse [Z72.0]  Yes   • Primary adenocarcinoma of right lung (CMS/HCC) [C34.91]  Yes   • COPD (chronic obstructive pulmonary disease) (CMS/HCC) [J44.9]  Yes   • Mediastinal lymphadenopathy [R59.0]  Yes   • Essential hypertension [I10]  Yes      Resolved Hospital Problems   No resolved problems to display.        Brief Hospital Course to date:  Lizbeth Johns is a 63 y.o. female admitted with right-sided chest tube air leak post pulmonary wedge resection, with increased pain and shortness of air.    Right-sided chest tube air leak  Consult CT surgery    Adenocarcinoma of the lung with new mediastinal lymphadenopathy  Consult oncology to establish care and explore further work-up and treatment options    Increased pain and dyspnea  Continue morphine and Norco PRN  CT surgery to manage chest tube air leak      DVT Prophylaxis: Heparin    CODE STATUS:   Code Status and Medical Interventions:   Ordered at: 09/17/19 0621     Level Of Support Discussed With:    Patient     Code Status:     CPR     Medical Interventions (Level of Support Prior to Arrest):    Full         Electronically signed by Malika Rod MD, 09/17/19, 8:27 AM.

## 2019-09-17 NOTE — PROGRESS NOTES
Discharge Planning Assessment  [facility]     Patient Name: Lizbeth Johns  MRN: [id number]  Today's Date: 9/17/2019    Admit Date: 9/16/2019    Discharge Needs Assessment     Row Name 09/17/19 1340       Living Environment    Lives With  friend(s)    Current Living Arrangements  home/apartment/condo Lives in [facility]    Primary Care Provided by  self    Provides Primary Care For  no one    Family Caregiver if Needed  child(erica), adult;friend(s)    Family Caregiver Names  Franklin Kinney (friend). Alcira Johns (dtr), Tonja Espinosa (dtr)    Quality of Family Relationships  helpful;involved;supportive    Able to Return to Prior Arrangements  yes       Resource/Environmental Concerns    Resource/Environmental Concerns  none    Transportation Concerns  car, none       Transition Planning    Patient/Family Anticipates Transition to  home with family    Patient/Family Anticipated Services at Transition  ;home health care    Transportation Anticipated  family or friend will provide       Discharge Needs Assessment    Concerns to be Addressed  discharge planning    Equipment Currently Used at Home  none    Anticipated Changes Related to Illness  none    Equipment Needed After Discharge  none    Current Discharge Risk  chronically ill        Discharge Plan     Row Name 09/17/19 5099       Plan    Plan  Home with resumption of HH for     Patient/Family in Agreement with Plan  yes    Plan Comments  Met with pt at . She is independent of ADL's and was recently admitted to [facility] and went home with a chest tube with [facility]. Spoke to [place] and pt is seen for  for diagnosis of chest tube management and COPD. Pt plans to return home with [facility]. Pt has her chest tube out currently. She is wearing oxygen and doesn't wear at home. Resumption orders in and will call [employer]  upon d/c.     Final Discharge Disposition Code  06 - home with home health care        Destination      No service  coordination in this encounter.      Durable Medical Equipment      No service coordination in this encounter.      Dialysis/Infusion      No service coordination in this encounter.      Home Medical Care      No service coordination in this encounter.      Therapy      No service coordination in this encounter.      Community Resources      No service coordination in this encounter.        Expected Discharge Date and Time     Expected Discharge Date Expected Discharge Time    Sep 19, 2019         Demographic Summary     Row Name 09/17/19 1321       General Information    Referral Source  admission list    Preferred Language  English     Used During This Interaction  no    General Information Comments  PCP is Dr. Emily Ferreira       Contact Information    Permission Granted to Share Info With  ;family/designee Daughter Tonja Eli (dtr)        Functional Status     Row Name 09/17/19 1331       Functional Status    Usual Activity Tolerance  good    Current Activity Tolerance  good       Functional Status, IADL    Medications  independent    Meal Preparation  independent    Housekeeping  independent    Laundry  independent    Shopping  independent       Employment/    Employment Status  unemployed    Employment/ Comments  Has Medicare A&B. Denies concerns affording medications. Uses [facility] in [place].         Psychosocial    No documentation.       Abuse/Neglect    No documentation.       Legal    No documentation.       Substance Abuse    No documentation.       Patient Forms    No documentation.           Ladonna Palumbo, RN

## 2019-09-17 NOTE — ED PROVIDER NOTES
Subjective   Lizbeth Johns is a 63 y.o female who presents to the ED with complaints of a post-op problem. The patient was admitted to the hospital from 8/30-9/13/19 for an extensive stay.  She has past medical history of hypertension, hyperlipidemia, and COPD with active tobacco abuse.  She had a PET scan with active 3.2 cm spiculated right lung nodule concerning for malignancy.  She had an adrenal nodule that was not PET active and was felt to be unrelated after review with radiologist, Dr. Corbin.  Patient underwent bronchoscopy then thoracoscopy with a right upper lobe wedge then completion of right upper lobectomy.  Patient then had an intercostal cryoablation of the right chest along with a lymph node dissection.  Patient was observed closely in the ICU.  Patient had minimal drainage from the chest tube however patient continues with a persistent air leak.  Chest tubes were placed under waterseal.  Pathology came back and revealed adenocarcinoma.  By 9/4/2019 patient continued with a flutter valve and a percussive vest to help with incentive spirometry.  She was awake at first but slowly improved with ambulation.  She continued with a persistent air leak and chest x-ray showed a very small apical pneumothorax on 9/9/2019.  She converted to a pneumo stat.  She was ultimately discharged home and recommended close follow-up with Dr. Barreto, CT surgeon that performed the surgery.  Patient reports sudden onset of pain near the chest tube site on the right lateral chest wall earlier today.  Family noted that the tubing had flattened out.  Patient also questioned a small amount of drainage from outside the tubing.  She also reports shortness of breath secondary to pain.  She denies any fever or chills.  Family concerned because patient lives alone and her pain was not controlled with oral Percocet 7.5 mg tablets, which she was prescribed on recent discharge from our facility.        History provided by:  Patient  Other    Location:  Left chest tube  Severity:  Severe  Onset quality:  Sudden  Timing:  Constant  Progression:  Unchanged  Chronicity:  New  Associated symptoms: chest pain (Right lateral chest pain) and shortness of breath    Associated symptoms: no cough and no fever    Shortness of breath:     Severity:  Moderate    Onset quality:  Sudden    Timing:  Constant    Progression:  Unchanged      Review of Systems   Constitutional: Negative for appetite change, chills and fever.   Respiratory: Positive for shortness of breath. Negative for cough.         Recent dx of adenocarcinoma with wedge excision, right upper lobectomy and chest tube placement per Dr. Barreto.  Long-standing tobacco dependence.   Cardiovascular: Positive for chest pain (Right lateral chest pain). Negative for palpitations and leg swelling.   Gastrointestinal: Negative.    Musculoskeletal: Negative for back pain.   All other systems reviewed and are negative.      Past Medical History:   Diagnosis Date   • Acid reflux    • Arthritis    • Cataract, bilateral    • COPD (chronic obstructive pulmonary disease) (CMS/HCC)    • Hypertension    • Leg cramps    • Lung mass     right   • SOB (shortness of breath)     xray showed spot on lungs       No Known Allergies    Past Surgical History:   Procedure Laterality Date   • BRONCHOSCOPY N/A 6/13/2019    Procedure: BRONCHOSCOPY WITH FLUOROSCOPY, BIOPSY, BRUSHINGS, AND WASHINGS;  Surgeon: Gudelia Patle MD;  Location: [facility] OR;  Service: Pulmonary   • COLONOSCOPY N/A 2/23/2017    Procedure: COLONOSCOPY with hot and cold snare polypectomies,  hot biopsy polypectomies, biopsies, resolution clip placement;  Surgeon: Zackery Siddiqui MD;  Location: [facility] ENDOSCOPY;  Service:    • COLONOSCOPY N/A 6/4/2018    Procedure: COLONOSCOPY WITH HOT SNARE POLYPECTOMY X 7; COLD SNARE POLYPECTOMY X 3; HOT BIOPSY POLYPECTOMY X 20; COLD BIOPSY POLYPECTOMY X 2; THERMAL ABLATION OF COLON POLYPS X 23; CLIP PLACEMENT X 2; BIOPSIES;   Surgeon: Zackery Siddiqui MD;  Location: [facility] ENDOSCOPY;  Service: Gastroenterology   • COLONOSCOPY N/A 6/19/2019    Procedure: COLONOSCOPY with cold biopsy polypectomies and cold biopsy;  Surgeon: Zackery Siddiqui MD;  Location: [facility] ENDOSCOPY;  Service: Gastroenterology   • ENDOSCOPY  06/13/2019   • MULTIPLE TOOTH EXTRACTIONS      full extraction   • ORIF TIBIA/FIBULA FRACTURES Left    • THORACOSCOPY Right 8/30/2019    Procedure: BRONCHOSCOPY,  THORACOSCOPY VIDEO ASSISTED with right upper lobe wedge RESECTION , RIGHT UPPER lobectomy with mediastinal lymph node dissection AND INTERCOSTAL CRYOABLATION OF RIGHT CHEST;  Surgeon: Brian Barreto MD;  Location: [facility] OR;  Service: Cardiothoracic   • TUBAL ABDOMINAL LIGATION         Family History   Problem Relation Age of Onset   • Cirrhosis Brother    • Liver disease Brother    • Colon cancer Brother         Possibly colon cancer, patient unsure.   • Cancer Mother    • No Known Problems Father    • Stomach cancer Neg Hx    • Esophageal cancer Neg Hx        Social History     Socioeconomic History   • Marital status:      Spouse name: Not on file   • Number of children: 4   • Years of education: Not on file   • Highest education level: Not on file   Occupational History     Employer: UNEMPLOYED   Tobacco Use   • Smoking status: Current Every Day Smoker     Packs/day: 1.00     Years: 50.00     Pack years: 50.00     Types: Cigarettes   • Smokeless tobacco: Never Used   • Tobacco comment: cutting down on cigarettes   Substance and Sexual Activity   • Alcohol use: Yes     Alcohol/week: 1.2 oz     Types: 2 Glasses of wine per week     Comment: once in a while    • Drug use: No   • Sexual activity: Defer   Social History Narrative    Lives in [place], [place]         Objective   Physical Exam   Constitutional: She is oriented to person, place, and time. She appears well-developed and well-nourished. No distress.   Patient appears uncomfortable secondary to pain.  Moaning  and walking around exam room.   HENT:   Head: Normocephalic and atraumatic.   Nose: Nose normal.   Mouth/Throat: Oropharynx is clear and moist.   Eyes: Conjunctivae are normal. No scleral icterus.   Neck: Normal range of motion. Neck supple.   Cardiovascular: Normal rate, regular rhythm and normal heart sounds.   No murmur heard.  Pulmonary/Chest: Effort normal and breath sounds normal. No accessory muscle usage. No tachypnea. No respiratory distress. She has no decreased breath sounds. She has no wheezes. She has no rhonchi. She has no rales.       Abdominal: Soft. Bowel sounds are normal. There is no tenderness. There is no rebound, no guarding and no CVA tenderness.   Abdomen soft and non-tender.   Musculoskeletal: Normal range of motion. She exhibits no edema.   Neurological: She is alert and oriented to person, place, and time.   Skin: Skin is warm and dry.   Psychiatric: Her speech is normal and behavior is normal. Her mood appears anxious.   Uncomfortable secondary to pain.   Nursing note and vitals reviewed.      Procedures         ED Course  ED Course as of Sep 17 0509   Tue Sep 17, 2019   0211 Chest x-ray shows stable right-sided chest tubes with tiny residual right apical pneumothorax.  Sats are stable at 99% on room air.  I will discussed the case thoroughly with Dr. Noel.  [FC]   0250 Discussed the case with Dr. Noel.  I updated patient and recommended close f/u with Dr. Barreto.  Patient does not feel comfortable going home.  Pain is uncontrolled with oral medications.  Will proceed with labs, CT scan of chest, and daughter also requests Flexeril by mouth.  [FC]   0404 Discussed the case with Dr. Tobias, hospitalist.  Creatinine is 1.00.  We will prepare for CT of the chest with IV contrast and I will update the hospitalist at those results.  Patient is resting comfortably and vital signs are stable.  [FC]   0502 CT of the chest with contrast reveals no evidence of pulmonary embolism.  Negative for  thoracic aortic aneurysm or dissection.  Postsurgical changes from right upper lobectomy.  2 chest tubes in the right hemithorax.  No pneumothorax.  Right chest wall subcutaneous emphysema.  Trace right pleural effusion.  A few minimally prominent mediastinal lymph nodes.  Metastatic disease is not excluded.  Indeterminate left adrenal nodule measuring 2.3 x 1.8 cm.  Metastatic disease is not excluded.  I still not received results back of CBC and chemistries.  I contacted the lab and they have not received the specimen yet.  Nursing staff is working on finding no specimens.  Page Dr. Tobias to update her on CT results and prepare patient for admission.  [FC]   6897 Dr. Tobias is agreeable to admission on telemetry.  [FC]      ED Course User Index  [FC] Allison Woodard PA-C     Recent Results (from the past 24 hour(s))   POC CHEM 8    Collection Time: 09/17/19  3:33 AM   Result Value Ref Range    Glucose 100 70 - 130 mg/dL    BUN 27 (H) 8 - 26 mg/dL    Creatinine 1.00 0.60 - 1.30 mg/dL    Sodium 136 (L) 138 - 146 mmol/L    Potassium 4.3 3.5 - 4.9 mmol/L    Chloride 101 98 - 109 mmol/L    Total CO2 28 24 - 29 mmol/L    Hemoglobin 11.2 (L) 12.0 - 17.0 g/dL    Hematocrit 33 (L) 38 - 51 %    Ionized Calcium 1.21 1.20 - 1.32 mmol/L     Note: In addition to lab results from this visit, the labs listed above may include labs taken at another facility or during a different encounter within the last 24 hours. Please correlate lab times with ED admission and discharge times for further clarification of the services performed during this visit.    CT Angiogram Chest With Contrast   Final Result   1. Negative for pulmonary embolus.   2. Negative for thoracic aortic aneurysm/dissection.   3. Postsurgical changes from right upper lobectomy. 2 chest tubes in the right hemithorax. No pneumothorax. Right chest wall subcutaneous emphysema.   4. Trace right pleural effusion.   5. A few minimally prominent mediastinal lymph nodes.  "Metastatic disease is not excluded.   6. Indeterminate left adrenal nodule measuring 2.3 x 1.8 cm. Metastatic disease is not excluded.      Signer Name: Josse Adorno MD    Signed: 9/17/2019 4:36 AM    Workstation Name: [facility]     [facility]      XR Chest 1 View   Final Result   Stable right-sided chest tubes with tiny residual right apical pneumothorax.      Signer Name: Josse Adorno MD    Signed: 9/17/2019 1:51 AM    Workstation Name: [facility]     [facility]        Vitals:    09/16/19 2108 09/16/19 2248 09/17/19 0353   BP:  121/71 105/69   BP Location:  Left arm Right arm   Patient Position:  Sitting Lying   Pulse:  75 70   Resp: 18 16 16   Temp: 97.6 °F (36.4 °C)  98.2 °F (36.8 °C)   TempSrc: Oral  Oral   SpO2:  99% 93%   Weight: 76.2 kg (168 lb)     Height: 152.4 cm (60\")       Medications   sodium chloride 0.9 % flush 10 mL (not administered)   HYDROcodone-acetaminophen (NORCO) 7.5-325 MG per tablet 1 tablet (1 tablet Oral Given 9/17/19 0108)   ondansetron ODT (ZOFRAN-ODT) disintegrating tablet 4 mg (4 mg Oral Given 9/17/19 0109)   morphine injection 2 mg (2 mg Intravenous Given 9/17/19 0340)   cyclobenzaprine (FLEXERIL) tablet 5 mg (5 mg Oral Given 9/17/19 0341)   iopamidol (ISOVUE-370) 76 % injection 100 mL (60 mL Intravenous Given 9/17/19 0420)     ECG/EMG Results (last 24 hours)     ** No results found for the last 24 hours. **        ECG 12 Lead    (Results Pending)                     MDM    Final diagnoses:   Right-sided chest pain   Primary adenocarcinoma of upper lobe of right lung (CMS/HCC)   Intractable pain   H/O chest tube placement   History of COPD   Tobacco dependence   History of hypertension       Documentation assistance provided by scrcarlo Grover.  Information recorded by the scribe was done at my direction and has been verified and validated by me.     iGovanni Grover  09/17/19 0128       Allison Woodard PA-C  09/17/19 " [id number]

## 2019-09-17 NOTE — PROGRESS NOTES
Patient's chest tube is been placed to 20 cm of suction.  She continues to have no airleak.  CT scan this morning showed no pneumothorax.  Have discussed with Dr. Barreto.  He suggests go ahead and pull the chest tubes will check a chest x-ray in 4 hours if the chest x-ray looks good she can be discharged home at any time from our standpoint.

## 2019-09-17 NOTE — H&P
[facility] Medicine Services  HISTORY AND PHYSICAL    Patient Name: Lizbeth Johns  : 1955  MRN: [id number]  Primary Care Physician: Emily Ferreira MD  Date of admission: 2019      Subjective   Subjective     Chief Complaint:  Right sided chest pain    HPI:  Lizbeth Johns is a 63 y.o. female with a past medical history significant for hypertension, HLD, COPD, adenocarcinoma of the right lung who presents with intractable right sided chest wall pain after chest tube placement. She reports a change in the structure of tubes with minimal drainage. Also reports SOB secondary to pain which is worse with exertion. Currently without complaints of fever, cough, congestion, syncope, or N/V/D. Patient was seen [place] where they tried to adjust tubing.    Patient was admitted to to this facility -2019. At this time she underwent PET scan which demonstrated active right lung nodule. She then underwent  bronchoscopy then thoracoscopy with a right upper lobe wedge then completion of right upper lobectomy. Patient then had an intercostal cryoablation of the right chest along with a lymph node dissection. Eventually had chest tubes placed under waterseal. Ended up with small apical pneumothorax 2019. At discharge, patient instructed to follow up with CT surgery as outpatient.    Emergency Department Evaluation: borderline hypotensive at 105/69. Chemistry and hematology favorable. CTA negative for acute PE, no pneumothorax, pneumonia, or other acute changes      Review of Systems   Constitutional: Negative for chills and fever.   HENT: Negative for congestion and trouble swallowing.    Eyes: Negative for photophobia and visual disturbance.   Respiratory: Positive for shortness of breath. Negative for cough.    Cardiovascular: Positive for chest pain.   Gastrointestinal: Negative for abdominal pain, diarrhea, nausea and vomiting.   Endocrine: Negative for cold  intolerance and heat intolerance.   Genitourinary: Negative for dysuria and flank pain.   Musculoskeletal: Negative for back pain and gait problem.   Skin: Positive for wound. Negative for pallor and rash.   Allergic/Immunologic: Negative for immunocompromised state.   Neurological: Negative for dizziness, weakness and headaches.   Hematological: Negative for adenopathy.   Psychiatric/Behavioral: Negative for agitation and confusion.          All other systems reviewed and are negative.     Personal History     Past Medical History:   Diagnosis Date   • Acid reflux    • Arthritis    • Cataract, bilateral    • COPD (chronic obstructive pulmonary disease) (CMS/HCC)    • Hypertension    • Leg cramps    • Lung mass     right   • SOB (shortness of breath)     xray showed spot on lungs       Past Surgical History:   Procedure Laterality Date   • BRONCHOSCOPY N/A 6/13/2019    Procedure: BRONCHOSCOPY WITH FLUOROSCOPY, BIOPSY, BRUSHINGS, AND WASHINGS;  Surgeon: Gudelia Patel MD;  Location: [facility] OR;  Service: Pulmonary   • COLONOSCOPY N/A 2/23/2017    Procedure: COLONOSCOPY with hot and cold snare polypectomies,  hot biopsy polypectomies, biopsies, resolution clip placement;  Surgeon: Zackery Siddiqui MD;  Location: [facility] ENDOSCOPY;  Service:    • COLONOSCOPY N/A 6/4/2018    Procedure: COLONOSCOPY WITH HOT SNARE POLYPECTOMY X 7; COLD SNARE POLYPECTOMY X 3; HOT BIOPSY POLYPECTOMY X 20; COLD BIOPSY POLYPECTOMY X 2; THERMAL ABLATION OF COLON POLYPS X 23; CLIP PLACEMENT X 2; BIOPSIES;  Surgeon: Zackery Siddiqui MD;  Location: [facility] ENDOSCOPY;  Service: Gastroenterology   • COLONOSCOPY N/A 6/19/2019    Procedure: COLONOSCOPY with cold biopsy polypectomies and cold biopsy;  Surgeon: Zackery Siddiqui MD;  Location: [facility] ENDOSCOPY;  Service: Gastroenterology   • ENDOSCOPY  06/13/2019   • MULTIPLE TOOTH EXTRACTIONS      full extraction   • ORIF TIBIA/FIBULA FRACTURES Left    • THORACOSCOPY Right 8/30/2019    Procedure:  BRONCHOSCOPY,  THORACOSCOPY VIDEO ASSISTED with right upper lobe wedge RESECTION , RIGHT UPPER lobectomy with mediastinal lymph node dissection AND INTERCOSTAL CRYOABLATION OF RIGHT CHEST;  Surgeon: Brian Barreto MD;  Location: [facility];  Service: Cardiothoracic   • TUBAL ABDOMINAL LIGATION         Family History: family history includes Cancer in her mother; Cirrhosis in her brother; Colon cancer in her brother; Liver disease in her brother; No Known Problems in her father. Otherwise pertinent FHx was reviewed and unremarkable.     Social History:  reports that she has been smoking cigarettes.  She has a 50.00 pack-year smoking history. She has never used smokeless tobacco. She reports that she drinks about 1.2 oz of alcohol per week. She reports that she does not use drugs.  Social History     Social History Narrative    Lives in [place], [place]       Medications:    Available home medication information reviewed.    (Not in a hospital admission)    No Known Allergies    Objective   Objective     Vital Signs:   Temp:  [97.6 °F (36.4 °C)-98.2 °F (36.8 °C)] 98.2 °F (36.8 °C)  Heart Rate:  [70-85] 85  Resp:  [14-18] 14  BP: (105-121)/(69-73) 109/73        Physical Exam   Constitutional: Awake, alert  Eyes: PERRLA, sclerae anicteric, no conjunctival injection  HENT: NCAT, mucous membranes moist  Neck: Supple, no thyromegaly, no lymphadenopathy, trachea midline  Respiratory: Clear to auscultation bilaterally, nonlabored respirations   - chest tubes placed. Minimal drainage. Pain reproducible on palpation  Cardiovascular: RRR, no murmurs, rubs, or gallops, palpable pedal pulses bilaterally  Gastrointestinal: Positive bowel sounds, soft, nontender, nondistended  Musculoskeletal: No bilateral ankle edema, no clubbing or cyanosis to extremities  Psychiatric: Appropriate affect, cooperative  Neurologic: Oriented x 3, strength symmetric in all extremities, Cranial Nerves grossly intact to confrontation, speech clear  Skin:  No rashes      Results Reviewed:  I have personally reviewed current lab and radiology data.    Results from last 7 days   Lab Units 09/17/19  0333 09/17/19 0329   WBC 10*3/mm3  --  10.62   HEMOGLOBIN g/dL  --  10.2*   HEMOGLOBIN, POC g/dL 11.2*  --    HEMATOCRIT %  --  32.6*   HEMATOCRIT POC % 33*  --    PLATELETS 10*3/mm3  --  466*     Results from last 7 days   Lab Units 09/17/19  0333 09/17/19 0329   SODIUM mmol/L  --  137   POTASSIUM mmol/L  --  4.4   CHLORIDE mmol/L  --  97*   CO2 mmol/L  --  28.0   BUN mg/dL  --  23   CREATININE mg/dL 1.00 0.95   GLUCOSE mg/dL  --  92   CALCIUM mg/dL  --  10.0   ALT (SGPT) U/L  --  16   AST (SGOT) U/L  --  23     Estimated Creatinine Clearance: 51.2 mL/min (by C-G formula based on SCr of 1 mg/dL).  Brief Urine Lab Results     None        Imaging Results (last 24 hours)     Procedure Component Value Units Date/Time    CT Angiogram Chest With Contrast [[id number]] Collected:  09/17/19 0436     Updated:  09/17/19 0438    Narrative:       CTA Chest    INDICATION:   63-year-old female with right-sided chest pain today. Possible chest tube malfunction. Recent partial right pneumonectomy 8/30/2019 for adenocarcinoma.    TECHNIQUE:   CT angiogram of the chest with IV contrast. 3-D reconstructions were obtained and reviewed.   Radiation dose reduction techniques included automated exposure control or exposure modulation based on body size. Count of known CT and cardiac nuc med studies  performed in previous 12 months: 0.     COMPARISON:   None available.    FINDINGS:   There is adequate opacification of the pulmonary arteries with no filling defects. Thoracic aorta normal in course and caliber without dissection. Heart size normal. No pericardial effusion. There are a few minimally prominent nonspecific mediastinal  lymph nodes. Metastatic disease is not excluded.    Postsurgical changes from right upper lobe resection. There are 2 chest tubes noted in the right hemithorax. Trace  right pleural effusion. No pneumothorax. Right chest wall subcutaneous emphysema. The left lung is clear.    Visualized upper abdomen demonstrates multiple partially imaged bilateral renal cysts. There is an indeterminate left adrenal nodule measuring 2.3 x 1.8 cm. Metastatic disease is not excluded. No acute osseous abnormality. No aggressive osseous lesions.      Impression:       1. Negative for pulmonary embolus.  2. Negative for thoracic aortic aneurysm/dissection.  3. Postsurgical changes from right upper lobectomy. 2 chest tubes in the right hemithorax. No pneumothorax. Right chest wall subcutaneous emphysema.  4. Trace right pleural effusion.  5. A few minimally prominent mediastinal lymph nodes. Metastatic disease is not excluded.  6. Indeterminate left adrenal nodule measuring 2.3 x 1.8 cm. Metastatic disease is not excluded.    Signer Name: Josse Adorno MD   Signed: 9/17/2019 4:36 AM   Workstation Name: [email or web address]    [facility]    XR Chest 1 View [[id number]] Collected:  09/17/19 0151     Updated:  09/17/19 0153    Narrative:       CR Chest 1 Vw    INDICATION:   63-year-old female with right chest wall pain status post recent right upper lobectomy.     COMPARISON:    Chest x-ray 9/13/2019    FINDINGS:  2 portable AP view(s) of the chest.  2 right-sided chest tubes are in place. Very tiny residual right apical pneumothorax. Right-sided volume loss from prior right upper lobectomy. Left lung is clear. Heart size and mediastinum are within normal limits  area      Impression:       Stable right-sided chest tubes with tiny residual right apical pneumothorax.    Signer Name: Josse Adorno MD   Signed: 9/17/2019 1:51 AM   Workstation Name: [email or web address]    [facility]iology [facility]             Assessment/Plan   Assessment / Plan     Active Hospital Problems    Diagnosis POA   • **Right-sided chest pain [R07.9] Yes     Priority: High   • Primary adenocarcinoma of right lung  (CMS/formerly [facility]) [C34.91] Yes     Priority: High   • COPD (chronic obstructive pulmonary disease) (CMS/formerly [facility]) [J44.9] Yes     Priority: Medium   • Essential hypertension [I10] Yes     Priority: Medium   • Tobacco abuse [Z72.0] Yes     Priority: Low         1. Right chest pain:  - post-op problem  - imaging not impressive for acute process  - consult to CT surgery, Dr. Barreto  - close monitor of vitals  - wound care  - pain control as needed    2. Tobacco abuse:  - nicotine patch as needed. Still actively smoking. Will plan to [place].    3. HTN:  - stable. Continue home meds ad continue to monitor    DVT prophylaxis: mechanical    CODE STATUS:  Full code  Code Status and Medical Interventions:   Ordered at: 09/17/19 0621     Level Of Support Discussed With:    Patient     Code Status:    CPR     Medical Interventions (Level of Support Prior to Arrest):    Full       Admission Status:  I believe this patient meets INPATIENT status due to IV analgesia.  I feel patient’s risk for adverse outcomes and need for care warrant INPATIENT evaluation and I predict the patient’s care encounter to likely last beyond 2 midnights.        Electronically signed by Juan Acosta PA-C, 09/17/19, 6:23 AM.        Brief Attending Admission Attestation     I have seen and examined the patient, performing an independent face-to-face diagnostic evaluation with plan of care reviewed and developed with the advanced practice clinician (APC).      Brief Summary Statement:   Lizbeth Johns is a 63 y.o. female with past medical history of hypertension, hyperlipidemia, COPD and recent diagnosis of adenocarcinoma of the right lung.  Patient presented with intractable pain on the right side of her chest after recent chest tube placement. She then underwent  bronchoscopy then thoracoscopy with a right upper lobe wedge then completion of right upper lobectomy.  Patient denies any coughing but complains of worsening shortness of breath.  Patient  states that due to increased pain she went to his [facility] ED and had tubing adjusted.  Her symptoms did not improve therefore patient came to [facility] ED and CT of the chest noted Postsurgical changes from right upper lobectomy. 2 chest tubes in the right hemithorax. No pneumothorax. Right chest wall subcutaneous emphysema.  Patient will be admitted for pan control and we will consult CT surgery for further evaluation.      Remainder of detailed HPI is as noted above and has been reviewed and/or edited by me for completeness.      Attending Physical Exam:  Constitutional: No acute distress, awake, alert  HENT: NCAT, mucous membranes moist  Respiratory: chest tube right, tenderness, rales on the right, 30 mL of SS fluid, empted by nursing.    Cardiovascular: RRR, no murmurs, rubs, or gallops, palpable pedal pulses bilaterally  Gastrointestinal: Positive bowel sounds, soft, nontender, nondistended  Musculoskeletal: trace edema  Psychiatric: Appropriate affect, cooperative  Neurologic: Oriented x 3, strength symmetric in all extremities, Cranial Nerves grossly intact to confrontation, speech clear  Skin: No rashes        Brief Assessment/Plan :  See above for further detailed assessment and plan developed with APC which I have reviewed and/or edited for completeness.      Electronically signed by Maryjo Tobias DO, 09/17/19, 7:18 AM.

## 2019-09-17 NOTE — OUTREACH NOTE
CT Surgery Week 1 Survey      Responses   Facility patient discharged from?  [facility]   Does the patient have one of the following disease processes/diagnoses(primary or secondary)?  Cardiothoracic surgery   Is there a successful TCM telephone encounter documented?  No   Week 1 attempt successful?  No   Revoke  Readmitted            Deisy Gonzales RN

## 2019-09-18 ENCOUNTER — APPOINTMENT (OUTPATIENT)
Dept: GENERAL RADIOLOGY | Facility: HOSPITAL | Age: 64
End: 2019-09-18

## 2019-09-18 VITALS
WEIGHT: 168 LBS | SYSTOLIC BLOOD PRESSURE: 117 MMHG | OXYGEN SATURATION: 82 % | DIASTOLIC BLOOD PRESSURE: 80 MMHG | HEART RATE: 81 BPM | BODY MASS INDEX: 32.98 KG/M2 | TEMPERATURE: 98.8 F | HEIGHT: 60 IN | RESPIRATION RATE: 16 BRPM

## 2019-09-18 PROCEDURE — 99239 HOSP IP/OBS DSCHRG MGMT >30: CPT | Performed by: FAMILY MEDICINE

## 2019-09-18 PROCEDURE — 25010000002 HEPARIN (PORCINE) PER 1000 UNITS: Performed by: FAMILY MEDICINE

## 2019-09-18 PROCEDURE — 71045 X-RAY EXAM CHEST 1 VIEW: CPT

## 2019-09-18 PROCEDURE — 25010000002 MORPHINE PER 10 MG: Performed by: FAMILY MEDICINE

## 2019-09-18 RX ADMIN — SODIUM CHLORIDE, PRESERVATIVE FREE 10 ML: 5 INJECTION INTRAVENOUS at 08:46

## 2019-09-18 RX ADMIN — HEPARIN SODIUM 5000 UNITS: 5000 INJECTION, SOLUTION INTRAVENOUS; SUBCUTANEOUS at 14:05

## 2019-09-18 RX ADMIN — ASPIRIN 81 MG: 81 TABLET, COATED ORAL at 08:45

## 2019-09-18 RX ADMIN — CARVEDILOL 3.12 MG: 3.12 TABLET, FILM COATED ORAL at 08:45

## 2019-09-18 RX ADMIN — HYDROCODONE BITARTRATE AND ACETAMINOPHEN 1 TABLET: 7.5; 325 TABLET ORAL at 14:06

## 2019-09-18 RX ADMIN — MORPHINE SULFATE 2 MG: 2 INJECTION, SOLUTION INTRAMUSCULAR; INTRAVENOUS at 10:18

## 2019-09-18 RX ADMIN — HEPARIN SODIUM 5000 UNITS: 5000 INJECTION, SOLUTION INTRAVENOUS; SUBCUTANEOUS at 05:20

## 2019-09-18 NOTE — PROGRESS NOTES
Continued Stay Note  [facility]     Patient Name: Lizbeth Johns  MRN: [id number]  Today's Date: 9/18/2019    Admit Date: 9/16/2019    Discharge Plan     Row Name 09/18/19 1050       Plan    Plan Comments  Pt to return home with [facility], [place] has confirmed. Her home O2 has been ordered from [employer]. A portable tank will be brought to her room prior to discharge.     Final Discharge Disposition Code  06 - home with home health care        Discharge Codes    No documentation.       Expected Discharge Date and Time     Expected Discharge Date Expected Discharge Time    Sep 18, 2019             Chuyita Stahl RN

## 2019-09-18 NOTE — DISCHARGE PLACEMENT REQUEST
"Heriberto Johns (63 y.o. Female)     Date of Birth Social Security Number Address Home Phone MRN    1955  [address] [id number] [phone] [id number]    [employer] Mar[employer] Status          [employer]        Admission Date Admission Type Admitting Provider Attending Provider Department, Room/Bed    19 Emergency Malika Rod MD Stephen, Karla R, MD [address], [address]615/1    Discharge Date Discharge Disposition Discharge Destination         Home or Self Care              Attending Provider:  Malika Rod MD    Allergies:  No Known Allergies    Isolation:  None   Infection:  None   Code Status:  CPR    Ht:  152.4 cm (60\")   Wt:  76.2 kg (168 lb)    Admission Cmt:  None   Principal Problem:  Air leak [J93.82]                 Active Insurance as of 2019     Primary Coverage     Payor Plan Insurance Group Employer/Plan Group    MEDICARE MEDICARE A & B      Payor Plan Address Payor Plan Phone Number Payor Plan Fax Number Effective Dates    [address] [phone]  2013 - None Entered    [place] [address]       Subscriber Name Subscriber Birth Date Member ID       HERIBERTO JOHNS 1955 [id number]                 Emergency Contacts      (Rel.) Home Phone Work Phone Mobile Phone    Alcira Johns (Daughter) [phone] -- --    LONA POWERS (Daughter) [phone] -- --        [address] [facility] [address]  Dept. Phone:  [phone]  Dept. Fax:   Date Ordered: Sep 18, 2019         Patient:  Heriberto Johns MRN:  [id number]   [address] [facility] [address] :  1955  SSN:    Phone: [phone] Sex:  F     Weight: 76.2 kg (168 lb)         Ht Readings from Last 1 Encounters:   19 152.4 cm (60\")         Oxygen Therapy         (Order ID: [id number])    Diagnosis:  Primary adenocarcinoma of upper lobe of right lung (CMS/HCC) (C34.11 [ICD-10-CM] 162.3 [ICD-9-CM])  History of COPD " (Z87.09 [ICD-10-CM] V12.69 [ICD-9-CM])   Quantity:  1     Delivery Modality: Nasal Cannula  Liters Per Minute: 2  Duration: Continuous  Equipment:  Oxygen Concentrator &  &  Portable Gaseous Oxygen System & Portable Oxygen Contents Gaseous &  Conserving Regulator  The face to face evaluation was performed on: 2019  Length of Need (99 Months = Lifetime): 99 Months = Lifetime        Authorizing Provider's Phone: [phone]   Verbal Order Mode: Per protocol: cosign required  Authorizing Provider: Malika Rod MD  Authorizing Provider's NPI: [id number]     Order Entered By: Chuyita Stahl RN 2019 10:40 AM     Electronically signed by: Malika Rod MD 2019 10:57 AM            Emergency Contact Information     Name Relation Home Work Mobile    Alcira Johns Daughter [phone]      LONA POWERS Daughter [phone]            Insurance Information                MEDICARE/MEDICARE A & B Phone: [phone]    Subscriber: Lizbeth Johns Subscriber#: [id number]    Group#:  Precert#:              History & Physical      Maryjo Tobias DO at 19 0622              [facility] Medicine Services  HISTORY AND PHYSICAL    Patient Name: Lizbeth Johns  : 1955  MRN: [id number]  Primary Care Physician: Emily Ferreira MD  Date of admission: 2019      Subjective   Subjective     Chief Complaint:  Right sided chest pain    HPI:  Lizbeth Johns is a 63 y.o. female with a past medical history significant for hypertension, HLD, COPD, adenocarcinoma of the right lung who presents with intractable right sided chest wall pain after chest tube placement. She reports a change in the structure of tubes with minimal drainage. Also reports SOB secondary to pain which is worse with exertion. Currently without complaints of fever, cough, congestion, syncope, or N/V/D. Patient was seen in [place] where they tried to adjust  tubing.    Patient was admitted to to this facility 8/30-9/13/2019. At this time she underwent PET scan which demonstrated active right lung nodule. She then underwent  bronchoscopy then thoracoscopy with a right upper lobe wedge then completion of right upper lobectomy. Patient then had an intercostal cryoablation of the right chest along with a lymph node dissection. Eventually had c hest tubes placed under waterseal. Ended up with small apical pneumothorax 9/9/2019. At discharge, patient instructed to follow up with CT surgery as outpatient.    Emergency Department Evaluation: borderline hypotensive at 105/69. Chemistry and hematology favorable. CTA negative for acute PE, no pneumothorax, pneumonia, or other acute changes      Review of Systems   Constitutional: Negative for chills and fever.   HENT: Negative for congestion and trouble swallowing.    Eyes: Negative for photophobia and visual disturbance.   Respiratory: Positive for shortness of breath. Negative for cough.    Cardiovascular: Positive for chest pain.   Gastrointestinal: Negative for abdominal pain, diarrhea, nausea and vomiting.   Endocrine: Negative for cold intolerance and heat intolerance.   Genitourinary: Negative for dysuria and flank pain.   Musculoskeletal: Negative for back pain and gait problem.   Skin: Positive for wound. Negative for pallor and rash.   Allergic/Immunologic: Negative for immunocompromised state.   Neurological: Negative for dizziness, weakness and headaches.   Hematological: Negative for adenopathy.   Psychiatric/Behavioral: Negative for agitation and confusion.          All other systems reviewed and are negative.     Personal History     Past Medical History:   Diagnosis Date   • Acid reflux    • Arthritis    • Cataract, bilateral    • COPD (chronic obstructive pulmonary disease) (CMS/HCC)    • Hypertension    • Leg cramps    • Lung mass     right   • SOB (shortness of breath)     xray showed spot on lungs       Past  Surgical History:   Procedure Laterality Date   • BRONCHOSCOPY N/A 6/13/2019    Procedure: BRONCHOSCOPY WITH FLUOROSCOPY, BIOPSY, BRUSHINGS, AND WASHINGS;  Surgeon: Gudelia Patel MD;  Location: [facility] OR;  Service: Pulmonary   • COLONOSCOPY N/A 2/23/2017    Procedure: COLONOSCOPY with hot and cold snare polypectomies,  hot biopsy polypectomies, biopsies, resolution clip placement;  Surgeon: Zackery Siddiqui MD;  Location: [facility] ENDOSCOPY;  Service:    • COLONOSCOPY N/A 6/4/2018    Procedure: COLONOSCOPY WITH HOT SNARE POLYPECTOMY X 7; COLD SNARE POLYPECTOMY X 3; HOT BIOPSY POLYPECTOMY X 20; COLD BIOPSY POLYPECTOMY X 2; THERMAL ABLATION OF COLON POLYPS X 23; CLIP PLACEMENT X 2; BIOPSIES;  Surgeon: Zackery Siddiqui MD;  Location: [facility] ENDOSCOPY;  Service: Gastroenterology   • COLONOSCOPY N/A 6/19/2019    Procedure: COLONOSCOPY with cold biopsy polypectomies and cold biopsy;  Surgeon: Zackery Siddiqui MD;  Location: [facility] ENDOSCOPY;  Service: Gastroenterology   • ENDOSCOPY  06/13/2019   • MULTIPLE TOOTH EXTRACTIONS      full extraction   • ORIF TIBIA/FIBULA FRACTURES Left    • THORACOSCOPY Right 8/30/2019    Procedure: BRONCHOSCOPY,  THORACOSCOPY VIDEO ASSISTED with right upper lobe wedge RESECTION , RIGHT UPPER lobectomy with mediastinal lymph node dissection AND INTERCOSTAL CRYOABLATION OF RIGHT CHEST;  Surgeon: Brian Barreto MD;  Location: [facility] OR;  Service: Cardiothoracic   • TUBAL ABDOMINAL LIGATION         Family History: family history includes Cancer in her mother; Cirrhosis in her brother; Colon cancer in her brother; Liver disease in her brother; No Known Problems in her father. Otherwise pertinent FHx was reviewed and unremarkable.     Social History:  reports that she has been smoking cigarettes.  She has a 50.00 pack-year smoking history. She has never used smokeless tobacco. She reports that she drinks about 1.2 oz of alcohol per week. She reports that she does not use drugs.  Social History      Social History Narrative    Lives in [place], [place]       Medications:    Available home medication information reviewed.    (Not in a hospital admission)    No Known Allergies    Objective   Objective     Vital Signs:   Temp:  [97.6 °F (36.4 °C)-98.2 °F (36.8 °C)] 98.2 °F (36.8 °C)  Heart Rate:  [70-85] 85  Resp:  [14-18] 14  BP: (105-121)/(69-73) 109/73        Physical Exam   Constitutional: Awake, alert  Eyes: PERRLA, sclerae anicteric, no conjunctival injection  HENT: NCAT, mucous membranes moist  Neck: Supple, no thyromegaly, no lymphadenopathy, trachea midline  Respiratory: Clear to auscultation bilaterally, nonlabored respirations   - chest tubes placed. Minimal drainage. Pain reproducible on palpation  Cardiovascular: RRR, no murmurs, rubs, or gallops, palpable pedal pulses bilaterally  Gastrointestinal: Positive bowel sounds, soft, nontender, nondistended  Musculoskeletal: No bilateral ankle edema, no clubbing or cyanosis to extremities  Psychiatric: Appropriate affect, cooperative  Neurologic: Oriented x 3, strength symmetric in all extremities, Cranial Nerves grossly intact to confrontation, speech clear  Skin: No rashes      Results Reviewed:  I have personally reviewed current lab and radiology data.    Results from last 7 days   Lab Units 09/17/19  0333 09/17/19  0329   WBC 10*3/mm3  --  10.62   HEMOGLOBIN g/dL  --  10.2*   HEMOGLOBIN, POC g/dL 11.2*  --    HEMATOCRIT %  --  32.6*   HEMATOCRIT POC % 33*  --    PLATELETS 10*3/mm3  --  466*     Results from last 7 days   Lab Units 09/17/19  0333 09/17/19  0329   SODIUM mmol/L  --  137   POTASSIUM mmol/L  --  4.4   CHLORIDE mmol/L  --  97*   CO2 mmol/L  --  28.0   BUN mg/dL  --  23   CREATININE mg/dL 1.00 0.95   GLUCOSE mg/dL  --  92   CALCIUM mg/dL  --  10.0   ALT (SGPT) U/L  --  16   AST (SGOT) U/L  --  23     Estimated Creatinine Clearance: 51.2 mL/min (by C-G formula based on SCr of 1 mg/dL).  Brief Urine Lab Results     None        Imaging Results  (last 24 hours)     Procedure Component Value Units Date/Time    CT Angiogram Chest With Contrast [[id number]] Collected:  09/17/19 0436     Updated:  09/17/19 0438    Narrative:       CTA Chest    INDICATION:   63-year-old female with right-sided chest pain today. Possible chest tube malfunction. Recent partial right pneumonectomy 8/30/2019 for adenocarcinoma.    TECHNIQUE:   CT angiogram of the chest with IV contrast. 3-D reconstructions were obtained and reviewed.   Radiation dose reduction techniques included automated exposure control or exposure modulation based on body size. Count of known CT and cardiac nuc med studies  performed in previous 12 months: 0.     COMPARISON:   None available.    FINDINGS:   There is adequate opacification of the pulmonary arteries with no filling defects. Thoracic aorta normal in course and caliber without dissection. Heart size normal. No pericardial effusion. There are a few minimally prominent nonspecific mediastinal  lymph nodes. Metastatic disease is not excluded.    Postsurgical changes from right upper lobe resection. There are 2 chest tubes noted in the right hemithorax. Trace right pleural effusion. No pneumothorax. Right chest wall subcutaneous emphysema. The left lung is clear.    Visualized upper abdomen demonstrates multiple partially imaged bilateral renal cysts. There is an indeterminate left adrenal nodule measuring 2.3 x 1.8 cm. Metastatic disease is not excluded. No acute osseous abnormality. No aggressive osseous lesions.      Impression:       1. Negative for pulmonary embolus.  2. Negative for thoracic aortic aneurysm/dissection.  3. Postsurgical changes from right upper lobectomy. 2 chest tubes in the right hemithorax. No pneumothorax. Right chest wall subcutaneous emphysema.  4. Trace right pleural effusion.  5. A few minimally prominent mediastinal lymph nodes. Metastatic disease is not excluded.  6. Indeterminate left adrenal nodule measuring 2.3 x 1.8  cm. Metastatic disease is not excluded.    Signer Name: Josse Adorno MD   Signed: 9/17/2019 4:36 AM   Workstation [facility]: [facility]    [facility]    XR Chest 1 View [[id number]] Collected:  09/17/19 0151     Updated:  09/17/19 0153    Narrative:       CR Chest 1 Vw    INDICATION:   63-year-old female with right chest wall pain status post recent right upper lobectomy.     COMPARISON:    Chest x-ray 9/13/2019    FINDINGS:  2 portable AP view(s) of the chest.  2 right-sided chest tubes are in place. Very tiny residual right apical pneumothorax. Right-sided volume loss from prior right upper lobectomy. Left lung is clear. Heart size and mediastinum are within normal limits  area      Impression:       Stable right-sided chest tubes with tiny residual right apical pneumothorax.    Signer Name: Josse Adorno MD   Signed: 9/17/2019 1:51 AM   Workstation [facility]: [facility]    [facility]             Assessment/Plan   Assessment / Plan     Active Hospital Problems    Diagnosis POA   • **Right-sided chest pain [R07.9] Yes     Priority: High   • Primary adenocarcinoma of right lung (CMS/HCC) [C34.91] Yes     Priority: High   • COPD (chronic obstructive pulmonary disease) (CMS/HCC) [J44.9] Yes     Priority: Medium   • Essential hypertension [I10] Yes     Priority: Medium   • Tobacco abuse [Z72.0] Yes     Priority: Low         1. Right chest pain:  - post-op problem  - imaging not impressive for acute process  - consult to CT surgery, Dr. Barreto  - close monitor of vitals  - wound care  - pain control as needed    2. Tobacco abuse:  - nicotine patch as needed. Still actively smoking. Will plan to [place].    3. HTN:  - stable. Continue home meds ad continue to monitor    DVT prophylaxis: mechanical    CODE STATUS:  Full code  Code Status and Medical Interventions:   Ordered at: 09/17/19 0647     Level Of Support Discussed With:    Patient     Code Status:    CPR     Medical  Interventions (Level of Support Prior to Arrest):    Full       Admission Status:  I believe this patient meets INPATIENT status due to IV analgesia.  I feel patient’s risk for adverse outcomes and need for care warrant INPATIENT evaluation and I predict the patient’s care encounter to likely last beyond 2 midnights.        Electronically signed by Juan Acosta PA-C, 09/17/19, 6:23 AM.        Brief Attending Admission Attestation     I have seen and examined the patient, performing an independent face-to-face diagnostic evaluation with plan of care reviewed and developed with the advanced practice clinician (APC).      Brief Summary Statement:   Lizbeth Johns is a 63 y.o. female with past medical history of hypertension, hyperlipidemia, COPD and recent diagnosis of adenocarcinoma of the right lung.  Patient presented with intractable pain on the right side of her chest after recent chest tube placement. She then underwent  bronchoscopy then thoracoscopy with a right upper lobe wedge then completion of right upper lobectomy.  Patient denies any coughing but complains of worsening shortness of breath.  Patient states that due to increased pain she went to [facility] ED and had tubing adjusted.  Her symptoms did not improve therefore patient came to [facility] ED and CT of the chest noted Postsurgical changes from right upper lobectomy. 2 chest tubes in the right hemithorax. No pneumothorax. Right chest wall subcutaneous emphysema.  Patient will be admitted for pan control and we will consult CT surgery for further evaluation.      Remainder of detailed HPI is as noted above and has been reviewed and/or edited by me for completeness.      Attending Physical Exam:  Constitutional: No acute distress, awake, alert  HENT: NCAT, mucous membranes moist  Respiratory: chest tube right, tenderness, rales on the right, 30 mL of SS fluid, empted by nursing.    Cardiovascular: RRR, no murmurs, rubs, or gallops,  palpable pedal pulses bilaterally  Gastrointestinal: Positive bowel sounds, soft, nontender, nondistended  Musculoskeletal: trace edema  Psychiatric: Appropriate affect, cooperative  Neurologic: Oriented x 3, strength symmetric in all extremities, Cranial Nerves grossly intact to confrontation, speech clear  Skin: No rashes        Brief Assessment/Plan :  See above for further detailed assessment and plan developed with APC which I have reviewed and/or edited for completeness.      Electronically signed by Maryjo Tobias DO, 09/17/19, 7:18 AM.         Electronically signed by Maryjo Tobias DO at 09/17/19 0724       Oxygen Therapy (last 2 days)     Date/Time   SpO2   Device (Oxygen Therapy)   Flow (L/min)   Oxygen Concentration (%)   ETCO2 (mmHg)    09/18/19 0855   82  (Abnormal)    room air   --   --   --    09/18/19 0600   --   nasal cannula   1   --   --    09/18/19 0526   93   --   --   --   --    09/18/19 0445   95   --   --   --   --    09/18/19 0400   --   nasal cannula   1   --   --    09/18/19 0200   --   nasal cannula   1   --   --    09/18/19 0000   --   nasal cannula   1   --   --    09/17/19 2154   --   nasal cannula   1   --   --    09/17/19 2022   95   nasal cannula   1   --   --    09/17/19 1800   --   room air   --   --   --    09/17/19 1600   --   nasal cannula   2   --   --    09/17/19 1510   --   nasal cannula   2   --   --    09/17/19 1400   --   nasal cannula   1   --   --    09/17/19 1200   --   nasal cannula   2   --   --    09/17/19 1127   --   nasal cannula   2   --   --    09/17/19 1000   --   nasal cannula   2   --   --    09/17/19 0801   --   nasal cannula   2   --   --    09/17/19 0753   95   nasal cannula   2   --   --    09/17/19 0700   --   --   2   --   --    09/17/19 0610   99   --   --   --   --    09/17/19 03:53:04   93   --   --   --   --    09/16/19 2248   99   --   --   --   --               Physician Progress Notes (last 24 hours) (Notes from 09/17/19 1104 through 09/18/19  1104)      Brian Barreto MD at 09/18/19 0997          Cardiothoracic Surgery Progress Note      s/p Right VATS upper lobectomy 8/30/19     LOS: 1 day      Subjective:  No complaints this morning.  Pain controlled.  Denies shortness of breath.  She did not ambulate yesterday.    Objective:  Vital Signs  Temp:  [97.6 °F (36.4 °C)-98.6 °F (37 °C)] 98.6 °F (37 °C)  Heart Rate:  [65-82] 82  Resp:  [16] 16  BP: ()/(50-73) 99/73    Physical Exam:   General Appearance: alert, appears stated age and cooperative   Lungs: clear to auscultation, respirations regular, respirations even and respirations unlabored   Heart: regular rhythm & normal rate, normal S1, S2 and no murmur, no gallop, no rub   Skin: Incision c/d/i     Results:  Results from last 7 days   Lab Units 09/17/19  0333 09/17/19  0329   WBC 10*3/mm3  --  10.62   HEMOGLOBIN g/dL  --  10.2*   HEMOGLOBIN, POC g/dL 11.2*  --    HEMATOCRIT %  --  32.6*   HEMATOCRIT POC % 33*  --    PLATELETS 10*3/mm3  --  466*     Results from last 7 days   Lab Units 09/17/19  0333 09/17/19  0329   SODIUM mmol/L  --  137   POTASSIUM mmol/L  --  4.4   CHLORIDE mmol/L  --  97*   CO2 mmol/L  --  28.0   BUN mg/dL  --  23   CREATININE mg/dL 1.00 0.95   GLUCOSE mg/dL  --  92   CALCIUM mg/dL  --  10.0       Assessment:  s/p Right VATS upper lobectomy 8/30/19  Postoperative pain  Post chest tube pull CXR satisfactory    Plan:  Ambulate in halls this morning.    Ok to discharge home with follow-up in surgical clinic in 4 weeks    Brian Barreto MD  09/18/19  9:38 AM          Electronically signed by Brian Barreto MD at 09/18/19 0949     Kentrell Turner PA at 09/17/19 1136     Attestation signed by Brian Barreto MD at 09/18/19 0924    I have reviewed the documentation above and agree.                  Patient's chest tube is been placed to 20 cm of suction.  She continues to have no airleak.  CT scan this morning showed no pneumothorax.  Have discussed with Dr. Barreto.  He  suggests go ahead and pull the chest tubes will check a chest x-ray in 4 hours if the chest x-ray looks good she can be discharged home at any time from our standpoint.    Electronically signed by Brian Barreto MD at 09/18/19 6023

## 2019-09-18 NOTE — PLAN OF CARE
Problem: Patient Care Overview  Goal: Plan of Care Review  Outcome: Ongoing (interventions implemented as appropriate)   09/18/19 1340   Coping/Psychosocial   Plan of Care Reviewed With patient   Plan of Care Review   Progress improving   OTHER   Outcome Summary Patient is discharging home today, home 02 has been sit up for her. Patient ambulated in hallway without issue. Will continue to monitor until discharge..

## 2019-09-18 NOTE — PROGRESS NOTES
Cardiothoracic Surgery Progress Note      s/p Right VATS upper lobectomy 8/30/19     LOS: 1 day      Subjective:  No complaints this morning.  Pain controlled.  Denies shortness of breath.  She did not ambulate yesterday.    Objective:  Vital Signs  Temp:  [97.6 °F (36.4 °C)-98.6 °F (37 °C)] 98.6 °F (37 °C)  Heart Rate:  [65-82] 82  Resp:  [16] 16  BP: ()/(50-73) 99/73    Physical Exam:   General Appearance: alert, appears stated age and cooperative   Lungs: clear to auscultation, respirations regular, respirations even and respirations unlabored   Heart: regular rhythm & normal rate, normal S1, S2 and no murmur, no gallop, no rub   Skin: Incision c/d/i     Results:  Results from last 7 days   Lab Units 09/17/19  0333 09/17/19  0329   WBC 10*3/mm3  --  10.62   HEMOGLOBIN g/dL  --  10.2*   HEMOGLOBIN, POC g/dL 11.2*  --    HEMATOCRIT %  --  32.6*   HEMATOCRIT POC % 33*  --    PLATELETS 10*3/mm3  --  466*     Results from last 7 days   Lab Units 09/17/19  0333 09/17/19  0329   SODIUM mmol/L  --  137   POTASSIUM mmol/L  --  4.4   CHLORIDE mmol/L  --  97*   CO2 mmol/L  --  28.0   BUN mg/dL  --  23   CREATININE mg/dL 1.00 0.95   GLUCOSE mg/dL  --  92   CALCIUM mg/dL  --  10.0       Assessment:  s/p Right VATS upper lobectomy 8/30/19  Postoperative pain  Post chest tube pull CXR satisfactory    Plan:  Ambulate in halls this morning.    Ok to discharge home with follow-up in surgical clinic in 4 weeks    Brian Barreto MD  09/18/19  9:38 AM

## 2019-09-18 NOTE — DISCHARGE SUMMARY
[facility] Medicine Services  DISCHARGE SUMMARY    Patient Name: Lizbeth Johns  : 1955  MRN: [id number]    Date of Admission: 2019  Date of Discharge:  19    Primary Care Physician: Emily Ferreira MD    Consults     Date and Time Order Name Status Description    2019 0812 Inpatient Cardiothoracic Surgery Consult            Hospital Course     Presenting Problem:   Right-sided chest pain [R07.9]    Active Hospital Problems    Diagnosis  POA   • **Air leak [J93.82]  Yes   • Right-sided chest pain [R07.9]  Yes   • Tobacco abuse [Z72.0]  Yes   • Primary adenocarcinoma of right lung (CMS/HCC) [C34.91]  Yes   • COPD (chronic obstructive pulmonary disease) (CMS/HCC) [J44.9]  Yes   • Mediastinal lymphadenopathy [R59.0]  Yes   • Essential hypertension [I10]  Yes      Resolved Hospital Problems   No resolved problems to display.          Hospital Course:  Lizbeth Johns is a 63 y.o. female admitted with right-sided chest tube insertion site pain post pulmonary wedge resection, associated with shortness of air.     Right-sided chest tube pain  Consulted CT surgery who removed her chest tube on 2019     Adenocarcinoma of the lung  Consulted oncology to establish care and explore further work-up and treatment options  She will follow-up in 3 months in the outpatient oncology clinic     Increased pain and dyspnea  Treated with morphine and Norco PRN while admitted  Pain is improved with removal of chest tube      Discharge Follow Up Recommendations for labs/diagnostics:  Follow-up with primary care physician in 1 to 2 weeks for routine hospital follow-up  Follow-up with oncology in clinic in 3 months with repeat CT of the chest.   Day of Discharge     HPI:   Patient reports she is doing better today although she still having some pain.  Chest tube was removed yesterday.  She has some shortness of air and needs oxygen to maintain sats in the 90s.  We will send  her home with oxygen.  Her sats were as low as 81% on room air.  She is tolerating p.o. well.    Review of Systems  General: No fever, chills, fatigue  ENT: No sore throat, trouble swallowing or changes in vision  Respiratory: Positive shortness of breath, cough, denies wheezing or fast breathing  Cardiovascular: Positive chest pain, denies palpitations, positive dyspnea with exertion  Gastrointestinal: No nausea vomiting abdominal pain  Musculoskeletal: Positive difficulty walking, positive weakness after surgery  Vascular: No cyanosis or clubbing  Lymphatic: No peripheral edema,  lymph node biopsies were negative  Neurologic: No headache, confusion, dizziness  Psychiatric: Positive anxiety    Vital Signs:   Temp:  [97.6 °F (36.4 °C)-98.6 °F (37 °C)] 97.6 °F (36.4 °C)  Heart Rate:  [73-99] 99  Resp:  [16] 16  BP: ()/(50-73) 99/73     Physical Exam:  Constitutional: No acute distress, awake, alert, nontoxic, normal body habitus  Eyes: Pupils equal, sclerae anicteric, no conjunctival injection  HENT: NCAT, mucous membranes moist  Neck: Supple, no thyromegaly, no lymphadenopathy  Respiratory: Decreased breath sounds in the right lung anteriorly, poor effort due to pain, nonlabored respirations   Cardiovascular: RRR  Gastrointestinal: Positive bowel sounds, soft, nontender, nondistended  Musculoskeletal: No peripheral edema, normal muscle tone for age  Psychiatric: Appropriate affect, good insight and judgement, cooperative  Neurologic: Oriented x 3, movements symmetric BUE and BLE, Cranial Nerves grossly intact to confrontation, speech clear and fluent    Pertinent  and/or Most Recent Results     Results from last 7 days   Lab Units 09/17/19  0333 09/17/19  0329 09/12/19  0527   WBC 10*3/mm3  --  10.62  --    HEMOGLOBIN g/dL  --  10.2*  --    HEMOGLOBIN, POC g/dL 11.2*  --   --    HEMATOCRIT %  --  32.6*  --    HEMATOCRIT POC % 33*  --   --    PLATELETS 10*3/mm3  --  466*  --    SODIUM mmol/L  --  137 138    POTASSIUM mmol/L  --  4.4 4.7   CHLORIDE mmol/L  --  97* 99   CO2 mmol/L  --  28.0 25.0   BUN mg/dL  --  23 30*   CREATININE mg/dL 1.00 0.95 1.02*   GLUCOSE mg/dL  --  92 89   CALCIUM mg/dL  --  10.0 9.9     Results from last 7 days   Lab Units 09/17/19  0329   BILIRUBIN mg/dL <0.2*   ALK PHOS U/L 125*   ALT (SGPT) U/L 16   AST (SGOT) U/L 23           Invalid input(s): TG, LDLCALC, LDLREALC        Brief Urine Lab Results     None          Microbiology Results Abnormal     None          Imaging Results (all)     Procedure Component Value Units Date/Time    XR Chest 1 View [[id number]] Collected:  09/18/19 0804     Updated:  09/18/19 1019    Narrative:       EXAMINATION: XR CHEST 1 VW-09/18/2019:      INDICATION: S/P chest tube removal; R07.9-Chest pain, unspecified;  C34.11-Malignant neoplasm of upper lobe, right bronchus or lung;  R52-Pain, unspecified; Z98.890-Other specified postprocedural states;  Z87.09-Personal history of other diseases of the respiratory system;  F17.200-Nicotine dependence, unspecified, uncomplicated; Z86.79-Personal  history of other diseases of the circulatory system; J93.82-Other.      COMPARISON: 09/17/2019 at 4:03 PM.     FINDINGS: Small amount of right-sided subcutaneous emphysema is again  noted. No pneumothorax is identified. There are mild chronic appearing  interstitial lung changes, but no clearly acute chest disease. No  effusion, edema, or lung consolidation is seen.           Impression:       Stable chest exam with postoperative changes of the right  chest and small amount of right-sided subcutaneous emphysema. No  evidence of new chest disease compared to yesterday's 4:03 PM exam.     D:  09/18/2019  E:  09/18/2019             XR Chest 1 View [[id number]] Collected:  09/18/19 0803     Updated:  09/18/19 1016    Narrative:       EXAMINATION: XR CHEST 1 VW-09/17/2019:      INDICATION: Postop; R07.9-Chest pain, unspecified; C34.11-Malignant  neoplasm of upper lobe, right bronchus  or lung; R52-Pain, unspecified;  Z98.890-Other specified postprocedural states; Z87.09-Personal history  of other diseases of the respiratory system; F17.200-Nicotine  dependence, unspecified, uncomplicated; Z86.79-Personal history of other  diseases of the circulatory system; J93.82-Other air leak.      COMPARISON: 09/17/2019.     FINDINGS: Right thoracotomy tubes have been removed. The right lung  appears clear except for mild linear scarring in the right base. No  pneumothorax is seen. There is a small amount of remaining right- sided  subjacent emphysema which appears decreased. The left lung remains well  expanded and clear. The heart and vasculature appear normal in size.           Impression:       Right chest tube removal with no evidence of pneumothorax.  Decreasing, mild right subcutaneous emphysema.     D:  09/18/2019  E:  09/18/2019             CT Angiogram Chest With Contrast [[id number]] Collected:  09/17/19 0436     Updated:  09/17/19 0438    Narrative:       CTA Chest    INDICATION:   63-year-old female with right-sided chest pain today. Possible chest tube malfunction. Recent partial right pneumonectomy 8/30/2019 for adenocarcinoma.    TECHNIQUE:   CT angiogram of the chest with IV contrast. 3-D reconstructions were obtained and reviewed.   Radiation dose reduction techniques included automated exposure control or exposure modulation based on body size. Count of known CT and cardiac nuc med studies  performed in previous 12 months: 0.     COMPARISON:   None available.    FINDINGS:   There is adequate opacification of the pulmonary arteries with no filling defects. Thoracic aorta normal in course and caliber without dissection. Heart size normal. No pericardial effusion. There are a few minimally prominent nonspecific mediastinal  lymph nodes. Metastatic disease is not excluded.    Postsurgical changes from right upper lobe resection. There are 2 chest tubes noted in the right hemithorax. Trace right  pleural effusion. No pneumothorax. Right chest wall subcutaneous emphysema. The left lung is clear.    Visualized upper abdomen demonstrates multiple partially imaged bilateral renal cysts. There is an indeterminate left adrenal nodule measuring 2.3 x 1.8 cm. Metastatic disease is not excluded. No acute osseous abnormality. No aggressive osseous lesions.      Impression:       1. Negative for pulmonary embolus.  2. Negative for thoracic aortic aneurysm/dissection.  3. Postsurgical changes from right upper lobectomy. 2 chest tubes in the right hemithorax. No pneumothorax. Right chest wall subcutaneous emphysema.  4. Trace right pleural effusion.  5. A few minimally prominent mediastinal lymph nodes. Metastatic disease is not excluded.  6. Indeterminate left adrenal nodule measuring 2.3 x 1.8 cm. Metastatic disease is not excluded.    Signer Name: Josse Adorno MD   Signed: 9/17/2019 4:36 AM   Workstation Name: [email or web address]    Radiology [facility]    XR Chest 1 View [[id number]] Collected:  09/17/19 0151     Updated:  09/17/19 0153    Narrative:       CR Chest 1 Vw    INDICATION:   63-year-old female with right chest wall pain status post recent right upper lobectomy.     COMPARISON:    Chest x-ray 9/13/2019    FINDINGS:  2 portable AP view(s) of the chest.  2 right-sided chest tubes are in place. Very tiny residual right apical pneumothorax. Right-sided volume loss from prior right upper lobectomy. Left lung is clear. Heart size and mediastinum are within normal limits  area      Impression:       Stable right-sided chest tubes with tiny residual right apical pneumothorax.    Signer Name: Josse Adorno MD   Signed: 9/17/2019 1:51 AM   Workstation Name: [email or web address]    Radiology [facility]                           Discharge Details        Discharge Medications      Continue These Medications      Instructions Start Date   aspirin 81 MG EC tablet   81 mg, Oral, Daily      carvedilol  3.125 MG tablet  Commonly known as:  COREG   3.125 mg, Oral, Every 12 Hours Scheduled      HYDROcodone-acetaminophen 7.5-325 MG per tablet  Commonly known as:  NORCO   1 tablet, Oral, Every 4 Hours PRN      rOPINIRole 0.5 MG tablet  Commonly known as:  REQUIP   0.5 mg, Oral, Nightly, Take 1 hour before bedtime.      tiotropium bromide-olodaterol 2.5-2.5 MCG/ACT aerosol solution inhaler  Commonly known as:  STIOLTO RESPIMAT   2 puffs, Inhalation, Daily             No Known Allergies      Discharge Disposition:  Home or Self Care    Diet:  Hospital:  Diet Order   Procedures   • Diet Regular; Cardiac     Discharge:   Diet Instructions     Diet: Regular, Cardiac      Discharge Diet:   Regular  Cardiac             Discharge Activity:   Activity Instructions     Activity as Tolerated              CODE STATUS:    Code Status and Medical Interventions:   Ordered at: 09/17/19 0621     Level Of Support Discussed With:    Patient     Code Status:    CPR     Medical Interventions (Level of Support Prior to Arrest):    Full         Future Appointments   Date Time Provider Department Center   9/30/2019  3:00 PM Shannon Plunkett APRN [facility] None   10/1/2019 12:45 PM Kole Jackman PA-C [facility] None   10/1/2019  3:45 PM Zackery Siddiqui MD [facility] None   10/24/2019  2:30 PM [facility] 1  [facility]       Additional Instructions for the Follow-ups that You Need to Schedule     Ambulatory Referral to Home Health   As directed      Face to Face Visit Date:  9/17/2019    Follow-up provider for Plan of Care?:  I treated the patient in an acute care facility and will not continue treatment after discharge.    Follow-up provider:  ROB BARNETT [[id number]]    Reason/Clinical Findings:  Generalized Weakness, COPD, recent right lobectomy with previous chest tubes    Describe mobility limitations that make leaving home difficult:  Generalized Weakness    Nursing/Therapeutic Services Requested:  Skilled Nursing    Skilled  nursing orders:  Other (Resume Skilled Nursing)         Discharge Follow-up with PCP   As directed       Currently Documented PCP:    Emily Ferreira MD    PCP Phone Number:    [phone]     Follow Up Details:  1 week to check oxygen         Discharge Follow-up with Specified Provider: Oncology; 3 Months   As directed      To:  Oncology    Follow Up:  3 Months    Follow Up Details:  For repeat CT of the chest               Time Spent on Discharge:  35 minutes    Electronically signed by Malika Rod MD, 09/18/19, 11:00 AM.

## 2019-09-18 NOTE — PLAN OF CARE
Problem: Patient Care Overview  Goal: Plan of Care Review  Outcome: Ongoing (interventions implemented as appropriate)   09/18/19 4228   Coping/Psychosocial   Plan of Care Reviewed With patient   Plan of Care Review   Progress no change   OTHER   Outcome Summary VSS. Has complained of pain; medicated with PRN meds. Has been NSR on monitor and on 1 L NC. No other complaints noted.

## 2019-09-19 ENCOUNTER — READMISSION MANAGEMENT (OUTPATIENT)
Dept: CALL CENTER | Facility: HOSPITAL | Age: 64
End: 2019-09-19

## 2019-09-19 NOTE — ED PROVIDER NOTES
Subjective   History of Present Illness    Chief Complaint: Clotted chest tube atrium  History of Present Illness: Patient is a history of a neoplasm with effusion, treated with chest tube to continuous drainage, has been draining well until this evening and noticed that they cannot aspirate fluid from the atrium.    Onset: Tonight, chest tube was placed approximately 3 weeks ago  Duration: Persistent  Exacerbating / Alleviating factors: None, home health manages the chest tube atrium.  Associated symptoms: Some right-sided chest pain      Nurses Notes reviewed and agree, including vitals, allergies, social history and prior medical history.     REVIEW OF SYSTEMS: All systems reviewed and not pertinent unless noted.    Positive for: Nonfunctioning chest tube atrium, pleuritic pain right chest    Negative for: Fever, hemoptysis, cough, syncope  Review of Systems    Past Medical History:   Diagnosis Date   • Acid reflux    • Arthritis    • Cataract, bilateral    • COPD (chronic obstructive pulmonary disease) (CMS/HCC)    • Hypertension    • Leg cramps    • Lung mass     right   • SOB (shortness of breath)     xray showed spot on lungs       No Known Allergies    Past Surgical History:   Procedure Laterality Date   • BRONCHOSCOPY N/A 6/13/2019    Procedure: BRONCHOSCOPY WITH FLUOROSCOPY, BIOPSY, BRUSHINGS, AND WASHINGS;  Surgeon: Gudelia Patel MD;  Location: [facility] OR;  Service: Pulmonary   • COLONOSCOPY N/A 2/23/2017    Procedure: COLONOSCOPY with hot and cold snare polypectomies,  hot biopsy polypectomies, biopsies, resolution clip placement;  Surgeon: Zackery Siddiqui MD;  Location: [facility] ENDOSCOPY;  Service:    • COLONOSCOPY N/A 6/4/2018    Procedure: COLONOSCOPY WITH HOT SNARE POLYPECTOMY X 7; COLD SNARE POLYPECTOMY X 3; HOT BIOPSY POLYPECTOMY X 20; COLD BIOPSY POLYPECTOMY X 2; THERMAL ABLATION OF COLON POLYPS X 23; CLIP PLACEMENT X 2; BIOPSIES;  Surgeon: Zackery Siddiqui MD;  Location: [facility] ENDOSCOPY;   Service: Gastroenterology   • COLONOSCOPY N/A 6/19/2019    Procedure: COLONOSCOPY with cold biopsy polypectomies and cold biopsy;  Surgeon: Zackery Siddiqui MD;  Location: [facility] ENDOSCOPY;  Service: Gastroenterology   • ENDOSCOPY  06/13/2019   • MULTIPLE TOOTH EXTRACTIONS      full extraction   • ORIF TIBIA/FIBULA FRACTURES Left    • THORACOSCOPY Right 8/30/2019    Procedure: BRONCHOSCOPY,  THORACOSCOPY VIDEO ASSISTED with right upper lobe wedge RESECTION , RIGHT UPPER lobectomy with mediastinal lymph node dissection AND INTERCOSTAL CRYOABLATION OF RIGHT CHEST;  Surgeon: Brian Barreto MD;  Location: [facility] OR;  Service: Cardiothoracic   • TUBAL ABDOMINAL LIGATION         Family History   Problem Relation Age of Onset   • Cirrhosis Brother    • Liver disease Brother    • Colon cancer Brother         Possibly colon cancer, patient unsure.   • Cancer Mother    • No Known Problems Father    • Stomach cancer Neg Hx    • Esophageal cancer Neg Hx        Social History     Socioeconomic History   • Marital status:      Spouse name: Not on file   • Number of children: 4   • Years of education: Not on file   • Highest education level: Not on file   Occupational History     Employer: UNEMPLOYED   Tobacco Use   • Smoking status: Current Every Day Smoker     Packs/day: 1.00     Years: 50.00     Pack years: 50.00     Types: Cigarettes   • Smokeless tobacco: Never Used   • Tobacco comment: cutting down on cigarettes   Substance and Sexual Activity   • Alcohol use: Yes     Alcohol/week: 1.2 oz     Types: 2 Glasses of wine per week     Comment: once in a while    • Drug use: No   • Sexual activity: Defer   Social History Narrative    Lives in [place], [place]           Objective   Physical Exam  GENERAL APPEARANCE: Well developed, well nourished, in no acute distress.  VITAL SIGNS: per nursing, reviewed and noted  SKIN: no rashes, ulcerations or petechiae.    Head: Normocephalic, atraumatic.   EYES:  EOMI.  LUNGS: No increased  work of breathing. No retractions.   CARDIOVASCULAR:  regular rate and rhythm, no murmurs.  Good Peripheral pulses. Good capillary refill.   MUSCULOSKELETAL: No compartment syndrome. Intact sensation chest tube in position of the right chest draining appropriately, there is a clot in the atrium which blocks the drainage valve from the Luer-Dvaid syringe port.  Serosanguineous drainage and chest tube.  NEUROLOGIC: Alert, oriented x 3. No gross deficits.   NECK: Supple, symmetric. No tenderness, Full ROM  Back: full rom, no paraspinal spasm.     Procedures           ED Course      Nursing staff was able to evacuate the client by disconnecting the tube, clamping, then using a endoscopy forcep to remove a clot from the atrium.  Atrium Orlon valve was tested and aspirated appropriately.  Chest x-ray was interpreted by me revealing improvement in subcutaneous emphysema.  No pneumothorax.  Dosed with one-time Norco.  Discharged home in stable condition with outpatient follow-up            MDM    Final diagnoses:   Clotted chest tube, initial encounter              Favio Salmeron, DO  09/19/19 0351

## 2019-09-19 NOTE — OUTREACH NOTE
Prep Survey      Responses   Facility patient discharged from?  [facility]   Is patient eligible?  Yes   Discharge diagnosis  Air leak   COPD   Does the patient have one of the following disease processes/diagnoses(primary or secondary)?  COPD/Pneumonia   Does the patient have Home health ordered?  No   Is there a DME ordered?  Yes   What DME was ordered?  home [employer]   Prep survey completed?  Yes          Heidi Rust RN

## 2019-09-23 ENCOUNTER — READMISSION MANAGEMENT (OUTPATIENT)
Dept: CALL CENTER | Facility: HOSPITAL | Age: 64
End: 2019-09-23

## 2019-09-23 NOTE — OUTREACH NOTE
COPD/PN Week 1 Survey      Responses   Facility patient discharged from?  [facility]   Does the patient have one of the following disease processes/diagnoses(primary or secondary)?  COPD/Pneumonia   Is there a successful TCM telephone encounter documented?  No   Was the primary reason for admission:  COPD exacerbation   Week 1 attempt successful?  Yes   Call start time  1554   Rescheduled  Rescheduled-pt requested   Discharge diagnosis  Air leak   COPD          Heidi Rust RN

## 2019-09-25 ENCOUNTER — READMISSION MANAGEMENT (OUTPATIENT)
Dept: CALL CENTER | Facility: HOSPITAL | Age: 64
End: 2019-09-25

## 2019-09-25 NOTE — OUTREACH NOTE
COPD/PN Week 1 Survey      Responses   Facility patient discharged from?  [facility]   Does the patient have one of the following disease processes/diagnoses(primary or secondary)?  COPD/Pneumonia   Is there a successful TCM telephone encounter documented?  No   Was the primary reason for admission:  COPD exacerbation   Week 1 attempt successful?  Yes   Call start time  1611   Call end time  1616   Discharge diagnosis  Air leak   COPD   Meds reviewed with patient/caregiver?  Yes   Is the patient having any side effects they believe may be caused by any medication additions or changes?  No   Does the patient have all medications ordered at discharge?  N/A   Is the patient taking all medications as directed (includes completed medication regime)?  N/A   Does the patient have a primary care provider?   Yes   Does the patient have an appointment with their PCP or pulmonologist within 7 days of discharge?  Yes   Has the patient kept scheduled appointments due by today?  Yes   What is the Home health agency?   [facility]   Has home health visited the patient within 72 hours of discharge?  Yes   Psychosocial issues?  No   Did the patient receive a copy of their discharge instructions?  Yes   Nursing interventions  Reviewed instructions with patient   What is the patient's perception of their health status since discharge?  Improving   Nursing Interventions  Nurse provided patient education   Are the patient's immunizations up to date?   Yes   Is the patient/caregiver able to teach back the hierarchy of who to call/visit for symptoms/problems? PCP, Specialist, Home health nurse, Urgent Care, ED, 911  Yes   Additional teach back comments  pt's O2 sats tested in office today, and pt is to continue wearing O2 NC for now   Is the patient able to teach back COPD zones?  Yes   Nursing interventions  Education provided on various zones   Patient reports what zone on this call?  Green Zone   Green Zone  Reports doing well,  Breathing without shortness of breath, Usual activity and exercise level, Usual amount of phlegm/mucus without difficulty coughing up, Sleeping well, Appetite is good   Green Zone interventions:  Take daily medications, Use oxygen as prescribed, Avoid indoor/outdoor triggers, Continue regular exercise/diet plan [non-smoker]   Week 1 call completed?  Yes          Roseline Ruano RN

## 2019-09-30 ENCOUNTER — OFFICE VISIT (OUTPATIENT)
Dept: PULMONOLOGY | Facility: CLINIC | Age: 64
End: 2019-09-30

## 2019-09-30 VITALS
DIASTOLIC BLOOD PRESSURE: 68 MMHG | WEIGHT: 175 LBS | BODY MASS INDEX: 34.36 KG/M2 | OXYGEN SATURATION: 96 % | SYSTOLIC BLOOD PRESSURE: 125 MMHG | HEART RATE: 85 BPM | RESPIRATION RATE: 18 BRPM | HEIGHT: 60 IN

## 2019-09-30 DIAGNOSIS — J44.9 CHRONIC OBSTRUCTIVE PULMONARY DISEASE, UNSPECIFIED COPD TYPE (HCC): ICD-10-CM

## 2019-09-30 DIAGNOSIS — G47.34 NOCTURNAL HYPOXEMIA: ICD-10-CM

## 2019-09-30 DIAGNOSIS — C34.91 PRIMARY ADENOCARCINOMA OF RIGHT LUNG (HCC): ICD-10-CM

## 2019-09-30 DIAGNOSIS — R06.02 SOB (SHORTNESS OF BREATH): Primary | ICD-10-CM

## 2019-09-30 PROCEDURE — 99214 OFFICE O/P EST MOD 30 MIN: CPT | Performed by: NURSE PRACTITIONER

## 2019-09-30 PROCEDURE — 94618 PULMONARY STRESS TESTING: CPT | Performed by: NURSE PRACTITIONER

## 2019-09-30 RX ORDER — CYCLOBENZAPRINE HCL 5 MG
TABLET ORAL EVERY 8 HOURS SCHEDULED
COMMUNITY
End: 2019-12-18

## 2019-10-01 ENCOUNTER — OFFICE VISIT (OUTPATIENT)
Dept: GASTROENTEROLOGY | Facility: CLINIC | Age: 64
End: 2019-10-01

## 2019-10-01 ENCOUNTER — OFFICE VISIT (OUTPATIENT)
Dept: CARDIAC SURGERY | Facility: CLINIC | Age: 64
End: 2019-10-01

## 2019-10-01 VITALS
RESPIRATION RATE: 18 BRPM | HEART RATE: 80 BPM | DIASTOLIC BLOOD PRESSURE: 88 MMHG | SYSTOLIC BLOOD PRESSURE: 169 MMHG | WEIGHT: 176 LBS | HEIGHT: 60 IN | TEMPERATURE: 98.1 F | BODY MASS INDEX: 34.55 KG/M2

## 2019-10-01 VITALS
BODY MASS INDEX: 34.36 KG/M2 | HEART RATE: 74 BPM | TEMPERATURE: 97.8 F | WEIGHT: 175 LBS | HEIGHT: 60 IN | OXYGEN SATURATION: 98 % | SYSTOLIC BLOOD PRESSURE: 145 MMHG | DIASTOLIC BLOOD PRESSURE: 93 MMHG

## 2019-10-01 DIAGNOSIS — K63.5 HYPERPLASTIC POLYP OF SIGMOID COLON: Primary | ICD-10-CM

## 2019-10-01 DIAGNOSIS — Z90.2 S/P LOBECTOMY OF LUNG: ICD-10-CM

## 2019-10-01 DIAGNOSIS — K57.30 DIVERTICULOSIS OF COLON WITHOUT HEMORRHAGE: ICD-10-CM

## 2019-10-01 DIAGNOSIS — R19.8 ABDOMINAL FULLNESS: ICD-10-CM

## 2019-10-01 DIAGNOSIS — D17.5 LIPOMA OF COLON: ICD-10-CM

## 2019-10-01 PROCEDURE — 99214 OFFICE O/P EST MOD 30 MIN: CPT | Performed by: INTERNAL MEDICINE

## 2019-10-01 PROCEDURE — 99024 POSTOP FOLLOW-UP VISIT: CPT | Performed by: PHYSICIAN ASSISTANT

## 2019-10-01 PROCEDURE — 71046 X-RAY EXAM CHEST 2 VIEWS: CPT | Performed by: THORACIC SURGERY (CARDIOTHORACIC VASCULAR SURGERY)

## 2019-10-01 RX ORDER — NICOTINE 21 MG/24HR
1 PATCH, TRANSDERMAL 24 HOURS TRANSDERMAL EVERY 24 HOURS
Qty: 30 PATCH | Refills: 0 | Status: SHIPPED | OUTPATIENT
Start: 2019-10-01 | End: 2019-12-18

## 2019-10-01 RX ORDER — HYDROCODONE BITARTRATE AND ACETAMINOPHEN 7.5; 325 MG/1; MG/1
1 TABLET ORAL EVERY 6 HOURS PRN
COMMUNITY
End: 2019-12-18

## 2019-10-01 NOTE — PROGRESS NOTES
Chief Complaint   Patient presents with   • Colon Polyps     History of Present Illness     The patient had undergone a colonoscopy to terminal ileum in June 2019.  The patient was found to have colon polyps (3 mm in size 5 were removed.  Biopsies from the colon polyps did not reveal adenomatous change.  The patien denies associated bleeding.  There is history of constipation which has improved.  There is positive family history of colon cancer (brother).  The colon was noted to have pandiverticulosis.  Furthermore the colon was tortuous, redundant and spastic.  The patient was also noted to have a low prep score of 6.  The patient denies nausea or vomiting.  There is no overt GI bleed (hematemesis, melena or hematochezia).  Patient denies reflux.  There is no dysphagia or odynophagia.                        Review of Systems   Constitutional: Negative for appetite change, chills, fatigue, fever and unexpected weight change.   HENT: Negative for mouth sores, nosebleeds and trouble swallowing.    Eyes: Negative for discharge and redness.   Respiratory: Negative for apnea, cough and shortness of breath.    Cardiovascular: Negative for chest pain, palpitations and leg swelling.   Gastrointestinal: Negative for abdominal distention, abdominal pain, anal bleeding, blood in stool, constipation, diarrhea, nausea and vomiting.   Endocrine: Negative for cold intolerance, heat intolerance and polydipsia.   Genitourinary: Negative for dysuria, hematuria and urgency.   Musculoskeletal: Negative for arthralgias, joint swelling and myalgias.   Skin: Negative for rash.   Allergic/Immunologic: Negative for food allergies and immunocompromised state.   Neurological: Negative for dizziness, seizures, syncope and headaches.   Hematological: Negative for adenopathy. Does not bruise/bleed easily.   Psychiatric/Behavioral: Negative for dysphoric mood. The patient is not nervous/anxious and is not hyperactive.      Patient Active Problem  List   Diagnosis   • Colon cancer screening   • Epigastric pain   • Heartburn   • Lesion of colon   • History of colon polyps   • Family history of colon cancer   • Constipation   • Renal insufficiency   • Acute renal failure (ARF) (CMS/HCC)   • Mass of upper lobe of right lung   • Pain in both lower extremities   • Essential hypertension   • Abnormal CT of the chest   • Lung nodule < 6cm on CT   • Lung nodule   • Right-sided chest pain   • Tobacco abuse   • Colon cancer (CMS/HCC)   • Primary adenocarcinoma of right lung (CMS/HCC)   • COPD (chronic obstructive pulmonary disease) (CMS/HCC)   • Air leak   • Mediastinal lymphadenopathy     Past Medical History:   Diagnosis Date   • Acid reflux    • Arthritis    • Cataract, bilateral    • COPD (chronic obstructive pulmonary disease) (CMS/HCC)    • Hypertension    • Leg cramps    • Lung mass     right   • SOB (shortness of breath)     xray showed spot on lungs     Past Surgical History:   Procedure Laterality Date   • BRONCHOSCOPY N/A 6/13/2019    Procedure: BRONCHOSCOPY WITH FLUOROSCOPY, BIOPSY, BRUSHINGS, AND WASHINGS;  Surgeon: Gudelia Patel MD;  Location: [facility] OR;  Service: Pulmonary   • COLONOSCOPY N/A 2/23/2017    Procedure: COLONOSCOPY with hot and cold snare polypectomies,  hot biopsy polypectomies, biopsies, resolution clip placement;  Surgeon: Zackery Siddiqui MD;  Location: [facility] ENDOSCOPY;  Service:    • COLONOSCOPY N/A 6/4/2018    Procedure: COLONOSCOPY WITH HOT SNARE POLYPECTOMY X 7; COLD SNARE POLYPECTOMY X 3; HOT BIOPSY POLYPECTOMY X 20; COLD BIOPSY POLYPECTOMY X 2; THERMAL ABLATION OF COLON POLYPS X 23; CLIP PLACEMENT X 2; BIOPSIES;  Surgeon: Zackery Siddiqui MD;  Location: [facility] ENDOSCOPY;  Service: Gastroenterology   • COLONOSCOPY N/A 6/19/2019    Procedure: COLONOSCOPY with cold biopsy polypectomies and cold biopsy;  Surgeon: Zackery Siddiqui MD;  Location: [facility] ENDOSCOPY;  Service: Gastroenterology   • ENDOSCOPY  06/13/2019   • MULTIPLE TOOTH  EXTRACTIONS      full extraction   • ORIF TIBIA/FIBULA FRACTURES Left    • THORACOSCOPY Right 2019    Procedure: BRONCHOSCOPY,  THORACOSCOPY VIDEO ASSISTED with right upper lobe wedge RESECTION , RIGHT UPPER lobectomy with mediastinal lymph node dissection AND INTERCOSTAL CRYOABLATION OF RIGHT CHEST;  Surgeon: Brian Barreto MD;  Location: [facility];  Service: Cardiothoracic   • TUBAL ABDOMINAL LIGATION       Family History   Problem Relation Age of Onset   • Cirrhosis Brother    • Liver disease Brother    • Colon cancer Brother         Possibly colon cancer, patient unsure.   • Cancer Mother    • No Known Problems Father    • Stomach cancer Neg Hx    • Esophageal cancer Neg Hx      Social History     Tobacco Use   • Smoking status: Former Smoker     Packs/day: 1.00     Years: 50.00     Pack years: 50.00     Types: Cigarettes     Last attempt to quit: 2019     Years since quittin.0   • Smokeless tobacco: Never Used   • Tobacco comment: cutting down on cigarettes   Substance Use Topics   • Alcohol use: Yes     Alcohol/week: 1.2 oz     Types: 2 Glasses of wine per week     Comment: once in a while        Current Outpatient Medications:   •  aspirin 81 MG EC tablet, Take 81 mg by mouth Daily., Disp: , Rfl:   •  carvedilol (COREG) 3.125 MG tablet, Take 3.125 mg by mouth Every 12 (Twelve) Hours., Disp: , Rfl:   •  cyclobenzaprine (FLEXERIL) 5 MG tablet, Every 8 (Eight) Hours., Disp: , Rfl:   •  HYDROcodone-acetaminophen (NORCO) 7.5-325 MG per tablet, Take 1 tablet by mouth Every 6 (Six) Hours As Needed for Moderate Pain ., Disp: , Rfl:   •  nicotine (NICODERM CQ) 21 MG/24HR patch, Place 1 patch on the skin as directed by provider Daily., Disp: 30 patch, Rfl: 0  •  O2 (OXYGEN), Inhale 1 (One) Time., Disp: , Rfl:   •  rOPINIRole (REQUIP) 0.5 MG tablet, Take 1 tablet by mouth Every Night. Take 1 hour before bedtime., Disp: 30 tablet, Rfl: 0  •  tiotropium bromide-olodaterol (STIOLTO RESPIMAT) 2.5-2.5 MCG/ACT  "aerosol solution inhaler, Inhale 2 puffs Daily., Disp: 1 inhaler, Rfl: 5  No current facility-administered medications for this visit.     Facility-Administered Medications Ordered in Other Visits:   •  Chlorhexidine Gluconate Cloth 2 % pads 1 application, 1 application, Topical, Q12H ROMIN, Candelaria Novak PA-C    No Known Allergies    Blood pressure 169/88, pulse 80, temperature 98.1 °F (36.7 °C), resp. rate 18, height 152.4 cm (60\"), weight 79.8 kg (176 lb), last menstrual period 03/16/2004, not currently breastfeeding.    Physical Exam   Constitutional: She is oriented to person, place, and time. She appears well-developed and well-nourished. No distress.   HENT:   Head: Normocephalic and atraumatic.   Right Ear: Hearing and external ear normal.   Left Ear: Hearing and external ear normal.   Nose: Nose normal.   Mouth/Throat: Oropharynx is clear and moist and mucous membranes are normal. Mucous membranes are not pale, not dry and not cyanotic. No oral lesions. No oropharyngeal exudate.   Eyes: Conjunctivae and EOM are normal. Right eye exhibits no discharge. Left eye exhibits no discharge. No scleral icterus.   Neck: Trachea normal. Neck supple. No JVD present. No edema present. No thyroid mass and no thyromegaly present.   Cardiovascular: Normal rate, regular rhythm, S2 normal and normal heart sounds. Exam reveals no gallop, no S3 and no friction rub.   No murmur heard.  Pulmonary/Chest: Effort normal and breath sounds normal. No respiratory distress. She has no wheezes. She has no rales. She exhibits no tenderness.   Abdominal: Soft. Normal appearance and bowel sounds are normal. She exhibits no distension, no ascites and no mass. There is no splenomegaly or hepatomegaly. There is no tenderness. There is no rigidity, no rebound and no guarding. No hernia.   Musculoskeletal: She exhibits no tenderness or deformity.     Vascular Status -  Her right foot exhibits no edema. Her left foot exhibits no " edema.  Lymphadenopathy:     She has no cervical adenopathy.        Left: No supraclavicular adenopathy present.   Neurological: She is alert and oriented to person, place, and time. She has normal strength. No cranial nerve deficit or sensory deficit. She exhibits normal muscle tone. Coordination normal.   Skin: No rash noted. She is not diaphoretic. No cyanosis. No pallor. Nails show no clubbing.   Psychiatric: She has a normal mood and affect. Her behavior is normal. Judgment and thought content normal.   Nursing note and vitals reviewed.  Stigmata of chronic liver disease:  None.  Asterixis:  None.    Laboratory Testing:  Upon review of medical records:    Dated June 6, 2019 sodium 135 potassium 3.6 chloride 97 CO2 26 BUN 40 serum creatinine 2.10 glucose 143.  Calcium 9.3.  Total protein 8.5.  Albumin 4.90.  T bili 0.5 AST 30 ALT 20 alkaline phosphatase 115.  C-reactive protein 1.90.  WBC 6.59 hemoglobin 12.1 hematocrit 36.3 MCV 88.1 and platelet count 280.    Dated July 12, 2019 sodium 139 potassium 4.2 chloride 105 CO2 27 BUN 28 serum creatinine 1.10 glucose 87.  Calcium 9.5.  Total protein 7.7.  Albumin 4.30.  T bili 0.2 AST 26 ALT 14 alkaline phosphatase 105.  Phosphorus 2.8.  Magnesium 1.9.  Total cholesterol 228.  Triglycerides 149.  Hemoglobin A1c 5.7%.  WBC 7.00 hemoglobin 11.0 hematocrit 34.5 MCV 88.7 platelet count 347.    Dated September 17, 2019 sodium 137 potassium 4.4 chloride 97 CO2 28 BUN 23 serum creatinine 0.95 glucose 92.  Calcium 10.0.  Total protein 8.2.  Albumin 4.60.  T bili < 0.2 AST 23 ALT 16 alkaline phosphatase 125.  WBC 10.62 hemoglobin 10.2 hematocrit 32.6 MCV 90.3 and platelet count 466.    Procedures:  Upon review of records:     Dated June 4, 2018 the patient underwent a colonoscopy to the terminal ileum which revealed: Pandiverticulosis more pronounced in the left colon.  Multiple colon polyps.  32 were removed ranging between 3 mm and 10 mm in size.  Additionally, 23 small  polyps were thermally ablated.  2 cm lipomatous lesion in the ascending colon which is somewhat firm than classic lipoma.  Multiple biopsies were obtained to include the deeper tissue.  The lesion is otherwise stable.  Internal hemorrhoids.  Ascending colon, polyp, biopsy revealed tubular adenoma.  Descending colon, polyps, biopsies revealed tubular adenoma.  Sigmoid colon, polyps, biopsies revealed tubular adenoma.  Hyperplastic polyps.  Lymphoid aggregates.  Rectal polyps, biopsies revealed hyperplastic polyps.  Transverse colon, polyp, biopsies revealed tubular adenoma.  Ascending colon, polyp #2, biopsies revealed benign colonic mucosa with lymphoid aggregate.  Ascending colon polyp, #3, biopsies revealed tubular adenoma.  Ascending, polyp, #4, biopsy revealed tubular adenoma.  Colon, hepatic flexure, polyp, biopsy revealed benign colonic mucosa with lymphoid aggregate.  Ascending colon, lipoma, biopsies revealed benign colonic mucosa with lymphoid aggregate.  Scant submucosa present.    Dated June 19, 2019 the patient underwent a colonoscopy to the terminal ileum which revealed: Pandiverticulosis.  More pronounced in the left colon as compared to the transverse colon and right colon.  Colon polyps.  5 were removed.  3 mm in size.  Submucosal lipoma within the proximal ascending colon.  Internal hemorrhoids.  Ascending colon, lipoma, biopsy revealed unremarkable colonic mucosa.  The findings do not rule out the possibility of an unsampled submucosal lesion.  Clinical and endoscopic correlation is required.  Rectal polyps, biopsy revealed fragments of hyperplastic polyp.  Sigmoid colon polyp, biopsy revealed unremarkable colonic mucosa.     Assessment:      ICD-10-CM ICD-9-CM   1. Hyperplastic polyp of sigmoid colon K63.5 211.3   2. Lipoma of colon D17.5 214.3   3. Abdominal fullness R19.8 789.9   4. Diverticulosis of colon without hemorrhage K57.30 562.10         Discussion:   1.     Plan/  Patient Instructions    1. Low-fat-high-fiber diet with liberal water intake.  2. Upper endoscopy (EGD) counseling:  Description of the procedure, risks, benefits, alternatives and options including nonoperative options were discussed with the patient in detail.  The patient understands and wishes to proceed.  3. Follow-up colonoscopy is recommended in 3 years.  Low prep score of 6.  Tortuous and redundant colon.  Positive family history of colon cancer.  4. Repeat anorectal exam.          Zackery Siddiqui MD

## 2019-10-01 NOTE — PATIENT INSTRUCTIONS
1. Low-fat-high-fiber diet with liberal water intake.  2. Upper endoscopy (EGD) counseling:  Description of the procedure, risks, benefits, alternatives and options including nonoperative options were discussed with the patient in detail.  The patient understands and wishes to proceed.  3. Follow-up colonoscopy is recommended in 3 years.  Low prep score of 6.  Tortuous and redundant colon.  Positive family history of colon cancer.  4. Repeat anorectal exam.

## 2019-10-01 NOTE — PROGRESS NOTES
10/01/2019  Patient Information  Lizbeth Johns                                                                                          [address]  [place] [address]   1955  'PCP/Referring Physician'  Emily Ferreira MD  [phone]  No ref. provider found    Chief Complaint   Patient presents with   • Post-op     Hospital follow up s/p right upper lobectomy 8/31/19.   • Lung Nodule       History of Present Illness:  63-year-old -American female with a history of hypertension, hyperlipidemia, COPD status post right VATS with right upper lobectomy on 8/31/2019 presenting to office today for pneumostat removal which was placed for recurrent pleural effusions.  Patient was recently readmitted from the emergency department where she presented with right chest tube insertion site pain.  She was found to have a clot in the tube which was removed, and subsequently the entire chest tube was removed on 9/17/2019 by CT surgery prior to her discharge.  Today in the office she has no incisional pain but does report some numbness near her incision sites as well as her right upper extremity and she is still requiring O2 supplementation with any activity.  Patient states that when she removes her O2, her saturation plummets.  Otherwise she is doing well.    Patient Active Problem List   Diagnosis   • Colon cancer screening   • Epigastric pain   • Heartburn   • Lesion of colon   • History of colon polyps   • Family history of colon cancer   • Constipation   • Renal insufficiency   • Acute renal failure (ARF) (CMS/HCC)   • Mass of upper lobe of right lung   • Pain in both lower extremities   • Essential hypertension   • Abnormal CT of the chest   • Lung nodule < 6cm on CT   • Lung nodule   • Right-sided chest pain   • Tobacco abuse   • Colon cancer (CMS/HCC)   • Primary adenocarcinoma of right lung (CMS/HCC)   • COPD (chronic obstructive pulmonary disease) (CMS/HCC)   • Air leak   • Mediastinal lymphadenopathy      Past Medical History:   Diagnosis Date   • Acid reflux    • Arthritis    • Cataract, bilateral    • COPD (chronic obstructive pulmonary disease) (CMS/HCC)    • Hypertension    • Leg cramps    • Lung mass     right   • SOB (shortness of breath)     xray showed spot on lungs     Past Surgical History:   Procedure Laterality Date   • BRONCHOSCOPY N/A 6/13/2019    Procedure: BRONCHOSCOPY WITH FLUOROSCOPY, BIOPSY, BRUSHINGS, AND WASHINGS;  Surgeon: Gudelia Patel MD;  Location: [facility] OR;  Service: Pulmonary   • COLONOSCOPY N/A 2/23/2017    Procedure: COLONOSCOPY with hot and cold snare polypectomies,  hot biopsy polypectomies, biopsies, resolution clip placement;  Surgeon: Zackery Siddiqui MD;  Location: [facility] ENDOSCOPY;  Service:    • COLONOSCOPY N/A 6/4/2018    Procedure: COLONOSCOPY WITH HOT SNARE POLYPECTOMY X 7; COLD SNARE POLYPECTOMY X 3; HOT BIOPSY POLYPECTOMY X 20; COLD BIOPSY POLYPECTOMY X 2; THERMAL ABLATION OF COLON POLYPS X 23; CLIP PLACEMENT X 2; BIOPSIES;  Surgeon: Zackery Siddiqui MD;  Location: [facility] ENDOSCOPY;  Service: Gastroenterology   • COLONOSCOPY N/A 6/19/2019    Procedure: COLONOSCOPY with cold biopsy polypectomies and cold biopsy;  Surgeon: Zackery Siddiqui MD;  Location: [facility] ENDOSCOPY;  Service: Gastroenterology   • ENDOSCOPY  06/13/2019   • MULTIPLE TOOTH EXTRACTIONS      full extraction   • ORIF TIBIA/FIBULA FRACTURES Left    • THORACOSCOPY Right 8/30/2019    Procedure: BRONCHOSCOPY,  THORACOSCOPY VIDEO ASSISTED with right upper lobe wedge RESECTION , RIGHT UPPER lobectomy with mediastinal lymph node dissection AND INTERCOSTAL CRYOABLATION OF RIGHT CHEST;  Surgeon: Brian Barreto MD;  Location: [facility] OR;  Service: Cardiothoracic   • TUBAL ABDOMINAL LIGATION         Current Outpatient Medications:   •  aspirin 81 MG EC tablet, Take 81 mg by mouth Daily., Disp: , Rfl:   •  carvedilol (COREG) 3.125 MG tablet, Take 3.125 mg by mouth Every 12 (Twelve) Hours., Disp: , Rfl:   •   cyclobenzaprine (FLEXERIL) 5 MG tablet, Every 8 (Eight) Hours., Disp: , Rfl:   •  HYDROcodone-acetaminophen (NORCO) 7.5-325 MG per tablet, Take 1 tablet by mouth Every 4 (Four) Hours As Needed for Moderate Pain ., Disp: 30 tablet, Rfl: 0  •  rOPINIRole (REQUIP) 0.5 MG tablet, Take 1 tablet by mouth Every Night. Take 1 hour before bedtime., Disp: 30 tablet, Rfl: 0  •  tiotropium bromide-olodaterol (STIOLTO RESPIMAT) 2.5-2.5 MCG/ACT aerosol solution inhaler, Inhale 2 puffs Daily., Disp: 1 inhaler, Rfl: 5  No current facility-administered medications for this visit.     Facility-Administered Medications Ordered in Other Visits:   •  Chlorhexidine Gluconate Cloth 2 % pads 1 application, 1 application, Topical, Q12H PRN, Candelaria Novak PA-C  No Known Allergies  Social History     Socioeconomic History   • Marital status:      Spouse name: Not on file   • Number of children: 4   • Years of education: Not on file   • Highest education level: Not on file   Occupational History     Employer: UNEMPLOYED   Tobacco Use   • Smoking status: Former Smoker     Packs/day: 1.00     Years: 50.00     Pack years: 50.00     Types: Cigarettes     Last attempt to quit: 2019     Years since quittin.0   • Smokeless tobacco: Never Used   • Tobacco comment: cutting down on cigarettes   Substance and Sexual Activity   • Alcohol use: Yes     Alcohol/week: 1.2 oz     Types: 2 Glasses of wine per week     Comment: once in a while    • Drug use: No   • Sexual activity: Defer   Social History Narrative    Lives in [place], [place]     Family History   Problem Relation Age of Onset   • Cirrhosis Brother    • Liver disease Brother    • Colon cancer Brother         Possibly colon cancer, patient unsure.   • Cancer Mother    • No Known Problems Father    • Stomach cancer Neg Hx    • Esophageal cancer Neg Hx      ROS  Vitals:    10/01/19 1237   BP: 145/93   BP Location: Left arm   Patient Position: Sitting   Pulse: 74   Temp: 97.8 °F (36.6  "°C)   SpO2: 98%   Weight: 79.4 kg (175 lb)   Height: 152.4 cm (60\")      Physical Exam  Gen - NAD, pleasant, cooperative  CV -regular rate and rhythm, no murmur gallop or rub  Pulm -lungs clear to auscultation bilateral without wheeze or rhonchi  GI -soft, normoactive bowel sounds, nontender  Ext - without edema.   Incision -healing well, no evidence of incisional dehiscence or cellulitis  Neuro - CN II - XII grossly intact, tongue midline, voice normal    Labs/Imaging:  CXR 10/1/2019  Chest x-ray obtained in office today satisfactory.  There is still a small amount of fluid in the right pleural space but overall her lung volume has improved bilaterally when compared to her most recent hospital admission.  No signs of pneumothorax.    Assessment/Plan:  63-year-old -American female with a history of hypertension, hyperlipidemia, COPD status post right VATS with right upper lobectomy on 8/31/2019 presenting to office today for pneumostat removal which was placed for recurrent pleural effusions.  Her pneumostat is not present during this visit due to the fact that her chest tube was removed during her latest hospital admission.  Chest x-ray obtained in office today  Satisfactory.  At this time the patient has not been advised to make attempts to wean herself off her O2 supplementation during her daily activity.  Prescription given for NicoDerm patch.  We will plan to see the patient again in office in 6 months with a repeat CT scan of the chest.  She may call our office at any time with any questions or concerns should any arise during the interval.  Patient Active Problem List   Diagnosis   • Colon cancer screening   • Epigastric pain   • Heartburn   • Lesion of colon   • History of colon polyps   • Family history of colon cancer   • Constipation   • Renal insufficiency   • Acute renal failure (ARF) (CMS/HCC)   • Mass of upper lobe of right lung   • Pain in both lower extremities   • Essential hypertension   • " Abnormal CT of the chest   • Lung nodule < 6cm on CT   • Lung nodule   • Right-sided chest pain   • Tobacco abuse   • Colon cancer (CMS/HCC)   • Primary adenocarcinoma of right lung (CMS/HCC)   • COPD (chronic obstructive pulmonary disease) (CMS/HCC)   • Air leak   • Mediastinal lymphadenopathy

## 2019-10-04 ENCOUNTER — READMISSION MANAGEMENT (OUTPATIENT)
Dept: CALL CENTER | Facility: HOSPITAL | Age: 64
End: 2019-10-04

## 2019-10-04 NOTE — OUTREACH NOTE
COPD/PN Week 2 Survey      Responses   Facility patient discharged from?  [facility]   Does the patient have one of the following disease processes/diagnoses(primary or secondary)?  COPD/Pneumonia   Was the primary reason for admission:  COPD exacerbation   Week 2 attempt successful?  Yes   Call start time  1006   Call end time  1010   Discharge diagnosis  Air leak   COPD   Meds reviewed with patient/caregiver?  Yes   Is the patient having any side effects they believe may be caused by any medication additions or changes?  No   Does the patient have all medications ordered at discharge?  Yes   Is the patient taking all medications as directed (includes completed medication regime)?  Yes   Does the patient have a primary care provider?   Yes   Does the patient have an appointment with their PCP or pulmonologist within 7 days of discharge?  Yes   Has the patient kept scheduled appointments due by today?  Yes   What is the Home health agency?   [facility]   Has home health visited the patient within 72 hours of discharge?  Yes   What DME was ordered?  home [employer]   Psychosocial issues?  No   Did the patient receive a copy of their discharge instructions?  Yes   Nursing interventions  Reviewed instructions with patient   What is the patient's perception of their health status since discharge?  Improving   Nursing Interventions  Nurse provided patient education   Are the patient's immunizations up to date?   Yes   Is the patient/caregiver able to teach back the hierarchy of who to call/visit for symptoms/problems? PCP, Specialist, Home health nurse, Urgent Care, ED, 911  Yes   Is the patient able to teach back COPD zones?  Yes   Patient reports what zone on this call?  Green Zone   Green Zone  Reports doing well, Breathing without shortness of breath, Usual activity and exercise level, Usual amount of phlegm/mucus without difficulty coughing up, Sleeping well, Appetite is good   Green Zone interventions:  Take  daily medications, Use oxygen as prescribed, Avoid indoor/outdoor triggers, Continue regular exercise/diet plan   Week 2 call completed?  Yes          Moses Paulino RN

## 2019-10-11 ENCOUNTER — READMISSION MANAGEMENT (OUTPATIENT)
Dept: CALL CENTER | Facility: HOSPITAL | Age: 64
End: 2019-10-11

## 2019-10-11 NOTE — OUTREACH NOTE
COPD/PN Week 3 Survey      Responses   Facility patient discharged from?  [facility]   Does the patient have one of the following disease processes/diagnoses(primary or secondary)?  COPD/Pneumonia   Was the primary reason for admission:  COPD exacerbation   Week 3 attempt successful?  Yes   Call start time  1733   Call end time  1735   Discharge diagnosis  Air leak   COPD   Meds reviewed with patient/caregiver?  Yes   Is the patient having any side effects they believe may be caused by any medication additions or changes?  No   Does the patient have all medications ordered at discharge?  Yes   Is the patient taking all medications as directed (includes completed medication regime)?  Yes   Does the patient have a primary care provider?   Yes   Does the patient have an appointment with their PCP or pulmonologist within 7 days of discharge?  Yes   Has the patient kept scheduled appointments due by today?  Yes   What is the Home health agency?   [facility]   Has home health visited the patient within 72 hours of discharge?  Yes   What DME was ordered?  home [employer]   Has all DME been delivered?  Yes   Psychosocial issues?  No   Did the patient receive a copy of their discharge instructions?  Yes   Nursing interventions  Reviewed instructions with patient   What is the patient's perception of their health status since discharge?  Improving   Nursing Interventions  Nurse provided patient education   Are the patient's immunizations up to date?   Yes   Nursing interventions  Educated on importance of maintaining up to date immunizations as advised by provider   Is the patient/caregiver able to teach back the hierarchy of who to call/visit for symptoms/problems? PCP, Specialist, Home health nurse, Urgent Care, ED, 911  Yes   Additional teach back comments  O2 @ 2L w/ sats down to 80 when office walked.   Is the patient able to teach back COPD zones?  Yes   Nursing interventions  Education provided on various zones    Patient reports what zone on this call?  Green Zone   Green Zone  Reports doing well, Breathing without shortness of breath, Usual activity and exercise level, Usual amount of phlegm/mucus without difficulty coughing up, Sleeping well, Appetite is good   Green Zone interventions:  Take daily medications   Is the patient/caregiver able to teach back signs and symptoms of worsening condition:  Fever/chills, Shortness of breath, Chest pain   Is the patient/caregiver able to teach back importance of completing antibiotic course of treatment?  Yes   Week 3 call completed?  Yes          Heidi Rust RN

## 2019-10-15 DIAGNOSIS — G47.34 NOCTURNAL HYPOXEMIA: ICD-10-CM

## 2019-10-15 DIAGNOSIS — J44.9 CHRONIC OBSTRUCTIVE PULMONARY DISEASE, UNSPECIFIED COPD TYPE (HCC): ICD-10-CM

## 2019-10-21 ENCOUNTER — OUTSIDE FACILITY SERVICE (OUTPATIENT)
Dept: CARDIAC SURGERY | Facility: CLINIC | Age: 64
End: 2019-10-21

## 2019-10-21 ENCOUNTER — READMISSION MANAGEMENT (OUTPATIENT)
Dept: CALL CENTER | Facility: HOSPITAL | Age: 64
End: 2019-10-21

## 2019-10-21 PROCEDURE — G0180 MD CERTIFICATION HHA PATIENT: HCPCS | Performed by: THORACIC SURGERY (CARDIOTHORACIC VASCULAR SURGERY)

## 2019-10-21 NOTE — OUTREACH NOTE
COPD/PN Week 4 Survey      Responses   Facility patient discharged from?  [facility]   Does the patient have one of the following disease processes/diagnoses(primary or secondary)?  COPD/Pneumonia   Was the primary reason for admission:  COPD exacerbation   Week 4 attempt successful?  Yes   Call start time  1430   Call end time  1440   Discharge diagnosis  Air leak   COPD   Meds reviewed with patient/caregiver?  Yes   Is the patient having any side effects they believe may be caused by any medication additions or changes?  No   Is the patient taking all medications as directed (includes completed medication regime)?  Yes   Has the patient kept scheduled appointments due by today?  Yes   Is the patient still receiving Home Health Services?  N/A   What is the patient's perception of their health status since discharge?  Improving   Nursing Interventions  Nurse provided patient education   Are the patient's immunizations up to date?   Yes   Is the patient/caregiver able to teach back the hierarchy of who to call/visit for symptoms/problems? PCP, Specialist, Home health nurse, Urgent Care, ED, 911  Yes   Additional teach back comments  Pt reports she is monitoring oxygen saturation.  She reports she is trying to wean off a little bit.   Is the patient able to teach back COPD zones?  Yes   Patient reports what zone on this call?  Yellow Zone   Yellow Zone  Increased shortness of air, Poor sleep and symptoms work patient up   Yellow interventions  Continue to use daily medications, Use quick relief inhaler as ordered, Use oxygen as ordered, Get plenty of rest, Call provider immediatly if symptoms do not improve   Week 4 call completed?  Yes   Would the patient like one additional call?  No   Graduated  Yes   Did the patient feel the follow up calls were helpful during their recovery period?  Yes   Was the number of calls appropriate?  Yes          Vivian Bailey RN

## 2019-10-24 ENCOUNTER — HOSPITAL ENCOUNTER (OUTPATIENT)
Dept: MAMMOGRAPHY | Facility: HOSPITAL | Age: 64
Discharge: HOME | End: 2019-10-24
Admitting: INTERNAL MEDICINE

## 2019-10-24 DIAGNOSIS — Z12.39 BREAST CANCER SCREENING: ICD-10-CM

## 2019-10-24 PROCEDURE — 77067 SCR MAMMO BI INCL CAD: CPT

## 2019-10-24 PROCEDURE — 77063 BREAST TOMOSYNTHESIS BI: CPT

## 2019-12-16 ENCOUNTER — TRANSCRIBE ORDERS (OUTPATIENT)
Dept: ADMINISTRATIVE | Facility: HOSPITAL | Age: 64
End: 2019-12-16

## 2019-12-16 DIAGNOSIS — Z78.0 POSTMENOPAUSAL STATE: Primary | ICD-10-CM

## 2019-12-17 ENCOUNTER — APPOINTMENT (OUTPATIENT)
Dept: CT IMAGING | Facility: HOSPITAL | Age: 64
End: 2019-12-17

## 2019-12-18 ENCOUNTER — APPOINTMENT (OUTPATIENT)
Dept: BONE DENSITY | Facility: HOSPITAL | Age: 64
End: 2019-12-18

## 2019-12-18 ENCOUNTER — OFFICE VISIT (OUTPATIENT)
Dept: ONCOLOGY | Facility: CLINIC | Age: 64
End: 2019-12-18

## 2019-12-18 ENCOUNTER — HOSPITAL ENCOUNTER (OUTPATIENT)
Dept: CT IMAGING | Facility: HOSPITAL | Age: 64
Discharge: HOME | End: 2019-12-18
Admitting: INTERNAL MEDICINE

## 2019-12-18 VITALS
WEIGHT: 183 LBS | RESPIRATION RATE: 16 BRPM | BODY MASS INDEX: 35.93 KG/M2 | OXYGEN SATURATION: 97 % | HEIGHT: 60 IN | HEART RATE: 69 BPM | TEMPERATURE: 97.8 F | SYSTOLIC BLOOD PRESSURE: 131 MMHG | DIASTOLIC BLOOD PRESSURE: 72 MMHG

## 2019-12-18 DIAGNOSIS — C34.91 PRIMARY ADENOCARCINOMA OF RIGHT LUNG (HCC): ICD-10-CM

## 2019-12-18 DIAGNOSIS — C34.91 PRIMARY ADENOCARCINOMA OF RIGHT LUNG (HCC): Primary | ICD-10-CM

## 2019-12-18 DIAGNOSIS — Z78.0 POSTMENOPAUSAL STATE: ICD-10-CM

## 2019-12-18 LAB — CREAT BLDA-MCNC: 1 MG/DL (ref 0.6–1.3)

## 2019-12-18 PROCEDURE — 82565 ASSAY OF CREATININE: CPT | Performed by: FAMILY MEDICINE

## 2019-12-18 PROCEDURE — 25010000002 IOPAMIDOL 61 % SOLUTION: Performed by: INTERNAL MEDICINE

## 2019-12-18 PROCEDURE — 71260 CT THORAX DX C+: CPT

## 2019-12-18 PROCEDURE — 99214 OFFICE O/P EST MOD 30 MIN: CPT | Performed by: INTERNAL MEDICINE

## 2019-12-18 PROCEDURE — 77080 DXA BONE DENSITY AXIAL: CPT

## 2019-12-18 RX ORDER — TRAZODONE HYDROCHLORIDE 50 MG/1
50 TABLET ORAL NIGHTLY
COMMUNITY

## 2019-12-18 RX ADMIN — IOPAMIDOL 100 ML: 612 INJECTION, SOLUTION INTRAVENOUS at 08:23

## 2019-12-18 NOTE — PROGRESS NOTES
DATE OF CONSULTATION: 12/18/2019    REFERRING PHYSICIAN: Emily Ferreira MD    Dear Emily Joel MD  Thank you for asking for my medical advice on this patient. I saw her in the  [place] office on 12/18/2019    REASON FOR CONSULTATION: Right upper lobe adenocarcinoma of the lung T2 a N0 M0 stage Ib    HISTORY OF PRESENT ILLNESS: The patient is a very pleasant 63 y.o.  female   who was in her usual state of health until June 2019.  The patient woke up 1 day not feeling good.  She did not have any specific complaints except she felt something is different.  She came [facility] emergency room chest x-ray revealed right upper lobe lung nodule.  She was referred to Dr. Patel CT chest document the mass.  Bronchoscopy with biopsy done by Dr. Patel June 13, 2019 was not diagnostic.  Patient was further Dr. Barreto who performed wedge resection followed by right upper lobe resection and medicine lymph node sampling August 30, 2019.  Final pathology confirmed moderate eventually adenocarcinoma, 3.5 cm in size, clear surgical margins, no pleural or lymphovascular invasion, and negative lymph nodes.  The patient had no postoperative complications.  Repeat CT scan done December 2019 was unremarkable.  The patient was referred to me for further recommendations.    SUBJECTIVE: When I saw the patient today she is doing fairly well.  She denies fever chills night sweats.  She was put on oxygen after surgery however now she was able to wean herself off it completely.    Review of Systems   Constitutional: Negative for activity change, appetite change, chills, fatigue, fever and unexpected weight change.   HENT: Negative for hearing loss, mouth sores, nosebleeds, sore throat and trouble swallowing.    Eyes: Negative for visual disturbance.   Respiratory: Negative for cough, chest tightness, shortness of breath and wheezing.    Cardiovascular: Negative for chest pain, palpitations and leg swelling.    Gastrointestinal: Negative for abdominal distention, abdominal pain, blood in stool, constipation, diarrhea, nausea, rectal pain and vomiting.   Endocrine: Negative for cold intolerance and heat intolerance.   Genitourinary: Negative for difficulty urinating, dysuria, frequency and urgency.   Musculoskeletal: Negative for arthralgias, back pain, gait problem, joint swelling and myalgias.   Skin: Negative for rash.   Neurological: Negative for dizziness, tremors, syncope, weakness, light-headedness, numbness and headaches.   Hematological: Negative for adenopathy. Does not bruise/bleed easily.   Psychiatric/Behavioral: Negative for confusion, sleep disturbance and suicidal ideas. The patient is not nervous/anxious.        Past Medical History:   Diagnosis Date   • Acid reflux    • Arthritis    • Cataract, bilateral    • COPD (chronic obstructive pulmonary disease) (CMS/HCC)    • Hypertension    • Leg cramps    • Lung mass     right   • SOB (shortness of breath)     xray showed spot on lungs       Social History     Socioeconomic History   • Marital status:      Spouse name: Not on file   • Number of children: 4   • Years of education: Not on file   • Highest education level: Not on file   Occupational History     Employer: UNEMPLOYED   Tobacco Use   • Smoking status: Former Smoker     Packs/day: 1.00     Years: 50.00     Pack years: 50.00     Types: Cigarettes     Last attempt to quit: 2019     Years since quittin.3   • Smokeless tobacco: Never Used   • Tobacco comment: cutting down on cigarettes   Substance and Sexual Activity   • Alcohol use: Yes     Alcohol/week: 2.0 standard drinks     Types: 2 Glasses of wine per week     Comment: once in a while    • Drug use: No   • Sexual activity: Defer   Social History Narrative    Lives in [place], [place]       Family History   Problem Relation Age of Onset   • Cirrhosis Brother    • Liver disease Brother    • Colon cancer Brother         Possibly colon  cancer, patient unsure.   • Cancer Mother    • No Known Problems Father    • Stomach cancer Neg Hx    • Esophageal cancer Neg Hx        Past Surgical History:   Procedure Laterality Date   • BRONCHOSCOPY N/A 6/13/2019    Procedure: BRONCHOSCOPY WITH FLUOROSCOPY, BIOPSY, BRUSHINGS, AND WASHINGS;  Surgeon: Gudelia Patel MD;  Location: [facility] OR;  Service: Pulmonary   • COLONOSCOPY N/A 2/23/2017    Procedure: COLONOSCOPY with hot and cold snare polypectomies,  hot biopsy polypectomies, biopsies, resolution clip placement;  Surgeon: Zackery Siddiqui MD;  Location: [facility] ENDOSCOPY;  Service:    • COLONOSCOPY N/A 6/4/2018    Procedure: COLONOSCOPY WITH HOT SNARE POLYPECTOMY X 7; COLD SNARE POLYPECTOMY X 3; HOT BIOPSY POLYPECTOMY X 20; COLD BIOPSY POLYPECTOMY X 2; THERMAL ABLATION OF COLON POLYPS X 23; CLIP PLACEMENT X 2; BIOPSIES;  Surgeon: Zackery Siddiqui MD;  Location: [facility] ENDOSCOPY;  Service: Gastroenterology   • COLONOSCOPY N/A 6/19/2019    Procedure: COLONOSCOPY with cold biopsy polypectomies and cold biopsy;  Surgeon: Zackery Siddiqui MD;  Location: [facility] ENDOSCOPY;  Service: Gastroenterology   • ENDOSCOPY  06/13/2019   • MULTIPLE TOOTH EXTRACTIONS      full extraction   • ORIF TIBIA/FIBULA FRACTURES Left    • THORACOSCOPY Right 8/30/2019    Procedure: BRONCHOSCOPY,  THORACOSCOPY VIDEO ASSISTED with right upper lobe wedge RESECTION , RIGHT UPPER lobectomy with mediastinal lymph node dissection AND INTERCOSTAL CRYOABLATION OF RIGHT CHEST;  Surgeon: Brian Barreto MD;  Location: [facility] OR;  Service: Cardiothoracic   • TUBAL ABDOMINAL LIGATION         No Known Allergies       Current Outpatient Medications:   •  aspirin 81 MG EC tablet, Take 81 mg by mouth Daily., Disp: , Rfl:   •  carvedilol (COREG) 3.125 MG tablet, Take 3.125 mg by mouth Every 12 (Twelve) Hours., Disp: , Rfl:   •  O2 (OXYGEN), Inhale 2 L/min 1 (One) Time., Disp: , Rfl:   •  rOPINIRole (REQUIP) 0.5 MG tablet, Take 1 tablet by mouth Every Night.  "Take 1 hour before bedtime., Disp: 30 tablet, Rfl: 0  •  tiotropium bromide-olodaterol (STIOLTO RESPIMAT) 2.5-2.5 MCG/ACT aerosol solution inhaler, Inhale 2 puffs Daily., Disp: 1 inhaler, Rfl: 5  •  traZODone (DESYREL) 50 MG tablet, trazodone 50 mg tablet  Take 1 tablet every day by oral route., Disp: , Rfl:   No current facility-administered medications for this visit.     Facility-Administered Medications Ordered in Other Visits:   •  Chlorhexidine Gluconate Cloth 2 % pads 1 application, 1 application, Topical, Q12H PRN, Candelaria Novak PA-C    PHYSICAL EXAMINATION:   /72   Pulse 69   Temp 97.8 °F (36.6 °C) (Temporal)   Resp 16   Ht 152.4 cm (60\")   Wt 83 kg (183 lb)   LMP 03/16/2004   SpO2 97%   BMI 35.74 kg/m²    ECOG Performance Status: 1 - Symptomatic but completely ambulatory  General Appearance:  alert, cooperative, no apparent distress and appears stated age   Neurologic/Psychiatric: A&O x 3, gait steady, appropriate affect, strength 5/5 in all muscle groups   HEENT:  Normocephalic, without obvious abnormality, mucous membranes moist   Neck: Supple, symmetrical, trachea midline, no adenopathy;  No thyromegaly, masses, or tenderness   Lungs:   Clear to auscultation bilaterally; respirations regular, even, and unlabored bilaterally   Heart:  Regular rate and rhythm, no murmurs appreciated   Abdomen:   Soft, non-tender, non-distended and no organomegaly   Lymph nodes: No cervical, supraclavicular, inguinal or axillary adenopathy noted   Extremities: Normal, atraumatic; no clubbing, cyanosis, or edema    Skin: No rashes, ulcers, or suspicious lesions noted       Appointment on 12/18/2019   Component Date Value Ref Range Status   • Creatinine 12/18/2019 1.00  0.60 - 1.30 mg/dL Final    Serial Number: [id number]:  [id number]        Ct Chest With Contrast    Result Date: 12/18/2019  Narrative: PROCEDURE: CT CHEST W CONTRAST-  HISTORY: restaging lung cancer; C34.91-Malignant neoplasm of unspecified " part of right bronchus or lung  COMPARISON: 06/07/2019.  PROCEDURE: Multiple axial CT images were obtained from the thoracic inlet through the upper abdomen following the administration of intravenous contrast.  FINDINGS: Soft tissue windows demonstrate no adenopathy within the chest. The heart is normal in size. The aorta is normal in caliber.There is no pericardial or pleural effusion. Lung windows reveal right upper lobectomy. There is no evidence of a suspicious pulmonary nodule. Within the visualized upper abdomen there is an approximately 1.6 cm left adrenal nodule which is unchanged. There are bilateral renal cysts. Bone windows reveal no lytic or destructive lesions.      Impression: No evidence of metastatic disease within the chest.   This study was performed with techniques to keep radiation doses as low as reasonably achievable (ALARA). Individualized dose reduction techniques using automated exposure control or adjustment of mA and/or kV according to the patient size were employed.  This report was finalized on 12/18/2019 12:19 PM by Ashley Ariza M.D..    Dexa Bone Density Axial    Result Date: 12/18/2019  Narrative: PROCEDURE: DEXA BONE DENSITY AXIAL-  HISTORY: POSTMENOPAUSAL STATE; Z78.0-Asymptomatic menopausal state  Comparison:  None .  FINDINGS: Bone densitometry calculations of the lumbar spine and left femoral neck were obtained.  Average BMD for the lumbar spine from L1-L4 is 1.277 g/sq cm. T-score is 0.8. Z-score is 1.2.   Left femoral neck BMD is 0.883 g/sq cm. T-score is -1.1. Z score is 0.9.       Impression:  1. Bone mineral density of the lumbar spine is within the range of normal. 2. Bone mineral density of the left femoral neck is within the range of osteopenia.  Images were reviewed, interpreted, and dictated by Dr. Ariza. Transcribed by Sharlene Amos PA-C.  This report was finalized on 12/18/2019 9:51 AM by Ashley Ariza M.D..        DIAGNOSTIC DATA:   1. Radiology:  As above  2. Dr. Patel's note reviewed by me and documented in the  chart.   3. Pathology report: Pathology report from bronchoscopy and surgery reviewed by me and document patient's chart.  4. Laboratory data:     Results for HERIBERTO HORTON (MRN [id number]) as of 12/18/2019 14:49   Ref. Range 9/17/2019 03:29   Glucose Latest Ref Range: 65 - 99 mg/dL 92   Sodium Latest Ref Range: 136 - 145 mmol/L 137   Potassium Latest Ref Range: 3.5 - 5.2 mmol/L 4.4   CO2 Latest Ref Range: 22.0 - 29.0 mmol/L 28.0   Chloride Latest Ref Range: 98 - 107 mmol/L 97 (L)   Anion Gap Latest Ref Range: 5.0 - 15.0 mmol/L 12.0   Creatinine Latest Ref Range: 0.57 - 1.00 mg/dL 0.95   BUN Latest Ref Range: 8 - 23 mg/dL 23   BUN/Creatinine Ratio Latest Ref Range: 7.0 - 25.0  24.2     ASSESSMENT: The patient is a very pleasant 63 y.o.  female  with adenocarcinoma of the right lung    PROBLEM LIST:   1.  Right upper lobe adenocarcinoma of the lung T2 aN0 M0 stage Ib:  A.  CT chest done on June 2019 revealed 3 cm right upper lobe lung nodule  B.  Bronchoscopy with biopsy done by Dr. Patel June 13, 2019 was negative  C.  Status post surgical resection with lumpectomy and lymph node sampling done by Dr. Barreto August 30, 2019  D.  Final pathology revealed moderate differentiated adenocarcinoma 3.5 cm in size clear surgical margins, no lymphovascular invasion, no pleural invasion, and negative lymph nodes.  2.  COPD  3.  Hypertension    PLAN:   1. I had a long discussion today with the patient about her  new diagnosis of lung cancer. I did go over the final pathology report in  detail with her. I reviewed the patient's documents including refereing provider's notes, lab results, imaging, and path report.   2.  I explained to patient that she does not require adjuvant chemotherapy.  Stage Ib patients some of them might be candidate for chemotherapy to have high risk features based on NCCN guidelines however patient did not have any of the high  risk features, her tumor was not poor differentiated, margins were clean, she had lobe not a wedge resection, tumor size was not more than 4 cm, no lymphovascular invasion and no pleural invasion.  3.  The patient does not require adjuvant vision treatment since she had no N2 disease and her surgical margins were clear.  4.  I explained the patient her CT scan today was negative.  5.  The patient follow-up me in 6 months with CT scan.  6.  Continue inhalers for COPD  7.  Continue Coreg twice a day for hypertension.    Vinnie José MD  12/18/2019

## 2019-12-19 ENCOUNTER — PREP FOR SURGERY (OUTPATIENT)
Dept: OTHER | Facility: HOSPITAL | Age: 64
End: 2019-12-19

## 2019-12-19 DIAGNOSIS — R19.8 ABDOMINAL FULLNESS: Primary | ICD-10-CM

## 2019-12-19 DIAGNOSIS — D17.5 LIPOMA OF COLON: Primary | ICD-10-CM

## 2019-12-19 DIAGNOSIS — R12 HEARTBURN: ICD-10-CM

## 2019-12-23 PROBLEM — R19.8 ABDOMINAL FULLNESS: Status: ACTIVE | Noted: 2019-12-23

## 2019-12-23 PROBLEM — D17.5 LIPOMA OF COLON: Status: ACTIVE | Noted: 2019-12-23

## 2020-01-06 RX ORDER — SODIUM CHLORIDE 9 MG/ML
10 INJECTION, SOLUTION INTRAVENOUS AS NEEDED
Status: CANCELLED | OUTPATIENT
Start: 2020-01-06

## 2020-01-09 ENCOUNTER — ANESTHESIA EVENT (OUTPATIENT)
Dept: GASTROENTEROLOGY | Facility: HOSPITAL | Age: 65
End: 2020-01-09

## 2020-01-09 ENCOUNTER — HOSPITAL ENCOUNTER (OUTPATIENT)
Facility: HOSPITAL | Age: 65
Setting detail: HOSPITAL OUTPATIENT SURGERY
Discharge: HOME | End: 2020-01-09
Attending: INTERNAL MEDICINE | Admitting: INTERNAL MEDICINE

## 2020-01-09 ENCOUNTER — ANESTHESIA (OUTPATIENT)
Dept: GASTROENTEROLOGY | Facility: HOSPITAL | Age: 65
End: 2020-01-09

## 2020-01-09 VITALS
BODY MASS INDEX: 36.32 KG/M2 | DIASTOLIC BLOOD PRESSURE: 95 MMHG | HEIGHT: 60 IN | TEMPERATURE: 97.2 F | HEART RATE: 55 BPM | OXYGEN SATURATION: 96 % | RESPIRATION RATE: 18 BRPM | SYSTOLIC BLOOD PRESSURE: 165 MMHG | WEIGHT: 185 LBS

## 2020-01-09 DIAGNOSIS — R19.8 ABDOMINAL FULLNESS: ICD-10-CM

## 2020-01-09 DIAGNOSIS — D17.5 LIPOMA OF COLON: ICD-10-CM

## 2020-01-09 DIAGNOSIS — R12 HEARTBURN: ICD-10-CM

## 2020-01-09 PROCEDURE — 43239 EGD BIOPSY SINGLE/MULTIPLE: CPT | Performed by: INTERNAL MEDICINE

## 2020-01-09 PROCEDURE — 88305 TISSUE EXAM BY PATHOLOGIST: CPT | Performed by: INTERNAL MEDICINE

## 2020-01-09 PROCEDURE — 25010000002 PHENYLEPHRINE PER 1 ML: Performed by: NURSE ANESTHETIST, CERTIFIED REGISTERED

## 2020-01-09 PROCEDURE — 45330 DIAGNOSTIC SIGMOIDOSCOPY: CPT | Performed by: INTERNAL MEDICINE

## 2020-01-09 PROCEDURE — S0260 H&P FOR SURGERY: HCPCS | Performed by: INTERNAL MEDICINE

## 2020-01-09 PROCEDURE — 25010000002 PROPOFOL 200 MG/20ML EMULSION: Performed by: NURSE ANESTHETIST, CERTIFIED REGISTERED

## 2020-01-09 RX ORDER — PROPOFOL 10 MG/ML
INJECTION, EMULSION INTRAVENOUS AS NEEDED
Status: DISCONTINUED | OUTPATIENT
Start: 2020-01-09 | End: 2020-01-09 | Stop reason: SURG

## 2020-01-09 RX ORDER — SODIUM CHLORIDE 9 MG/ML
10 INJECTION, SOLUTION INTRAVENOUS AS NEEDED
Status: DISCONTINUED | OUTPATIENT
Start: 2020-01-09 | End: 2020-01-09 | Stop reason: HOSPADM

## 2020-01-09 RX ORDER — PANTOPRAZOLE SODIUM 40 MG/1
TABLET, DELAYED RELEASE ORAL
Qty: 30 TABLET | Refills: 2 | Status: SHIPPED | OUTPATIENT
Start: 2020-01-09 | End: 2020-12-16

## 2020-01-09 RX ORDER — SIMETHICONE 20 MG/.3ML
EMULSION ORAL AS NEEDED
Status: DISCONTINUED | OUTPATIENT
Start: 2020-01-09 | End: 2020-01-09 | Stop reason: HOSPADM

## 2020-01-09 RX ORDER — LIDOCAINE 50 MG/G
OINTMENT TOPICAL AS NEEDED
Status: DISCONTINUED | OUTPATIENT
Start: 2020-01-09 | End: 2020-01-09 | Stop reason: HOSPADM

## 2020-01-09 RX ORDER — SODIUM CHLORIDE 0.9 % (FLUSH) 0.9 %
10 SYRINGE (ML) INJECTION AS NEEDED
Status: DISCONTINUED | OUTPATIENT
Start: 2020-01-09 | End: 2020-01-09 | Stop reason: HOSPADM

## 2020-01-09 RX ORDER — LIDOCAINE HYDROCHLORIDE 20 MG/ML
INJECTION, SOLUTION INTRAVENOUS AS NEEDED
Status: DISCONTINUED | OUTPATIENT
Start: 2020-01-09 | End: 2020-01-09 | Stop reason: SURG

## 2020-01-09 RX ADMIN — PHENYLEPHRINE HYDROCHLORIDE 100 MCG: 10 INJECTION INTRAVENOUS at 10:37

## 2020-01-09 RX ADMIN — LIDOCAINE HYDROCHLORIDE 50 MG: 20 INJECTION, SOLUTION INTRAVENOUS at 10:19

## 2020-01-09 RX ADMIN — PROPOFOL 50 MG: 10 INJECTION, EMULSION INTRAVENOUS at 10:24

## 2020-01-09 RX ADMIN — PROPOFOL 60 MG: 10 INJECTION, EMULSION INTRAVENOUS at 10:19

## 2020-01-09 RX ADMIN — PROPOFOL 50 MG: 10 INJECTION, EMULSION INTRAVENOUS at 10:36

## 2020-01-09 RX ADMIN — PROPOFOL 50 MG: 10 INJECTION, EMULSION INTRAVENOUS at 10:27

## 2020-01-09 RX ADMIN — SODIUM CHLORIDE 10 ML/HR: 9 INJECTION, SOLUTION INTRAVENOUS at 08:09

## 2020-01-09 RX ADMIN — PROPOFOL 50 MG: 10 INJECTION, EMULSION INTRAVENOUS at 10:21

## 2020-01-09 RX ADMIN — PROPOFOL 50 MG: 10 INJECTION, EMULSION INTRAVENOUS at 10:33

## 2020-01-09 RX ADMIN — PROPOFOL 50 MG: 10 INJECTION, EMULSION INTRAVENOUS at 10:30

## 2020-01-09 NOTE — OP NOTE
PROCEDURE: Flexible sigmoidoscopy.    DATE OF PROCEDURE:  January 9, 2020    REFERRING PROVIDER: Dr. Ferreira     INSTRUMENT USED:    PCF H1 90 DL    INDICATIONS OF THE PROCEDURE:   The patient had undergone a colonoscopy in the past with suboptimal prep.  The patient also had difficulty holding air with suboptimal prep this compromised the distal rectal exam.     BIOPSIES:  None.      PREPARATION:  Poor.  Solid fecal material was seen in the left colon.  Colonoscopy was discontinued and exam was limited to flexible sigmoidoscopy.   PHOTOGRAPHS:  Photographs were included in the medical records.     MEDICATIONS: None.     CONSENT/PREPROCEDURE EVALUATION:  Risks, benefits, alternatives and options of the procedure including risks of sedation/anesthesia were discussed with the patient and informed consent was obtained prior to the procedure.  History and physical examination were performed and nothing precluded the test.      REPORT:  The patient was placed in left lateral decubitus position and a digital examination was performed. The instrument was inserted into the rectum and advanced under direct vision.        FINDINGS:      Digital rectal examination: Decreased anal tone.  No perianal pathology.  No mass.       Descending colon, sigmoid colon and rectum:  The visualized portions normal.  Solid fecal material was seen in the descending colon and the  exam was discontinued.  Small internal hemorrhoids.  Retroflex exam was conducted.          The scope was then straightened, the lower GI tract was decompressed, and the scope was pulled out of the patient.  The patient tolerated the procedure well.  There were no immediate complications and the patient was transferred in stable condition for post procedure observation.       DIFFICULTY OF THE EXAMINATION:  Average.         DIAGNOSES:    1.  Exam limited to distal sigmoid colon.  2.  Small internal hemorrhoids.      RECOMMENDATIONS:     1.  Follow-up in 3 to 4 weeks  in the office.       Thank you very much for letting me participate in the care of this patient. Please do not hesitate to call me if you have any questions.

## 2020-01-09 NOTE — ANESTHESIA POSTPROCEDURE EVALUATION
Patient: Lizbeth Johns    Procedure Summary     Date:  01/09/20 Room / Location:  [facility] ENDOSCOPY 2 / [facility] ENDOSCOPY    Anesthesia Start:  1016 Anesthesia Stop:  1046    Procedures:       FLEXIBLE SIGMOIDOSCOPY; ANOSCOPY (N/A )      ESOPHAGOGASTRODUODENOSCOPY W/ BIOPSIES (N/A Esophagus) Diagnosis:       Esophagitis      Erosive gastritis      Hiatal hernia      Internal hemorrhoid      (Lipoma of colon [D17.5])    Surgeon:  Zackery Siddiqui MD Provider:  Popeye Hickey CRNA    Anesthesia Type:  MAC ASA Status:  2          Anesthesia Type: MAC    Vitals  Vitals Value Taken Time   /76 1/9/2020 10:50 AM   Temp 97.2 °F (36.2 °C) 1/9/2020 10:50 AM   Pulse 64 1/9/2020 10:50 AM   Resp 16 1/9/2020 10:50 AM   SpO2 94 % 1/9/2020 10:50 AM           Post Anesthesia Care and Evaluation    Patient location during evaluation: bedside  Patient participation: complete - patient participated  Level of consciousness: awake and sleepy but conscious  Pain management: adequate  Airway patency: patent  Anesthetic complications: No anesthetic complications  PONV Status: none  Cardiovascular status: acceptable and hemodynamically stable  Respiratory status: acceptable, room air, nonlabored ventilation and spontaneous ventilation  Hydration status: acceptable

## 2020-01-09 NOTE — ANESTHESIA PREPROCEDURE EVALUATION
Anesthesia Evaluation     Patient summary reviewed and Nursing notes reviewed   NPO Solid Status: > 8 hours  NPO Liquid Status: > 8 hours           Airway   Mallampati: I  TM distance: >3 FB  Neck ROM: full  No difficulty expected  Dental - normal exam     Pulmonary - normal exam   (+) lung cancer, COPD, shortness of breath,   Cardiovascular - normal exam    (+) hypertension,       Neuro/Psych- negative ROS  GI/Hepatic/Renal/Endo    (+) obesity,  GERD,  renal disease,     Musculoskeletal     Abdominal  - normal exam    Bowel sounds: normal.   Substance History - negative use     OB/GYN negative ob/gyn ROS         Other   arthritis,    history of cancer    ROS/Med Hx Other: Right upper lobectomy                  Anesthesia Plan    ASA 2     MAC     intravenous induction     Anesthetic plan, all risks, benefits, and alternatives have been provided, discussed and informed consent has been obtained with: patient.    Plan discussed with attending.

## 2020-01-09 NOTE — DISCHARGE INSTRUCTIONS
To assist you in voiding:  Drink plenty of fluids  Listen to running water while attempting to void.    If you are unable to urinate and you have an uncomfortable urge to void or it has been   6 hours since you were discharged, return to the Emergency Room.    Rest today  No pushing,pulling,tugging,heavy lifting, or strenuous activity   No major decision making,driving,or drinking alcoholic beverages for 24 hours due to the sedation you received  Always use good hand hygiene/washing technique  No driving on pain medication.    F EGD REF *******************************************************    Postprocedure instructions.  1. Nothing by mouth for 30 min.  2. Clear liquids diet (No Sodas) for 1 hours.  3. May advance to soft low-fat diet  in 2 hours       Diet otherwise:    1. Low fat diet.    2. Avoid soda beverages, excessive use of coffee and tea.   3. Avoid smoking and alcohol.      Other Instructions:  Call [facility] at [phone] or come to the Emergency Department if you experience the following: Chest pain, abdominal pain, bleeding (vomiting of blood or coffee colored material, black stools or clare blood in stools),   fever/chills, nausea and vomiting or dizziness.    Follow-up:    Dr. Siddiqui in 3-4 weeks.   Office phone #[phone].   If you already have an appointment just keep that appointment.    ************************************************************************************    Notes to the patient and the family from Dr. Siddiqui.    Dear patient/family member,    Findings on today's procedure are as follows:  1. Esophagitis. Inflammation of the esophagus.  2. Inflammation of the stomach.  Erosive gastritis.    3. Small sliding hiatal hernia.    4. Near healed ulceration in the stomach.  This was biopsied.  5. No cancer.     Recommendations:    1. Protonix (Pantoprazole) tablet 40 mg tablet. Take 1 tablet orally in the morning half an hour before eating every day.  2. Other  instructions as above.      Should you have more questions please do not hesitate to talk to the nurse who can call me and let me talk to you.      I hope you feel better.    Zackery Siddiqui M.D., FACP, FACG.

## 2020-01-09 NOTE — OP NOTE
PROCEDURE:  Upper Endoscopy with biopsies.    DATE OF PROCEDURE: January 9, 2020.    REFERRING PROVIDER:  Emily Ferreira MD.     INSTRUMENT:   Olympus GIF H 190 video endoscope     INDICATIONS OF THE PROCEDURE: This is a 64-year-old female with history of recurrent upper abdominal fullness.        BIOPSIES: Second portion of duodenum.  Gastric antrum, body and fundus.  Multiple biopsies were obtained from the gastric antral healing ulcer edges.     MEDICATIONS:  MAC.     PHOTOGRAPHS:  Photographs were included in the medical records.     CONSENT/PREPROCEDURE EVALUATION:  Risks, benefits, alternatives and options of the procedure including risks of anesthesia/sedation were discussed and informed consent was obtained prior to the procedure. History and physical examination were performed and nothing precluded the test.     REPORT:  The patient was placed in left lateral decubitus position. Once under the influence of IV sedation, the instrument was inserted into the mouth and esophagus was intubated under direct vision without difficulty.     Esophagus:  Z line was noted to be around  35 cm.  Erosive distal esophagitis. LA class A.   A small sliding hiatal hernia less than 3 cm was noted.  No Najera's esophagus was seen.     Stomach:  Antrum:  Erythematous-erosive gastritis. 2 healing gastric ulcer at the antrum were seen.  These were biopsied.  Angulus, lesser and greater curves: Normal.  Retroflex examination: Sliding hiatal hernia.  Cardia and fundus:  Normal.     Body of the stomach: Erythematous gastritis.  Good distensibility of the stomach was achieved no giant folds were noted.   Biopsies were obtained from the gastric antrum,  body and fundus.    Pylorus and pyloric channel:  normal.     Duodenum:  Bulb: Normal.  Second portion: normal.  Subtle scalloping was seen in the second portion of duodenum.  Biopsies were obtained from the second portion of duodenum.    Intervention:  None.       The upper GI tract  was decompressed and the scope was pulled out of the patient. The patient tolerated the procedure well.     DIAGNOSES:     1. Erosive distal esophagitis. LA class A.  2. Small sliding hiatal hernia less than 3 cm.  3. Erythematous-erosive gastritis.  4. Healing gastric ulcerations.    RECOMMENDATIONS:  1.  Dietary instructions.  2.  Pantoprazole 40 mg 1 p.o. q.a.m. 1/2 hour before breakfast.  3.  Follow biopsies.  4.  Follow up in office.     Thank you very much for letting me participate in the care of this patient. Please do not hesitate to call me if you have any questions.

## 2020-01-14 LAB
LAB AP CASE REPORT: NORMAL
PATH REPORT.FINAL DX SPEC: NORMAL

## 2020-01-19 NOTE — H&P
Chief complaint:  The patient had undergone a colonoscopy in the past with suboptimal prep.  The patient also had difficulty holding air with suboptimal prep this compromised the distal rectal exam.    History of present illness: As above    Past medical history:   Past Medical History:   Diagnosis Date   • Acid reflux    • Arthritis    • Body piercing     ears   • Cataract, bilateral    • COPD (chronic obstructive pulmonary disease) (CMS/[facility])    • Full dentures     Patient advised no adhesives DOS   • Hypertension    • Leg cramps    • Lung mass     right   • On home oxygen therapy     2L NC HS and daily prn    • SOB (shortness of breath)     xray showed spot on lungs   • Wears glasses     Rx glasses       Surgical history:    Past Surgical History:   Procedure Laterality Date   • BRONCHOSCOPY N/A 6/13/2019    Procedure: BRONCHOSCOPY WITH FLUOROSCOPY, BIOPSY, BRUSHINGS, AND WASHINGS;  Surgeon: Gudelia Patel MD;  Location: [facility] OR;  Service: Pulmonary   • COLONOSCOPY N/A 2/23/2017    Procedure: COLONOSCOPY with hot and cold snare polypectomies,  hot biopsy polypectomies, biopsies, resolution clip placement;  Surgeon: Zackery Siddiqui MD;  Location: [facility] ENDOSCOPY;  Service:    • COLONOSCOPY N/A 6/4/2018    Procedure: COLONOSCOPY WITH HOT SNARE POLYPECTOMY X 7; COLD SNARE POLYPECTOMY X 3; HOT BIOPSY POLYPECTOMY X 20; COLD BIOPSY POLYPECTOMY X 2; THERMAL ABLATION OF COLON POLYPS X 23; CLIP PLACEMENT X 2; BIOPSIES;  Surgeon: Zackery Siddiqui MD;  Location: [facility] ENDOSCOPY;  Service: Gastroenterology   • COLONOSCOPY N/A 6/19/2019    Procedure: COLONOSCOPY with cold biopsy polypectomies and cold biopsy;  Surgeon: Zackery Siddiqui MD;  Location: [facility] ENDOSCOPY;  Service: Gastroenterology   • ENDOSCOPY  06/13/2019   • ENDOSCOPY N/A 1/9/2020    Procedure: ESOPHAGOGASTRODUODENOSCOPY;  Surgeon: Zackery Siddiqui MD;  Location: [facility] ENDOSCOPY;  Service: Gastroenterology   • MULTIPLE TOOTH EXTRACTIONS      full extraction    • ORIF TIBIA/FIBULA FRACTURES Left    • SIGMOIDOSCOPY N/A 2020    Procedure: FLEXIBLE SIGMOIDOSCOPY; ANOSCOPY;  Surgeon: Zackery Siddiqui MD;  Location: [facility] ENDOSCOPY;  Service: Gastroenterology   • THORACOSCOPY Right 2019    Procedure: BRONCHOSCOPY,  THORACOSCOPY VIDEO ASSISTED with right upper lobe wedge RESECTION , RIGHT UPPER lobectomy with mediastinal lymph node dissection AND INTERCOSTAL CRYOABLATION OF RIGHT CHEST;  Surgeon: Brian Barreto MD;  Location: [facility] OR;  Service: Cardiothoracic   • TUBAL ABDOMINAL LIGATION         Social history:  Social History     Socioeconomic History   • Marital status:      Spouse name: Not on file   • Number of children: 4   • Years of education: Not on file   • Highest education level: Not on file   Occupational History     Employer: UNEMPLOYED   Tobacco Use   • Smoking status: Former Smoker     Packs/day: 1.00     Years: 50.00     Pack years: 50.00     Types: Cigarettes     Last attempt to quit: 2019     Years since quittin.3   • Smokeless tobacco: Never Used   • Tobacco comment: cutting down on cigarettes   Substance and Sexual Activity   • Alcohol use: Yes     Alcohol/week: 2.0 standard drinks     Types: 2 Glasses of wine per week     Comment: once in a while    • Drug use: No   • Sexual activity: Defer   Social History Narrative    Lives in [place], [place]       Allergies:  Patient has no known allergies.  Latex allergy: None  Contrast allergy: None    Medications:  No medications prior to admission.       Review of systems:   Constitutional: No recent: Fever, Weight loss,   Respiratory: No recent: SOB, Cough,   Cardiovascular: No recent: Chest Pains, congestive heart failure or arrhythmias.   Neurological: No recent: Seizures, CVA, TIA.   Genitourinary: No recent: Renal Failure, UTI.  Endocrine: No recent: Worsening of diabetes or thyroid disease.  Musculoskeletal:   No recent: Joint swelling.  Hem. Oncology: No recent: Anemia or  "bleeding.  Psychiatric: No recent: Worsening of depression or anxiety.     VITAL SIGNS:    Blood pressure 165/95, pulse 55, temperature 97.2 °F (36.2 °C), temperature source Temporal, resp. rate 18, height 152.4 cm (60\"), weight 83.9 kg (185 lb), last menstrual period 03/16/2004, SpO2 96 %, not currently breastfeeding.    PHYSICAL EXAMINATION:   HEENT: Normal.   Lungs: Clear to auscultation.  Heart: No S3, no murmur.    Abdomen: Soft.  BS+ ND, NT  Extremities: No edema.  No cyanosis.  Neuro: Alert X 3. No focal deficit.    Assessment: The patient had undergone a colonoscopy in the past with suboptimal prep.  The patient also had difficulty holding air with suboptimal prep this compromised the distal rectal exam.    Plan: Flex sig.     Risks/Benefits:  The potential benefits, risk and/or side effects of the procedure and alternatives have been discussed with the patient/authorized representative and questions were answered.    "

## 2020-02-03 ENCOUNTER — OFFICE VISIT (OUTPATIENT)
Dept: GASTROENTEROLOGY | Facility: CLINIC | Age: 65
End: 2020-02-03

## 2020-02-03 VITALS
DIASTOLIC BLOOD PRESSURE: 78 MMHG | BODY MASS INDEX: 36.91 KG/M2 | HEART RATE: 75 BPM | SYSTOLIC BLOOD PRESSURE: 171 MMHG | TEMPERATURE: 97.8 F | WEIGHT: 188 LBS | HEIGHT: 60 IN | RESPIRATION RATE: 16 BRPM

## 2020-02-03 DIAGNOSIS — E66.01 MORBIDLY OBESE (HCC): ICD-10-CM

## 2020-02-03 DIAGNOSIS — D12.2 ADENOMATOUS POLYP OF ASCENDING COLON: ICD-10-CM

## 2020-02-03 DIAGNOSIS — C34.91 PRIMARY ADENOCARCINOMA OF RIGHT LUNG (HCC): ICD-10-CM

## 2020-02-03 DIAGNOSIS — D17.5 LIPOMA OF COLON: ICD-10-CM

## 2020-02-03 DIAGNOSIS — R19.8 ABDOMINAL FULLNESS: ICD-10-CM

## 2020-02-03 DIAGNOSIS — K59.00 CONSTIPATION, UNSPECIFIED CONSTIPATION TYPE: Primary | ICD-10-CM

## 2020-02-03 PROCEDURE — 99214 OFFICE O/P EST MOD 30 MIN: CPT | Performed by: INTERNAL MEDICINE

## 2020-02-03 RX ORDER — METOCLOPRAMIDE 5 MG/1
2.5 TABLET ORAL
Qty: 30 TABLET | Refills: 0 | Status: SHIPPED | OUTPATIENT
Start: 2020-02-03 | End: 2021-09-21

## 2020-02-03 NOTE — PROGRESS NOTES
Chief Complaint   Patient presents with   • Colon Polyps     History of Present Illness     The patient has history of constipation for the last several months.  Constipation is described as moderately severe.  Frequency of bowel movements is perhaps once in 2 days.  The stools are described as hard.  The patient has taken stool softeners and laxatives for a bowel movement.  Currently, she is doing better.  Patient had undergone a colonoscopy to terminal ileum in June 2019.  She was found to have colon polyps (5 were removed 3 mm in size.  Biopsies from the colon polyps did not reveal adenomatous change.  There is no associated bleeding.  There is history of constipation which is improved.  Her upper abdominal fullness has improved.  She denies nausea.  Her reflux symptoms are under control.  There is no dysphagia or odynophagia.  The patient denies hematemesis, melena or hematochezia.  There is no dysphagia or odynophagia.       Review of Systems   Constitutional: Negative for appetite change, chills, fatigue, fever and unexpected weight change.   HENT: Negative for mouth sores, nosebleeds and trouble swallowing.    Eyes: Negative for discharge and redness.   Respiratory: Positive for cough and shortness of breath. Negative for apnea.    Cardiovascular: Negative for chest pain, palpitations and leg swelling.   Gastrointestinal: Positive for constipation. Negative for abdominal distention, abdominal pain, anal bleeding, blood in stool, diarrhea, nausea and vomiting.   Endocrine: Negative for cold intolerance, heat intolerance and polydipsia.   Genitourinary: Negative for dysuria, hematuria and urgency.   Musculoskeletal: Positive for arthralgias, back pain and myalgias. Negative for joint swelling.   Skin: Negative for rash.   Allergic/Immunologic: Negative for food allergies and immunocompromised state.   Neurological: Negative for dizziness, seizures, syncope and headaches.   Hematological: Negative for  adenopathy. Does not bruise/bleed easily.   Psychiatric/Behavioral: Negative for dysphoric mood. The patient is not nervous/anxious and is not hyperactive.      Patient Active Problem List   Diagnosis   • Colon cancer screening   • Epigastric pain   • Heartburn   • Lesion of colon   • History of colon polyps   • Family history of colon cancer   • Constipation   • Renal insufficiency   • Acute renal failure (ARF) (CMS/HCC)   • Mass of upper lobe of right lung   • Pain in both lower extremities   • Essential hypertension   • Abnormal CT of the chest   • Lung nodule < 6cm on CT   • Lung nodule   • Right-sided chest pain   • Tobacco abuse   • Colon cancer (CMS/HCC)   • Primary adenocarcinoma of right lung (CMS/HCC)   • COPD (chronic obstructive pulmonary disease) (CMS/HCC)   • Air leak   • Mediastinal lymphadenopathy   • Lipoma of colon   • Abdominal fullness   • Morbidly obese (CMS/HCC)     Past Medical History:   Diagnosis Date   • Acid reflux    • Arthritis    • Body piercing     ears   • Cataract, bilateral    • COPD (chronic obstructive pulmonary disease) (CMS/HCC)    • Full dentures     Patient advised no adhesives DOS   • Hypertension    • Leg cramps    • Lung mass     right   • On home oxygen therapy     2L NC HS and daily prn    • SOB (shortness of breath)     xray showed spot on lungs   • Wears glasses     Rx glasses     Past Surgical History:   Procedure Laterality Date   • BRONCHOSCOPY N/A 6/13/2019    Procedure: BRONCHOSCOPY WITH FLUOROSCOPY, BIOPSY, BRUSHINGS, AND WASHINGS;  Surgeon: Gudelia Patel MD;  Location: [facility] OR;  Service: Pulmonary   • COLONOSCOPY N/A 2/23/2017    Procedure: COLONOSCOPY with hot and cold snare polypectomies,  hot biopsy polypectomies, biopsies, resolution clip placement;  Surgeon: Zackery Siddiqui MD;  Location: [facility] ENDOSCOPY;  Service:    • COLONOSCOPY N/A 6/4/2018    Procedure: COLONOSCOPY WITH HOT SNARE POLYPECTOMY X 7; COLD SNARE POLYPECTOMY X 3; HOT BIOPSY POLYPECTOMY  X 20; COLD BIOPSY POLYPECTOMY X 2; THERMAL ABLATION OF COLON POLYPS X 23; CLIP PLACEMENT X 2; BIOPSIES;  Surgeon: Zackery Siddiqui MD;  Location: [facility] ENDOSCOPY;  Service: Gastroenterology   • COLONOSCOPY N/A 2019    Procedure: COLONOSCOPY with cold biopsy polypectomies and cold biopsy;  Surgeon: Zackery Siddiqui MD;  Location: [facility] ENDOSCOPY;  Service: Gastroenterology   • ENDOSCOPY  2019   • ENDOSCOPY N/A 2020    Procedure: ESOPHAGOGASTRODUODENOSCOPY;  Surgeon: Zackery Siddiqui MD;  Location: [facility] ENDOSCOPY;  Service: Gastroenterology   • MULTIPLE TOOTH EXTRACTIONS      full extraction   • ORIF TIBIA/FIBULA FRACTURES Left    • SIGMOIDOSCOPY N/A 2020    Procedure: FLEXIBLE SIGMOIDOSCOPY; ANOSCOPY;  Surgeon: Zackery Siddiqui MD;  Location: [facility] ENDOSCOPY;  Service: Gastroenterology   • THORACOSCOPY Right 2019    Procedure: BRONCHOSCOPY,  THORACOSCOPY VIDEO ASSISTED with right upper lobe wedge RESECTION , RIGHT UPPER lobectomy with mediastinal lymph node dissection AND INTERCOSTAL CRYOABLATION OF RIGHT CHEST;  Surgeon: Brian Barreto MD;  Location: [facility] OR;  Service: Cardiothoracic   • TUBAL ABDOMINAL LIGATION       Family History   Problem Relation Age of Onset   • Cirrhosis Brother    • Liver disease Brother    • Colon cancer Brother         Possibly colon cancer, patient unsure.   • Cancer Mother    • No Known Problems Father    • Stomach cancer Neg Hx    • Esophageal cancer Neg Hx      Social History     Tobacco Use   • Smoking status: Former Smoker     Packs/day: 1.00     Years: 50.00     Pack years: 50.00     Types: Cigarettes     Last attempt to quit: 2019     Years since quittin.4   • Smokeless tobacco: Never Used   • Tobacco comment: cutting down on cigarettes   Substance Use Topics   • Alcohol use: Yes     Alcohol/week: 2.0 standard drinks     Types: 2 Glasses of wine per week     Comment: once in a while        Current Outpatient Medications:   •  aspirin 81 MG EC  "tablet, Take 81 mg by mouth Daily., Disp: , Rfl:   •  carvedilol (COREG) 6.25 MG tablet, Take 6.25 mg by mouth Every 12 (Twelve) Hours., Disp: , Rfl:   •  O2 (OXYGEN), Inhale 2 L/min Take As Directed. Patient to use HS and daily prn with activity, Disp: , Rfl:   •  pantoprazole (PROTONIX) 40 MG EC tablet, Take 1 tablet by mouth 30 minutes before breakfast daily., Disp: 30 tablet, Rfl: 2  •  rOPINIRole (REQUIP) 0.5 MG tablet, Take 1 tablet by mouth Every Night. Take 1 hour before bedtime., Disp: 30 tablet, Rfl: 0  •  tiotropium bromide-olodaterol (STIOLTO RESPIMAT) 2.5-2.5 MCG/ACT aerosol solution inhaler, Inhale 2 puffs Daily., Disp: 1 inhaler, Rfl: 5  •  traZODone (DESYREL) 50 MG tablet, Take 50 mg by mouth Every Night., Disp: , Rfl:   •  metoclopramide (REGLAN) 5 MG tablet, Take 0.5 tablets by mouth 2 (Two) Times a Day Before Meals., Disp: 30 tablet, Rfl: 0  No current facility-administered medications for this visit.     Facility-Administered Medications Ordered in Other Visits:   •  Chlorhexidine Gluconate Cloth 2 % pads 1 application, 1 application, Topical, Q12H PRN, Candelaira Novak PA-C    No Known Allergies    Blood pressure 171/78, pulse 75, temperature 97.8 °F (36.6 °C), resp. rate 16, height 152.4 cm (60\"), weight 85.3 kg (188 lb), last menstrual period 03/16/2004, not currently breastfeeding.    Physical Exam   Constitutional: She is oriented to person, place, and time. She appears well-developed and well-nourished. No distress.   HENT:   Head: Normocephalic and atraumatic.   Right Ear: Hearing and external ear normal.   Left Ear: Hearing and external ear normal.   Nose: Nose normal.   Mouth/Throat: Oropharynx is clear and moist and mucous membranes are normal. Mucous membranes are not pale, not dry and not cyanotic. No oral lesions. No oropharyngeal exudate.   Eyes: Conjunctivae and EOM are normal. Right eye exhibits no discharge. Left eye exhibits no discharge. No scleral icterus.   Neck: Trachea " normal. Neck supple. No JVD present. No edema present. No thyroid mass and no thyromegaly present.   Cardiovascular: Normal rate, regular rhythm, S2 normal and normal heart sounds. Exam reveals no gallop, no S3 and no friction rub.   No murmur heard.  Pulmonary/Chest: Effort normal and breath sounds normal. No respiratory distress. She has no wheezes. She has no rales. She exhibits no tenderness.   Abdominal: Soft. Normal appearance and bowel sounds are normal. She exhibits no distension, no ascites and no mass. There is no splenomegaly or hepatomegaly. There is no tenderness. There is no rigidity, no rebound and no guarding. No hernia.   Musculoskeletal: She exhibits no tenderness or deformity.     Vascular Status -  Her right foot exhibits no edema. Her left foot exhibits no edema.  Lymphadenopathy:     She has no cervical adenopathy.        Left: No supraclavicular adenopathy present.   Neurological: She is alert and oriented to person, place, and time. She has normal strength. No cranial nerve deficit or sensory deficit. She exhibits normal muscle tone. Coordination normal.   Skin: No rash noted. She is not diaphoretic. No cyanosis. No pallor. Nails show no clubbing.   Psychiatric: She has a normal mood and affect. Her behavior is normal. Judgment and thought content normal.   Nursing note and vitals reviewed.  Stigmata of chronic liver disease:  None.  Asterixis:  None.    Assessment:      ICD-10-CM ICD-9-CM   1. Constipation, unspecified constipation type K59.00 564.00   2. Abdominal fullness R19.8 789.9   3. Lipoma of colon D17.5 214.3   4. Adenomatous polyp of ascending colon D12.2 211.3   5. Morbidly obese (CMS/HCC) E66.01 278.01   6. Primary adenocarcinoma of right lung (CMS/HCC) C34.91 162.9         Discussion:  1.     Plan/  Patient Instructions   1. Low-fat-high-fiber diet with liberal water intake.  2. Anti-reflex measures.  3. Protonix (Pantoprazole) tablet 40 mg tablet. Take 1 tablet orally in the  morning half an hour before eating every day.  4. Weight reduction.  5. Follow-up colonoscopy in 3 years.  June 2019.         Zackery Siddiqui MD

## 2020-02-03 NOTE — PATIENT INSTRUCTIONS
1. Low-fat-high-fiber diet with liberal water intake.  2. Anti-reflex measures.  3. Protonix (Pantoprazole) tablet 40 mg tablet. Take 1 tablet orally in the morning half an hour before eating every day.  4. Weight reduction.  5. Follow-up colonoscopy in 3 years.  June 2019.

## 2020-03-20 ENCOUNTER — TELEPHONE (OUTPATIENT)
Dept: CARDIAC SURGERY | Facility: CLINIC | Age: 65
End: 2020-03-20

## 2020-04-03 DIAGNOSIS — C34.91 PRIMARY ADENOCARCINOMA OF RIGHT LUNG (HCC): Primary | ICD-10-CM

## 2020-04-08 ENCOUNTER — TELEPHONE (OUTPATIENT)
Dept: CARDIAC SURGERY | Facility: CLINIC | Age: 65
End: 2020-04-08

## 2020-05-22 ENCOUNTER — TELEPHONE (OUTPATIENT)
Dept: CARDIAC SURGERY | Facility: CLINIC | Age: 65
End: 2020-05-22

## 2020-06-15 ENCOUNTER — HOSPITAL ENCOUNTER (OUTPATIENT)
Dept: CT IMAGING | Facility: HOSPITAL | Age: 65
Discharge: HOME | End: 2020-06-15
Admitting: INTERNAL MEDICINE

## 2020-06-15 DIAGNOSIS — C34.91 PRIMARY ADENOCARCINOMA OF RIGHT LUNG (HCC): ICD-10-CM

## 2020-06-15 LAB — CREAT BLDA-MCNC: 1 MG/DL (ref 0.6–1.3)

## 2020-06-15 PROCEDURE — 25010000002 IOPAMIDOL 61 % SOLUTION: Performed by: INTERNAL MEDICINE

## 2020-06-15 PROCEDURE — 82565 ASSAY OF CREATININE: CPT

## 2020-06-15 PROCEDURE — 71260 CT THORAX DX C+: CPT

## 2020-06-15 RX ADMIN — IOPAMIDOL 100 ML: 612 INJECTION, SOLUTION INTRAVENOUS at 14:11

## 2020-06-17 ENCOUNTER — OFFICE VISIT (OUTPATIENT)
Dept: ONCOLOGY | Facility: CLINIC | Age: 65
End: 2020-06-17

## 2020-06-17 DIAGNOSIS — C34.91 PRIMARY ADENOCARCINOMA OF RIGHT LUNG (HCC): Primary | ICD-10-CM

## 2020-06-17 PROCEDURE — 99213 OFFICE O/P EST LOW 20 MIN: CPT | Performed by: NURSE PRACTITIONER

## 2020-06-17 NOTE — PROGRESS NOTES
You have chosen to receive care through a telephone visit. Do you consent to use a telephone visit for your medical care today? Yes      DATE OF VISIT: 6/17/2020    REASON FOR VISIT: Followup for Right upper lobe adenocarcinoma of the lung T2 a N0 M0 stage Ib     HISTORY OF PRESENT ILLNESS: The patient is a very pleasant 64 y.o. female with past medical history significant for Right upper lobe adenocarcinoma of the lung T2 a N0 M0 stage Ib  diagnosed June 2019.  The patient woke up 1 day not feeling good.  She did not have any specific complaints except she felt something is different.  She came to [facility] emergency room chest x-ray revealed right upper lobe lung nodule.  She was referred to Dr. Patel CT chest document the mass.  Bronchoscopy with biopsy done by Dr. Patel June 13, 2019 was not diagnostic.  Patient was further Dr. Barreto who performed wedge resection followed by right upper lobe resection and medicine lymph node sampling August 30, 2019.  Final pathology confirmed moderate eventually adenocarcinoma, 3.5 cm in size, clear surgical margins, no pleural or lymphovascular invasion, and negative lymph nodes.  The patient had no postoperative complications.  Repeat CT scan done December 2019 was unremarkable. Patient is here today for follow up.    SUBJECTIVE: The patient has been doing fairly well.  She started using her oxygen at night again. She says it makes her sleepp easier. She is out of most her inhalers and regular medicines.  she denied any fever or chills, no night sweats, denied any headaches    PAST MEDICAL HISTORY/SOCIAL HISTORY/FAMILY HISTORY: Unchanged from my prior documentation done on 12/18/2019    Review of Systems   Constitutional: Negative for activity change, appetite change, fatigue, fever and unexpected weight change.   HENT: Negative for congestion, dental problem, facial swelling, hearing loss, mouth sores, sinus pressure, sinus pain, tinnitus and trouble  swallowing.    Eyes: Negative for photophobia, discharge and itching.   Respiratory: Negative for apnea, cough, chest tightness, shortness of breath and wheezing.    Cardiovascular: Negative for chest pain and palpitations.   Gastrointestinal: Negative for abdominal distention, blood in stool, constipation, diarrhea, nausea and vomiting.   Endocrine: Negative for cold intolerance and heat intolerance.   Genitourinary: Negative for difficulty urinating, frequency, pelvic pain and urgency.   Musculoskeletal: Negative for arthralgias, joint swelling and myalgias.   Skin: Negative for color change and pallor.   Allergic/Immunologic: Negative for environmental allergies.   Neurological: Negative for dizziness, tremors, facial asymmetry, weakness and headaches.   Hematological: Negative for adenopathy. Does not bruise/bleed easily.   Psychiatric/Behavioral: Negative for agitation and behavioral problems. The patient is nervous/anxious.          Current Outpatient Medications:   •  aspirin 81 MG EC tablet, Take 81 mg by mouth Daily., Disp: , Rfl:   •  carvedilol (COREG) 6.25 MG tablet, Take 6.25 mg by mouth Every 12 (Twelve) Hours., Disp: , Rfl:   •  metoclopramide (REGLAN) 5 MG tablet, Take 0.5 tablets by mouth 2 (Two) Times a Day Before Meals., Disp: 30 tablet, Rfl: 0  •  O2 (OXYGEN), Inhale 2 L/min Take As Directed. Patient to use HS and daily prn with activity, Disp: , Rfl:   •  pantoprazole (PROTONIX) 40 MG EC tablet, Take 1 tablet by mouth 30 minutes before breakfast daily., Disp: 30 tablet, Rfl: 2  •  rOPINIRole (REQUIP) 0.5 MG tablet, Take 1 tablet by mouth Every Night. Take 1 hour before bedtime., Disp: 30 tablet, Rfl: 0  •  tiotropium bromide-olodaterol (STIOLTO RESPIMAT) 2.5-2.5 MCG/ACT aerosol solution inhaler, Inhale 2 puffs Daily., Disp: 1 inhaler, Rfl: 5  •  traZODone (DESYREL) 50 MG tablet, Take 50 mg by mouth Every Night., Disp: , Rfl:   No current facility-administered medications for this visit.      Facility-Administered Medications Ordered in Other Visits:   •  Chlorhexidine Gluconate Cloth 2 % pads 1 application, 1 application, Topical, Q12H PRN, Candelaria Novak PA-C    PHYSICAL EXAMINATION:   LMP 03/16/2004    ECOG Performance Status: 1 - Symptomatic but completely ambulatory  General Appearance:  alert, cooperative, no apparent distress and appears stated age   Neurologic/Psychiatric: A&O x 3, gait steady, appropriate affect, strength 5/5 in all muscle groups   HEENT:  Normocephalic, without obvious abnormality, mucous membranes moist   Neck: Supple, symmetrical, trachea midline, no adenopathy;  No thyromegaly, masses, or tenderness   Lungs:   Clear to auscultation bilaterally; respirations regular, even, and unlabored bilaterally   Heart:  Regular rate and rhythm, no murmurs appreciated   Abdomen:   Soft, non-tender, non-distended and no organomegaly   Lymph nodes: No cervical, supraclavicular, inguinal or axillary adenopathy noted   Extremities: Normal, atraumatic; no clubbing, cyanosis, or edema    Skin: No rashes, ulcers, or suspicious lesions noted     Hospital Outpatient Visit on 06/15/2020   Component Date Value Ref Range Status   • Creatinine 06/15/2020 1.00  0.60 - 1.30 mg/dL Final    Serial Number: [id number]:  [id number]        No results found.    ASSESSMENT: The patient is a very pleasant 64 y.o. female  with adenocarcinoma of the right lung    PROBLEM LIST:  1. Right upper lobe adenocarcinoma of the lung T2 aN0 M0 stage Ib:  A.  CT chest done on June 2019 revealed 3 cm right upper lobe lung nodule  B.  Bronchoscopy with biopsy done by Dr. Patel June 13, 2019 was negative  C.  Status post surgical resection with lumpectomy and lymph node sampling done by Dr. Barreto August 30, 2019  D.  Final pathology revealed moderate differentiated adenocarcinoma 3.5 cm in size clear surgical margins, no lymphovascular invasion, no pleural invasion, and negative lymph nodes.  2.  COPD  3.   Hypertension    PLAN:  1. I reviewed with the patient that she did not require adjuvant chemotherapy.  Stage Ib patients some of them might be candidate for chemotherapy to have high risk features based on NCCN guidelines however patient did not have any of the high risk features, her tumor was not poor differentiated, margins were clean, she had lobe not a wedge resection, tumor size was not more than 4 cm, no lymphovascular invasion and no pleural invasion.  2.  The patient does not require adjuvant vision treatment since she had no N2 disease and her surgical margins were clear.  3.  I discussed with the patient her CT scan today showed no evidence of disease.  4.  The patient follow-up in 6 months with CT scan.  5.  Continue inhalers for COPD  6.  Continue Coreg twice a day for hypertension.  7. I will mail labs to the patients to have labs drawn when she has PCP lab work completed.   8. She will continue oxygen as needed at night.  9. She will plan to call Dr. Patel to reschedule follow up.    Chandni Odell, APRN   6/17/2020    This visit has been rescheduled as a phone visit to comply with patient safety concerns in accordance with CDC recommendations. Total time of discussion was 11 minutes.

## 2020-07-14 ENCOUNTER — OFFICE VISIT (OUTPATIENT)
Dept: CARDIAC SURGERY | Facility: CLINIC | Age: 65
End: 2020-07-14

## 2020-07-14 VITALS
SYSTOLIC BLOOD PRESSURE: 171 MMHG | HEIGHT: 60 IN | OXYGEN SATURATION: 96 % | DIASTOLIC BLOOD PRESSURE: 81 MMHG | TEMPERATURE: 98.7 F | HEART RATE: 54 BPM | WEIGHT: 184 LBS | BODY MASS INDEX: 36.12 KG/M2

## 2020-07-14 DIAGNOSIS — I10 ESSENTIAL HYPERTENSION: ICD-10-CM

## 2020-07-14 DIAGNOSIS — C34.91 PRIMARY ADENOCARCINOMA OF RIGHT LUNG (HCC): Primary | ICD-10-CM

## 2020-07-14 DIAGNOSIS — Z90.2 S/P LOBECTOMY OF LUNG: ICD-10-CM

## 2020-07-14 PROCEDURE — 99213 OFFICE O/P EST LOW 20 MIN: CPT | Performed by: NURSE PRACTITIONER

## 2020-07-14 NOTE — PROGRESS NOTES
[facility] Cardiothoracic Surgery Follow-Up Note    Name:  Lizbeth Johns  MRN Number:  [id number]  Date of Encounter:  07/14/2020    Referred By:  No ref. provider found  PCP:  Emily Ferreira MD    Chief Complaint:    Chief Complaint   Patient presents with   • Follow-up     6 month F/u w/ Ct chest right lung nodules. Pt states that she is sttill having sharp pains in the right side of her breast/ upper chest and some days she does get fatigued.        History of Present Illness:    Ms. Lizbeth Johns is a pleasant 64 y.o. female with a history of hypertension, hyperlipidemia, COPD and right lung nodule status post right VATS with right upper lobectomy 8/31/2019 who returns the office today for follow-up exam.  The patient was last seen in our office on 10/1/2019 and denies interval fever, chills, weight loss, lymphadenopathy and hemoptysis.  The patient continues to have some shortness of breath, particularly with walking.  She has intermittent sharp pains in her right side radiating to underneath of her breast.    Review of Systems:  Review of Systems   Constitution: Positive for chills, malaise/fatigue and night sweats. Negative for fever and weight loss.   HENT: Negative for congestion, hearing loss, nosebleeds and odynophagia.    Eyes: Positive for blurred vision (bilateral blurry ).   Cardiovascular: Positive for chest pain. Negative for claudication, dyspnea on exertion, leg swelling, orthopnea, palpitations and syncope.        Patient states that she has chest/ breast pain and that her side still feels numb in the rib line   Respiratory: Positive for sputum production and wheezing. Negative for cough, hemoptysis and shortness of breath.    Endocrine: Negative for cold intolerance, heat intolerance, polydipsia, polyphagia and polyuria.   Hematologic/Lymphatic: Bruises/bleeds easily.   Skin: Negative for itching, poor wound healing and rash.   Musculoskeletal: Positive for arthritis, back  pain and joint pain (shoulder and hip pain bilateral). Negative for joint swelling and myalgias.   Gastrointestinal: Positive for abdominal pain and constipation. Negative for diarrhea, hematemesis, melena, nausea and vomiting.   Genitourinary: Positive for nocturia. Negative for dysuria, frequency, hematuria and urgency.   Neurological: Negative for dizziness, light-headedness, loss of balance and numbness.   Psychiatric/Behavioral: Negative for depression and suicidal ideas. The patient is not nervous/anxious.    Allergic/Immunologic: Negative for environmental allergies and HIV exposure.       Past Medical History:    Past Medical History:   Diagnosis Date   • Acid reflux    • Arthritis    • Body piercing     ears   • Cataract, bilateral    • COPD (chronic obstructive pulmonary disease) (CMS/[facility])    • Full dentures     Patient advised no adhesives DOS   • Hypertension    • Leg cramps    • Lung mass     right   • On home oxygen therapy     2L NC HS and daily prn    • SOB (shortness of breath)     xray showed spot on lungs   • Wears glasses     Rx glasses       Past Surgical History:    Past Surgical History:   Procedure Laterality Date   • BRONCHOSCOPY N/A 6/13/2019    Procedure: BRONCHOSCOPY WITH FLUOROSCOPY, BIOPSY, BRUSHINGS, AND WASHINGS;  Surgeon: Gudelia Patel MD;  Location: [facility] OR;  Service: Pulmonary   • COLONOSCOPY N/A 2/23/2017    Procedure: COLONOSCOPY with hot and cold snare polypectomies,  hot biopsy polypectomies, biopsies, resolution clip placement;  Surgeon: Zackery Siddiqui MD;  Location: [facility] ENDOSCOPY;  Service:    • COLONOSCOPY N/A 6/4/2018    Procedure: COLONOSCOPY WITH HOT SNARE POLYPECTOMY X 7; COLD SNARE POLYPECTOMY X 3; HOT BIOPSY POLYPECTOMY X 20; COLD BIOPSY POLYPECTOMY X 2; THERMAL ABLATION OF COLON POLYPS X 23; CLIP PLACEMENT X 2; BIOPSIES;  Surgeon: Zackery Siddiqui MD;  Location: [facility] ENDOSCOPY;  Service: Gastroenterology   • COLONOSCOPY N/A 6/19/2019    Procedure:  COLONOSCOPY with cold biopsy polypectomies and cold biopsy;  Surgeon: Zackery Siddiqui MD;  Location: [facility] ENDOSCOPY;  Service: Gastroenterology   • ENDOSCOPY  2019   • ENDOSCOPY N/A 2020    Procedure: ESOPHAGOGASTRODUODENOSCOPY;  Surgeon: Zackery Siddiqui MD;  Location: [facility] ENDOSCOPY;  Service: Gastroenterology   • MULTIPLE TOOTH EXTRACTIONS      full extraction   • ORIF TIBIA/FIBULA FRACTURES Left    • SIGMOIDOSCOPY N/A 2020    Procedure: FLEXIBLE SIGMOIDOSCOPY; ANOSCOPY;  Surgeon: Zackrey Siddiqui MD;  Location: [facility] ENDOSCOPY;  Service: Gastroenterology   • THORACOSCOPY Right 2019    Procedure: BRONCHOSCOPY,  THORACOSCOPY VIDEO ASSISTED with right upper lobe wedge RESECTION , RIGHT UPPER lobectomy with mediastinal lymph node dissection AND INTERCOSTAL CRYOABLATION OF RIGHT CHEST;  Surgeon: Brian Barreto MD;  Location: [facility] OR;  Service: Cardiothoracic   • TUBAL ABDOMINAL LIGATION         Patient Active Problem List   Diagnosis   • Colon cancer screening   • Epigastric pain   • Heartburn   • Lesion of colon   • History of colon polyps   • Family history of colon cancer   • Constipation   • Renal insufficiency   • Acute renal failure (ARF) (CMS/HCC)   • Mass of upper lobe of right lung   • Pain in both lower extremities   • Essential hypertension   • Abnormal CT of the chest   • Lung nodule < 6cm on CT   • Lung nodule   • Right-sided chest pain   • Tobacco abuse   • Colon cancer (CMS/HCC)   • Primary adenocarcinoma of right lung (CMS/HCC)   • COPD (chronic obstructive pulmonary disease) (CMS/HCC)   • Air leak   • Mediastinal lymphadenopathy   • Lipoma of colon   • Abdominal fullness   • Morbidly obese (CMS/HCC)     Social History     Tobacco Use   • Smoking status: Former Smoker     Packs/day: 1.00     Years: 50.00     Pack years: 50.00     Types: Cigarettes     Last attempt to quit: 2019     Years since quittin.8   • Smokeless tobacco: Never Used   • Tobacco comment: cutting  "down on cigarettes   Substance Use Topics   • Alcohol use: Yes     Alcohol/week: 2.0 standard drinks     Types: 2 Glasses of wine per week     Comment: once in a while    • Drug use: No     Family History   Problem Relation Age of Onset   • Cirrhosis Brother    • Liver disease Brother    • Colon cancer Brother         Possibly colon cancer, patient unsure.   • Cancer Mother    • No Known Problems Father    • Stomach cancer Neg Hx    • Esophageal cancer Neg Hx        Medications:      Current Outpatient Medications:   •  aspirin 81 MG EC tablet, Take 81 mg by mouth Daily., Disp: , Rfl:   •  carvedilol (COREG) 6.25 MG tablet, Take 12.5 mg by mouth 2 (Two) Times a Day With Meals., Disp: , Rfl:   •  O2 (OXYGEN), Inhale 2 L/min Take As Directed. Patient to use HS and daily prn with activity, Disp: , Rfl:   •  tiotropium bromide-olodaterol (STIOLTO RESPIMAT) 2.5-2.5 MCG/ACT aerosol solution inhaler, Inhale 2 puffs Daily., Disp: 1 inhaler, Rfl: 5  •  traZODone (DESYREL) 50 MG tablet, Take 50 mg by mouth Every Night., Disp: , Rfl:   •  metoclopramide (REGLAN) 5 MG tablet, Take 0.5 tablets by mouth 2 (Two) Times a Day Before Meals., Disp: 30 tablet, Rfl: 0  •  pantoprazole (PROTONIX) 40 MG EC tablet, Take 1 tablet by mouth 30 minutes before breakfast daily., Disp: 30 tablet, Rfl: 2  •  rOPINIRole (REQUIP) 0.5 MG tablet, Take 1 tablet by mouth Every Night. Take 1 hour before bedtime., Disp: 30 tablet, Rfl: 0  No current facility-administered medications for this visit.     Facility-Administered Medications Ordered in Other Visits:   •  Chlorhexidine Gluconate Cloth 2 % pads 1 application, 1 application, Topical, Q12H PRN, Candelaria Novak PA-C    Allergies:  No Known Allergies    Physical Exam:  Vital Signs:    Vitals:    07/14/20 1049   BP: 171/81   Pulse: 54   Temp: 98.7 °F (37.1 °C)   TempSrc: Temporal   SpO2: 96%   Weight: 83.5 kg (184 lb)   Height: 152.4 cm (60\")       Physical Exam   Gen- NAD, pleasant, cooperative  CV- " Regular rate and rhythm, no murmur, gallop or rub  Pulm- Clear to auscultation bilateral without wheeze or rhonchi  GI- Soft, normoactive bowel sounds, non-tender  Ext- Without edema  Incision- Well healed right VATS incision.  Neuro- CN II- XII grossly intact, tongue midline, voice normal.    Labs/Imaging:  CT Chest W/ Contrast, [facility], 6/15/2020  Impression:  No evidence of recurrent or metastatic disease.  I personally reviewed these images in the office today.    Assessment / Plan:  Ms. Lizbeth Johns is a pleasant 64 y.o. female with a history of hypertension, hyperlipidemia, COPD and right lung nodule status post right VATS with right upper lobectomy 8/31/2019 who returns the office today for follow-up exam.  I have reviewed the patient's CT scan with her in the office today which demonstrated no recurrence of disease or metastasis.  I have encouraged the patient to continue walking to help improve her shortness of breath and fatigue.  Otherwise, we will plan to see her back in approximately 6 months with repeat CT scan of the chest.  Should she have any acutely worsening symptoms in the interval, she may contact the office to move her appointment forward.    Please note, this document was produced using voice recognition software.    MARGARET Ramirez  [facility] Cardiothoracic Surgery

## 2020-11-04 ENCOUNTER — TELEPHONE (OUTPATIENT)
Dept: ONCOLOGY | Facility: CLINIC | Age: 65
End: 2020-11-04

## 2020-11-04 RX ORDER — PREDNISONE 50 MG/1
TABLET ORAL
Qty: 3 TABLET | Refills: 0 | Status: SHIPPED | OUTPATIENT
Start: 2020-11-04 | End: 2020-12-16

## 2020-11-04 NOTE — TELEPHONE ENCOUNTER
"I called patient to confirm allergy to contrast dye.  Patients chart states \"No Allergies\" and is scheduled for a scan.  Patient states she gets \"hives\".  Per MARGARET Parnell, I instructed patient to  prescription and instructions for prednisone and diphenhydramine to be taken prior to scan.  Patient stated understanding.  "

## 2020-11-11 ENCOUNTER — TRANSCRIBE ORDERS (OUTPATIENT)
Dept: ADMINISTRATIVE | Facility: HOSPITAL | Age: 65
End: 2020-11-11

## 2020-11-11 DIAGNOSIS — Z12.31 SCREENING MAMMOGRAM, ENCOUNTER FOR: Primary | ICD-10-CM

## 2020-11-16 ENCOUNTER — HOSPITAL ENCOUNTER (OUTPATIENT)
Dept: CT IMAGING | Facility: HOSPITAL | Age: 65
Discharge: HOME | End: 2020-11-16
Admitting: NURSE PRACTITIONER

## 2020-11-16 DIAGNOSIS — C34.91 PRIMARY ADENOCARCINOMA OF RIGHT LUNG (HCC): ICD-10-CM

## 2020-11-16 LAB — CREAT BLDA-MCNC: 1.4 MG/DL (ref 0.6–1.3)

## 2020-11-16 PROCEDURE — 71260 CT THORAX DX C+: CPT

## 2020-11-16 PROCEDURE — 82565 ASSAY OF CREATININE: CPT

## 2020-11-16 PROCEDURE — 25010000002 IOPAMIDOL 61 % SOLUTION: Performed by: NURSE PRACTITIONER

## 2020-11-16 RX ADMIN — IOPAMIDOL 100 ML: 612 INJECTION, SOLUTION INTRAVENOUS at 16:56

## 2020-12-15 ENCOUNTER — TRANSCRIBE ORDERS (OUTPATIENT)
Dept: LAB | Facility: HOSPITAL | Age: 65
End: 2020-12-15

## 2020-12-15 ENCOUNTER — LAB (OUTPATIENT)
Dept: LAB | Facility: HOSPITAL | Age: 65
End: 2020-12-15

## 2020-12-15 DIAGNOSIS — Z11.59 SCREENING EXAMINATION FOR POLIOMYELITIS: ICD-10-CM

## 2020-12-15 DIAGNOSIS — C34.91 PRIMARY ADENOCARCINOMA OF RIGHT LUNG (HCC): ICD-10-CM

## 2020-12-15 DIAGNOSIS — E78.5 HYPERLIPIDEMIA, UNSPECIFIED HYPERLIPIDEMIA TYPE: Primary | ICD-10-CM

## 2020-12-15 DIAGNOSIS — E78.5 HYPERLIPIDEMIA, UNSPECIFIED HYPERLIPIDEMIA TYPE: ICD-10-CM

## 2020-12-15 LAB
ALBUMIN SERPL-MCNC: 4.2 G/DL (ref 3.5–5.2)
ALBUMIN/GLOB SERPL: 1.2 G/DL
ALP SERPL-CCNC: 94 U/L (ref 39–117)
ALT SERPL W P-5'-P-CCNC: 17 U/L (ref 1–33)
ANION GAP SERPL CALCULATED.3IONS-SCNC: 10 MMOL/L (ref 5–15)
AST SERPL-CCNC: 20 U/L (ref 1–32)
BASOPHILS # BLD AUTO: 0.02 10*3/MM3 (ref 0–0.2)
BASOPHILS NFR BLD AUTO: 0.4 % (ref 0–1.5)
BILIRUB SERPL-MCNC: 0.3 MG/DL (ref 0–1.2)
BUN SERPL-MCNC: 21 MG/DL (ref 8–23)
BUN/CREAT SERPL: 20 (ref 7–25)
CALCIUM SPEC-SCNC: 9.9 MG/DL (ref 8.6–10.5)
CHLORIDE SERPL-SCNC: 104 MMOL/L (ref 98–107)
CHOLEST SERPL-MCNC: 254 MG/DL (ref 0–200)
CO2 SERPL-SCNC: 25 MMOL/L (ref 22–29)
CREAT SERPL-MCNC: 1.05 MG/DL (ref 0.57–1)
DEPRECATED RDW RBC AUTO: 51.8 FL (ref 37–54)
EOSINOPHIL # BLD AUTO: 0.17 10*3/MM3 (ref 0–0.4)
EOSINOPHIL NFR BLD AUTO: 3.6 % (ref 0.3–6.2)
ERYTHROCYTE [DISTWIDTH] IN BLOOD BY AUTOMATED COUNT: 15.2 % (ref 12.3–15.4)
GFR SERPL CREATININE-BSD FRML MDRD: 64 ML/MIN/1.73
GLOBULIN UR ELPH-MCNC: 3.4 GM/DL
GLUCOSE SERPL-MCNC: 93 MG/DL (ref 65–99)
HCT VFR BLD AUTO: 35.1 % (ref 34–46.6)
HCV AB SER DONR QL: NORMAL
HDLC SERPL-MCNC: 65 MG/DL (ref 40–60)
HGB BLD-MCNC: 11.9 G/DL (ref 12–15.9)
IMM GRANULOCYTES # BLD AUTO: 0.01 10*3/MM3 (ref 0–0.05)
IMM GRANULOCYTES NFR BLD AUTO: 0.2 % (ref 0–0.5)
LDLC SERPL CALC-MCNC: 165 MG/DL (ref 0–100)
LDLC/HDLC SERPL: 2.5 {RATIO}
LYMPHOCYTES # BLD AUTO: 1.16 10*3/MM3 (ref 0.7–3.1)
LYMPHOCYTES NFR BLD AUTO: 24.5 % (ref 19.6–45.3)
MCH RBC QN AUTO: 31.3 PG (ref 26.6–33)
MCHC RBC AUTO-ENTMCNC: 33.9 G/DL (ref 31.5–35.7)
MCV RBC AUTO: 92.4 FL (ref 79–97)
MONOCYTES # BLD AUTO: 0.64 10*3/MM3 (ref 0.1–0.9)
MONOCYTES NFR BLD AUTO: 13.5 % (ref 5–12)
NEUTROPHILS NFR BLD AUTO: 2.74 10*3/MM3 (ref 1.7–7)
NEUTROPHILS NFR BLD AUTO: 57.8 % (ref 42.7–76)
NRBC BLD AUTO-RTO: 0 /100 WBC (ref 0–0.2)
PLATELET # BLD AUTO: 226 10*3/MM3 (ref 140–450)
PMV BLD AUTO: 9.4 FL (ref 6–12)
POTASSIUM SERPL-SCNC: 4 MMOL/L (ref 3.5–5.2)
PROT SERPL-MCNC: 7.6 G/DL (ref 6–8.5)
RBC # BLD AUTO: 3.8 10*6/MM3 (ref 3.77–5.28)
SODIUM SERPL-SCNC: 139 MMOL/L (ref 136–145)
TRIGL SERPL-MCNC: 133 MG/DL (ref 0–150)
VLDLC SERPL-MCNC: 24 MG/DL (ref 5–40)
WBC # BLD AUTO: 4.74 10*3/MM3 (ref 3.4–10.8)

## 2020-12-15 PROCEDURE — 36415 COLL VENOUS BLD VENIPUNCTURE: CPT

## 2020-12-15 PROCEDURE — 86803 HEPATITIS C AB TEST: CPT

## 2020-12-15 PROCEDURE — 80053 COMPREHEN METABOLIC PANEL: CPT

## 2020-12-15 PROCEDURE — 80061 LIPID PANEL: CPT

## 2020-12-15 PROCEDURE — 85025 COMPLETE CBC W/AUTO DIFF WBC: CPT

## 2020-12-16 ENCOUNTER — OFFICE VISIT (OUTPATIENT)
Dept: ONCOLOGY | Facility: CLINIC | Age: 65
End: 2020-12-16

## 2020-12-16 VITALS
RESPIRATION RATE: 16 BRPM | HEART RATE: 73 BPM | DIASTOLIC BLOOD PRESSURE: 75 MMHG | BODY MASS INDEX: 35.14 KG/M2 | WEIGHT: 179 LBS | OXYGEN SATURATION: 94 % | HEIGHT: 60 IN | SYSTOLIC BLOOD PRESSURE: 126 MMHG | TEMPERATURE: 97.1 F

## 2020-12-16 DIAGNOSIS — C34.91 PRIMARY ADENOCARCINOMA OF RIGHT LUNG (HCC): Primary | ICD-10-CM

## 2020-12-16 PROCEDURE — 99214 OFFICE O/P EST MOD 30 MIN: CPT | Performed by: INTERNAL MEDICINE

## 2020-12-16 RX ORDER — PREDNISONE 50 MG/1
TABLET ORAL
Qty: 3 TABLET | Refills: 0 | Status: SHIPPED | OUTPATIENT
Start: 2020-12-16 | End: 2022-01-19

## 2020-12-16 RX ORDER — AMLODIPINE BESYLATE 5 MG/1
5 TABLET ORAL 2 TIMES DAILY
COMMUNITY
End: 2023-01-18

## 2020-12-16 NOTE — PROGRESS NOTES
You have chosen to receive care through a telephone visit. Do you consent to use a telephone visit for your medical care today? Yes      DATE OF VISIT: 12/16/2020    REASON FOR VISIT: Followup for Right upper lobe adenocarcinoma of the lung T2 a N0 M0 stage Ib     HISTORY OF PRESENT ILLNESS: The patient is a very pleasant 64 y.o. female with past medical history significant for Right upper lobe adenocarcinoma of the lung T2 a N0 M0 stage Ib  diagnosed June 2019.  The patient woke up 1 day not feeling good.  She did not have any specific complaints except she felt something is different.  She came to [facility] emergency room chest x-ray revealed right upper lobe lung nodule.  She was referred to Dr. Patel CT chest document the mass.  Bronchoscopy with biopsy done by Dr. Patel June 13, 2019 was not diagnostic.  Patient was further Dr. Barreto who performed wedge resection followed by right upper lobe resection and medicine lymph node sampling August 30, 2019.  Final pathology confirmed moderate eventually adenocarcinoma, 3.5 cm in size, clear surgical margins, no pleural or lymphovascular invasion, and negative lymph nodes.  The patient had no postoperative complications.  Repeat CT scan done December 2019 was unremarkable. Patient is here today for follow up.    SUBJECTIVE: The patient is here today by herself.  She is still grieving over her daughter's death.  She denied any fever chills night sweats.    PAST MEDICAL HISTORY/SOCIAL HISTORY/FAMILY HISTORY: Unchanged from my prior documentation done on 12/18/2019    Review of Systems   Constitutional: Negative for activity change, appetite change, fatigue, fever and unexpected weight change.   HENT: Negative for congestion, dental problem, facial swelling, hearing loss, mouth sores, sinus pressure, sinus pain, tinnitus and trouble swallowing.    Eyes: Negative for photophobia, discharge and itching.   Respiratory: Negative for apnea, cough, chest  tightness, shortness of breath and wheezing.    Cardiovascular: Negative for chest pain and palpitations.   Gastrointestinal: Negative for abdominal distention, blood in stool, constipation, diarrhea, nausea and vomiting.   Endocrine: Negative for cold intolerance and heat intolerance.   Genitourinary: Negative for difficulty urinating, frequency, pelvic pain and urgency.   Musculoskeletal: Negative for arthralgias, joint swelling and myalgias.   Skin: Negative for color change and pallor.   Allergic/Immunologic: Negative for environmental allergies.   Neurological: Negative for dizziness, tremors, facial asymmetry, weakness and headaches.   Hematological: Negative for adenopathy. Does not bruise/bleed easily.   Psychiatric/Behavioral: Negative for agitation and behavioral problems. The patient is nervous/anxious.          Current Outpatient Medications:   •  amLODIPine (NORVASC) 5 MG tablet, Take 5 mg by mouth 2 (two) times a day., Disp: , Rfl:   •  Loratadine (CLARITIN PO), Take  by mouth Daily., Disp: , Rfl:   •  aspirin 81 MG EC tablet, Take 81 mg by mouth Daily., Disp: , Rfl:   •  carvedilol (COREG) 12.5 MG tablet, Take 12.5 mg by mouth 2 (Two) Times a Day With Meals., Disp: , Rfl:   •  metoclopramide (REGLAN) 5 MG tablet, Take 0.5 tablets by mouth 2 (Two) Times a Day Before Meals., Disp: 30 tablet, Rfl: 0  •  O2 (OXYGEN), Inhale 2 L/min Take As Directed. Patient to use HS and daily prn with activity, Disp: , Rfl:   •  tiotropium bromide-olodaterol (STIOLTO RESPIMAT) 2.5-2.5 MCG/ACT aerosol solution inhaler, Inhale 2 puffs Daily., Disp: 1 inhaler, Rfl: 5  •  traZODone (DESYREL) 50 MG tablet, Take 50 mg by mouth Every Night., Disp: , Rfl:   No current facility-administered medications for this visit.     Facility-Administered Medications Ordered in Other Visits:   •  Chlorhexidine Gluconate Cloth 2 % pads 1 application, 1 application, Topical, Q12H PRN, Candelaira Novak PA-C    PHYSICAL EXAMINATION:   /75   " Pulse 73   Temp 97.1 °F (36.2 °C) (Temporal)   Resp 16   Ht 152.4 cm (60\")   Wt 81.2 kg (179 lb)   LMP 03/16/2004   SpO2 94%   BMI 34.96 kg/m²    ECOG Performance Status: 1 - Symptomatic but completely ambulatory  General Appearance:  alert, cooperative, no apparent distress and appears stated age   Neurologic/Psychiatric: A&O x 3, gait steady, appropriate affect, strength 5/5 in all muscle groups   HEENT:  Normocephalic, without obvious abnormality, mucous membranes moist   Neck: Supple, symmetrical, trachea midline, no adenopathy;  No thyromegaly, masses, or tenderness   Lungs:   Clear to auscultation bilaterally; respirations regular, even, and unlabored bilaterally   Heart:  Regular rate and rhythm, no murmurs appreciated   Abdomen:   Soft, non-tender, non-distended and no organomegaly   Lymph nodes: No cervical, supraclavicular, inguinal or axillary adenopathy noted   Extremities: Normal, atraumatic; no clubbing, cyanosis, or edema    Skin: No rashes, ulcers, or suspicious lesions noted     Lab on 12/15/2020   Component Date Value Ref Range Status   • Glucose 12/15/2020 93  65 - 99 mg/dL Final   • BUN 12/15/2020 21  8 - 23 mg/dL Final   • Creatinine 12/15/2020 1.05* 0.57 - 1.00 mg/dL Final   • Sodium 12/15/2020 139  136 - 145 mmol/L Final   • Potassium 12/15/2020 4.0  3.5 - 5.2 mmol/L Final   • Chloride 12/15/2020 104  98 - 107 mmol/L Final   • CO2 12/15/2020 25.0  22.0 - 29.0 mmol/L Final   • Calcium 12/15/2020 9.9  8.6 - 10.5 mg/dL Final   • Total Protein 12/15/2020 7.6  6.0 - 8.5 g/dL Final   • Albumin 12/15/2020 4.20  3.50 - 5.20 g/dL Final   • ALT (SGPT) 12/15/2020 17  1 - 33 U/L Final   • AST (SGOT) 12/15/2020 20  1 - 32 U/L Final   • Alkaline Phosphatase 12/15/2020 94  39 - 117 U/L Final   • Total Bilirubin 12/15/2020 0.3  0.0 - 1.2 mg/dL Final   • eGFR   Amer 12/15/2020 64  >60 mL/min/1.73 Final   • Globulin 12/15/2020 3.4  gm/dL Final   • A/G Ratio 12/15/2020 1.2  g/dL Final   • " BUN/Creatinine Ratio 12/15/2020 20.0  7.0 - 25.0 Final   • Anion Gap 12/15/2020 10.0  5.0 - 15.0 mmol/L Final   • Total Cholesterol 12/15/2020 254* 0 - 200 mg/dL Final   • Triglycerides 12/15/2020 133  0 - 150 mg/dL Final   • HDL Cholesterol 12/15/2020 65* 40 - 60 mg/dL Final   • LDL Cholesterol  12/15/2020 165* 0 - 100 mg/dL Final   • VLDL Cholesterol 12/15/2020 24  5 - 40 mg/dL Final   • LDL/HDL Ratio 12/15/2020 2.50   Final   • Hepatitis C Ab 12/15/2020 Non-Reactive  Non-Reactive Final   • WBC 12/15/2020 4.74  3.40 - 10.80 10*3/mm3 Final   • RBC 12/15/2020 3.80  3.77 - 5.28 10*6/mm3 Final   • Hemoglobin 12/15/2020 11.9* 12.0 - 15.9 g/dL Final   • Hematocrit 12/15/2020 35.1  34.0 - 46.6 % Final   • MCV 12/15/2020 92.4  79.0 - 97.0 fL Final   • MCH 12/15/2020 31.3  26.6 - 33.0 pg Final   • MCHC 12/15/2020 33.9  31.5 - 35.7 g/dL Final   • RDW 12/15/2020 15.2  12.3 - 15.4 % Final   • RDW-SD 12/15/2020 51.8  37.0 - 54.0 fl Final   • MPV 12/15/2020 9.4  6.0 - 12.0 fL Final   • Platelets 12/15/2020 226  140 - 450 10*3/mm3 Final   • Neutrophil % 12/15/2020 57.8  42.7 - 76.0 % Final   • Lymphocyte % 12/15/2020 24.5  19.6 - 45.3 % Final   • Monocyte % 12/15/2020 13.5* 5.0 - 12.0 % Final   • Eosinophil % 12/15/2020 3.6  0.3 - 6.2 % Final   • Basophil % 12/15/2020 0.4  0.0 - 1.5 % Final   • Immature Grans % 12/15/2020 0.2  0.0 - 0.5 % Final   • Neutrophils, Absolute 12/15/2020 2.74  1.70 - 7.00 10*3/mm3 Final   • Lymphocytes, Absolute 12/15/2020 1.16  0.70 - 3.10 10*3/mm3 Final   • Monocytes, Absolute 12/15/2020 0.64  0.10 - 0.90 10*3/mm3 Final   • Eosinophils, Absolute 12/15/2020 0.17  0.00 - 0.40 10*3/mm3 Final   • Basophils, Absolute 12/15/2020 0.02  0.00 - 0.20 10*3/mm3 Final   • Immature Grans, Absolute 12/15/2020 0.01  0.00 - 0.05 10*3/mm3 Final   • nRBC 12/15/2020 0.0  0.0 - 0.2 /100 WBC Final        No results found.    ASSESSMENT: The patient is a very pleasant 64 y.o. female  with adenocarcinoma of the right  lung    PROBLEM LIST:  1. Right upper lobe adenocarcinoma of the lung T2 aN0 M0 stage Ib:  A.  CT chest done on June 2019 revealed 3 cm right upper lobe lung nodule  B.  Bronchoscopy with biopsy done by Dr. Patel June 13, 2019 was negative  C.  Status post surgical resection with lumpectomy and lymph node sampling done by Dr. Barreto August 30, 2019  D.  Final pathology revealed moderate differentiated adenocarcinoma 3.5 cm in size clear surgical margins, no lymphovascular invasion, no pleural invasion, and negative lymph nodes.  2.  COPD  3.  Hypertension    PLAN:  1. I did go over the scan results with the patient reassured there is no evidence of relapsed disease.  2.  We will continue watchful waiting for now per  3.  Repeat scans prior to return.  4.  The patient follow-up in 6 months.  5.  Continue inhalers for COPD  6.  Continue Coreg twice a day for hypertension.  7. I will mail labs to the patients to have labs drawn when she has PCP lab work completed.   8. She will continue oxygen as needed at night.      Vinnie José MD   12/16/2020

## 2021-01-07 ENCOUNTER — TELEPHONE (OUTPATIENT)
Dept: ONCOLOGY | Facility: CLINIC | Age: 66
End: 2021-01-07

## 2021-01-08 ENCOUNTER — HOSPITAL ENCOUNTER (OUTPATIENT)
Dept: MAMMOGRAPHY | Facility: HOSPITAL | Age: 66
Discharge: HOME | End: 2021-01-08
Admitting: FAMILY MEDICINE

## 2021-01-08 DIAGNOSIS — Z12.31 SCREENING MAMMOGRAM, ENCOUNTER FOR: ICD-10-CM

## 2021-01-08 PROCEDURE — 77067 SCR MAMMO BI INCL CAD: CPT

## 2021-01-08 PROCEDURE — 77063 BREAST TOMOSYNTHESIS BI: CPT

## 2021-01-20 ENCOUNTER — TELEPHONE (OUTPATIENT)
Dept: CARDIAC SURGERY | Facility: CLINIC | Age: 66
End: 2021-01-20

## 2021-01-20 NOTE — TELEPHONE ENCOUNTER
Pts daughter called - pt received a letter in the mail and is ready to schedule her 6 mo f/u appt and CT chest with Dr. Barreto. Verified demo. Please call pts daughter Tonja ([phone]) regarding appt info.

## 2021-03-16 ENCOUNTER — OFFICE VISIT (OUTPATIENT)
Dept: CARDIAC SURGERY | Facility: CLINIC | Age: 66
End: 2021-03-16

## 2021-03-16 VITALS
HEIGHT: 60 IN | SYSTOLIC BLOOD PRESSURE: 130 MMHG | HEART RATE: 58 BPM | DIASTOLIC BLOOD PRESSURE: 88 MMHG | BODY MASS INDEX: 34.71 KG/M2 | TEMPERATURE: 97.8 F | WEIGHT: 176.8 LBS | OXYGEN SATURATION: 93 %

## 2021-03-16 DIAGNOSIS — C34.91 PRIMARY ADENOCARCINOMA OF RIGHT LUNG (HCC): Primary | ICD-10-CM

## 2021-03-16 PROCEDURE — 99213 OFFICE O/P EST LOW 20 MIN: CPT | Performed by: NURSE PRACTITIONER

## 2021-03-16 RX ORDER — ATORVASTATIN CALCIUM 10 MG/1
10 TABLET, FILM COATED ORAL DAILY
COMMUNITY
End: 2022-01-19

## 2021-03-16 NOTE — PROGRESS NOTES
[facility] Cardiothoracic Surgery Office Follow Up Note     Date of Encounter: 03/16/2021     MRN Number: [id number]  Name: Lizbeth Johns  Phone Number: [phone]     Referred By: No ref. provider found  PCP: Emily Ferreira MD    Chief Complaint:    Chief Complaint   Patient presents with   • Lung Nodule     6 month follow up with CT chest for lung nodule       History of Present Illness:    Lizbeth Johns is a 65 y.o. female with a history of hypertension, renal insufficiency, COPD, former tobacco use, right lung nodule s/p right VATS with right upper lobectomy on 8/31/2019 with Dr. Barreto.  Pathology showing adenocarcinoma of the lung [id number] stage Ib.  She presents today for 6-month follow-up.  Last in office on 7/14/2020.  She has been following with Dr. José.  She denies any cough, hemoptysis, shortness of breath or unexplained weight loss.  She does continue to have some right chest numbness postoperatively.    Review of Systems:  Review of Systems   Constitutional: Negative for chills, decreased appetite, diaphoresis, fever, malaise/fatigue, night sweats and weight loss.   HENT: Negative for congestion, hoarse voice, sore throat and stridor.    Cardiovascular: Negative for chest pain, claudication, dyspnea on exertion, irregular heartbeat, leg swelling, near-syncope, orthopnea, palpitations, paroxysmal nocturnal dyspnea and syncope.   Respiratory: Negative for cough, hemoptysis, shortness of breath, sleep disturbances due to breathing, snoring, sputum production and wheezing.    Hematologic/Lymphatic: Negative for adenopathy and bleeding problem. Does not bruise/bleed easily.   Skin: Negative for color change, dry skin, itching, poor wound healing and rash.   Musculoskeletal: Negative for arthritis, back pain, falls and muscle weakness.   Gastrointestinal: Negative for abdominal pain, anorexia, constipation, diarrhea, hematochezia, melena, nausea and vomiting.   Neurological:  Negative for difficulty with concentration, disturbances in coordination, dizziness, loss of balance, numbness, seizures, vertigo and weakness.   Psychiatric/Behavioral: Negative for altered mental status, depression, memory loss and substance abuse. The patient does not have insomnia and is not nervous/anxious.    Allergic/Immunologic: Negative for persistent infections.       I have reviewed the review of systems as entered by my clinical support staff and have updated it as appropriate.       Allergies:  Allergies   Allergen Reactions   • Contrast Dye Hives       Medications:      Current Outpatient Medications:   •  amLODIPine (NORVASC) 5 MG tablet, Take 5 mg by mouth 2 (two) times a day., Disp: , Rfl:   •  aspirin 81 MG EC tablet, Take 81 mg by mouth Daily., Disp: , Rfl:   •  atorvastatin (LIPITOR) 10 MG tablet, Take 10 mg by mouth Daily., Disp: , Rfl:   •  carvedilol (COREG) 12.5 MG tablet, Take 12.5 mg by mouth 2 (Two) Times a Day With Meals., Disp: , Rfl:   •  Loratadine (CLARITIN PO), Take  by mouth Daily., Disp: , Rfl:   •  tiotropium bromide-olodaterol (STIOLTO RESPIMAT) 2.5-2.5 MCG/ACT aerosol solution inhaler, Inhale 2 puffs Daily., Disp: 1 inhaler, Rfl: 5  •  traZODone (DESYREL) 50 MG tablet, Take 50 mg by mouth Every Night., Disp: , Rfl:   •  diphenhydrAMINE (BENADRYL) 50 MG tablet, Take 1 tab (50mg) by mouth prior to exam., Disp: 1 tablet, Rfl: 0  •  metoclopramide (REGLAN) 5 MG tablet, Take 0.5 tablets by mouth 2 (Two) Times a Day Before Meals., Disp: 30 tablet, Rfl: 0  •  O2 (OXYGEN), Inhale 2 L/min Take As Directed. Patient to use HS and daily prn with activity, Disp: , Rfl:   •  predniSONE (DELTASONE) 50 MG tablet, Take 1 tab (50mg) by mouth 13 hours, 7 hours and 1 hour prior to exam., Disp: 3 tablet, Rfl: 0  No current facility-administered medications for this visit.    Facility-Administered Medications Ordered in Other Visits:   •  Chlorhexidine Gluconate Cloth 2 % pads 1 application, 1  application, Topical, Q12H PRN, Candelaria Novak PA-C    Social History     Socioeconomic History   • Marital status:      Spouse name: Not on file   • Number of children: 4   • Years of education: Not on file   • Highest education level: Not on file   Tobacco Use   • Smoking status: Former Smoker     Packs/day: 1.00     Years: 50.00     Pack years: 50.00     Types: Cigarettes     Quit date: 2019     Years since quittin.5   • Smokeless tobacco: Never Used   • Tobacco comment: cutting down on cigarettes   Substance and Sexual Activity   • Alcohol use: Yes     Alcohol/week: 2.0 standard drinks     Types: 2 Glasses of wine per week     Comment: once in a while    • Drug use: No   • Sexual activity: Defer       Family History   Problem Relation Age of Onset   • Cirrhosis Brother    • Liver disease Brother    • Colon cancer Brother         Possibly colon cancer, patient unsure.   • Cancer Mother    • No Known Problems Father    • Stomach cancer Neg Hx    • Esophageal cancer Neg Hx    • Breast cancer Neg Hx        Past Medical History:   Diagnosis Date   • Acid reflux    • Arthritis    • Body piercing     ears   • Cataract, bilateral    • COPD (chronic obstructive pulmonary disease) (CMS/HCC)    • Full dentures     Patient advised no adhesives DOS   • Hypertension    • Leg cramps    • Lung mass     right   • On home oxygen therapy     2L NC HS and daily prn    • SOB (shortness of breath)     xray showed spot on lungs   • Wears glasses     Rx glasses       Past Surgical History:   Procedure Laterality Date   • BRONCHOSCOPY N/A 2019    Procedure: BRONCHOSCOPY WITH FLUOROSCOPY, BIOPSY, BRUSHINGS, AND WASHINGS;  Surgeon: Gudelia Patel MD;  Location: [facility] OR;  Service: Pulmonary   • COLONOSCOPY N/A 2017    Procedure: COLONOSCOPY with hot and cold snare polypectomies,  hot biopsy polypectomies, biopsies, resolution clip placement;  Surgeon: Zackery Siddiqui MD;  Location: [facility] ENDOSCOPY;   "Service:    • COLONOSCOPY N/A 6/4/2018    Procedure: COLONOSCOPY WITH HOT SNARE POLYPECTOMY X 7; COLD SNARE POLYPECTOMY X 3; HOT BIOPSY POLYPECTOMY X 20; COLD BIOPSY POLYPECTOMY X 2; THERMAL ABLATION OF COLON POLYPS X 23; CLIP PLACEMENT X 2; BIOPSIES;  Surgeon: Zackery Siddiqui MD;  Location: [facility] ENDOSCOPY;  Service: Gastroenterology   • COLONOSCOPY N/A 6/19/2019    Procedure: COLONOSCOPY with cold biopsy polypectomies and cold biopsy;  Surgeon: Zackery Siddiqui MD;  Location: [facility] ENDOSCOPY;  Service: Gastroenterology   • ENDOSCOPY  06/13/2019   • ENDOSCOPY N/A 1/9/2020    Procedure: ESOPHAGOGASTRODUODENOSCOPY;  Surgeon: Zackery Siddiqui MD;  Location: [facility] ENDOSCOPY;  Service: Gastroenterology   • MULTIPLE TOOTH EXTRACTIONS      full extraction   • ORIF TIBIA/FIBULA FRACTURES Left    • SIGMOIDOSCOPY N/A 1/9/2020    Procedure: FLEXIBLE SIGMOIDOSCOPY; ANOSCOPY;  Surgeon: Zackery Siddiqui MD;  Location: [facility] ENDOSCOPY;  Service: Gastroenterology   • THORACOSCOPY Right 8/30/2019    Procedure: BRONCHOSCOPY,  THORACOSCOPY VIDEO ASSISTED with right upper lobe wedge RESECTION , RIGHT UPPER lobectomy with mediastinal lymph node dissection AND INTERCOSTAL CRYOABLATION OF RIGHT CHEST;  Surgeon: Brian Barreto MD;  Location: [facility];  Service: Cardiothoracic   • TUBAL ABDOMINAL LIGATION         Physical Exam:  Vital Signs:    Vitals:    03/16/21 1033   BP: 130/88   BP Location: Left arm   Patient Position: Sitting   Pulse: 58   Temp: 97.8 °F (36.6 °C)   SpO2: 93%   Weight: 80.2 kg (176 lb 12.8 oz)   Height: 152.4 cm (60\")     Body mass index is 34.53 kg/m².     Physical Exam  Vitals and nursing note reviewed.   Constitutional:       Appearance: Normal appearance. She is well-developed and well-groomed.   HENT:      Head: Normocephalic and atraumatic.   Cardiovascular:      Rate and Rhythm: Normal rate and regular rhythm.      Heart sounds: Normal heart sounds, S1 normal and S2 normal. No murmur. No friction rub. "   Pulmonary:      Comments: Unlabored, Clear to auscultation bilaterally  Abdominal:      General: Bowel sounds are normal.      Palpations: Abdomen is soft.      Tenderness: There is no abdominal tenderness.   Musculoskeletal:      Cervical back: Neck supple.      Right lower leg: No edema.      Left lower leg: No edema.   Lymphadenopathy:      Cervical: Cervical adenopathy present.   Skin:     General: Skin is warm and dry.      Comments: Incisions: Right VATS/chest tube site intact  No surrounding erythema,  Masses or induration   Neurological:      Mental Status: She is alert and oriented to person, place, and time.   Psychiatric:         Attention and Perception: Attention normal.         Mood and Affect: Mood normal.         Speech: Speech normal.         Behavior: Behavior is cooperative.         Labs/Imagin/16/20 CT chest  FINDINGS:   Soft tissue windows demonstrate no adenopathy within the chest. The  heart is normal in size. The aorta is normal in caliber.There is no  pericardial or pleural effusion. There are postsurgical changes from  right upper lobectomy. There is scarring in the right base.. There are  no suspicious pulmonary nodules identified. The visualized upper abdomen  demonstrates fatty infiltration of the liver. There are bilateral renal  cysts. There is no significant change in the 1.8 cm left adrenal nodule.  Bone windows reveal no acute osseous abnormalities.     IMPRESSION:  No recurrent or metastatic disease.    Assessment / Plan:    Lizbeth Johns is a 65 y.o. female with a history of hypertension, renal insufficiency, COPD, former tobacco use, right lung nodule s/p right VATS with right upper lobectomy on 2019 with Dr. Barreto.  Pathology showing adenocarcinoma of the lung [id number] stage Ib.  Discussed findings of stable CT chest.  Patient is doing well.  She continues to follow with Dr. José and plans for follow-up scans in .  We will follow-up with patient in 6  months or earlier with repeat CT chest if not already performed by Dr. José.    Soo Holly, [facility] Cardiothoracic Surgery    Please note that portions of this note may have been completed with a voice recognition program. Efforts were made to edit the dictations, but occasionally words are mistranscribed.

## 2021-05-07 ENCOUNTER — TRANSCRIBE ORDERS (OUTPATIENT)
Dept: LAB | Facility: HOSPITAL | Age: 66
End: 2021-05-07

## 2021-05-07 ENCOUNTER — LAB (OUTPATIENT)
Dept: LAB | Facility: HOSPITAL | Age: 66
End: 2021-05-07

## 2021-05-07 DIAGNOSIS — E78.5 HYPERLIPIDEMIA, UNSPECIFIED HYPERLIPIDEMIA TYPE: ICD-10-CM

## 2021-05-07 DIAGNOSIS — E78.5 HYPERLIPIDEMIA, UNSPECIFIED HYPERLIPIDEMIA TYPE: Primary | ICD-10-CM

## 2021-05-07 LAB
ALBUMIN SERPL-MCNC: 4.6 G/DL (ref 3.5–5.2)
ALBUMIN/GLOB SERPL: 1.6 G/DL
ALP SERPL-CCNC: 105 U/L (ref 39–117)
ALT SERPL W P-5'-P-CCNC: 17 U/L (ref 1–33)
ANION GAP SERPL CALCULATED.3IONS-SCNC: 12.1 MMOL/L (ref 5–15)
AST SERPL-CCNC: 24 U/L (ref 1–32)
BILIRUB SERPL-MCNC: 0.4 MG/DL (ref 0–1.2)
BUN SERPL-MCNC: 14 MG/DL (ref 8–23)
BUN/CREAT SERPL: 16.1 (ref 7–25)
CALCIUM SPEC-SCNC: 9 MG/DL (ref 8.6–10.5)
CHLORIDE SERPL-SCNC: 105 MMOL/L (ref 98–107)
CHOLEST SERPL-MCNC: 211 MG/DL (ref 0–200)
CO2 SERPL-SCNC: 26.9 MMOL/L (ref 22–29)
CREAT SERPL-MCNC: 0.87 MG/DL (ref 0.57–1)
GFR SERPL CREATININE-BSD FRML MDRD: 79 ML/MIN/1.73
GLOBULIN UR ELPH-MCNC: 2.9 GM/DL
GLUCOSE SERPL-MCNC: 97 MG/DL (ref 65–99)
HDLC SERPL-MCNC: 103 MG/DL (ref 40–60)
LDLC SERPL CALC-MCNC: 88 MG/DL (ref 0–100)
LDLC/HDLC SERPL: 0.81 {RATIO}
POTASSIUM SERPL-SCNC: 3.9 MMOL/L (ref 3.5–5.2)
PROT SERPL-MCNC: 7.5 G/DL (ref 6–8.5)
SODIUM SERPL-SCNC: 144 MMOL/L (ref 136–145)
TRIGL SERPL-MCNC: 122 MG/DL (ref 0–150)
VLDLC SERPL-MCNC: 20 MG/DL (ref 5–40)

## 2021-05-07 PROCEDURE — 36415 COLL VENOUS BLD VENIPUNCTURE: CPT

## 2021-05-07 PROCEDURE — 80053 COMPREHEN METABOLIC PANEL: CPT

## 2021-05-07 PROCEDURE — 80061 LIPID PANEL: CPT

## 2021-06-14 ENCOUNTER — HOSPITAL ENCOUNTER (OUTPATIENT)
Dept: CT IMAGING | Facility: HOSPITAL | Age: 66
Discharge: HOME | End: 2021-06-14
Admitting: INTERNAL MEDICINE

## 2021-06-14 DIAGNOSIS — C34.91 PRIMARY ADENOCARCINOMA OF RIGHT LUNG (HCC): ICD-10-CM

## 2021-06-14 PROCEDURE — 25010000002 IOPAMIDOL 61 % SOLUTION: Performed by: INTERNAL MEDICINE

## 2021-06-14 PROCEDURE — 71260 CT THORAX DX C+: CPT

## 2021-06-14 RX ADMIN — IOPAMIDOL 100 ML: 612 INJECTION, SOLUTION INTRAVENOUS at 15:49

## 2021-06-16 ENCOUNTER — LAB (OUTPATIENT)
Dept: LAB | Facility: HOSPITAL | Age: 66
End: 2021-06-16

## 2021-06-16 ENCOUNTER — OFFICE VISIT (OUTPATIENT)
Dept: ONCOLOGY | Facility: CLINIC | Age: 66
End: 2021-06-16

## 2021-06-16 VITALS
HEIGHT: 60 IN | TEMPERATURE: 97.3 F | DIASTOLIC BLOOD PRESSURE: 74 MMHG | RESPIRATION RATE: 12 BRPM | SYSTOLIC BLOOD PRESSURE: 161 MMHG | OXYGEN SATURATION: 93 % | HEART RATE: 79 BPM | BODY MASS INDEX: 33.57 KG/M2 | WEIGHT: 171 LBS

## 2021-06-16 DIAGNOSIS — C34.91 PRIMARY ADENOCARCINOMA OF RIGHT LUNG (HCC): Primary | ICD-10-CM

## 2021-06-16 DIAGNOSIS — C34.91 PRIMARY ADENOCARCINOMA OF RIGHT LUNG (HCC): ICD-10-CM

## 2021-06-16 LAB
ALBUMIN SERPL-MCNC: 4.6 G/DL (ref 3.5–5.2)
ALBUMIN/GLOB SERPL: 1.4 G/DL
ALP SERPL-CCNC: 118 U/L (ref 39–117)
ALT SERPL W P-5'-P-CCNC: 26 U/L (ref 1–33)
ANION GAP SERPL CALCULATED.3IONS-SCNC: 13.8 MMOL/L (ref 5–15)
AST SERPL-CCNC: 35 U/L (ref 1–32)
BASOPHILS # BLD AUTO: 0.02 10*3/MM3 (ref 0–0.2)
BASOPHILS NFR BLD AUTO: 0.4 % (ref 0–1.5)
BILIRUB SERPL-MCNC: 0.3 MG/DL (ref 0–1.2)
BUN SERPL-MCNC: 12 MG/DL (ref 8–23)
BUN/CREAT SERPL: 15.6 (ref 7–25)
CALCIUM SPEC-SCNC: 9.5 MG/DL (ref 8.6–10.5)
CHLORIDE SERPL-SCNC: 106 MMOL/L (ref 98–107)
CO2 SERPL-SCNC: 24.2 MMOL/L (ref 22–29)
CREAT SERPL-MCNC: 0.77 MG/DL (ref 0.57–1)
DEPRECATED RDW RBC AUTO: 52.1 FL (ref 37–54)
EOSINOPHIL # BLD AUTO: 0.07 10*3/MM3 (ref 0–0.4)
EOSINOPHIL NFR BLD AUTO: 1.6 % (ref 0.3–6.2)
ERYTHROCYTE [DISTWIDTH] IN BLOOD BY AUTOMATED COUNT: 15.4 % (ref 12.3–15.4)
GFR SERPL CREATININE-BSD FRML MDRD: 91 ML/MIN/1.73
GLOBULIN UR ELPH-MCNC: 3.4 GM/DL
GLUCOSE SERPL-MCNC: 81 MG/DL (ref 65–99)
HCT VFR BLD AUTO: 35.3 % (ref 34–46.6)
HGB BLD-MCNC: 12 G/DL (ref 12–15.9)
IMM GRANULOCYTES # BLD AUTO: 0.02 10*3/MM3 (ref 0–0.05)
IMM GRANULOCYTES NFR BLD AUTO: 0.4 % (ref 0–0.5)
LYMPHOCYTES # BLD AUTO: 0.85 10*3/MM3 (ref 0.7–3.1)
LYMPHOCYTES NFR BLD AUTO: 19 % (ref 19.6–45.3)
MCH RBC QN AUTO: 31.5 PG (ref 26.6–33)
MCHC RBC AUTO-ENTMCNC: 34 G/DL (ref 31.5–35.7)
MCV RBC AUTO: 92.7 FL (ref 79–97)
MONOCYTES # BLD AUTO: 0.6 10*3/MM3 (ref 0.1–0.9)
MONOCYTES NFR BLD AUTO: 13.4 % (ref 5–12)
NEUTROPHILS NFR BLD AUTO: 2.92 10*3/MM3 (ref 1.7–7)
NEUTROPHILS NFR BLD AUTO: 65.2 % (ref 42.7–76)
NRBC BLD AUTO-RTO: 0 /100 WBC (ref 0–0.2)
PLATELET # BLD AUTO: 190 10*3/MM3 (ref 140–450)
PMV BLD AUTO: 9.8 FL (ref 6–12)
POTASSIUM SERPL-SCNC: 3.8 MMOL/L (ref 3.5–5.2)
PROT SERPL-MCNC: 8 G/DL (ref 6–8.5)
RBC # BLD AUTO: 3.81 10*6/MM3 (ref 3.77–5.28)
SODIUM SERPL-SCNC: 144 MMOL/L (ref 136–145)
WBC # BLD AUTO: 4.48 10*3/MM3 (ref 3.4–10.8)

## 2021-06-16 PROCEDURE — 36415 COLL VENOUS BLD VENIPUNCTURE: CPT

## 2021-06-16 PROCEDURE — 80053 COMPREHEN METABOLIC PANEL: CPT

## 2021-06-16 PROCEDURE — 99214 OFFICE O/P EST MOD 30 MIN: CPT | Performed by: NURSE PRACTITIONER

## 2021-06-16 PROCEDURE — 85025 COMPLETE CBC W/AUTO DIFF WBC: CPT

## 2021-06-16 NOTE — PROGRESS NOTES
DATE OF VISIT: 6/16/2021    REASON FOR VISIT: Followup for Right upper lobe adenocarcinoma of the lung T2 a N0 M0 stage Ib     HISTORY OF PRESENT ILLNESS: The patient is a very pleasant 65 y.o. female with past medical history significant for Right upper lobe adenocarcinoma of the lung T2 a N0 M0 stage Ib  diagnosed June 2019.  The patient woke up 1 day not feeling good.  She did not have any specific complaints except she felt something is different.  She came to [facility] emergency room chest x-ray revealed right upper lobe lung nodule.  She was referred to Dr. Patel CT chest document the mass.  Bronchoscopy with biopsy done by Dr. Patel June 13, 2019 was not diagnostic.  Patient was further Dr. Barreto who performed wedge resection followed by right upper lobe resection and medicine lymph node sampling August 30, 2019.  Final pathology confirmed moderate eventually adenocarcinoma, 3.5 cm in size, clear surgical margins, no pleural or lymphovascular invasion, and negative lymph nodes.  The patient had no postoperative complications.  Repeat CT scan done December 2019 was unremarkable. Patient is here today for follow up.    SUBJECTIVE: The patient is here today by herself.  She continues to  grieve over her daughter's death.  Her granddaughter is in therapy. She is tearful today. She declines depression screening.   She denied any fever chills night sweats.      PAST MEDICAL HISTORY/SOCIAL HISTORY/FAMILY HISTORY: Unchanged from my prior documentation done on 12/18/2019    Review of Systems   Constitutional: Negative for activity change, appetite change, fatigue, fever and unexpected weight change.   HENT: Negative for congestion, dental problem, facial swelling, hearing loss, mouth sores, sinus pressure, sinus pain, tinnitus and trouble swallowing.    Eyes: Negative for photophobia, discharge and itching.   Respiratory: Negative for apnea, cough, chest tightness, shortness of breath and wheezing.     Cardiovascular: Negative for chest pain and palpitations.   Gastrointestinal: Negative for abdominal distention, blood in stool, constipation, diarrhea, nausea and vomiting.   Endocrine: Negative for cold intolerance and heat intolerance.   Genitourinary: Negative for difficulty urinating, frequency, pelvic pain and urgency.   Musculoskeletal: Negative for arthralgias, joint swelling and myalgias.   Skin: Negative for color change and pallor.   Allergic/Immunologic: Negative for environmental allergies.   Neurological: Negative for dizziness, tremors, facial asymmetry, weakness and headaches.   Hematological: Negative for adenopathy. Does not bruise/bleed easily.   Psychiatric/Behavioral: Negative for agitation and behavioral problems. The patient is nervous/anxious.          Current Outpatient Medications:   •  amLODIPine (NORVASC) 5 MG tablet, Take 5 mg by mouth 2 (two) times a day., Disp: , Rfl:   •  aspirin 81 MG EC tablet, Take 81 mg by mouth Daily., Disp: , Rfl:   •  atorvastatin (LIPITOR) 10 MG tablet, Take 10 mg by mouth Daily., Disp: , Rfl:   •  carvedilol (COREG) 12.5 MG tablet, Take 12.5 mg by mouth 2 (Two) Times a Day With Meals., Disp: , Rfl:   •  diphenhydrAMINE (BENADRYL) 50 MG tablet, Take 1 tab (50mg) by mouth prior to exam., Disp: 1 tablet, Rfl: 0  •  Loratadine (CLARITIN PO), Take  by mouth Daily., Disp: , Rfl:   •  metoclopramide (REGLAN) 5 MG tablet, Take 0.5 tablets by mouth 2 (Two) Times a Day Before Meals., Disp: 30 tablet, Rfl: 0  •  O2 (OXYGEN), Inhale 2 L/min Take As Directed. Patient to use HS and daily prn with activity, Disp: , Rfl:   •  predniSONE (DELTASONE) 50 MG tablet, Take 1 tab (50mg) by mouth 13 hours, 7 hours and 1 hour prior to exam., Disp: 3 tablet, Rfl: 0  •  tiotropium bromide-olodaterol (STIOLTO RESPIMAT) 2.5-2.5 MCG/ACT aerosol solution inhaler, Inhale 2 puffs Daily., Disp: 1 inhaler, Rfl: 5  •  traZODone (DESYREL) 50 MG tablet, Take 50 mg by mouth Every Night., Disp: ,  "Rfl:   No current facility-administered medications for this visit.    Facility-Administered Medications Ordered in Other Visits:   •  Chlorhexidine Gluconate Cloth 2 % pads 1 application, 1 application, Topical, Q12H ROMIN, Candelaria Novak PA-C    PHYSICAL EXAMINATION:   /74   Pulse 79   Temp 97.3 °F (36.3 °C) (Temporal)   Resp 12   Ht 152.4 cm (60\")   Wt 77.6 kg (171 lb)   LMP 03/16/2004   SpO2 93%   BMI 33.40 kg/m²    ECOG Performance Status: 1 - Symptomatic but completely ambulatory  General Appearance:  alert, cooperative, no apparent distress and appears stated age   Neurologic/Psychiatric: A&O x 3, gait steady, appropriate affect, strength 5/5 in all muscle groups   HEENT:  Normocephalic, without obvious abnormality, mucous membranes moist   Neck: Supple, symmetrical, trachea midline, no adenopathy;  No thyromegaly, masses, or tenderness   Lungs:   Clear to auscultation bilaterally; respirations regular, even, and unlabored bilaterally   Heart:  Regular rate and rhythm, no murmurs appreciated   Abdomen:   Soft, non-tender, non-distended and no organomegaly   Lymph nodes: No cervical, supraclavicular, inguinal or axillary adenopathy noted   Extremities: Normal, atraumatic; no clubbing, cyanosis, or edema    Skin: No rashes, ulcers, or suspicious lesions noted     No visits with results within 2 Week(s) from this visit.   Latest known visit with results is:   Lab on 05/07/2021   Component Date Value Ref Range Status   • Total Cholesterol 05/07/2021 211* 0 - 200 mg/dL Final   • Triglycerides 05/07/2021 122  0 - 150 mg/dL Final   • HDL Cholesterol 05/07/2021 103* 40 - 60 mg/dL Final   • LDL Cholesterol  05/07/2021 88  0 - 100 mg/dL Final   • VLDL Cholesterol 05/07/2021 20  5 - 40 mg/dL Final   • LDL/HDL Ratio 05/07/2021 0.81   Final   • Glucose 05/07/2021 97  65 - 99 mg/dL Final   • BUN 05/07/2021 14  8 - 23 mg/dL Final   • Creatinine 05/07/2021 0.87  0.57 - 1.00 mg/dL Final   • Sodium 05/07/2021 144 "  136 - 145 mmol/L Final   • Potassium 05/07/2021 3.9  3.5 - 5.2 mmol/L Final   • Chloride 05/07/2021 105  98 - 107 mmol/L Final   • CO2 05/07/2021 26.9  22.0 - 29.0 mmol/L Final   • Calcium 05/07/2021 9.0  8.6 - 10.5 mg/dL Final   • Total Protein 05/07/2021 7.5  6.0 - 8.5 g/dL Final   • Albumin 05/07/2021 4.60  3.50 - 5.20 g/dL Final   • ALT (SGPT) 05/07/2021 17  1 - 33 U/L Final   • AST (SGOT) 05/07/2021 24  1 - 32 U/L Final   • Alkaline Phosphatase 05/07/2021 105  39 - 117 U/L Final   • Total Bilirubin 05/07/2021 0.4  0.0 - 1.2 mg/dL Final   • eGFR   Amer 05/07/2021 79  >60 mL/min/1.73 Final   • Globulin 05/07/2021 2.9  gm/dL Final   • A/G Ratio 05/07/2021 1.6  g/dL Final   • BUN/Creatinine Ratio 05/07/2021 16.1  7.0 - 25.0 Final   • Anion Gap 05/07/2021 12.1  5.0 - 15.0 mmol/L Final        No results found.    ASSESSMENT: The patient is a very pleasant 65 y.o. female  with adenocarcinoma of the right lung    PROBLEM LIST:  1. Right upper lobe adenocarcinoma of the lung T2 aN0 M0 stage Ib:  A.  CT chest done on June 2019 revealed 3 cm right upper lobe lung nodule  B.  Bronchoscopy with biopsy done by Dr. Patel June 13, 2019 was negative  C.  Status post surgical resection with lumpectomy and lymph node sampling done by Dr. Barreto August 30, 2019  D.  Final pathology revealed moderate differentiated adenocarcinoma 3.5 cm in size clear surgical margins, no lymphovascular invasion, no pleural invasion, and negative lymph nodes.  2.  COPD  3.  Hypertension    PLAN:  1. I did go over the scan results with the patient and I reassured there is no evidence of relapsed disease.  2.  We will continue watchful waiting for now per NCCN guidelines.   3.  I will repeat scans prior to return.  4.  The patient follow-up in 6 months.  5.  We will continue inhalers for COPD  6.  We will continue Coreg twice a day for hypertension.  7. Patient will have labs drawn today and I will call her with results.    8. She will  continue oxygen as needed at night.      Chandni Odell, APRN   6/16/2021

## 2021-09-21 ENCOUNTER — OFFICE VISIT (OUTPATIENT)
Dept: CARDIAC SURGERY | Facility: CLINIC | Age: 66
End: 2021-09-21

## 2021-09-21 VITALS
OXYGEN SATURATION: 99 % | HEART RATE: 68 BPM | HEIGHT: 60 IN | SYSTOLIC BLOOD PRESSURE: 118 MMHG | TEMPERATURE: 97.5 F | DIASTOLIC BLOOD PRESSURE: 68 MMHG | WEIGHT: 168.6 LBS | BODY MASS INDEX: 33.1 KG/M2

## 2021-09-21 DIAGNOSIS — C34.91 PRIMARY ADENOCARCINOMA OF RIGHT LUNG (HCC): Primary | ICD-10-CM

## 2021-09-21 PROCEDURE — 99213 OFFICE O/P EST LOW 20 MIN: CPT | Performed by: NURSE PRACTITIONER

## 2021-09-21 NOTE — PROGRESS NOTES
[facility] Cardiothoracic Surgery Office Follow Up Note     Date of Encounter: 09/21/2021     YOB: 1955  Name: Lizbeth Johns    PCP: Emily Ferreira MD    Chief Complaint:    Chief Complaint   Patient presents with   • Follow-up     6 month follow up w/ CT chest. Pt states that she is SOB w/ exertion. Complains of bilateral leg pain that is worse at night, pain in both legs that aches like a tooch ache. Denies any fatigue.        History of Present Illness:    Lizbeth Johns is a 65 y.o. female with a history of hypertension, renal insufficiency, COPD, former tobacco use, stage Ib adenocarcinoma of right lung s/p right VATS with right upper lobectomy on 8/31/2019 with Dr. Barreto.    Patient presents today for 6-month follow-up with CT chest.  Last seen in the office on 3/16/2021.  Patient continues to follow closely with Dr. José.  She denies any unusual cough, hemoptysis, shortness of breath or unexplained weight loss.    Review of Systems:  Review of Systems   Constitutional: Positive for malaise/fatigue. Negative for chills, decreased appetite, diaphoresis, fever, night sweats and weight loss.   HENT: Negative for congestion, hoarse voice, sore throat and stridor.    Cardiovascular: Negative for chest pain, claudication, dyspnea on exertion, irregular heartbeat, leg swelling, near-syncope, orthopnea, palpitations, paroxysmal nocturnal dyspnea and syncope.   Respiratory: Negative for cough, hemoptysis, shortness of breath, sleep disturbances due to breathing, snoring, sputum production and wheezing.    Hematologic/Lymphatic: Negative for adenopathy and bleeding problem. Does not bruise/bleed easily.   Skin: Negative for color change, dry skin, itching, poor wound healing and rash.   Musculoskeletal: Positive for joint pain (bilateral legs) and muscle cramps (john horse). Negative for arthritis, back pain, falls and muscle weakness.   Gastrointestinal: Negative for  abdominal pain, anorexia, constipation, diarrhea, hematochezia, melena, nausea and vomiting.   Neurological: Negative for difficulty with concentration, disturbances in coordination, dizziness, loss of balance, numbness, seizures, vertigo and weakness.   Psychiatric/Behavioral: Negative for altered mental status, depression, memory loss and substance abuse. The patient does not have insomnia and is not nervous/anxious.    Allergic/Immunologic: Negative for persistent infections.       Allergies:  Allergies   Allergen Reactions   • Contrast Dye Hives       Medications:      Current Outpatient Medications:   •  amLODIPine (NORVASC) 5 MG tablet, Take 5 mg by mouth 2 (two) times a day., Disp: , Rfl:   •  aspirin 81 MG EC tablet, Take 81 mg by mouth Daily., Disp: , Rfl:   •  atorvastatin (LIPITOR) 10 MG tablet, Take 10 mg by mouth Daily., Disp: , Rfl:   •  carvedilol (COREG) 12.5 MG tablet, Take 12.5 mg by mouth 2 (Two) Times a Day With Meals., Disp: , Rfl:   •  Loratadine (CLARITIN PO), Take  by mouth Daily., Disp: , Rfl:   •  tiotropium bromide-olodaterol (STIOLTO RESPIMAT) 2.5-2.5 MCG/ACT aerosol solution inhaler, Inhale 2 puffs Daily., Disp: 1 inhaler, Rfl: 5  •  traZODone (DESYREL) 50 MG tablet, Take 50 mg by mouth Every Night., Disp: , Rfl:   •  diphenhydrAMINE (BENADRYL) 50 MG tablet, Take 1 tab (50mg) by mouth prior to exam., Disp: 1 tablet, Rfl: 0  •  predniSONE (DELTASONE) 50 MG tablet, Take 1 tab (50mg) by mouth 13 hours, 7 hours and 1 hour prior to exam., Disp: 3 tablet, Rfl: 0  No current facility-administered medications for this visit.    Facility-Administered Medications Ordered in Other Visits:   •  Chlorhexidine Gluconate Cloth 2 % pads 1 application, 1 application, Topical, Q12H PRN, Candelaria Novak PA-C    Social History     Socioeconomic History   • Marital status:      Spouse name: Not on file   • Number of children: 4   • Years of education: Not on file   • Highest education level: Not on  file   Tobacco Use   • Smoking status: Former Smoker     Packs/day: 1.00     Years: 50.00     Pack years: 50.00     Types: Cigarettes     Quit date: 2019     Years since quittin.0   • Smokeless tobacco: Never Used   • Tobacco comment: Pt states that she quit in 2019   Substance and Sexual Activity   • Alcohol use: Yes     Alcohol/week: 2.0 standard drinks     Types: 2 Glasses of wine per week     Comment: once in a while    • Drug use: No   • Sexual activity: Defer       Family History   Problem Relation Age of Onset   • Cirrhosis Brother    • Liver disease Brother    • Colon cancer Brother         Possibly colon cancer, patient unsure.   • Cancer Mother    • No Known Problems Father    • Stomach cancer Neg Hx    • Esophageal cancer Neg Hx    • Breast cancer Neg Hx        Past Medical History:   Diagnosis Date   • Acid reflux    • Arthritis    • Body piercing     ears   • Cataract, bilateral    • COPD (chronic obstructive pulmonary disease) (CMS/HCC)    • Full dentures     Patient advised no adhesives DOS   • Hypertension    • Leg cramps    • Lung mass     right   • On home oxygen therapy     2L NC HS and daily prn    • SOB (shortness of breath)     xray showed spot on lungs   • Wears glasses     Rx glasses       Past Surgical History:   Procedure Laterality Date   • BRONCHOSCOPY N/A 2019    Procedure: BRONCHOSCOPY WITH FLUOROSCOPY, BIOPSY, BRUSHINGS, AND WASHINGS;  Surgeon: Gudelia Patel MD;  Location: [facility] OR;  Service: Pulmonary   • COLONOSCOPY N/A 2017    Procedure: COLONOSCOPY with hot and cold snare polypectomies,  hot biopsy polypectomies, biopsies, resolution clip placement;  Surgeon: Zackery Siddiqui MD;  Location: [facility] ENDOSCOPY;  Service:    • COLONOSCOPY N/A 2018    Procedure: COLONOSCOPY WITH HOT SNARE POLYPECTOMY X 7; COLD SNARE POLYPECTOMY X 3; HOT BIOPSY POLYPECTOMY X 20; COLD BIOPSY POLYPECTOMY X 2; THERMAL ABLATION OF COLON POLYPS X 23; CLIP PLACEMENT X 2; BIOPSIES;   "Surgeon: Zackery Siddiqui MD;  Location: [facility] ENDOSCOPY;  Service: Gastroenterology   • COLONOSCOPY N/A 6/19/2019    Procedure: COLONOSCOPY with cold biopsy polypectomies and cold biopsy;  Surgeon: Zackery Siddiqui MD;  Location: [facility] ENDOSCOPY;  Service: Gastroenterology   • ENDOSCOPY  06/13/2019   • ENDOSCOPY N/A 1/9/2020    Procedure: ESOPHAGOGASTRODUODENOSCOPY;  Surgeon: Zackery Siddiqui MD;  Location: [facility] ENDOSCOPY;  Service: Gastroenterology   • MULTIPLE TOOTH EXTRACTIONS      full extraction   • ORIF TIBIA/FIBULA FRACTURES Left    • SIGMOIDOSCOPY N/A 1/9/2020    Procedure: FLEXIBLE SIGMOIDOSCOPY; ANOSCOPY;  Surgeon: Zackery Siddiqui MD;  Location: [facility] ENDOSCOPY;  Service: Gastroenterology   • THORACOSCOPY Right 8/30/2019    Procedure: BRONCHOSCOPY,  THORACOSCOPY VIDEO ASSISTED with right upper lobe wedge RESECTION , RIGHT UPPER lobectomy with mediastinal lymph node dissection AND INTERCOSTAL CRYOABLATION OF RIGHT CHEST;  Surgeon: Brian Barreto MD;  Location: [facility] OR;  Service: Cardiothoracic   • TUBAL ABDOMINAL LIGATION         I have reviewed the following portions of the patient's history: allergies, current medications, past family history, past medical history, past social history, past surgical history and problem list and confirm it's accurate.    Physical Exam:  Vital Signs:    Vitals:    09/21/21 1051   BP: 118/68   Pulse: 68   Temp: 97.5 °F (36.4 °C)   SpO2: 99%   Weight: 76.5 kg (168 lb 9.6 oz)   Height: 152.4 cm (60\")     Body mass index is 32.93 kg/m².     Physical Exam  Vitals and nursing note reviewed.   Constitutional:       Appearance: Normal appearance. She is well-developed and well-groomed.   HENT:      Head: Normocephalic and atraumatic.   Cardiovascular:      Rate and Rhythm: Normal rate and regular rhythm.      Heart sounds: Normal heart sounds, S1 normal and S2 normal. No murmur heard.   No friction rub.   Pulmonary:      Comments: Unlabored, Coarse to auscultation " bilaterally  Abdominal:      General: Bowel sounds are normal.      Palpations: Abdomen is soft.      Tenderness: There is no abdominal tenderness.   Musculoskeletal:      Cervical back: Neck supple.      Right lower leg: No edema.      Left lower leg: No edema.   Skin:     General: Skin is warm and dry.   Neurological:      Mental Status: She is alert and oriented to person, place, and time.   Psychiatric:         Attention and Perception: Attention normal.         Mood and Affect: Mood normal.         Speech: Speech normal.         Behavior: Behavior is cooperative.         Labs/Imagin/14/21 CT chest:  Soft tissue windows demonstrate no adenopathy within the chest. The  heart is normal in size. The aorta is normal in caliber.There is no  pericardial or pleural effusion. There are postsurgical changes within  the right lung. There is an area of atelectasis in the anterior right  lung a 5 mm nodular opacity in the left upper lobe on image 17 is  unchanged. No new or enlarging pulmonary nodules are identified.  Within  the visualized upper abdomen there is fatty infiltration of the liver.  There is thickening of the left adrenal gland with an adenoma which is  unchanged. There are partially imaged renal cysts. Bone windows reveal  no lytic or destructive lesions.     IMPRESSION:  No evidence of recurrent or metastatic disease within the  Chest.    Personally reviewed    Assessment / Plan:  Diagnoses and all orders for this visit:    1. Primary adenocarcinoma of right lung (CMS/HCC) (Primary)     Lizbeth Johns is a 65 y.o. female with a history of hypertension, renal insufficiency, COPD, former tobacco use, stage Ib adenocarcinoma of right lung s/p right VATS with right upper lobectomy on 2019 with Dr. Barreto.    Discussed findings of stable CT chest with no evidence of recurrence or metastasis.  Known, unchanged 5 mm nodular opacity in the left upper lobe.  Patient is following closely with   Estefanía's office with plans for repeat CT scans in December.  Advised pt/daughter she would need to continued follow up with Dr. José's office loading serial CT imaging.  At this point, we will plan to follow with patient on as-needed basis.    Soo Holly, [facility] Cardiothoracic Surgery

## 2021-11-01 ENCOUNTER — TRANSCRIBE ORDERS (OUTPATIENT)
Dept: LAB | Facility: HOSPITAL | Age: 66
End: 2021-11-01

## 2021-11-01 DIAGNOSIS — C34.90 MALIGNANT NEOPLASM OF BRONCHUS AND LUNG (HCC): ICD-10-CM

## 2021-11-01 DIAGNOSIS — E78.5 HYPERLIPIDEMIA, UNSPECIFIED HYPERLIPIDEMIA TYPE: Primary | ICD-10-CM

## 2021-11-02 ENCOUNTER — TRANSCRIBE ORDERS (OUTPATIENT)
Dept: LAB | Facility: HOSPITAL | Age: 66
End: 2021-11-02

## 2021-11-02 ENCOUNTER — LAB (OUTPATIENT)
Dept: LAB | Facility: HOSPITAL | Age: 66
End: 2021-11-02

## 2021-11-02 DIAGNOSIS — B35.1 DERMATOPHYTOSIS OF NAIL: Primary | ICD-10-CM

## 2021-11-02 DIAGNOSIS — B35.1 DERMATOPHYTOSIS OF NAIL: ICD-10-CM

## 2021-11-02 DIAGNOSIS — E78.5 HYPERLIPIDEMIA, UNSPECIFIED HYPERLIPIDEMIA TYPE: ICD-10-CM

## 2021-11-02 DIAGNOSIS — C34.90 MALIGNANT NEOPLASM OF BRONCHUS AND LUNG (HCC): ICD-10-CM

## 2021-11-02 LAB
ALBUMIN SERPL-MCNC: 4.1 G/DL (ref 3.5–5.2)
ALBUMIN/GLOB SERPL: 1.3 G/DL
ALP SERPL-CCNC: 108 U/L (ref 39–117)
ALT SERPL W P-5'-P-CCNC: 18 U/L (ref 1–33)
ANION GAP SERPL CALCULATED.3IONS-SCNC: 12.7 MMOL/L (ref 5–15)
AST SERPL-CCNC: 19 U/L (ref 1–32)
BASOPHILS # BLD AUTO: 0.03 10*3/MM3 (ref 0–0.2)
BASOPHILS NFR BLD AUTO: 0.6 % (ref 0–1.5)
BILIRUB CONJ SERPL-MCNC: <0.2 MG/DL (ref 0–0.3)
BILIRUB SERPL-MCNC: 0.3 MG/DL (ref 0–1.2)
BUN SERPL-MCNC: 17 MG/DL (ref 8–23)
BUN/CREAT SERPL: 18.3 (ref 7–25)
CALCIUM SPEC-SCNC: 9.4 MG/DL (ref 8.6–10.5)
CHLORIDE SERPL-SCNC: 106 MMOL/L (ref 98–107)
CHOLEST SERPL-MCNC: 183 MG/DL (ref 0–200)
CO2 SERPL-SCNC: 23.3 MMOL/L (ref 22–29)
CREAT SERPL-MCNC: 0.93 MG/DL (ref 0.57–1)
DEPRECATED RDW RBC AUTO: 45.7 FL (ref 37–54)
EOSINOPHIL # BLD AUTO: 0.14 10*3/MM3 (ref 0–0.4)
EOSINOPHIL NFR BLD AUTO: 2.9 % (ref 0.3–6.2)
ERYTHROCYTE [DISTWIDTH] IN BLOOD BY AUTOMATED COUNT: 13.5 % (ref 12.3–15.4)
GFR SERPL CREATININE-BSD FRML MDRD: 73 ML/MIN/1.73
GLOBULIN UR ELPH-MCNC: 3.1 GM/DL
GLUCOSE SERPL-MCNC: 93 MG/DL (ref 65–99)
HCT VFR BLD AUTO: 34.3 % (ref 34–46.6)
HDLC SERPL-MCNC: 61 MG/DL (ref 40–60)
HGB BLD-MCNC: 11.4 G/DL (ref 12–15.9)
IMM GRANULOCYTES # BLD AUTO: 0.01 10*3/MM3 (ref 0–0.05)
IMM GRANULOCYTES NFR BLD AUTO: 0.2 % (ref 0–0.5)
LDLC SERPL CALC-MCNC: 106 MG/DL (ref 0–100)
LDLC/HDLC SERPL: 1.71 {RATIO}
LYMPHOCYTES # BLD AUTO: 1.16 10*3/MM3 (ref 0.7–3.1)
LYMPHOCYTES NFR BLD AUTO: 24 % (ref 19.6–45.3)
MCH RBC QN AUTO: 30.8 PG (ref 26.6–33)
MCHC RBC AUTO-ENTMCNC: 33.2 G/DL (ref 31.5–35.7)
MCV RBC AUTO: 92.7 FL (ref 79–97)
MONOCYTES # BLD AUTO: 0.66 10*3/MM3 (ref 0.1–0.9)
MONOCYTES NFR BLD AUTO: 13.7 % (ref 5–12)
NEUTROPHILS NFR BLD AUTO: 2.83 10*3/MM3 (ref 1.7–7)
NEUTROPHILS NFR BLD AUTO: 58.6 % (ref 42.7–76)
NRBC BLD AUTO-RTO: 0 /100 WBC (ref 0–0.2)
PLATELET # BLD AUTO: 144 10*3/MM3 (ref 140–450)
PMV BLD AUTO: 11.8 FL (ref 6–12)
POTASSIUM SERPL-SCNC: 4.1 MMOL/L (ref 3.5–5.2)
PROT SERPL-MCNC: 7.2 G/DL (ref 6–8.5)
RBC # BLD AUTO: 3.7 10*6/MM3 (ref 3.77–5.28)
SODIUM SERPL-SCNC: 142 MMOL/L (ref 136–145)
TRIGL SERPL-MCNC: 88 MG/DL (ref 0–150)
VLDLC SERPL-MCNC: 16 MG/DL (ref 5–40)
WBC # BLD AUTO: 4.83 10*3/MM3 (ref 3.4–10.8)

## 2021-11-02 PROCEDURE — 85025 COMPLETE CBC W/AUTO DIFF WBC: CPT

## 2021-11-02 PROCEDURE — 36415 COLL VENOUS BLD VENIPUNCTURE: CPT

## 2021-11-02 PROCEDURE — 80061 LIPID PANEL: CPT

## 2021-11-02 PROCEDURE — 80053 COMPREHEN METABOLIC PANEL: CPT

## 2021-11-02 PROCEDURE — 82248 BILIRUBIN DIRECT: CPT

## 2021-12-01 ENCOUNTER — HOSPITAL ENCOUNTER (OUTPATIENT)
Dept: GENERAL RADIOLOGY | Facility: HOSPITAL | Age: 66
Discharge: HOME | End: 2021-12-01
Admitting: FAMILY MEDICINE

## 2021-12-01 ENCOUNTER — TRANSCRIBE ORDERS (OUTPATIENT)
Dept: GENERAL RADIOLOGY | Facility: HOSPITAL | Age: 66
End: 2021-12-01

## 2021-12-01 DIAGNOSIS — M54.50 LOW BACK PAIN, UNSPECIFIED BACK PAIN LATERALITY, UNSPECIFIED CHRONICITY, UNSPECIFIED WHETHER SCIATICA PRESENT: Primary | ICD-10-CM

## 2021-12-01 DIAGNOSIS — M54.50 LOW BACK PAIN, UNSPECIFIED BACK PAIN LATERALITY, UNSPECIFIED CHRONICITY, UNSPECIFIED WHETHER SCIATICA PRESENT: ICD-10-CM

## 2021-12-01 PROCEDURE — 72100 X-RAY EXAM L-S SPINE 2/3 VWS: CPT

## 2021-12-13 ENCOUNTER — APPOINTMENT (OUTPATIENT)
Dept: CT IMAGING | Facility: HOSPITAL | Age: 66
End: 2021-12-13

## 2021-12-13 ENCOUNTER — TRANSCRIBE ORDERS (OUTPATIENT)
Dept: ADMINISTRATIVE | Facility: HOSPITAL | Age: 66
End: 2021-12-13

## 2021-12-13 DIAGNOSIS — Z78.0 ASYMPTOMATIC MENOPAUSAL STATE: Primary | ICD-10-CM

## 2021-12-27 ENCOUNTER — TRANSCRIBE ORDERS (OUTPATIENT)
Dept: ADMINISTRATIVE | Facility: HOSPITAL | Age: 66
End: 2021-12-27

## 2021-12-27 ENCOUNTER — APPOINTMENT (OUTPATIENT)
Dept: BONE DENSITY | Facility: HOSPITAL | Age: 66
End: 2021-12-27

## 2021-12-27 DIAGNOSIS — Z78.0 ASYMPTOMATIC MENOPAUSAL STATE: ICD-10-CM

## 2021-12-27 DIAGNOSIS — Z12.31 VISIT FOR SCREENING MAMMOGRAM: Primary | ICD-10-CM

## 2021-12-27 PROCEDURE — 77080 DXA BONE DENSITY AXIAL: CPT

## 2022-01-11 ENCOUNTER — HOSPITAL ENCOUNTER (OUTPATIENT)
Dept: CT IMAGING | Facility: HOSPITAL | Age: 67
Discharge: HOME | End: 2022-01-11
Admitting: NURSE PRACTITIONER

## 2022-01-11 DIAGNOSIS — C34.91 PRIMARY ADENOCARCINOMA OF RIGHT LUNG: ICD-10-CM

## 2022-01-11 PROCEDURE — 82565 ASSAY OF CREATININE: CPT

## 2022-01-11 PROCEDURE — 25010000002 IOPAMIDOL 61 % SOLUTION: Performed by: NURSE PRACTITIONER

## 2022-01-11 PROCEDURE — 71260 CT THORAX DX C+: CPT

## 2022-01-11 RX ADMIN — IOPAMIDOL 100 ML: 612 INJECTION, SOLUTION INTRAVENOUS at 12:20

## 2022-01-12 LAB — CREAT BLDA-MCNC: 0.8 MG/DL (ref 0.6–1.3)

## 2022-01-13 ENCOUNTER — TELEPHONE (OUTPATIENT)
Dept: ONCOLOGY | Facility: CLINIC | Age: 67
End: 2022-01-13

## 2022-01-13 NOTE — TELEPHONE ENCOUNTER
Caller: Lizbeth Johns    Relationship to patient: Self    Best call back number: [phone]    Chief complaint: PATIENT MISSED APPOINTMENT     Type of visit: FOLLOW UP    Requested date: NEXT AVAILABLE IN [place]     If rescheduling, when is the original appointment: 1-12-22     Additional notes:PLEASE CALL TO SCHEDULE, HUB UNABLE TO SCHEDULE DUE TO SCHEDULE SHOWING MONTHS OUT     PATIENT ALSO HAD CT SCAN DONE AND WOULD LIKE THE RESULTS

## 2022-01-13 NOTE — TELEPHONE ENCOUNTER
----- Message from Zenia Zurita sent at 1/13/2022  8:43 AM EST -----  Regarding: RE: missed appt  Called and was unable to get a voicemail. Scheduled and mailed appt reminder.   ----- Message -----  From: Jaimie Maldonado RN  Sent: 1/12/2022   4:26 PM EST  To: Zenia Zurita  Subject: missed appt                                      Can you rechedule patients f/u?  She went for her scan yesterday but did not show today

## 2022-01-19 ENCOUNTER — OFFICE VISIT (OUTPATIENT)
Dept: ONCOLOGY | Facility: CLINIC | Age: 67
End: 2022-01-19

## 2022-01-19 VITALS
SYSTOLIC BLOOD PRESSURE: 133 MMHG | BODY MASS INDEX: 31.8 KG/M2 | RESPIRATION RATE: 12 BRPM | DIASTOLIC BLOOD PRESSURE: 79 MMHG | HEART RATE: 67 BPM | WEIGHT: 162 LBS | HEIGHT: 60 IN | TEMPERATURE: 97.3 F | OXYGEN SATURATION: 97 %

## 2022-01-19 DIAGNOSIS — C34.91 PRIMARY ADENOCARCINOMA OF RIGHT LUNG: Primary | ICD-10-CM

## 2022-01-19 PROCEDURE — 99214 OFFICE O/P EST MOD 30 MIN: CPT | Performed by: INTERNAL MEDICINE

## 2022-01-19 RX ORDER — MELOXICAM 15 MG/1
TABLET ORAL
COMMUNITY
End: 2022-07-20

## 2022-01-19 RX ORDER — PREDNISONE 50 MG/1
TABLET ORAL
Qty: 3 TABLET | Refills: 0 | Status: SHIPPED | OUTPATIENT
Start: 2022-01-19 | End: 2022-07-20

## 2022-01-19 NOTE — PROGRESS NOTES
DATE OF VISIT: 1/19/2022    REASON FOR VISIT: Followup for Right upper lobe adenocarcinoma of the lung T2 a N0 M0 stage Ib     HISTORY OF PRESENT ILLNESS: The patient is a very pleasant 66 y.o. female with past medical history significant for Right upper lobe adenocarcinoma of the lung T2 a N0 M0 stage Ib  diagnosed June 2019.  The patient woke up 1 day not feeling good.  She did not have any specific complaints except she felt something is different.  She came to [facility] emergency room chest x-ray revealed right upper lobe lung nodule.  She was referred to Dr. Patel CT chest document the mass.  Bronchoscopy with biopsy done by Dr. Patel June 13, 2019 was not diagnostic.  Patient was further Dr. Barreto who performed wedge resection followed by right upper lobe resection and medicine lymph node sampling August 30, 2019.  Final pathology confirmed moderate eventually adenocarcinoma, 3.5 cm in size, clear surgical margins, no pleural or lymphovascular invasion, and negative lymph nodes.  The patient had no postoperative complications.  Repeat CT scan done December 2019 was unremarkable. Patient is here today for follow up.    SUBJECTIVE: The patient is here today with her daughter.  She is doing fairly well denies any abdominal pain no weight loss.    PAST MEDICAL HISTORY/SOCIAL HISTORY/FAMILY HISTORY: Unchanged from my prior documentation done on 12/18/2019    Review of Systems   Constitutional: Negative for activity change, appetite change, fatigue, fever and unexpected weight change.   HENT: Negative for congestion, dental problem, facial swelling, hearing loss, mouth sores, sinus pressure, sinus pain, tinnitus and trouble swallowing.    Eyes: Negative for photophobia, discharge and itching.   Respiratory: Negative for apnea, cough, chest tightness, shortness of breath and wheezing.    Cardiovascular: Negative for chest pain and palpitations.   Gastrointestinal: Negative for abdominal distention,  "blood in stool, constipation, diarrhea, nausea and vomiting.   Endocrine: Negative for cold intolerance and heat intolerance.   Genitourinary: Negative for difficulty urinating, frequency, pelvic pain and urgency.   Musculoskeletal: Negative for arthralgias, joint swelling and myalgias.   Skin: Negative for color change and pallor.   Allergic/Immunologic: Negative for environmental allergies.   Neurological: Negative for dizziness, tremors, facial asymmetry, weakness and headaches.   Hematological: Negative for adenopathy. Does not bruise/bleed easily.   Psychiatric/Behavioral: Negative for agitation and behavioral problems. The patient is nervous/anxious.          Current Outpatient Medications:   •  amLODIPine (NORVASC) 5 MG tablet, Take 5 mg by mouth 2 (two) times a day., Disp: , Rfl:   •  aspirin 81 MG EC tablet, Take 81 mg by mouth Daily., Disp: , Rfl:   •  carvedilol (COREG) 12.5 MG tablet, Take 12.5 mg by mouth 2 (Two) Times a Day With Meals., Disp: , Rfl:   •  diphenhydrAMINE (BENADRYL) 50 MG tablet, Take 1 tab (50mg) by mouth prior to exam., Disp: 1 tablet, Rfl: 0  •  Loratadine (CLARITIN PO), Take  by mouth Daily., Disp: , Rfl:   •  meloxicam (MOBIC) 15 MG tablet, meloxicam 15 mg tablet, Disp: , Rfl:   •  tiotropium bromide-olodaterol (STIOLTO RESPIMAT) 2.5-2.5 MCG/ACT aerosol solution inhaler, Inhale 2 puffs Daily., Disp: 1 inhaler, Rfl: 5  •  traZODone (DESYREL) 50 MG tablet, Take 50 mg by mouth Every Night., Disp: , Rfl:   No current facility-administered medications for this visit.    Facility-Administered Medications Ordered in Other Visits:   •  Chlorhexidine Gluconate Cloth 2 % pads 1 application, 1 application, Topical, Q12H PRN, Candelaria Novak PA-C    PHYSICAL EXAMINATION:   /79   Pulse 67   Temp 97.3 °F (36.3 °C) (Temporal)   Resp 12   Ht 152.4 cm (60\")   Wt 73.5 kg (162 lb)   LMP 03/16/2004   SpO2 97%   BMI 31.64 kg/m²    ECOG Performance Status: 1 - Symptomatic but completely " ambulatory  General Appearance:  alert, cooperative, no apparent distress and appears stated age   Neurologic/Psychiatric: A&O x 3, gait steady, appropriate affect, strength 5/5 in all muscle groups   HEENT:  Normocephalic, without obvious abnormality, mucous membranes moist   Neck: Supple, symmetrical, trachea midline, no adenopathy;  No thyromegaly, masses, or tenderness   Lungs:   Clear to auscultation bilaterally; respirations regular, even, and unlabored bilaterally   Heart:  Regular rate and rhythm, no murmurs appreciated   Abdomen:   Soft, non-tender, non-distended and no organomegaly   Lymph nodes: No cervical, supraclavicular, inguinal or axillary adenopathy noted   Extremities: Normal, atraumatic; no clubbing, cyanosis, or edema    Skin: No rashes, ulcers, or suspicious lesions noted     Hospital Outpatient Visit on 01/11/2022   Component Date Value Ref Range Status   • Creatinine 01/11/2022 0.80  0.60 - 1.30 mg/dL Final    Serial Number: [id number]:  [id number]      CT Chest With Contrast Diagnostic    Result Date: 1/11/2022  Narrative: PROCEDURE: CT CHEST W CONTRAST DIAGNOSTIC-  HISTORY: Follow up lung cancer; C34.91-Malignant neoplasm of unspecified part of right bronchus or lung  COMPARISON: November 2020 and June 2020.  PROCEDURE: Multiple axial CT images were obtained from the thoracic inlet through the upper abdomen following the administration of intravenous contrast.  FINDINGS: Soft tissue windows demonstrate no mediastinal adenopathy. The heart is normal in size. The aorta is normal in caliber.There is no pericardial or pleural effusion. There are postsurgical changes of right upper lobectomy. No suspicious pulmonary mass or nodule is identified..  The visualized upper abdomen shows fatty infiltration of the liver. There are multiple bilateral renal cysts. A left adrenal nodule measures 2 x 1.7 cm. This was present on the prior exam, but appears more pronounced. A follow-up CT adrenal mass  protocol or correlation with recent PET imaging is recommended. Bone windows reveal no acute osseous abnormalities.      Impression: 1. Postsurgical changes of right upper lobectomy. 2. No suspicious pulmonary mass or nodule. 3. A left adrenal nodule appears more pronounced than on the prior exam. A follow-up CT adrenal mass protocol or correlation with recent PET imaging is recommended.   This study was performed with techniques to keep radiation doses as low as reasonably achievable (ALARA). Individualized dose reduction techniques using automated exposure control or adjustment of mA and/or kV according to the patient size were employed.     Images were reviewed, interpreted, and dictated by Dr. Ashley Ariza M.D. Transcribed by Sharlene Amos PA-C.  This report was finalized on 1/11/2022 12:57 PM by Ashley Ariza M.D..    dexa bone density axial    Result Date: 12/28/2021  Narrative: PROCEDURE: DEXA BONE DENSITY AXIAL-  HISTORY: ASYMPTOMATIC MENOPAUSAL STATE; Z78.0-Asymptomatic menopausal state  Comparison: 12/18/2019.  FINDINGS: Bone densitometry calculations of the lumbar spine and left femoral neck were obtained.  Average BMD for the lumbar spine from L1-L4 is 1.369 g/sq cm. T-score is 1.6. Z-score is 2.1.   Left femoral neck BMD is 0.906 g/sq cm. T-score is -1.0. Z score is -0.5.       Impression: The bone mineral densities within the lumbar spine and left femoral neck are within the range of normal.  This report was finalized on 12/28/2021 11:40 AM by Ashley Ariza M.D..  (  No results found.    ASSESSMENT: The patient is a very pleasant 66 y.o. female  with adenocarcinoma of the right lung    PROBLEM LIST:  1. Right upper lobe adenocarcinoma of the lung T2 aN0 M0 stage Ib:  A.  CT chest done on June 2019 revealed 3 cm right upper lobe lung nodule  B.  Bronchoscopy with biopsy done by Dr. Patel June 13, 2019 was negative  C.  Status post surgical resection with lumpectomy and lymph node  sampling done by Dr. Barreto August 30, 2019  D.  Final pathology revealed moderate differentiated adenocarcinoma 3.5 cm in size clear surgical margins, no lymphovascular invasion, no pleural invasion, and negative lymph nodes.  2.  COPD  3.  Hypertension    PLAN:  1. I did go over the scan results with the patient and I reassured there is no evidence of relapsed disease.  She always had 2 cm left adrenal nodule but it looked more pronounced on the CT scan compared to previous study done 7 months ago.  2.  We will continue watchful waiting for now per NCCN guidelines.   3.  I will repeat scans prior to return.  4.  The patient follow-up in 6 months.  I reemphasized on the importance of keeping her appointment to make sure the adrenal nodule is essentially stable.  5.  We will continue inhalers for COPD  6.  We will continue Coreg twice a day for hypertension.  7. Patient will have labs drawn today and I will call her with results.    8. She will continue oxygen as needed at night.      Vinnie José MD   1/19/2022

## 2022-02-11 ENCOUNTER — HOSPITAL ENCOUNTER (OUTPATIENT)
Dept: MAMMOGRAPHY | Facility: HOSPITAL | Age: 67
Discharge: HOME | End: 2022-02-11
Admitting: FAMILY MEDICINE

## 2022-02-11 DIAGNOSIS — Z12.31 VISIT FOR SCREENING MAMMOGRAM: ICD-10-CM

## 2022-02-11 PROCEDURE — 77063 BREAST TOMOSYNTHESIS BI: CPT

## 2022-02-11 PROCEDURE — 77067 SCR MAMMO BI INCL CAD: CPT

## 2022-05-02 ENCOUNTER — TRANSCRIBE ORDERS (OUTPATIENT)
Dept: LAB | Facility: HOSPITAL | Age: 67
End: 2022-05-02

## 2022-05-02 DIAGNOSIS — E78.5 HYPERLIPIDEMIA, UNSPECIFIED HYPERLIPIDEMIA TYPE: Primary | ICD-10-CM

## 2022-05-10 ENCOUNTER — LAB (OUTPATIENT)
Dept: LAB | Facility: HOSPITAL | Age: 67
End: 2022-05-10

## 2022-05-10 DIAGNOSIS — E78.5 HYPERLIPIDEMIA, UNSPECIFIED HYPERLIPIDEMIA TYPE: ICD-10-CM

## 2022-05-10 LAB
ALBUMIN SERPL-MCNC: 4.6 G/DL (ref 3.5–5.2)
ALBUMIN/GLOB SERPL: 1.7 G/DL
ALP SERPL-CCNC: 100 U/L (ref 39–117)
ALT SERPL W P-5'-P-CCNC: 18 U/L (ref 1–33)
ANION GAP SERPL CALCULATED.3IONS-SCNC: 16 MMOL/L (ref 5–15)
AST SERPL-CCNC: 27 U/L (ref 1–32)
BASOPHILS # BLD AUTO: 0.03 10*3/MM3 (ref 0–0.2)
BASOPHILS NFR BLD AUTO: 0.8 % (ref 0–1.5)
BILIRUB SERPL-MCNC: 0.4 MG/DL (ref 0–1.2)
BUN SERPL-MCNC: 24 MG/DL (ref 8–23)
BUN/CREAT SERPL: 22.4 (ref 7–25)
CALCIUM SPEC-SCNC: 9 MG/DL (ref 8.6–10.5)
CHLORIDE SERPL-SCNC: 101 MMOL/L (ref 98–107)
CHOLEST SERPL-MCNC: 237 MG/DL (ref 0–200)
CO2 SERPL-SCNC: 24 MMOL/L (ref 22–29)
CREAT SERPL-MCNC: 1.07 MG/DL (ref 0.57–1)
DEPRECATED RDW RBC AUTO: 54 FL (ref 37–54)
EGFRCR SERPLBLD CKD-EPI 2021: 56.7 ML/MIN/1.73
EOSINOPHIL # BLD AUTO: 0.11 10*3/MM3 (ref 0–0.4)
EOSINOPHIL NFR BLD AUTO: 2.8 % (ref 0.3–6.2)
ERYTHROCYTE [DISTWIDTH] IN BLOOD BY AUTOMATED COUNT: 15.3 % (ref 12.3–15.4)
GLOBULIN UR ELPH-MCNC: 2.7 GM/DL
GLUCOSE SERPL-MCNC: 103 MG/DL (ref 65–99)
HCT VFR BLD AUTO: 33.5 % (ref 34–46.6)
HDLC SERPL-MCNC: 105 MG/DL (ref 40–60)
HGB BLD-MCNC: 11.3 G/DL (ref 12–15.9)
IMM GRANULOCYTES # BLD AUTO: 0.03 10*3/MM3 (ref 0–0.05)
IMM GRANULOCYTES NFR BLD AUTO: 0.8 % (ref 0–0.5)
LDLC SERPL CALC-MCNC: 107 MG/DL (ref 0–100)
LDLC/HDLC SERPL: 0.97 {RATIO}
LYMPHOCYTES # BLD AUTO: 1.17 10*3/MM3 (ref 0.7–3.1)
LYMPHOCYTES NFR BLD AUTO: 30 % (ref 19.6–45.3)
MCH RBC QN AUTO: 32.4 PG (ref 26.6–33)
MCHC RBC AUTO-ENTMCNC: 33.7 G/DL (ref 31.5–35.7)
MCV RBC AUTO: 96 FL (ref 79–97)
MONOCYTES # BLD AUTO: 0.72 10*3/MM3 (ref 0.1–0.9)
MONOCYTES NFR BLD AUTO: 18.5 % (ref 5–12)
NEUTROPHILS NFR BLD AUTO: 1.84 10*3/MM3 (ref 1.7–7)
NEUTROPHILS NFR BLD AUTO: 47.1 % (ref 42.7–76)
NRBC BLD AUTO-RTO: 0 /100 WBC (ref 0–0.2)
PLATELET # BLD AUTO: 83 10*3/MM3 (ref 140–450)
PMV BLD AUTO: 11.3 FL (ref 6–12)
POTASSIUM SERPL-SCNC: 3.5 MMOL/L (ref 3.5–5.2)
PROT SERPL-MCNC: 7.3 G/DL (ref 6–8.5)
RBC # BLD AUTO: 3.49 10*6/MM3 (ref 3.77–5.28)
SODIUM SERPL-SCNC: 141 MMOL/L (ref 136–145)
TRIGL SERPL-MCNC: 150 MG/DL (ref 0–150)
VLDLC SERPL-MCNC: 25 MG/DL (ref 5–40)
WBC NRBC COR # BLD: 3.9 10*3/MM3 (ref 3.4–10.8)

## 2022-05-10 PROCEDURE — 36415 COLL VENOUS BLD VENIPUNCTURE: CPT

## 2022-05-10 PROCEDURE — 80053 COMPREHEN METABOLIC PANEL: CPT

## 2022-05-10 PROCEDURE — 85025 COMPLETE CBC W/AUTO DIFF WBC: CPT | Performed by: NURSE PRACTITIONER

## 2022-05-10 PROCEDURE — 80061 LIPID PANEL: CPT

## 2022-06-27 ENCOUNTER — HOSPITAL ENCOUNTER (EMERGENCY)
Facility: HOSPITAL | Age: 67
Discharge: HOME | End: 2022-06-27
Attending: EMERGENCY MEDICINE | Admitting: EMERGENCY MEDICINE

## 2022-06-27 VITALS
HEIGHT: 60 IN | WEIGHT: 157.2 LBS | SYSTOLIC BLOOD PRESSURE: 149 MMHG | HEART RATE: 80 BPM | OXYGEN SATURATION: 97 % | BODY MASS INDEX: 30.86 KG/M2 | RESPIRATION RATE: 18 BRPM | TEMPERATURE: 98.2 F | DIASTOLIC BLOOD PRESSURE: 90 MMHG

## 2022-06-27 DIAGNOSIS — R21 RASH: Primary | ICD-10-CM

## 2022-06-27 PROCEDURE — 99283 EMERGENCY DEPT VISIT LOW MDM: CPT

## 2022-06-27 RX ORDER — DIAPER,BRIEF,INFANT-TODD,DISP
1 EACH MISCELLANEOUS EVERY 12 HOURS SCHEDULED
Status: DISCONTINUED | OUTPATIENT
Start: 2022-06-27 | End: 2022-06-28 | Stop reason: HOSPADM

## 2022-06-27 RX ORDER — CEPHALEXIN 500 MG/1
500 CAPSULE ORAL 3 TIMES DAILY
Qty: 21 CAPSULE | Refills: 0 | Status: SHIPPED | OUTPATIENT
Start: 2022-06-27 | End: 2022-07-04

## 2022-06-27 RX ORDER — CEPHALEXIN 250 MG/1
500 CAPSULE ORAL ONCE
Status: COMPLETED | OUTPATIENT
Start: 2022-06-27 | End: 2022-06-27

## 2022-06-27 RX ADMIN — HYDROCORTISONE 1 APPLICATION: 1 CREAM TOPICAL at 22:45

## 2022-06-27 RX ADMIN — CEPHALEXIN 500 MG: 250 CAPSULE ORAL at 22:45

## 2022-07-18 ENCOUNTER — HOSPITAL ENCOUNTER (OUTPATIENT)
Dept: CT IMAGING | Facility: HOSPITAL | Age: 67
Discharge: HOME | End: 2022-07-18
Admitting: INTERNAL MEDICINE

## 2022-07-18 DIAGNOSIS — C34.91 PRIMARY ADENOCARCINOMA OF RIGHT LUNG: ICD-10-CM

## 2022-07-18 PROCEDURE — 71260 CT THORAX DX C+: CPT

## 2022-07-18 PROCEDURE — 25010000002 IOPAMIDOL 61 % SOLUTION: Performed by: INTERNAL MEDICINE

## 2022-07-18 RX ADMIN — IOPAMIDOL 100 ML: 612 INJECTION, SOLUTION INTRAVENOUS at 09:40

## 2022-07-18 NOTE — PROGRESS NOTES
DATE OF VISIT: 7/18/2022    REASON FOR VISIT: Followup for right lung adenocarcinoma     PROBLEM LIST:  1. Right upper lobe adenocarcinoma of the lung T2 aN0 M0 stage Ib:  A.  CT chest done on June 2019 revealed 3 cm right upper lobe lung nodule  B.  Bronchoscopy with biopsy done by Dr. Patel June 13, 2019 was negative  C.  Status post surgical resection with lumpectomy and lymph node sampling done by Dr. Barreto August 30, 2019  D.  Final pathology revealed moderate differentiated adenocarcinoma 3.5 cm in size clear surgical margins, no lymphovascular invasion, no pleural invasion, and negative lymph nodes.  2.  COPD  3.  Hypertension    HISTORY OF PRESENT ILLNESS: The patient is a very pleasant 66 y.o. female  with past medical history significant for right upper lobe adenocarcinoma diagnosed August 30, 2019.  Patient was treated with surgical resection.  She did not require adjuvant treatment. The patient is here today for scheduled follow-up visit.    SUBJECTIVE: The patient is here today by herself.  All in all she is doing fairly well.  She was able to wean herself off oxygen.  She is anxious about the scan results.    Past History:  Medical History: has a past medical history of Acid reflux, Arthritis, Body piercing, Cataract, bilateral, COPD (chronic obstructive pulmonary disease) (HCC), Full dentures, Hypertension, Leg cramps, Lung mass, On home oxygen therapy, SOB (shortness of breath), and Wears glasses.   Surgical History: has a past surgical history that includes Tubal ligation; Colonoscopy (N/A, 2/23/2017); Colonoscopy (N/A, 6/4/2018); Esophagogastroduodenoscopy (06/13/2019); Bronchoscopy (N/A, 6/13/2019); Colonoscopy (N/A, 6/19/2019); Multiple tooth extractions; ORIF tibia & fibula fractures (Left); Thoracoscopy (Right, 8/30/2019); Sigmoidoscopy (N/A, 1/9/2020); and Esophagogastroduodenoscopy (N/A, 1/9/2020).   Family History: family history includes Cancer in her mother; Cirrhosis in her brother;  Colon cancer in her brother; Liver disease in her brother; No Known Problems in her father.   Social History: reports that she quit smoking about 2 years ago. Her smoking use included cigarettes. She has a 50.00 pack-year smoking history. She has never used smokeless tobacco. She reports current alcohol use of about 2.0 standard drinks of alcohol per week. She reports that she does not use drugs.    (Not in a hospital admission)     Allergies: Contrast dye     Review of Systems   Constitutional: Positive for fatigue.   Respiratory: Negative.    Cardiovascular: Negative.    Gastrointestinal: Negative.        PHYSICAL EXAMINATION:   LMP 03/16/2004    There were no vitals filed for this visit.                  ECOG Performance Status: 1 - Symptomatic but completely ambulatory      General Appearance:      alert, cooperative, no apparent distress and appears stated age   Lungs:   Clear to auscultation bilaterally; respirations regular, even, and unlabored bilaterally   Heart:  Regular rate and rhythm, no murmurs appreciated   Abdomen:   Soft, non-tender, non-distended and no organomegaly                 No visits with results within 2 Week(s) from this visit.   Latest known visit with results is:   Lab on 05/10/2022   Component Date Value Ref Range Status   • Glucose 05/10/2022 103 (A) 65 - 99 mg/dL Final   • BUN 05/10/2022 24 (A) 8 - 23 mg/dL Final   • Creatinine 05/10/2022 1.07 (A) 0.57 - 1.00 mg/dL Final   • Sodium 05/10/2022 141  136 - 145 mmol/L Final   • Potassium 05/10/2022 3.5  3.5 - 5.2 mmol/L Final   • Chloride 05/10/2022 101  98 - 107 mmol/L Final   • CO2 05/10/2022 24.0  22.0 - 29.0 mmol/L Final   • Calcium 05/10/2022 9.0  8.6 - 10.5 mg/dL Final   • Total Protein 05/10/2022 7.3  6.0 - 8.5 g/dL Final   • Albumin 05/10/2022 4.60  3.50 - 5.20 g/dL Final   • ALT (SGPT) 05/10/2022 18  1 - 33 U/L Final   • AST (SGOT) 05/10/2022 27  1 - 32 U/L Final   • Alkaline Phosphatase 05/10/2022 100  39 - 117 U/L Final   •  Total Bilirubin 05/10/2022 0.4  0.0 - 1.2 mg/dL Final   • Globulin 05/10/2022 2.7  gm/dL Final   • A/G Ratio 05/10/2022 1.7  g/dL Final   • BUN/Creatinine Ratio 05/10/2022 22.4  7.0 - 25.0 Final   • Anion Gap 05/10/2022 16.0 (A) 5.0 - 15.0 mmol/L Final   • eGFR 05/10/2022 56.7 (A) >60.0 mL/min/1.73 Final    National Kidney Foundation and American Society of Nephrology (ASN) Task Force recommended calculation based on the Chronic Kidney Disease Epidemiology Collaboration (CKD-EPI) equation refit without adjustment for race.   • Total Cholesterol 05/10/2022 237 (A) 0 - 200 mg/dL Final   • Triglycerides 05/10/2022 150  0 - 150 mg/dL Final   • HDL Cholesterol 05/10/2022 105 (A) 40 - 60 mg/dL Final   • LDL Cholesterol  05/10/2022 107 (A) 0 - 100 mg/dL Final   • VLDL Cholesterol 05/10/2022 25  5 - 40 mg/dL Final   • LDL/HDL Ratio 05/10/2022 0.97   Final        CT Chest With Contrast Diagnostic    Result Date: 7/18/2022  Narrative: CT CHEST WITH CONTRAST DIAGNOSTIC-  HISTORY: Followup scans; C34.91-Malignant neoplasm of unspecified part of right bronchus or lung.  COMPARISON: 01/11/2022 and 06/14/2021  TECHNIQUE: Axial CT with IV contrast administration.  FINDINGS:  Postoperative changes of right upper lobectomy are present. Mild scarring of the right lung is noted.  An oval nodule in the left upper lobe is noted measuring 7 x 5 mm, previously measuring 5 x 4 mm in June 2021 and 7 x 5 mm in January 2022. There is underlying emphysema.  No pleural or pericardial effusion is seen.  No enlarged lymph node is seen. Multiple bilateral renal cysts are present. Left adrenal nodule is noted measuring 18 mm, unchanged.      Impression: 1. No evidence of recurrent disease. 2. Left upper lobe nodule stable for the past 6 months and questionably minimally increased since exam done approximately 1 year earlier. 3. Stable left adrenal nodule favor benign.   This study was performed with techniques to keep radiation doses as low as  reasonably achievable (ALARA). Individualized dose reduction techniques using automated exposure control or adjustment of vA and/or kV according to the patient size were employed.  This report was signed and finalized on 7/18/2022 11:25 AM by Erickson Chau MD.      ASSESSMENT: The patient is a very pleasant 66 y.o. female  with right upper lobe lung adenocarcinoma      PLAN:    1.  Right upper lobe lung adenocarcinoma stage Ib:  A.  I did go over the scan results with the patient from July 18, 2022.  I reassured the patient no evidence of relapsed disease on her current CT scan.  She did have 7 mm left upper lobe lung nodule and left adrenal nodule for which we will continue surveillance.    2.  Left upper lobe 7 mm lung nodule:  A.  I will do 6-month follow-up CT scan.    3.  Hypertension:  A.  She will continue Norvasc daily and Coreg twice a day.    FOLLOW UP: 6 months with CT chest.    Vinnie José MD  7/18/2022

## 2022-07-20 ENCOUNTER — OFFICE VISIT (OUTPATIENT)
Dept: ONCOLOGY | Facility: CLINIC | Age: 67
End: 2022-07-20

## 2022-07-20 VITALS
SYSTOLIC BLOOD PRESSURE: 133 MMHG | BODY MASS INDEX: 31.02 KG/M2 | DIASTOLIC BLOOD PRESSURE: 70 MMHG | OXYGEN SATURATION: 94 % | HEIGHT: 60 IN | TEMPERATURE: 97.7 F | RESPIRATION RATE: 16 BRPM | HEART RATE: 75 BPM | WEIGHT: 158 LBS

## 2022-07-20 DIAGNOSIS — C34.91 PRIMARY ADENOCARCINOMA OF RIGHT LUNG: Primary | ICD-10-CM

## 2022-07-20 PROCEDURE — 99214 OFFICE O/P EST MOD 30 MIN: CPT | Performed by: INTERNAL MEDICINE

## 2022-07-20 RX ORDER — ATORVASTATIN CALCIUM 20 MG/1
20 TABLET, FILM COATED ORAL DAILY
COMMUNITY
Start: 2022-05-25

## 2022-07-20 RX ORDER — PREDNISONE 50 MG/1
TABLET ORAL
Qty: 3 TABLET | Refills: 0 | Status: SHIPPED | OUTPATIENT
Start: 2022-07-20 | End: 2023-04-04

## 2022-11-01 ENCOUNTER — TRANSCRIBE ORDERS (OUTPATIENT)
Dept: LAB | Facility: HOSPITAL | Age: 67
End: 2022-11-01

## 2022-11-01 DIAGNOSIS — E78.5 HYPERLIPIDEMIA, UNSPECIFIED HYPERLIPIDEMIA TYPE: Primary | ICD-10-CM

## 2022-11-26 ENCOUNTER — LAB (OUTPATIENT)
Dept: LAB | Facility: HOSPITAL | Age: 67
End: 2022-11-26

## 2022-11-26 DIAGNOSIS — E78.5 HYPERLIPIDEMIA, UNSPECIFIED HYPERLIPIDEMIA TYPE: ICD-10-CM

## 2022-11-26 LAB
ALBUMIN SERPL-MCNC: 4.4 G/DL (ref 3.5–5.2)
ALBUMIN/GLOB SERPL: 1.4 G/DL
ALP SERPL-CCNC: 121 U/L (ref 39–117)
ALT SERPL W P-5'-P-CCNC: 12 U/L (ref 1–33)
ANION GAP SERPL CALCULATED.3IONS-SCNC: 16.2 MMOL/L (ref 5–15)
ANISOCYTOSIS BLD QL: ABNORMAL
AST SERPL-CCNC: 19 U/L (ref 1–32)
BASOPHILS # BLD MANUAL: 0.04 10*3/MM3 (ref 0–0.2)
BASOPHILS NFR BLD MANUAL: 1 % (ref 0–1.5)
BILIRUB SERPL-MCNC: 0.5 MG/DL (ref 0–1.2)
BUN SERPL-MCNC: 16 MG/DL (ref 8–23)
BUN/CREAT SERPL: 18 (ref 7–25)
CALCIUM SPEC-SCNC: 9.8 MG/DL (ref 8.6–10.5)
CHLORIDE SERPL-SCNC: 102 MMOL/L (ref 98–107)
CHOLEST SERPL-MCNC: 238 MG/DL (ref 0–200)
CO2 SERPL-SCNC: 25.8 MMOL/L (ref 22–29)
CREAT SERPL-MCNC: 0.89 MG/DL (ref 0.57–1)
DEPRECATED RDW RBC AUTO: 48.6 FL (ref 37–54)
EGFRCR SERPLBLD CKD-EPI 2021: 70.7 ML/MIN/1.73
EOSINOPHIL # BLD MANUAL: 0.39 10*3/MM3 (ref 0–0.4)
EOSINOPHIL NFR BLD MANUAL: 9.1 % (ref 0.3–6.2)
ERYTHROCYTE [DISTWIDTH] IN BLOOD BY AUTOMATED COUNT: 14.8 % (ref 12.3–15.4)
GLOBULIN UR ELPH-MCNC: 3.2 GM/DL
GLUCOSE SERPL-MCNC: 115 MG/DL (ref 65–99)
HCT VFR BLD AUTO: 34.2 % (ref 34–46.6)
HDLC SERPL-MCNC: 145 MG/DL (ref 40–60)
HGB BLD-MCNC: 11.3 G/DL (ref 12–15.9)
LDLC SERPL CALC-MCNC: 74 MG/DL (ref 0–100)
LDLC/HDLC SERPL: 0.48 {RATIO}
LYMPHOCYTES # BLD MANUAL: 1 10*3/MM3 (ref 0.7–3.1)
LYMPHOCYTES NFR BLD MANUAL: 6.1 % (ref 5–12)
MCH RBC QN AUTO: 30 PG (ref 26.6–33)
MCHC RBC AUTO-ENTMCNC: 33 G/DL (ref 31.5–35.7)
MCV RBC AUTO: 90.7 FL (ref 79–97)
MONOCYTES # BLD: 0.26 10*3/MM3 (ref 0.1–0.9)
NEUTROPHILS # BLD AUTO: 2.62 10*3/MM3 (ref 1.7–7)
NEUTROPHILS NFR BLD MANUAL: 60.6 % (ref 42.7–76)
PLAT MORPH BLD: NORMAL
PLATELET # BLD AUTO: 126 10*3/MM3 (ref 140–450)
PMV BLD AUTO: 10.7 FL (ref 6–12)
POTASSIUM SERPL-SCNC: 3.7 MMOL/L (ref 3.5–5.2)
PROT SERPL-MCNC: 7.6 G/DL (ref 6–8.5)
RBC # BLD AUTO: 3.77 10*6/MM3 (ref 3.77–5.28)
SODIUM SERPL-SCNC: 144 MMOL/L (ref 136–145)
TRIGL SERPL-MCNC: 120 MG/DL (ref 0–150)
VARIANT LYMPHS NFR BLD MANUAL: 23.2 % (ref 19.6–45.3)
VLDLC SERPL-MCNC: 19 MG/DL (ref 5–40)
WBC MORPH BLD: NORMAL
WBC NRBC COR # BLD: 4.33 10*3/MM3 (ref 3.4–10.8)

## 2022-11-26 PROCEDURE — 80053 COMPREHEN METABOLIC PANEL: CPT

## 2022-11-26 PROCEDURE — 85007 BL SMEAR W/DIFF WBC COUNT: CPT

## 2022-11-26 PROCEDURE — 80061 LIPID PANEL: CPT

## 2022-11-26 PROCEDURE — 36415 COLL VENOUS BLD VENIPUNCTURE: CPT

## 2022-11-26 PROCEDURE — 85025 COMPLETE CBC W/AUTO DIFF WBC: CPT

## 2023-01-16 ENCOUNTER — HOSPITAL ENCOUNTER (OUTPATIENT)
Dept: CT IMAGING | Facility: HOSPITAL | Age: 68
Discharge: HOME | End: 2023-01-16
Admitting: INTERNAL MEDICINE
Payer: MEDICARE

## 2023-01-16 DIAGNOSIS — C34.91 PRIMARY ADENOCARCINOMA OF RIGHT LUNG: ICD-10-CM

## 2023-01-16 PROCEDURE — 25010000002 IOPAMIDOL 61 % SOLUTION: Performed by: INTERNAL MEDICINE

## 2023-01-16 PROCEDURE — 71260 CT THORAX DX C+: CPT

## 2023-01-16 RX ADMIN — IOPAMIDOL 100 ML: 612 INJECTION, SOLUTION INTRAVENOUS at 10:39

## 2023-01-17 NOTE — PROGRESS NOTES
DATE OF VISIT: 1/18/2023    REASON FOR VISIT: Followup for right lung adenocarcinoma     PROBLEM LIST:  1. Right upper lobe adenocarcinoma of the lung T2 aN0 M0 stage Ib:  A.  CT chest done on June 2019 revealed 3 cm right upper lobe lung nodule  B.  Bronchoscopy with biopsy done by Dr. Patel June 13, 2019 was negative  C.  Status post surgical resection with lumpectomy and lymph node sampling done by Dr. Barreto August 30, 2019  D.  Final pathology revealed moderate differentiated adenocarcinoma 3.5 cm in size clear surgical margins, no lymphovascular invasion, no pleural invasion, and negative lymph nodes.  2.  COPD  3.  Hypertension    HISTORY OF PRESENT ILLNESS: The patient is a very pleasant 67 y.o. female  with past medical history significant for right upper lobe adenocarcinoma diagnosed August 30, 2019.  Patient was treated with surgical resection.  She did not require adjuvant treatment. The patient is here today for scheduled follow-up visit.    SUBJECTIVE: The patient is here today by herself.  Overall, she has been doing fairly well.  She remains off oxygen.  She is anxious about scan results today.        Past History:  Medical History: has a past medical history of Acid reflux, Arthritis, Body piercing, Cataract, bilateral, COPD (chronic obstructive pulmonary disease) (HCC), Full dentures, Hypertension, Leg cramps, Lung mass, On home oxygen therapy, SOB (shortness of breath), and Wears glasses.   Surgical History: has a past surgical history that includes Tubal ligation; Colonoscopy (N/A, 2/23/2017); Colonoscopy (N/A, 6/4/2018); Esophagogastroduodenoscopy (06/13/2019); Bronchoscopy (N/A, 6/13/2019); Colonoscopy (N/A, 6/19/2019); Multiple tooth extractions; ORIF tibia & fibula fractures (Left); Thoracoscopy (Right, 8/30/2019); Sigmoidoscopy (N/A, 1/9/2020); and Esophagogastroduodenoscopy (N/A, 1/9/2020).   Family History: family history includes Cancer in her mother; Cirrhosis in her brother; Colon  "cancer in her brother; Liver disease in her brother; No Known Problems in her father.   Social History: reports that she quit smoking about 3 years ago. Her smoking use included cigarettes. She has a 50.00 pack-year smoking history. She has never used smokeless tobacco. She reports current alcohol use of about 2.0 standard drinks per week. She reports that she does not use drugs.    (Not in a hospital admission)     Allergies: Contrast dye (echo or unknown ct/mr)     Review of Systems   Constitutional: Positive for fatigue.   Respiratory: Negative.    Cardiovascular: Negative.    Gastrointestinal: Negative.        PHYSICAL EXAMINATION:   /70   Pulse 69   Temp 97.1 °F (36.2 °C) (Temporal)   Resp 12   Ht 152.4 cm (60\")   Wt 73 kg (161 lb)   LMP 03/16/2004   SpO2 97%   BMI 31.44 kg/m²    Pain Score    01/18/23 1118   PainSc: 0-No pain        ECOG score: 1            ECOG Performance Status: 1 - Symptomatic but completely ambulatory      General Appearance:      alert, cooperative, no apparent distress and appears stated age   Lungs:   Clear to auscultation bilaterally; respirations regular, even, and unlabored bilaterally   Heart:  Regular rate and rhythm, no murmurs appreciated   Abdomen:   Soft, non-tender, non-distended and no organomegaly                 No visits with results within 2 Week(s) from this visit.   Latest known visit with results is:   Lab on 11/26/2022   Component Date Value Ref Range Status   • Total Cholesterol 11/26/2022 238 (H)  0 - 200 mg/dL Final   • Triglycerides 11/26/2022 120  0 - 150 mg/dL Final   • HDL Cholesterol 11/26/2022 145 (H)  40 - 60 mg/dL Final   • LDL Cholesterol  11/26/2022 74  0 - 100 mg/dL Final   • VLDL Cholesterol 11/26/2022 19  5 - 40 mg/dL Final   • LDL/HDL Ratio 11/26/2022 0.48   Final   • Glucose 11/26/2022 115 (H)  65 - 99 mg/dL Final   • BUN 11/26/2022 16  8 - 23 mg/dL Final   • Creatinine 11/26/2022 0.89  0.57 - 1.00 mg/dL Final   • Sodium 11/26/2022 144 "  136 - 145 mmol/L Final   • Potassium 11/26/2022 3.7  3.5 - 5.2 mmol/L Final   • Chloride 11/26/2022 102  98 - 107 mmol/L Final   • CO2 11/26/2022 25.8  22.0 - 29.0 mmol/L Final   • Calcium 11/26/2022 9.8  8.6 - 10.5 mg/dL Final   • Total Protein 11/26/2022 7.6  6.0 - 8.5 g/dL Final   • Albumin 11/26/2022 4.40  3.50 - 5.20 g/dL Final   • ALT (SGPT) 11/26/2022 12  1 - 33 U/L Final   • AST (SGOT) 11/26/2022 19  1 - 32 U/L Final   • Alkaline Phosphatase 11/26/2022 121 (H)  39 - 117 U/L Final   • Total Bilirubin 11/26/2022 0.5  0.0 - 1.2 mg/dL Final   • Globulin 11/26/2022 3.2  gm/dL Final   • A/G Ratio 11/26/2022 1.4  g/dL Final   • BUN/Creatinine Ratio 11/26/2022 18.0  7.0 - 25.0 Final   • Anion Gap 11/26/2022 16.2 (H)  5.0 - 15.0 mmol/L Final   • eGFR 11/26/2022 70.7  >60.0 mL/min/1.73 Final    National Kidney Foundation and American Society of Nephrology (ASN) Task Force recommended calculation based on the Chronic Kidney Disease Epidemiology Collaboration (CKD-EPI) equation refit without adjustment for race.   • WBC 11/26/2022 4.33  3.40 - 10.80 10*3/mm3 Final   • RBC 11/26/2022 3.77  3.77 - 5.28 10*6/mm3 Final   • Hemoglobin 11/26/2022 11.3 (L)  12.0 - 15.9 g/dL Final   • Hematocrit 11/26/2022 34.2  34.0 - 46.6 % Final   • MCV 11/26/2022 90.7  79.0 - 97.0 fL Final   • MCH 11/26/2022 30.0  26.6 - 33.0 pg Final   • MCHC 11/26/2022 33.0  31.5 - 35.7 g/dL Final   • RDW 11/26/2022 14.8  12.3 - 15.4 % Final   • RDW-SD 11/26/2022 48.6  37.0 - 54.0 fl Final   • MPV 11/26/2022 10.7  6.0 - 12.0 fL Final   • Platelets 11/26/2022 126 (L)  140 - 450 10*3/mm3 Final    Platelet clumping noted. Platelet count may be falsely decreased due to platelet clumping.        • Neutrophil % 11/26/2022 60.6  42.7 - 76.0 % Final   • Lymphocyte % 11/26/2022 23.2  19.6 - 45.3 % Final   • Monocyte % 11/26/2022 6.1  5.0 - 12.0 % Final   • Eosinophil % 11/26/2022 9.1 (H)  0.3 - 6.2 % Final   • Basophil % 11/26/2022 1.0  0.0 - 1.5 % Final   •  Neutrophils Absolute 11/26/2022 2.62  1.70 - 7.00 10*3/mm3 Final   • Lymphocytes Absolute 11/26/2022 1.00  0.70 - 3.10 10*3/mm3 Final   • Monocytes Absolute 11/26/2022 0.26  0.10 - 0.90 10*3/mm3 Final   • Eosinophils Absolute 11/26/2022 0.39  0.00 - 0.40 10*3/mm3 Final   • Basophils Absolute 11/26/2022 0.04  0.00 - 0.20 10*3/mm3 Final   • Anisocytosis 11/26/2022 Slight/1+  None Seen Final   • WBC Morphology 11/26/2022 Normal  Normal Final   • Platelet Morphology 11/26/2022 Normal  Normal Final        CT Chest With Contrast Diagnostic    Result Date: 1/16/2023  Narrative: PROCEDURE: CT CHEST W CONTRAST DIAGNOSTIC-  HISTORY: f/u scans; C34.91-Malignant neoplasm of unspecified part of right bronchus or lung  COMPARISON: 07/18/2022.  PROCEDURE: The patient was injected with IV contrast.  Axial images were obtained from the lung apex to the mid abdomen by computed tomography. This study was performed with techniques to keep radiation doses as low as reasonably achievable, (ALARA). Individualized dose reduction techniques using automated exposure control or adjustment of mA and/or kV according to the patient size were employed.  FINDINGS:  CHEST: There is no suspicious axillary adenopathy. There is no suspicious hilar or mediastinal adenopathy. Postsurgical change from previous right lobectomy with right perihilar surgical clips and volume loss in the right hemithorax is stable. No right pulmonary nodules identified. Again noted is the oval, left upper lobe nodule measuring 8.7 x 5 mm best seen series 3 image 19. This measured 7 x 5 mm previously. Consider PET/CT or 3 month follow-up exam for further evaluation. No new left pulmonary nodules identified. The heart is proper size. There is no pericardial or pleural effusion. Limited images of the upper abdomen again demonstrate bilateral, circumscribed, hypodense renal lesions consistent with cysts and stable from the prior exam. Hypodense left adrenal mass is stable at 19  mm from the prior exam. No bony destructive lesion seen.      Impression: Minimal interval enlargement of left upper lobe nodule from 7 x 5 mm to 8.7 x 5 mm; consider PET/CT or 3 month follow-up.  Stable, bilateral, simple appearing renal cystic lesions.  Stable left adrenal mass.  This report was signed and finalized on 1/16/2023 1:45 PM by Candelaria Madrigal MD.      ASSESSMENT: The patient is a very pleasant 67 y.o. female  with right upper lobe lung adenocarcinoma      PLAN:    1.  Right upper lobe lung adenocarcinoma stage Ib:  A.  I did go over the scan results with the patient from January 16, 2023.  I discussed with the patient that there was minimal interval enlargement of the left upper lobe nodule.  I discussed the case with Dr. José. We will plan to check CT scan in 6.    2.  Left upper lobe 8 mm lung nodule:  A.  Slight increase in lung nodule. I discussed the case with Dr. José. We will do 6-month follow-up CT scan.    3.  Hypertension:  A.  She will continue Norvasc daily and Coreg twice a day.    FOLLOW UP: 6 months with CT chest.    Chandni Odell, APRN  1/18/2023

## 2023-01-18 ENCOUNTER — OFFICE VISIT (OUTPATIENT)
Dept: ONCOLOGY | Facility: CLINIC | Age: 68
End: 2023-01-18
Payer: MEDICARE

## 2023-01-18 VITALS
OXYGEN SATURATION: 97 % | HEIGHT: 60 IN | HEART RATE: 69 BPM | WEIGHT: 161 LBS | SYSTOLIC BLOOD PRESSURE: 133 MMHG | RESPIRATION RATE: 12 BRPM | TEMPERATURE: 97.1 F | BODY MASS INDEX: 31.61 KG/M2 | DIASTOLIC BLOOD PRESSURE: 70 MMHG

## 2023-01-18 DIAGNOSIS — C34.91 PRIMARY ADENOCARCINOMA OF RIGHT LUNG: Primary | ICD-10-CM

## 2023-01-18 PROCEDURE — 99214 OFFICE O/P EST MOD 30 MIN: CPT | Performed by: NURSE PRACTITIONER

## 2023-01-18 RX ORDER — AMLODIPINE BESYLATE 10 MG/1
TABLET ORAL
COMMUNITY

## 2023-02-02 ENCOUNTER — TRANSCRIBE ORDERS (OUTPATIENT)
Dept: ADMINISTRATIVE | Facility: HOSPITAL | Age: 68
End: 2023-02-02
Payer: MEDICARE

## 2023-02-02 DIAGNOSIS — Z13.9 SCREENING DUE: Primary | ICD-10-CM

## 2023-04-04 ENCOUNTER — OFFICE VISIT (OUTPATIENT)
Dept: OBSTETRICS AND GYNECOLOGY | Facility: CLINIC | Age: 68
End: 2023-04-04
Payer: MEDICARE

## 2023-04-04 VITALS
HEIGHT: 60 IN | DIASTOLIC BLOOD PRESSURE: 62 MMHG | BODY MASS INDEX: 31.37 KG/M2 | SYSTOLIC BLOOD PRESSURE: 120 MMHG | WEIGHT: 159.8 LBS

## 2023-04-04 DIAGNOSIS — D21.9 FIBROIDS: ICD-10-CM

## 2023-04-04 DIAGNOSIS — N85.2 ENLARGED UTERUS: ICD-10-CM

## 2023-04-04 DIAGNOSIS — Z01.419 ROUTINE GYNECOLOGICAL EXAMINATION: Primary | ICD-10-CM

## 2023-04-04 DIAGNOSIS — Z12.4 SCREENING FOR CERVICAL CANCER: ICD-10-CM

## 2023-04-04 PROCEDURE — 99387 INIT PM E/M NEW PAT 65+ YRS: CPT | Performed by: PHYSICIAN ASSISTANT

## 2023-04-04 PROCEDURE — 1160F RVW MEDS BY RX/DR IN RCRD: CPT | Performed by: PHYSICIAN ASSISTANT

## 2023-04-04 PROCEDURE — 3015F CERV CANCER SCREEN DOCD: CPT | Performed by: PHYSICIAN ASSISTANT

## 2023-04-04 PROCEDURE — 1159F MED LIST DOCD IN RCRD: CPT | Performed by: PHYSICIAN ASSISTANT

## 2023-04-04 PROCEDURE — 3074F SYST BP LT 130 MM HG: CPT | Performed by: PHYSICIAN ASSISTANT

## 2023-04-04 PROCEDURE — 3078F DIAST BP <80 MM HG: CPT | Performed by: PHYSICIAN ASSISTANT

## 2023-04-04 PROCEDURE — 99213 OFFICE O/P EST LOW 20 MIN: CPT | Performed by: PHYSICIAN ASSISTANT

## 2023-04-04 RX ORDER — CARVEDILOL 12.5 MG/1
1 TABLET ORAL 2 TIMES DAILY
COMMUNITY
Start: 2022-08-15

## 2023-04-04 RX ORDER — TRAZODONE HYDROCHLORIDE 100 MG/1
TABLET ORAL
COMMUNITY

## 2023-04-04 NOTE — PROGRESS NOTES
Subjective   Chief Complaint   Patient presents with   • Gynecologic Exam     Last pap done 10+ years ago, MMG is scheduled, History of fibroids-would like to have those checked       Lizbeth Johns is a 67 y.o. year old  presenting to be seen for her annual gynecological exam.   She reports no complaints  Has history of uterine fibroids and wanting to have fibroids checked  LMP was about age 48 and no bleeding or spotting since menopause  No pelvic pain  No urinary symptoms or incontinence  Has mammogram scheduled later this month through PCP     Past Medical History:   Diagnosis Date   • Acid reflux    • Arthritis    • Body piercing     ears   • Cataract, bilateral    • COPD (chronic obstructive pulmonary disease)    • Full dentures     Patient advised no adhesives DOS   • Hypertension    • Leg cramps    • Lung mass     right   • On home oxygen therapy     2L NC HS and daily prn    • SOB (shortness of breath)     xray showed spot on lungs   • Wears glasses     Rx glasses        Current Outpatient Medications:   •  amLODIPine (NORVASC) 10 MG tablet, amlodipine 10 mg tablet  Take 1 tablet every day by oral route., Disp: , Rfl:   •  aspirin 81 MG EC tablet, Take 1 tablet by mouth Daily., Disp: , Rfl:   •  atorvastatin (LIPITOR) 20 MG tablet, Take 1 tablet by mouth Daily., Disp: , Rfl:   •  carvedilol (COREG) 12.5 MG tablet, Take 1 tablet by mouth 2 (Two) Times a Day With Meals., Disp: , Rfl:   •  carvedilol (COREG) 12.5 MG tablet, Take 1 tablet by mouth 2 (Two) Times a Day., Disp: , Rfl:   •  diphenhydrAMINE (BENADRYL) 50 MG tablet, Take 1 tab (50mg) by mouth prior to exam., Disp: 1 tablet, Rfl: 0  •  tiotropium bromide-olodaterol (STIOLTO RESPIMAT) 2.5-2.5 MCG/ACT aerosol solution inhaler, Inhale 2 puffs Daily., Disp: 1 inhaler, Rfl: 5  •  traZODone (DESYREL) 100 MG tablet, trazodone 100 mg tablet  Take 1 tablet every day by oral route at bedtime., Disp: , Rfl:   •  traZODone (DESYREL) 50 MG tablet, Take 1  tablet by mouth Every Night., Disp: , Rfl:   No current facility-administered medications for this visit.    Facility-Administered Medications Ordered in Other Visits:   •  Chlorhexidine Gluconate Cloth 2 % pads 1 application, 1 application, Topical, Q12H PRN, Candelaria Novak PA-C   Allergies   Allergen Reactions   • Contrast Dye (Echo Or Unknown Ct/Mr) Hives      Past Surgical History:   Procedure Laterality Date   • BRONCHOSCOPY N/A 6/13/2019    Procedure: BRONCHOSCOPY WITH FLUOROSCOPY, BIOPSY, BRUSHINGS, AND WASHINGS;  Surgeon: Gudelia Patel MD;  Location: [facility] OR;  Service: Pulmonary   • COLONOSCOPY N/A 2/23/2017    Procedure: COLONOSCOPY with hot and cold snare polypectomies,  hot biopsy polypectomies, biopsies, resolution clip placement;  Surgeon: Zackery Siddiqui MD;  Location: [facility] ENDOSCOPY;  Service:    • COLONOSCOPY N/A 6/4/2018    Procedure: COLONOSCOPY WITH HOT SNARE POLYPECTOMY X 7; COLD SNARE POLYPECTOMY X 3; HOT BIOPSY POLYPECTOMY X 20; COLD BIOPSY POLYPECTOMY X 2; THERMAL ABLATION OF COLON POLYPS X 23; CLIP PLACEMENT X 2; BIOPSIES;  Surgeon: Zackery Siddiqui MD;  Location: [facility] ENDOSCOPY;  Service: Gastroenterology   • COLONOSCOPY N/A 6/19/2019    Procedure: COLONOSCOPY with cold biopsy polypectomies and cold biopsy;  Surgeon: Zackeyr Siddiqui MD;  Location: [facility] ENDOSCOPY;  Service: Gastroenterology   • ENDOSCOPY  06/13/2019   • ENDOSCOPY N/A 1/9/2020    Procedure: ESOPHAGOGASTRODUODENOSCOPY;  Surgeon: Zackery Siddiqui MD;  Location: [facility] ENDOSCOPY;  Service: Gastroenterology   • MULTIPLE TOOTH EXTRACTIONS      full extraction   • ORIF TIBIA/FIBULA FRACTURES Left    • SIGMOIDOSCOPY N/A 1/9/2020    Procedure: FLEXIBLE SIGMOIDOSCOPY; ANOSCOPY;  Surgeon: Zackery Siddiqui MD;  Location: [facility] ENDOSCOPY;  Service: Gastroenterology   • THORACOSCOPY Right 8/30/2019    Procedure: BRONCHOSCOPY,  THORACOSCOPY VIDEO ASSISTED with right upper lobe wedge RESECTION , RIGHT UPPER lobectomy with  "mediastinal lymph node dissection AND INTERCOSTAL CRYOABLATION OF RIGHT CHEST;  Surgeon: Brian Barreto MD;  Location: [facility];  Service: Cardiothoracic   • TUBAL ABDOMINAL LIGATION        Social History     Socioeconomic History   • Marital status:    • Number of children: 4   Tobacco Use   • Smoking status: Former     Packs/day: 1.00     Years: 50.00     Pack years: 50.00     Types: Cigarettes     Quit date: 8/30/2019     Years since quitting: 3.5   • Smokeless tobacco: Never   • Tobacco comments:     Pt states that she quit in 2019   Vaping Use   • Vaping Use: Never used   Substance and Sexual Activity   • Alcohol use: Yes     Alcohol/week: 2.0 standard drinks     Types: 2 Glasses of wine per week     Comment: once in a while    • Drug use: No   • Sexual activity: Not Currently     Birth control/protection: Post-menopausal      Family History   Problem Relation Age of Onset   • Cirrhosis Brother    • Liver disease Brother    • Colon cancer Brother         Possibly colon cancer, patient unsure.   • Cancer Mother    • No Known Problems Father    • Stomach cancer Neg Hx    • Esophageal cancer Neg Hx    • Breast cancer Neg Hx        Review of Systems   Constitutional: Negative for chills, diaphoresis and fever.   Gastrointestinal: Negative for abdominal distention, abdominal pain, constipation, diarrhea, nausea and vomiting.   Genitourinary: Negative for difficulty urinating, dysuria, enuresis, pelvic pain, vaginal bleeding, vaginal discharge and vaginal pain.           Objective   /62   Ht 152.4 cm (60\")   Wt 72.5 kg (159 lb 12.8 oz)   LMP 03/16/2004   BMI 31.21 kg/m²     Physical Exam  Exam conducted with a chaperone present.   Constitutional:       Appearance: Normal appearance. She is well-developed and well-groomed.   Eyes:      General: Lids are normal.      Extraocular Movements: Extraocular movements intact.      Conjunctiva/sclera: Conjunctivae normal.   Chest:   Breasts:     Breasts are " symmetrical.      Right: No inverted nipple, mass, nipple discharge, skin change or tenderness.      Left: No inverted nipple, mass, nipple discharge, skin change or tenderness.   Abdominal:      Palpations: Abdomen is soft.      Tenderness: There is no abdominal tenderness.   Genitourinary:     Labia:         Right: No rash, tenderness or lesion.         Left: No rash, tenderness or lesion.       Urethra: No prolapse, urethral pain, urethral swelling or urethral lesion.      Vagina: No vaginal discharge, tenderness, bleeding, lesions or prolapsed vaginal walls.      Cervix: No cervical motion tenderness, discharge, friability, lesion or cervical bleeding.      Uterus: Enlarged. Not tender.       Adnexa:         Right: No mass or tenderness.          Left: No mass or tenderness.        Comments: Vaginal tissue atrophic  Uterus 8 weeks globular  Lymphadenopathy:      Upper Body:      Right upper body: No axillary adenopathy.      Left upper body: No axillary adenopathy.   Skin:     General: Skin is warm and dry.      Findings: No bruising or lesion.   Neurological:      General: No focal deficit present.      Mental Status: She is alert and oriented to person, place, and time.   Psychiatric:         Attention and Perception: Attention normal.         Mood and Affect: Mood normal.         Speech: Speech normal.         Behavior: Behavior is cooperative.            Result Review :                   Assessment and Plan  Diagnoses and all orders for this visit:    1. Routine gynecological examination (Primary)    2. Screening for cervical cancer  -     LIQUID-BASED PAP SMEAR WITH HPV GENOTYPING IF ASCUS ([facility])    3. Enlarged uterus  -     US Non-ob Transvaginal    4. Fibroids  -     US Non-ob Transvaginal      Patient Instructions   Self breast exam monthly  Annual mammogram  Calcium 1200 mg daily and vit D 2000 mg daily  Regular excercise              This note was electronically signed.    Tracy Garza PA-C    April 4, 2023

## 2023-04-05 LAB — REF LAB TEST METHOD: NORMAL

## 2023-04-20 ENCOUNTER — HOSPITAL ENCOUNTER (OUTPATIENT)
Dept: MAMMOGRAPHY | Facility: HOSPITAL | Age: 68
Discharge: HOME | End: 2023-04-20
Admitting: FAMILY MEDICINE
Payer: MEDICARE

## 2023-04-20 DIAGNOSIS — Z13.9 SCREENING DUE: ICD-10-CM

## 2023-04-20 PROCEDURE — 77063 BREAST TOMOSYNTHESIS BI: CPT

## 2023-04-20 PROCEDURE — 77067 SCR MAMMO BI INCL CAD: CPT

## 2023-04-25 ENCOUNTER — OFFICE VISIT (OUTPATIENT)
Dept: OBSTETRICS AND GYNECOLOGY | Facility: CLINIC | Age: 68
End: 2023-04-25
Payer: MEDICARE

## 2023-04-25 VITALS
BODY MASS INDEX: 31.22 KG/M2 | DIASTOLIC BLOOD PRESSURE: 68 MMHG | WEIGHT: 159 LBS | SYSTOLIC BLOOD PRESSURE: 112 MMHG | HEIGHT: 60 IN

## 2023-04-25 DIAGNOSIS — D21.9 FIBROIDS: Primary | ICD-10-CM

## 2023-04-25 PROCEDURE — 3078F DIAST BP <80 MM HG: CPT | Performed by: PHYSICIAN ASSISTANT

## 2023-04-25 PROCEDURE — 1159F MED LIST DOCD IN RCRD: CPT | Performed by: PHYSICIAN ASSISTANT

## 2023-04-25 PROCEDURE — 99213 OFFICE O/P EST LOW 20 MIN: CPT | Performed by: PHYSICIAN ASSISTANT

## 2023-04-25 PROCEDURE — 1160F RVW MEDS BY RX/DR IN RCRD: CPT | Performed by: PHYSICIAN ASSISTANT

## 2023-04-25 PROCEDURE — 3074F SYST BP LT 130 MM HG: CPT | Performed by: PHYSICIAN ASSISTANT

## 2023-04-25 NOTE — PROGRESS NOTES
Subjective   Chief Complaint   Patient presents with   • Follow-up     TVS done today       Lizbeth Johns is a 67 y.o. year old  presenting to be seen for follow-up.  Patient had been seen recently for annual exam.  She had requested an ultrasound to have fibroids checked.  She had been told when she was younger that she had fibroids but she had not had a follow-up ultrasound for several years.  She is not having any pelvic pain, pressure or vaginal bleeding.  Transvaginal ultrasound done today is reviewed with patient.  Uterus is retroflexed and mildly enlarged for age with multiple small calcified fibroids.  Fibroids ranging in size from 3.6 cm to 2.5 cm.  Endometrium is thin measuring 4.8 mm.  Neither ovary is visualized.  There is no free fluid or adnexal masses.    Past Medical History:   Diagnosis Date   • Acid reflux    • Arthritis    • Body piercing     ears   • Cataract, bilateral    • COPD (chronic obstructive pulmonary disease)    • Full dentures     Patient advised no adhesives DOS   • Hypertension    • Leg cramps    • Lung mass     right   • On home oxygen therapy     2L NC HS and daily prn    • SOB (shortness of breath)     xray showed spot on lungs   • Wears glasses     Rx glasses        Current Outpatient Medications:   •  amLODIPine (NORVASC) 10 MG tablet, amlodipine 10 mg tablet  Take 1 tablet every day by oral route., Disp: , Rfl:   •  aspirin 81 MG EC tablet, Take 1 tablet by mouth Daily., Disp: , Rfl:   •  atorvastatin (LIPITOR) 20 MG tablet, Take 1 tablet by mouth Daily., Disp: , Rfl:   •  carvedilol (COREG) 12.5 MG tablet, Take 1 tablet by mouth 2 (Two) Times a Day With Meals., Disp: , Rfl:   •  carvedilol (COREG) 12.5 MG tablet, Take 1 tablet by mouth 2 (Two) Times a Day., Disp: , Rfl:   •  diphenhydrAMINE (BENADRYL) 50 MG tablet, Take 1 tab (50mg) by mouth prior to exam., Disp: 1 tablet, Rfl: 0  •  tiotropium bromide-olodaterol (STIOLTO RESPIMAT) 2.5-2.5 MCG/ACT aerosol solution  inhaler, Inhale 2 puffs Daily., Disp: 1 inhaler, Rfl: 5  •  traZODone (DESYREL) 100 MG tablet, trazodone 100 mg tablet  Take 1 tablet every day by oral route at bedtime., Disp: , Rfl:   •  traZODone (DESYREL) 50 MG tablet, Take 1 tablet by mouth Every Night., Disp: , Rfl:   No current facility-administered medications for this visit.    Facility-Administered Medications Ordered in Other Visits:   •  Chlorhexidine Gluconate Cloth 2 % pads 1 application, 1 application, Topical, Q12H PRN, Candelaria Novak PA-C   Allergies   Allergen Reactions   • Contrast Dye (Echo Or Unknown Ct/Mr) Hives      Past Surgical History:   Procedure Laterality Date   • BRONCHOSCOPY N/A 06/13/2019    Procedure: BRONCHOSCOPY WITH FLUOROSCOPY, BIOPSY, BRUSHINGS, AND WASHINGS;  Surgeon: Gudelia Patel MD;  Location: [facility] OR;  Service: Pulmonary   • COLONOSCOPY N/A 02/23/2017    Procedure: COLONOSCOPY with hot and cold snare polypectomies,  hot biopsy polypectomies, biopsies, resolution clip placement;  Surgeon: Zackery Siddiqui MD;  Location: [facility] ENDOSCOPY;  Service:    • COLONOSCOPY N/A 06/04/2018    Procedure: COLONOSCOPY WITH HOT SNARE POLYPECTOMY X 7; COLD SNARE POLYPECTOMY X 3; HOT BIOPSY POLYPECTOMY X 20; COLD BIOPSY POLYPECTOMY X 2; THERMAL ABLATION OF COLON POLYPS X 23; CLIP PLACEMENT X 2; BIOPSIES;  Surgeon: Zackery Siddiqui MD;  Location: [facility] ENDOSCOPY;  Service: Gastroenterology   • COLONOSCOPY N/A 06/19/2019    Procedure: COLONOSCOPY with cold biopsy polypectomies and cold biopsy;  Surgeon: Zackery Siddiqui MD;  Location: [facility] ENDOSCOPY;  Service: Gastroenterology   • ENDOSCOPY  06/13/2019   • ENDOSCOPY N/A 01/09/2020    Procedure: ESOPHAGOGASTRODUODENOSCOPY;  Surgeon: Zackery Siddiqui MD;  Location: [facility] ENDOSCOPY;  Service: Gastroenterology   • MULTIPLE TOOTH EXTRACTIONS      full extraction   • ORIF TIBIA/FIBULA FRACTURES Left    • SIGMOIDOSCOPY N/A 01/09/2020    Procedure: FLEXIBLE SIGMOIDOSCOPY; ANOSCOPY;  Surgeon:  "Zackery Siddiqui MD;  Location: [facility] ENDOSCOPY;  Service: Gastroenterology   • THORACOSCOPY Right 08/30/2019    Procedure: BRONCHOSCOPY,  THORACOSCOPY VIDEO ASSISTED with right upper lobe wedge RESECTION , RIGHT UPPER lobectomy with mediastinal lymph node dissection AND INTERCOSTAL CRYOABLATION OF RIGHT CHEST;  Surgeon: Brian Barreto MD;  Location: [facility] OR;  Service: Cardiothoracic   • TUBAL ABDOMINAL LIGATION        Social History     Socioeconomic History   • Marital status:    • Number of children: 4   Tobacco Use   • Smoking status: Former     Packs/day: 1.00     Years: 50.00     Pack years: 50.00     Types: Cigarettes     Quit date: 8/30/2019     Years since quitting: 3.6   • Smokeless tobacco: Never   • Tobacco comments:     Pt states that she quit in 2019   Vaping Use   • Vaping Use: Never used   Substance and Sexual Activity   • Alcohol use: Yes     Alcohol/week: 2.0 standard drinks     Types: 2 Glasses of wine per week     Comment: once in a while    • Drug use: No   • Sexual activity: Not Currently     Birth control/protection: Post-menopausal      Family History   Problem Relation Age of Onset   • Cirrhosis Brother    • Liver disease Brother    • Colon cancer Brother         Possibly colon cancer, patient unsure.   • Cancer Mother    • No Known Problems Father    • Stomach cancer Neg Hx    • Esophageal cancer Neg Hx    • Breast cancer Neg Hx        Review of Systems   Constitutional: Negative for chills, diaphoresis and fever.   Gastrointestinal: Negative for constipation, diarrhea, nausea and vomiting.   Genitourinary: Negative for difficulty urinating, dysuria, pelvic pain, vaginal bleeding and vaginal discharge.           Objective   /68   Ht 152.4 cm (60\")   Wt 72.1 kg (159 lb)   LMP 03/16/2004   BMI 31.05 kg/m²     Physical Exam  Constitutional:       Appearance: Normal appearance. She is well-groomed.   Eyes:      General: Lids are normal.      Extraocular Movements: " Extraocular movements intact.      Conjunctiva/sclera: Conjunctivae normal.   Skin:     General: Skin is warm and dry.      Findings: No bruising or lesion.   Neurological:      General: No focal deficit present.      Mental Status: She is alert and oriented to person, place, and time.   Psychiatric:         Attention and Perception: Attention normal.         Mood and Affect: Mood normal.         Speech: Speech normal.         Behavior: Behavior is cooperative.            Result Review :           US Non-ob Transvaginal (04/25/2023 13:22)          Assessment and Plan  Diagnoses and all orders for this visit:    1. Fibroids (Primary)      Patient Instructions   With patient being asymptomatic and relatively small calcified fibroids no intervention is recommended.  Patient is advised should she develop any pelvic pain, pressure or bleeding to call or return to the office as needed.  Otherwise follow-up in 1 year for annual exam.             This note was electronically signed.    Tracy Garza PA-C   April 25, 2023

## 2023-04-25 NOTE — PATIENT INSTRUCTIONS
With patient being asymptomatic and relatively small calcified fibroids no intervention is recommended.  Patient is advised should she develop any pelvic pain, pressure or bleeding to call or return to the office as needed.  Otherwise follow-up in 1 year for annual exam.

## 2023-06-05 ENCOUNTER — LAB (OUTPATIENT)
Dept: LAB | Facility: HOSPITAL | Age: 68
End: 2023-06-05
Payer: MEDICARE

## 2023-06-05 ENCOUNTER — TRANSCRIBE ORDERS (OUTPATIENT)
Dept: LAB | Facility: HOSPITAL | Age: 68
End: 2023-06-05
Payer: MEDICARE

## 2023-06-05 DIAGNOSIS — E78.5 HYPERLIPIDEMIA, UNSPECIFIED HYPERLIPIDEMIA TYPE: ICD-10-CM

## 2023-06-05 DIAGNOSIS — E78.5 HYPERLIPIDEMIA, UNSPECIFIED HYPERLIPIDEMIA TYPE: Primary | ICD-10-CM

## 2023-06-05 LAB
ALBUMIN SERPL-MCNC: 4.2 G/DL (ref 3.5–5.2)
ALBUMIN/GLOB SERPL: 1.3 G/DL
ALP SERPL-CCNC: 130 U/L (ref 39–117)
ALT SERPL W P-5'-P-CCNC: 16 U/L (ref 1–33)
ANION GAP SERPL CALCULATED.3IONS-SCNC: 14 MMOL/L (ref 5–15)
AST SERPL-CCNC: 22 U/L (ref 1–32)
BASOPHILS # BLD AUTO: 0.03 10*3/MM3 (ref 0–0.2)
BASOPHILS NFR BLD AUTO: 0.7 % (ref 0–1.5)
BILIRUB SERPL-MCNC: 0.2 MG/DL (ref 0–1.2)
BUN SERPL-MCNC: 15 MG/DL (ref 8–23)
BUN/CREAT SERPL: 17 (ref 7–25)
CALCIUM SPEC-SCNC: 9.5 MG/DL (ref 8.6–10.5)
CHLORIDE SERPL-SCNC: 105 MMOL/L (ref 98–107)
CHOLEST SERPL-MCNC: 187 MG/DL (ref 0–200)
CO2 SERPL-SCNC: 23 MMOL/L (ref 22–29)
CREAT SERPL-MCNC: 0.88 MG/DL (ref 0.57–1)
DEPRECATED RDW RBC AUTO: 47 FL (ref 37–54)
EGFRCR SERPLBLD CKD-EPI 2021: 71.2 ML/MIN/1.73
EOSINOPHIL # BLD AUTO: 0.15 10*3/MM3 (ref 0–0.4)
EOSINOPHIL NFR BLD AUTO: 3.3 % (ref 0.3–6.2)
ERYTHROCYTE [DISTWIDTH] IN BLOOD BY AUTOMATED COUNT: 14.7 % (ref 12.3–15.4)
GLOBULIN UR ELPH-MCNC: 3.2 GM/DL
GLUCOSE SERPL-MCNC: 84 MG/DL (ref 65–99)
HCT VFR BLD AUTO: 31.5 % (ref 34–46.6)
HDLC SERPL-MCNC: 73 MG/DL (ref 40–60)
HGB BLD-MCNC: 10.8 G/DL (ref 12–15.9)
IMM GRANULOCYTES # BLD AUTO: 0.02 10*3/MM3 (ref 0–0.05)
IMM GRANULOCYTES NFR BLD AUTO: 0.4 % (ref 0–0.5)
LDLC SERPL CALC-MCNC: 101 MG/DL (ref 0–100)
LDLC/HDLC SERPL: 1.38 {RATIO}
LYMPHOCYTES # BLD AUTO: 1.07 10*3/MM3 (ref 0.7–3.1)
LYMPHOCYTES NFR BLD AUTO: 23.2 % (ref 19.6–45.3)
MCH RBC QN AUTO: 30.2 PG (ref 26.6–33)
MCHC RBC AUTO-ENTMCNC: 34.3 G/DL (ref 31.5–35.7)
MCV RBC AUTO: 88 FL (ref 79–97)
MONOCYTES # BLD AUTO: 0.6 10*3/MM3 (ref 0.1–0.9)
MONOCYTES NFR BLD AUTO: 13 % (ref 5–12)
NEUTROPHILS NFR BLD AUTO: 2.74 10*3/MM3 (ref 1.7–7)
NEUTROPHILS NFR BLD AUTO: 59.4 % (ref 42.7–76)
NRBC BLD AUTO-RTO: 0 /100 WBC (ref 0–0.2)
PLATELET # BLD AUTO: 229 10*3/MM3 (ref 140–450)
PMV BLD AUTO: 10.5 FL (ref 6–12)
POTASSIUM SERPL-SCNC: 3.9 MMOL/L (ref 3.5–5.2)
PROT SERPL-MCNC: 7.4 G/DL (ref 6–8.5)
RBC # BLD AUTO: 3.58 10*6/MM3 (ref 3.77–5.28)
SODIUM SERPL-SCNC: 142 MMOL/L (ref 136–145)
TRIGL SERPL-MCNC: 68 MG/DL (ref 0–150)
VLDLC SERPL-MCNC: 13 MG/DL (ref 5–40)
WBC NRBC COR # BLD: 4.61 10*3/MM3 (ref 3.4–10.8)

## 2023-06-05 PROCEDURE — 80053 COMPREHEN METABOLIC PANEL: CPT

## 2023-06-05 PROCEDURE — 85025 COMPLETE CBC W/AUTO DIFF WBC: CPT

## 2023-06-05 PROCEDURE — 36415 COLL VENOUS BLD VENIPUNCTURE: CPT

## 2023-06-05 PROCEDURE — 80061 LIPID PANEL: CPT

## 2023-06-12 ENCOUNTER — LAB (OUTPATIENT)
Dept: LAB | Facility: HOSPITAL | Age: 68
End: 2023-06-12
Payer: MEDICARE

## 2023-06-12 ENCOUNTER — TRANSCRIBE ORDERS (OUTPATIENT)
Dept: LAB | Facility: HOSPITAL | Age: 68
End: 2023-06-12
Payer: MEDICARE

## 2023-06-12 DIAGNOSIS — D64.9 ANEMIA, UNSPECIFIED TYPE: ICD-10-CM

## 2023-06-12 DIAGNOSIS — D64.9 ANEMIA, UNSPECIFIED TYPE: Primary | ICD-10-CM

## 2023-06-12 LAB
IRON 24H UR-MRATE: 61 MCG/DL (ref 37–145)
IRON SATN MFR SERPL: 15 % (ref 20–50)
TIBC SERPL-MCNC: 411 MCG/DL (ref 298–536)
TRANSFERRIN SERPL-MCNC: 276 MG/DL (ref 200–360)
VIT B12 BLD-MCNC: 531 PG/ML (ref 211–946)

## 2023-06-12 PROCEDURE — 84466 ASSAY OF TRANSFERRIN: CPT

## 2023-06-12 PROCEDURE — 82607 VITAMIN B-12: CPT

## 2023-06-12 PROCEDURE — 36415 COLL VENOUS BLD VENIPUNCTURE: CPT

## 2023-06-12 PROCEDURE — 83540 ASSAY OF IRON: CPT

## 2023-07-28 ENCOUNTER — HOSPITAL ENCOUNTER (OUTPATIENT)
Dept: PET IMAGING | Facility: HOSPITAL | Age: 68
Discharge: HOME | End: 2023-07-28
Payer: MEDICARE

## 2023-07-28 DIAGNOSIS — C34.91 PRIMARY ADENOCARCINOMA OF RIGHT LUNG: ICD-10-CM

## 2023-07-28 DIAGNOSIS — R91.8 OTHER NONSPECIFIC ABNORMAL FINDING OF LUNG FIELD: ICD-10-CM

## 2023-07-28 LAB — GLUCOSE BLDC GLUCOMTR-MCNC: 78 MG/DL (ref 70–130)

## 2023-07-28 PROCEDURE — A9552 F18 FDG: HCPCS | Performed by: INTERNAL MEDICINE

## 2023-07-28 PROCEDURE — 78815 PET IMAGE W/CT SKULL-THIGH: CPT

## 2023-07-28 PROCEDURE — 0 FLUDEOXYGLUCOSE F18 SOLUTION: Performed by: INTERNAL MEDICINE

## 2023-07-28 PROCEDURE — 82948 REAGENT STRIP/BLOOD GLUCOSE: CPT

## 2023-07-28 RX ADMIN — FLUDEOXYGLUCOSE F18 1 DOSE: 300 INJECTION INTRAVENOUS at 09:02

## 2023-08-02 ENCOUNTER — OFFICE VISIT (OUTPATIENT)
Dept: ONCOLOGY | Facility: CLINIC | Age: 68
End: 2023-08-02
Payer: MEDICARE

## 2023-08-02 ENCOUNTER — TELEPHONE (OUTPATIENT)
Dept: ONCOLOGY | Facility: CLINIC | Age: 68
End: 2023-08-02

## 2023-08-02 VITALS
SYSTOLIC BLOOD PRESSURE: 137 MMHG | HEIGHT: 60 IN | WEIGHT: 163 LBS | RESPIRATION RATE: 12 BRPM | OXYGEN SATURATION: 98 % | TEMPERATURE: 97.8 F | DIASTOLIC BLOOD PRESSURE: 75 MMHG | HEART RATE: 73 BPM | BODY MASS INDEX: 32 KG/M2

## 2023-08-02 DIAGNOSIS — C34.91 PRIMARY ADENOCARCINOMA OF RIGHT LUNG: Primary | ICD-10-CM

## 2023-08-02 DIAGNOSIS — R91.8 MASS OF UPPER LOBE OF LEFT LUNG: ICD-10-CM

## 2023-08-02 PROCEDURE — 1125F AMNT PAIN NOTED PAIN PRSNT: CPT | Performed by: INTERNAL MEDICINE

## 2023-08-02 PROCEDURE — 3078F DIAST BP <80 MM HG: CPT | Performed by: INTERNAL MEDICINE

## 2023-08-02 PROCEDURE — 99214 OFFICE O/P EST MOD 30 MIN: CPT | Performed by: INTERNAL MEDICINE

## 2023-08-02 PROCEDURE — 3075F SYST BP GE 130 - 139MM HG: CPT | Performed by: INTERNAL MEDICINE

## 2023-08-03 ENCOUNTER — APPOINTMENT (OUTPATIENT)
Dept: GENERAL RADIOLOGY | Facility: HOSPITAL | Age: 68
End: 2023-08-03
Payer: MEDICARE

## 2023-08-03 ENCOUNTER — HOSPITAL ENCOUNTER (EMERGENCY)
Facility: HOSPITAL | Age: 68
Discharge: HOME | End: 2023-08-03
Attending: EMERGENCY MEDICINE
Payer: MEDICARE

## 2023-08-03 VITALS
HEART RATE: 66 BPM | TEMPERATURE: 97.7 F | WEIGHT: 162 LBS | RESPIRATION RATE: 14 BRPM | BODY MASS INDEX: 31.8 KG/M2 | SYSTOLIC BLOOD PRESSURE: 122 MMHG | DIASTOLIC BLOOD PRESSURE: 76 MMHG | HEIGHT: 60 IN | OXYGEN SATURATION: 98 %

## 2023-08-03 DIAGNOSIS — M79.602 LEFT ARM PAIN: Primary | ICD-10-CM
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
## 2023-08-03 PROCEDURE — 99283 EMERGENCY DEPT VISIT LOW MDM: CPT

## 2023-08-03 PROCEDURE — 73060 X-RAY EXAM OF HUMERUS: CPT

## 2023-08-03 PROCEDURE — 73090 X-RAY EXAM OF FOREARM: CPT

## 2023-08-03 PROCEDURE — 73110 X-RAY EXAM OF WRIST: CPT

## 2023-08-03 RX ORDER — ACETAMINOPHEN 500 MG
1000 TABLET ORAL ONCE
Status: COMPLETED | OUTPATIENT
Start: 2023-08-03 | End: 2023-08-03

## 2023-08-03 RX ADMIN — ACETAMINOPHEN 1000 MG: 500 TABLET, FILM COATED ORAL at 11:45

## 2023-08-03 RX ADMIN — DICLOFENAC SODIUM 4 G: 10 GEL TOPICAL at 13:08

## 2023-08-03 NOTE — ED PROVIDER NOTES
Subjective:  Chief Complaint:  Left arm pain    History of Present Illness:  Patient is a 67-year-old female with history of arthritis, COPD, hypertension, among others presenting to the ER with complaints of left wrist pain radiating up her arm after a fall on outstretched hand that occurred at [employer] last week.  She has been taking Tylenol but states she has not had any today.  She denies additional injuries or complaints at this time.      Nurses Notes reviewed and agree, including vitals, allergies, social history and prior medical history.     REVIEW OF SYSTEMS: All systems reviewed and not pertinent unless noted.  Review of Systems   Musculoskeletal:         Left wrist and arm pain   All other systems reviewed and are negative.    Past Medical History:   Diagnosis Date    Acid reflux     Arthritis     Body piercing     ears    Cataract, bilateral     COPD (chronic obstructive pulmonary disease)     Full dentures     Patient advised no adhesives DOS    Hypertension     Leg cramps     Lung mass     right    On home oxygen therapy     2L [place] [facility] and daily prn     SOB (shortness of breath)     xray showed spot on lungs    Wears glasses     Rx glasses       Allergies:    Contrast dye (echo or unknown ct/mr)      Past Surgical History:   Procedure Laterality Date    BRONCHOSCOPY N/A 06/13/2019    Procedure: BRONCHOSCOPY WITH FLUOROSCOPY, BIOPSY, BRUSHINGS, AND WASHINGS;  Surgeon: Gudelia Patel MD;  Location: [facility] OR;  Service: Pulmonary    COLONOSCOPY N/A 02/23/2017    Procedure: COLONOSCOPY with hot and cold snare polypectomies,  hot biopsy polypectomies, biopsies, resolution clip placement;  Surgeon: Zackery Siddiqui MD;  Location: [facility] ENDOSCOPY;  Service:     COLONOSCOPY N/A 06/04/2018    Procedure: COLONOSCOPY WITH HOT SNARE POLYPECTOMY X 7; COLD SNARE POLYPECTOMY X 3; HOT BIOPSY POLYPECTOMY X 20; COLD BIOPSY POLYPECTOMY X 2; THERMAL ABLATION OF COLON POLYPS X 23; CLIP PLACEMENT X 2; BIOPSIES;  Surgeon:  Zackery Siddiqui MD;  Location: [facility] ENDOSCOPY;  Service: Gastroenterology    COLONOSCOPY N/A 06/19/2019    Procedure: COLONOSCOPY with cold biopsy polypectomies and cold biopsy;  Surgeon: Zackery Siddiqui MD;  Location: [facility] ENDOSCOPY;  Service: Gastroenterology    ENDOSCOPY  06/13/2019    ENDOSCOPY N/A 01/09/2020    Procedure: ESOPHAGOGASTRODUODENOSCOPY;  Surgeon: Zcakery Siddiqui MD;  Location: [facility] ENDOSCOPY;  Service: Gastroenterology    MULTIPLE TOOTH EXTRACTIONS      full extraction    ORIF TIBIA/FIBULA FRACTURES Left     SIGMOIDOSCOPY N/A 01/09/2020    Procedure: FLEXIBLE SIGMOIDOSCOPY; ANOSCOPY;  Surgeon: Zackery Siddiqui MD;  Location: [facility] ENDOSCOPY;  Service: Gastroenterology    THORACOSCOPY Right 08/30/2019    Procedure: BRONCHOSCOPY,  THORACOSCOPY VIDEO ASSISTED with right upper lobe wedge RESECTION , RIGHT UPPER lobectomy with mediastinal lymph node dissection AND INTERCOSTAL CRYOABLATION OF RIGHT CHEST;  Surgeon: Brian Barreto MD;  Location: [facility] OR;  Service: Cardiothoracic    TUBAL ABDOMINAL LIGATION           Social History     Socioeconomic History    Marital status:     Number of children: 4   Tobacco Use    Smoking status: Former     Packs/day: 1.00     Years: 50.00     Pack years: 50.00     Types: Cigarettes     Quit date: 8/30/2019     Years since quitting: 3.9    Smokeless tobacco: Never    Tobacco comments:     Pt states that she quit in 2019   Vaping Use    Vaping Use: Never used   Substance and Sexual Activity    Alcohol use: Yes     Alcohol/week: 2.0 standard drinks     Types: 2 Glasses of wine per week     Comment: once in a while     Drug use: No    Sexual activity: Not Currently     Birth control/protection: Post-menopausal         Family History   Problem Relation Age of Onset    Cirrhosis Brother     Liver disease Brother     Colon cancer Brother         Possibly colon cancer, patient unsure.    Cancer Mother     No Known Problems Father     Stomach cancer Neg Hx      "Esophageal cancer Neg Hx     Breast cancer Neg Hx        Objective  Physical Exam:  /86 (BP Location: Left arm, Patient Position: Sitting)   Pulse 64   Temp 97.8 øF (36.6 øC) (Oral)   Resp 14   Ht 152.4 cm (60\")   Wt 73.5 kg (162 lb)   LMP 03/16/2004   SpO2 98%   BMI 31.64 kg/mý      Physical Exam  Vitals and nursing note reviewed.   Constitutional:       General: She is not in acute distress.     Appearance: She is not toxic-appearing.   HENT:      Head: Normocephalic and atraumatic.      Right Ear: External ear normal.      Left Ear: External ear normal.      Nose: Nose normal.   Eyes:      Extraocular Movements: Extraocular movements intact.      Conjunctiva/sclera: Conjunctivae normal.   Cardiovascular:      Rate and Rhythm: Normal rate.   Pulmonary:      Effort: Pulmonary effort is normal. No respiratory distress.   Abdominal:      General: There is no distension.      Palpations: Abdomen is soft.   Musculoskeletal:      Cervical back: Normal range of motion and neck supple.      Comments: LUE: 2+ pulses, TTP, pain with active and passive ROM, no obvious swelling or deformity   Skin:     General: Skin is warm and dry.   Neurological:      General: No focal deficit present.      Mental Status: She is alert and oriented to person, place, and time.   Psychiatric:         Mood and Affect: Mood normal.         Behavior: Behavior normal.       Procedures    ED Course:         Lab Results (last 24 hours)       ** No results found for the last 24 hours. **             XR Humerus Left    Result Date: 8/3/2023  PROCEDURE: XR HUMERUS LEFT-  History: Acute pain after FOOSH last week  COMPARISON: None.  FINDINGS:  A 2 view exam demonstrates no acute fracture or dislocation. The joint spaces are preserved. No soft tissue abnormality is seen.      Impression: No acute fracture.        Images were reviewed, interpreted, and dictated by TJ Michele Transcribed by David Kinney PA-C.      XR Forearm 2 " View Left    Result Date: 8/3/2023  PROCEDURE: XR FOREARM 2 VW LEFT-  HISTORY: Acute pain after FOOSH last week  COMPARISON: None.  FINDINGS:  A 2 view exam demonstrates no acute fracture or dislocation. The joint spaces are preserved.  No soft tissue abnormality is seen. There is sequela of medial epicondylitis.      Impression: No acute fracture.        Images were reviewed, interpreted, and dictated by TJ Michele Transcribed by David Kinney PA-C.      XR Wrist 3+ View Left    Result Date: 8/3/2023  PROCEDURE: XR WRIST 3+ VW LEFT-  History: Acute pain after FOOSH last week  COMPARISON: None.  FINDINGS:  A 3 view exam demonstrates no acute fracture or dislocation. The joint spaces are preserved. No soft tissue abnormality is seen. There is osseous demineralization noted.      Impression: No acute fracture.       Images were reviewed, interpreted, and dictated by TJ Michele Transcribed by David Kinney PA-C.          MDM  Patient was evaluated in the ER for left wrist and arm pain after a fall that occurred last week.  She is hemodynamically stable, afebrile, nontoxic-appearing on exam.  Left upper extremity is neurovascularly intact with no obvious swelling or deformity.  Differential diagnosis includes but is not limited to fracture, sprain/strain, dislocation, among others.  Initial plan includes x-ray of left wrist, forearm, and humerus and treatment with Tylenol and rice therapy.    X-rays were acutely unremarkable per radiology.  Patient agreeable with plan for discharge.  She was given Voltaren gel in the ER and advised to use this as prescribed as needed for pain as well as continue taking Tylenol per directions on the package.  Left wrist was wrapped with an Ace bandage.  RICE therapy recommended.  Patient advised to follow-up with her PCP for further outpatient evaluation if symptoms persist.  Precautions were given for return to the ER for any new or worsening symptoms.    Final  diagnoses:   Left arm pain   Fall, initial encounter          Jeannine Dimas, YOLIS  08/03/23 [id number]

## 2023-08-03 NOTE — DISCHARGE INSTRUCTIONS
Rest, ice, elevate the extremity at home.  Wear Ace bandage for compression.  Use Voltaren gel as prescribed as needed.  Take Tylenol as needed per directions on the package.  Follow-up with your PCP for further outpatient evaluation if symptoms persist.  Return to the ER for new or worsening symptoms or acute concerns.

## 2023-08-15 ENCOUNTER — HOSPITAL ENCOUNTER (OUTPATIENT)
Dept: GENERAL RADIOLOGY | Facility: HOSPITAL | Age: 68
Discharge: HOME | End: 2023-08-15
Admitting: FAMILY MEDICINE
Payer: MEDICARE

## 2023-08-15 ENCOUNTER — TRANSCRIBE ORDERS (OUTPATIENT)
Dept: LAB | Facility: HOSPITAL | Age: 68
End: 2023-08-15
Payer: MEDICARE

## 2023-08-15 DIAGNOSIS — M25.512 LEFT SHOULDER PAIN, UNSPECIFIED CHRONICITY: Primary | ICD-10-CM

## 2023-08-15 DIAGNOSIS — M25.512 LEFT SHOULDER PAIN, UNSPECIFIED CHRONICITY: ICD-10-CM

## 2023-08-15 PROCEDURE — 72040 X-RAY EXAM NECK SPINE 2-3 VW: CPT

## 2023-08-15 PROCEDURE — 73100 X-RAY EXAM OF WRIST: CPT

## 2023-08-15 PROCEDURE — 73030 X-RAY EXAM OF SHOULDER: CPT

## 2023-08-18 ENCOUNTER — OFFICE VISIT (OUTPATIENT)
Dept: RADIATION ONCOLOGY | Facility: HOSPITAL | Age: 68
End: 2023-08-18
Payer: MEDICARE

## 2023-08-18 ENCOUNTER — HOSPITAL ENCOUNTER (OUTPATIENT)
Dept: RADIATION ONCOLOGY | Facility: HOSPITAL | Age: 68
Setting detail: RADIATION/ONCOLOGY SERIES
Discharge: HOME | End: 2023-08-18
Payer: MEDICARE

## 2023-08-18 VITALS
HEART RATE: 62 BPM | WEIGHT: 164.7 LBS | RESPIRATION RATE: 16 BRPM | OXYGEN SATURATION: 93 % | DIASTOLIC BLOOD PRESSURE: 62 MMHG | HEIGHT: 60 IN | TEMPERATURE: 97.5 F | BODY MASS INDEX: 32.34 KG/M2 | SYSTOLIC BLOOD PRESSURE: 125 MMHG

## 2023-08-18 DIAGNOSIS — C34.12 MALIGNANT NEOPLASM OF UPPER LOBE OF LEFT LUNG: Primary | ICD-10-CM

## 2023-08-18 PROCEDURE — G0463 HOSPITAL OUTPT CLINIC VISIT: HCPCS

## 2023-08-18 NOTE — PROGRESS NOTES
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       BRIEF HISTORY:  The patient is a very pleasant 67 y.o. female  with a high risk history and history of previous right sided lung cancer status post resection with Dr. Barreto.  The patient has had a lesion growing in the lungs since 2019.  It grew very slowly for several years then over the last year started to increase in size.  This lesion increase in size and density and the patient was subsequently sent for a PET CT scan.  The PET scan showed evidence of hypermetabolic lesion in the left upper lobe of the lung.  She did not have any other hyper metabolic foci.    The patient discussed options with Dr. José who recommended empiric radiotherapy with SBRT.  Patient was subsequently referred to our clinic for further evaluation.    The patient presents with her daughter today to discuss her case.  She reports that she is still recovering from some the side effects of her surgery.  She reports that she is hesitant to pursue any aggressive treatments for things that are not a problem at this time point.  She is though however interested in treating the lesion.    Allergies   Allergen Reactions    Contrast Dye (Echo Or Unknown Ct/Mr) Hives       Social History     Socioeconomic History    Marital status:     Number of children: 4   Tobacco Use    Smoking status: Former     Packs/day: 1.00     Years: 50.00     Pack years: 50.00     Types: Cigarettes     Quit date: 2019     Years since quitting: 3.9    Smokeless tobacco: Never    Tobacco comments:     Pt states that she  quit in 2019   Vaping Use    Vaping Use: Never used   Substance and Sexual Activity    Alcohol use: Yes     Alcohol/week: 2.0 standard drinks     Types: 2 Glasses of wine per week     Comment: once in a while     Drug use: No    Sexual activity: Not Currently     Birth control/protection: Post-menopausal       Past Medical History:   Diagnosis Date    Acid reflux     Arthritis     Body piercing     ears    Cataract, bilateral     COPD (chronic obstructive pulmonary disease)     Full dentures     Patient advised no adhesives DOS    Hypertension     Leg cramps     Lung cancer     Lung mass     right    On home oxygen therapy     2L [place] [facility] and daily prn     SOB (shortness of breath)     xray showed spot on lungs    Wears glasses     Rx glasses       family history includes Cancer in her mother; Cirrhosis in her brother; Colon cancer in her brother; Liver disease in her brother; Lung cancer in her mother; No Known Problems in her father.     Past Surgical History:   Procedure Laterality Date    BRONCHOSCOPY N/A 06/13/2019    Procedure: BRONCHOSCOPY WITH FLUOROSCOPY, BIOPSY, BRUSHINGS, AND WASHINGS;  Surgeon: Gudelia Patel MD;  Location: [facility] OR;  Service: Pulmonary    COLONOSCOPY N/A 02/23/2017    Procedure: COLONOSCOPY with hot and cold snare polypectomies,  hot biopsy polypectomies, biopsies, resolution clip placement;  Surgeon: Zackery Siddiqui MD;  Location: [facility] ENDOSCOPY;  Service:     COLONOSCOPY N/A 06/04/2018    Procedure: COLONOSCOPY WITH HOT SNARE POLYPECTOMY X 7; COLD SNARE POLYPECTOMY X 3; HOT BIOPSY POLYPECTOMY X 20; COLD BIOPSY POLYPECTOMY X 2; THERMAL ABLATION OF COLON POLYPS X 23; CLIP PLACEMENT X 2; BIOPSIES;  Surgeon: Zackery Siddiqui MD;  Location: [facility] ENDOSCOPY;  Service: Gastroenterology    COLONOSCOPY N/A 06/19/2019    Procedure: COLONOSCOPY with cold biopsy polypectomies and cold biopsy;  Surgeon: Zackery Siddiqui MD;  Location: [facility] ENDOSCOPY;  Service: Gastroenterology    ENDOSCOPY   "06/13/2019    ENDOSCOPY N/A 01/09/2020    Procedure: ESOPHAGOGASTRODUODENOSCOPY;  Surgeon: Zackery Siddiqui MD;  Location: [facility] ENDOSCOPY;  Service: Gastroenterology    MULTIPLE TOOTH EXTRACTIONS      full extraction    ORIF TIBIA/FIBULA FRACTURES Left     OTHER SURGICAL HISTORY      SIGMOIDOSCOPY N/A 01/09/2020    Procedure: FLEXIBLE SIGMOIDOSCOPY; ANOSCOPY;  Surgeon: Zackery Siddiqui MD;  Location: [facility] ENDOSCOPY;  Service: Gastroenterology    THORACOSCOPY Right 08/30/2019    Procedure: BRONCHOSCOPY,  THORACOSCOPY VIDEO ASSISTED with right upper lobe wedge RESECTION , RIGHT UPPER lobectomy with mediastinal lymph node dissection AND INTERCOSTAL CRYOABLATION OF RIGHT CHEST;  Surgeon: Brian Barreto MD;  Location: [facility] OR;  Service: Cardiothoracic    TUBAL ABDOMINAL LIGATION          Review of Systems   Respiratory:  Positive for shortness of breath.    Musculoskeletal:         Shoulder pain   Skin:  Positive for rash.       A full 14 point review of systems was performed and was negative except as noted in the HPI.    Objective   VITAL SIGNS:   Vitals:    08/18/23 0853   BP: 125/62   Pulse: 62   Resp: 16   Temp: 97.5 øF (36.4 øC)   TempSrc: Temporal   SpO2: 93%   Weight: 74.7 kg (164 lb 11.2 oz)   Height: 152.4 cm (60\")   PainSc: 0-No pain        Karnofsky score: 90        Physical Exam  Vitals and nursing note reviewed.   Constitutional:       Appearance: She is well-developed.   HENT:      Head: Normocephalic and atraumatic.   Eyes:      Conjunctiva/sclera: Conjunctivae normal.      Pupils: Pupils are equal, round, and reactive to light.   Neck:      Comments: No obviously enlarged cervical or supraclavicular LAD.  Cardiovascular:      Comments: Patient well perfused. Non cyanotic. No prominent JVD. No pedal edema  Pulmonary:      Effort: Pulmonary effort is normal.      Breath sounds: Normal breath sounds. No stridor. No wheezing.   Abdominal:      General: There is no distension.      Palpations: Abdomen is " soft.   Musculoskeletal:         General: Normal range of motion.      Cervical back: Normal range of motion and neck supple.      Comments: Patient moves all extremities spontaneously.    Skin:     General: Skin is warm and dry.   Neurological:      Mental Status: She is alert and oriented to person, place, and time.      Comments: Coordination intact.   Psychiatric:         Behavior: Behavior normal.         Thought Content: Thought content normal.         Judgment: Judgment normal.            The following portions of the patient's history were reviewed and updated as appropriate: allergies, current medications, past family history, past medical history, past social history, past surgical history, and problem list.      I have personally requested reviewed and interpreted the patient's images and radiology reports and pathology reports listed below:        CT Chest With Contrast Diagnostic    Result Date: 7/17/2023  Mild interval enlargement of 10 mm left upper lobe nodule as described, concerning for malignancy. Recommend PET/CT. Lesion may be amenable to CT-guided biopsy.  5.9 mm left upper lobe nodule mildly enlarged from prior, too small to be evaluated on PET and too small to undergo needle core biopsy, recommend 3-month follow-up.  This report was signed and finalized on 7/17/2023 9:48 AM by Candelaria Madrigal MD.    NM PET/CT Skull Base to Mid Thigh    Result Date: 7/31/2023  Impression: Enlarging 1.0 cm spiculated left upper lobe solid pulmonary nodule is hypermetabolic and suspicious for malignancy. There is an adjacent 0.6 cm solid pulmonary nodule that is also enlarging and suspicious, however is below the resolution of PET. No other suspicious hypermetabolic activity in the chest, abdomen, or pelvis. Electronically Signed: Matthew Martinez MD  7/31/2023 4:15 PM EDT  Workstation ID: [id number]        I reviewed the patient's previous surgical resection pathology    I discussed the case with Dr. José  personally.    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  This patient is status post resection of a right lung cancer with Dr. Barreto previously.  She is recovering but is still hesitant to pursue aggressive treatments.  She is interested in avoiding as many side effects possible.  We discussed options for treatment today including proceeding with a biopsy and then following with radiation treatment if needed.  The patient was less interested in pursuing a biopsy and was more interested in an empiric radiation based approach.    Discussed that the patient would likely need between 3 and 4 treatments to address his right upper lobe lesion that is hypermetabolic and highly suspicious for cancer particular given her history with smoking and also history of a previous lung cancer.    We discussed that the odds that this lesion actually represents malignancy is quite high.    She is interested in returning for CT simulation and then treatments.  I recommended dose between 54 Gray in 48 Gray between 3-4 treatments.    Discussed the potential side effects and toxicity.  The patient is interested in pursuing treatments.    Discussed the need for follow-up in the future with regular scans.    Patient does have another lesion in the left upper lobe of the lung however this was not hypermetabolic and I would recommend to continue to observe at this time.    Greater than 1 hour was spent preparing for and coordinating this visit. >50% of the time was spent in direct face to face conversation with the patient teaching, answering question, and providing explanations regarding the patient's case.  The decision to treat the patient with radiation is a complex one and carries the risk of long-term side effects and complications.  The patient's malignancy represents a complicated life threatening condition that  requires complex multidisciplinary management for treatment and followup.      RECOMMENDATIONS:    Left upper lobe primary bronchogenic carcinoma  -Growing slowly over time then more quickly  -Hypermetabolic on PET scan   -No  -Risk smoking history  -History of previous malignancy resected by Dr. Barreto  -Recommend empiric CyberKnife treatment  -Recommend 54 versus 48 Gray in 3-4 treatments.  -Follows with Dr. José in [place]    Right lung cancer  -Previously treated with resection  -Still having some issues    Medial left upper lobe lesion  -Not hypermetabolic  -We will continue to observe for the time being    No follow-ups on file.  Diagnoses and all orders for this visit:    1. Malignant neoplasm of upper lobe of left lung (Primary)         Kentrell Lees MD

## 2023-08-28 ENCOUNTER — HOSPITAL ENCOUNTER (OUTPATIENT)
Dept: RADIATION ONCOLOGY | Facility: HOSPITAL | Age: 68
Discharge: HOME | End: 2023-08-28
Payer: MEDICARE

## 2023-08-30 PROCEDURE — 77301 RADIOTHERAPY DOSE PLAN IMRT: CPT | Performed by: RADIOLOGY

## 2023-08-30 PROCEDURE — 77338 DESIGN MLC DEVICE FOR IMRT: CPT | Performed by: RADIOLOGY

## 2023-08-30 PROCEDURE — 77300 RADIATION THERAPY DOSE PLAN: CPT | Performed by: RADIOLOGY

## 2023-08-31 ENCOUNTER — TRANSCRIBE ORDERS (OUTPATIENT)
Dept: GENERAL RADIOLOGY | Facility: HOSPITAL | Age: 68
End: 2023-08-31
Payer: MEDICARE

## 2023-08-31 ENCOUNTER — HOSPITAL ENCOUNTER (OUTPATIENT)
Dept: GENERAL RADIOLOGY | Facility: HOSPITAL | Age: 68
Discharge: HOME | End: 2023-08-31
Admitting: FAMILY MEDICINE
Payer: MEDICARE

## 2023-08-31 DIAGNOSIS — M25.552 PAIN IN LEFT HIP: ICD-10-CM

## 2023-08-31 DIAGNOSIS — M25.552 PAIN IN LEFT HIP: Primary | ICD-10-CM

## 2023-08-31 PROCEDURE — 73502 X-RAY EXAM HIP UNI 2-3 VIEWS: CPT

## 2023-09-07 ENCOUNTER — TELEPHONE (OUTPATIENT)
Dept: ORTHOPEDIC SURGERY | Facility: CLINIC | Age: 68
End: 2023-09-07
Payer: MEDICARE
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
## 2023-09-15 ENCOUNTER — HOSPITAL ENCOUNTER (OUTPATIENT)
Dept: RADIATION ONCOLOGY | Facility: HOSPITAL | Age: 68
Setting detail: RADIATION/ONCOLOGY SERIES
Discharge: HOME | End: 2023-09-15
Payer: MEDICARE

## 2023-09-19 ENCOUNTER — HOSPITAL ENCOUNTER (OUTPATIENT)
Dept: RADIATION ONCOLOGY | Facility: HOSPITAL | Age: 68
Setting detail: RADIATION/ONCOLOGY SERIES
Discharge: HOME | End: 2023-09-19
Payer: MEDICARE

## 2023-09-19 PROCEDURE — 77373 STRTCTC BDY RAD THER TX DLVR: CPT | Performed by: RADIOLOGY

## 2023-09-21 ENCOUNTER — HOSPITAL ENCOUNTER (OUTPATIENT)
Dept: RADIATION ONCOLOGY | Facility: HOSPITAL | Age: 68
Discharge: HOME | End: 2023-09-21
Payer: MEDICARE

## 2023-09-21 PROCEDURE — 77373 STRTCTC BDY RAD THER TX DLVR: CPT | Performed by: RADIOLOGY

## 2023-09-25 ENCOUNTER — HOSPITAL ENCOUNTER (OUTPATIENT)
Dept: RADIATION ONCOLOGY | Facility: HOSPITAL | Age: 68
Discharge: HOME | End: 2023-09-25
Payer: MEDICARE

## 2023-09-25 PROCEDURE — 77373 STRTCTC BDY RAD THER TX DLVR: CPT | Performed by: RADIOLOGY

## 2023-09-27 ENCOUNTER — HOSPITAL ENCOUNTER (OUTPATIENT)
Dept: RADIATION ONCOLOGY | Facility: HOSPITAL | Age: 68
Discharge: HOME | End: 2023-09-27
Payer: MEDICARE

## 2023-09-27 PROCEDURE — 77373 STRTCTC BDY RAD THER TX DLVR: CPT | Performed by: RADIOLOGY

## 2023-10-16 ENCOUNTER — TRANSCRIBE ORDERS (OUTPATIENT)
Dept: GENERAL RADIOLOGY | Facility: HOSPITAL | Age: 68
End: 2023-10-16
Payer: MEDICARE

## 2023-10-16 DIAGNOSIS — M25.552 LEFT HIP PAIN: Primary | ICD-10-CM

## 2023-11-01 ENCOUNTER — TRANSCRIBE ORDERS (OUTPATIENT)
Dept: ADMINISTRATIVE | Facility: HOSPITAL | Age: 68
End: 2023-11-01
Payer: MEDICARE

## 2023-11-01 DIAGNOSIS — Z78.0 ASYMPTOMATIC MENOPAUSAL STATE: Primary | ICD-10-CM

## 2023-11-09 ENCOUNTER — OFFICE VISIT (OUTPATIENT)
Dept: RADIATION ONCOLOGY | Facility: HOSPITAL | Age: 68
End: 2023-11-09
Payer: MEDICARE

## 2023-11-09 ENCOUNTER — HOSPITAL ENCOUNTER (OUTPATIENT)
Dept: RADIATION ONCOLOGY | Facility: HOSPITAL | Age: 68
Setting detail: RADIATION/ONCOLOGY SERIES
Discharge: HOME | End: 2023-11-09
Payer: MEDICARE

## 2023-11-09 VITALS
SYSTOLIC BLOOD PRESSURE: 167 MMHG | RESPIRATION RATE: 16 BRPM | WEIGHT: 174.1 LBS | BODY MASS INDEX: 34 KG/M2 | DIASTOLIC BLOOD PRESSURE: 81 MMHG | HEART RATE: 58 BPM | TEMPERATURE: 96.6 F | OXYGEN SATURATION: 94 %

## 2023-11-09 DIAGNOSIS — C34.12 MALIGNANT NEOPLASM OF UPPER LOBE OF LEFT LUNG: Primary | ICD-10-CM

## 2023-11-09 PROCEDURE — G0463 HOSPITAL OUTPT CLINIC VISIT: HCPCS

## 2023-11-09 NOTE — PROGRESS NOTES
FOLLOW UP NOTE    PATIENT:                                                      Lizbeth Johns  MEDICAL RECORD #:                        [id number]  :                                                          1955  COMPLETION DATE:   2023  DIAGNOSIS:     Malignant neoplasm of upper lobe of left lung  - Clinical stage IA2 (cT1b, cN0, cM0)    Primary adenocarcinoma of right lung  - Clinical stage IB (cT2a, cN0, cM0)      BRIEF HISTORY:  Lizbeth Johns is a 67 y.o. female with known history of former tobacco abuse and a previous early-stage adenocarcinoma of the right upper lobe of lung status post prior resection on 2019 with Dr. Barreto.  She did not require adjuvant treatment.  She was found on surveillance chest imaging to have interval increase in size and density of a spiculated 1.1 cm peripheral left upper lobe lung lesion.  She was sent for PET/CT scan which showed isolated hypermetabolism with maximum SUV 4.7 within this left upper lobe lung lesion, clinically consistent with malignancy.  Adjacent to this lesion is 0.6 cm medial left upper lobe lung lesion which is not PET avid.  There is otherwise no evidence of regional or distant metastatic disease and no evidence of recurrence in the contralateral right lung.  The patient wished to avoid additional work-up with biopsy.  The patient underwent definitive, empiric treatment with a course of SBRT to a dose of 48 Gray in 4 fractions delivered to the PET positive left upper lobe lung lesion, completing 2023.  She tolerated treatment well.  She did not develop exacerbation of acute fatigue.  She reports breathing is stable with some occasional exertional dyspnea in the setting of COPD.  She does not require oxygen.  She denies cough, sputum production, hemoptysis, chest or pleuritic pains.  She denies fever or chills.  Appetite is good, weight is stable.  She denies additional acute concerns today.      MEDICATIONS:  andMedication  reconciliation for the patient was reviewed and confirmed in the electronic medical record.    Review of Systems   Respiratory:  Positive for shortness of breath (with exertion).    All other systems reviewed and are negative.          KPS 90%      Physical Exam  Vitals and nursing note reviewed.   Constitutional:       General: She is not in acute distress.     Appearance: She is well-developed.   HENT:      Head: Normocephalic and atraumatic.   Eyes:      Conjunctiva/sclera: Conjunctivae normal.      Pupils: Pupils are equal, round, and reactive to light.   Cardiovascular:      Rate and Rhythm: Normal rate and regular rhythm.   Pulmonary:      Effort: Pulmonary effort is normal. No respiratory distress.      Breath sounds: Normal breath sounds. No wheezing.   Musculoskeletal:         General: Normal range of motion.      Cervical back: Normal range of motion and neck supple.   Lymphadenopathy:      Cervical: No cervical adenopathy.   Skin:     General: Skin is warm and dry.   Neurological:      Mental Status: She is alert and oriented to person, place, and time.   Psychiatric:         Behavior: Behavior normal.         Thought Content: Thought content normal.         Judgment: Judgment normal.         VITAL SIGNS:   Vitals:    11/09/23 1344   BP: 167/81   Pulse: 58   Resp: 16   Temp: 96.6 °F (35.9 °C)   TempSrc: Temporal   SpO2: 94%   Weight: 79 kg (174 lb 1.6 oz)   PainSc: 0-No pain                The following portions of the patient's history were reviewed and updated as appropriate: allergies, current medications, past family history, past medical history, past social history, past surgical history and problem list.         Diagnoses and all orders for this visit:    1. Malignant neoplasm of upper lobe of left lung (Primary)         IMPRESSION:  Lizbeth Johns is a 67 y.o. female with what appears to be an early stage bronchogenic carcinoma of the left upper lobe of lung.  The patient was not interested in  pursuing a biopsy and was more interested in an empiric radiation based approach.  She is now 1 month status post SBRT to the PET positive, peripherally located left upper lobe lung lesion.  She tolerated treatment well and did not develop acute radiation-related toxicities.  She will be due for restaging PET/CT scan 3 months out from treatment, due 12/2023, to evaluate response to treatment.  We discussed typical expectations for response to treatment including timeline for typical interval improvement/resolution of treated tumor.  We also discussed the role of surveillance imaging to watch the smaller, more medial left upper lobe lung lesion that was not previously hypermetabolic on PET.  We discussed that should the untreated lesion demonstrate metabolic uptake or interval change/growth, we will have a very low threshold to treat down the road as needed.  The patient is scheduled for follow-up with Dr. José for ongoing surveillance and we will have the patient return to our clinic after her next scan.        RECOMMENDATIONS:      Primary bronchogenic carcinoma of the left upper lobe of lung  -Radiographic diagnosis  -cT1b, cN0, cM0  -Located more peripherally  -Slow interval growth over time  -Hypermetabolic on PET scan  -High risk smoking history  -History of previous malignancy resected by Dr. Barreto  -Patient declines biopsy  -Status post empiric SBRT 48 Mart in 4 fractions   -completed 9/27/2023  -Repeat PET/CT scan pending  -RTC at that time  -Follows with Dr. José in [place]    Separate left upper lobe lung lesion  -Located more medially  -Subcentimeter  -Not hypermetabolic on PET scan  -Continue to observe for the time being  -May be a candidate for SBRT down the road if interval enlargement     Adenocarcinoma of the right upper lobe of lung  -Status post resection with Dr. Barreto 8/30/2019  -Pathologic stage IB (pT2a, pN0, cM0)  -Did not require adjuvant treatment  -No evidence of local recurrence on  surveillance imaging  -Continue to monitor    COPD  -Management per primary  -Stable         Return in about 6 weeks (around 12/21/2023) for Office Visit.    MARGARET Becerra    I spent a total of 30 minutes on today's visit, with more than 15 minutes in direct face to face communication, and the remainder of the time spent in reviewing the relevant history, records, available imaging, and for documentation.

## 2023-11-20 ENCOUNTER — APPOINTMENT (OUTPATIENT)
Dept: BONE DENSITY | Facility: HOSPITAL | Age: 68
End: 2023-11-20
Payer: MEDICARE

## 2023-11-20 DIAGNOSIS — Z78.0 ASYMPTOMATIC MENOPAUSAL STATE: ICD-10-CM

## 2023-11-20 PROCEDURE — 77080 DXA BONE DENSITY AXIAL: CPT

## 2023-11-29 ENCOUNTER — OFFICE VISIT (OUTPATIENT)
Dept: ONCOLOGY | Facility: CLINIC | Age: 68
End: 2023-11-29
Payer: MEDICARE

## 2023-11-29 VITALS
WEIGHT: 167 LBS | HEART RATE: 81 BPM | RESPIRATION RATE: 16 BRPM | OXYGEN SATURATION: 97 % | HEIGHT: 60 IN | TEMPERATURE: 97.8 F | BODY MASS INDEX: 32.79 KG/M2 | DIASTOLIC BLOOD PRESSURE: 80 MMHG | SYSTOLIC BLOOD PRESSURE: 165 MMHG

## 2023-11-29 DIAGNOSIS — C34.12 MALIGNANT NEOPLASM OF UPPER LOBE OF LEFT LUNG: Primary | ICD-10-CM

## 2023-11-29 PROCEDURE — 3079F DIAST BP 80-89 MM HG: CPT | Performed by: INTERNAL MEDICINE

## 2023-11-29 PROCEDURE — 99214 OFFICE O/P EST MOD 30 MIN: CPT | Performed by: INTERNAL MEDICINE

## 2023-11-29 PROCEDURE — 1126F AMNT PAIN NOTED NONE PRSNT: CPT | Performed by: INTERNAL MEDICINE

## 2023-11-29 PROCEDURE — 3077F SYST BP >= 140 MM HG: CPT | Performed by: INTERNAL MEDICINE

## 2023-11-29 RX ORDER — PREDNISONE 50 MG/1
TABLET ORAL
Qty: 3 TABLET | Refills: 0 | Status: SHIPPED | OUTPATIENT
Start: 2023-11-29

## 2023-11-29 NOTE — PROGRESS NOTES
DATE OF VISIT: 11/29/2023    REASON FOR VISIT: Followup for right lung adenocarcinoma     PROBLEM LIST:  1. Right upper lobe adenocarcinoma of the lung T2 aN0 M0 stage Ib:  A.  CT chest done on June 2019 revealed 3 cm right upper lobe lung nodule  B.  Bronchoscopy with biopsy done by Dr. Patel June 13, 2019 was negative  C.  Status post surgical resection with lobectomy and lymph node sampling done by Dr. Barreto August 30, 2019  D.  Final pathology revealed moderate differentiated adenocarcinoma 3.5 cm in size clear surgical margins, no lymphovascular invasion, no pleural invasion, and negative lymph nodes.  2.  COPD  3.  Hypertension  4.  Enlarging left upper lobe lung nodule:  A.  Status post CyberKnife with Dr. Lees completed September 7, 2023    HISTORY OF PRESENT ILLNESS: The patient is a very pleasant 67 y.o. female  with past medical history significant for right upper lobe adenocarcinoma diagnosed August 30, 2019.  Patient was treated with surgical resection.  She did not require adjuvant treatment. The patient is here today for scheduled follow-up visit.    SUBJECTIVE: Lizbeth is here today by herself.  She was able to tolerate her CyberKnife radiotherapy to the left lung nodule.    Past History:  Medical History: has a past medical history of Acid reflux, Arthritis, Body piercing, Cataract, bilateral, COPD (chronic obstructive pulmonary disease), Full dentures, History of radiation therapy (09/27/2023), Hypertension, Leg cramps, Lung cancer, Lung mass, On home oxygen therapy, SOB (shortness of breath), and Wears glasses.   Surgical History: has a past surgical history that includes Tubal ligation; Colonoscopy (N/A, 02/23/2017); Colonoscopy (N/A, 06/04/2018); Esophagogastroduodenoscopy (06/13/2019); Bronchoscopy (N/A, 06/13/2019); Colonoscopy (N/A, 06/19/2019); Multiple tooth extractions; ORIF tibia & fibula fractures (Left); Thoracoscopy (Right, 08/30/2019); Sigmoidoscopy (N/A, 01/09/2020);  Esophagogastroduodenoscopy (N/A, 01/09/2020); and Other surgical history.   Family History: family history includes Cancer in her mother; Cirrhosis in her brother; Colon cancer in her brother; Liver disease in her brother; Lung cancer in her mother; No Known Problems in her father.   Social History: reports that she quit smoking about 4 years ago. Her smoking use included cigarettes. She has a 50.00 pack-year smoking history. She has never used smokeless tobacco. She reports current alcohol use of about 2.0 standard drinks of alcohol per week. She reports that she does not use drugs.    (Not in a hospital admission)     Allergies: Contrast dye (echo or unknown ct/mr)     Review of Systems   Constitutional:  Positive for fatigue.   Respiratory: Negative.     Cardiovascular: Negative.    Psychiatric/Behavioral:  The patient is nervous/anxious.        PHYSICAL EXAMINATION:   LMP 03/16/2004    There were no vitals filed for this visit.       ECOG score: 1            ECOG Performance Status: 1 - Symptomatic but completely ambulatory      General Appearance:      alert, cooperative, no apparent distress and appears stated age   Lungs:   Clear to auscultation bilaterally; respirations regular, even, and unlabored bilaterally   Heart:  Regular rate and rhythm, no murmurs appreciated   Abdomen:   Soft, non-tender, non-distended, and no organomegaly                 No visits with results within 2 Week(s) from this visit.   Latest known visit with results is:   Hospital Outpatient Visit on 07/28/2023   Component Date Value Ref Range Status    Glucose 07/28/2023 78  70 - 130 mg/dL Final    Meter: [id number] : [id number] Adalberto Daily        DEXA Bone Density Axial    Result Date: 11/20/2023  Narrative: PROCEDURE: DEXA BONE DENSITY AXIAL-  HISTORY: Z78.0; Z78.0-Asymptomatic menopausal state  Comparison: December 2019.  FINDINGS: Bone densitometry calculations of the lumbar spine and left femoral neck were obtained.  Average  BMD for the lumbar spine from L1-L4 is 1.146 g/cm2. T-score is 0.9. Z-score is 3.0.   Left femoral neck BMD is 0.805 g/cm2. T-score is -0.4. Z score is 1.4.        Impression: The bone mineral densities within the lumbar spine and left femoral neck are within the range of normal.    Images were reviewed, interpreted, and dictated by TJ Michele Transcribed by Sharlene Amos PA-C.  This report was signed and finalized on 11/20/2023 3:35 PM by TJ Jensen.         ASSESSMENT: The patient is a very pleasant 67 y.o. female  with right upper lobe lung adenocarcinoma      PLAN:    1.  Right upper lobe lung adenocarcinoma stage Ib:  A.  She is scheduled for repeat PET scan next month.  I will follow-up on the results.    2.  Left upper lobe lung nodules:  A.  Status post CyberKnife radiotherapy with Dr. Lees done September 7, 2023.    3.  Hypertension:  A.  She will continue Norvasc daily and Coreg twice a day.    FOLLOW UP: 4 months with CT scan.    Vinnie José MD  11/29/2023

## 2023-12-01 ENCOUNTER — HOSPITAL ENCOUNTER (OUTPATIENT)
Dept: PET IMAGING | Facility: HOSPITAL | Age: 68
Discharge: HOME | End: 2023-12-01
Payer: MEDICARE

## 2023-12-01 DIAGNOSIS — R91.8 MASS OF UPPER LOBE OF LEFT LUNG: ICD-10-CM

## 2023-12-01 DIAGNOSIS — C34.91 PRIMARY ADENOCARCINOMA OF RIGHT LUNG: ICD-10-CM

## 2023-12-01 LAB — GLUCOSE BLDC GLUCOMTR-MCNC: 107 MG/DL (ref 70–130)

## 2023-12-01 PROCEDURE — 78815 PET IMAGE W/CT SKULL-THIGH: CPT

## 2023-12-01 PROCEDURE — A9552 F18 FDG: HCPCS | Performed by: INTERNAL MEDICINE

## 2023-12-01 PROCEDURE — 0 FLUDEOXYGLUCOSE F18 SOLUTION: Performed by: INTERNAL MEDICINE

## 2023-12-01 PROCEDURE — 82948 REAGENT STRIP/BLOOD GLUCOSE: CPT

## 2023-12-01 RX ADMIN — FLUDEOXYGLUCOSE F 18 1 DOSE: 200 INJECTION, SOLUTION INTRAVENOUS at 14:18

## 2023-12-04 ENCOUNTER — TELEPHONE (OUTPATIENT)
Dept: ONCOLOGY | Facility: CLINIC | Age: 68
End: 2023-12-04

## 2023-12-04 NOTE — TELEPHONE ENCOUNTER
Caller: Lizbeth Johns    Relationship: Self    Best call back number: [phone]      What was the call regarding: PT CALLED WANTED TO SPEAK TO THE NURSE REGARDING THE MEDICATIONS  THAT WAS CALLED IN FOR HER

## 2023-12-04 NOTE — TELEPHONE ENCOUNTER
RN called patient back - let her know that those medications are for her to take before her CT scan that she is supposed to have in March since she is allergic to IV contrast. Patient requested that those meds be sent again closer to the time of her scan. RN sent a message to Christopher to get those scans scheduled and let her know when they are so that RN can send pre-meds again closer to scan.

## 2023-12-08 ENCOUNTER — LAB (OUTPATIENT)
Dept: LAB | Facility: HOSPITAL | Age: 68
End: 2023-12-08
Payer: MEDICARE

## 2023-12-08 ENCOUNTER — TRANSCRIBE ORDERS (OUTPATIENT)
Dept: LAB | Facility: HOSPITAL | Age: 68
End: 2023-12-08
Payer: MEDICARE

## 2023-12-08 DIAGNOSIS — E78.5 HYPERLIPIDEMIA, UNSPECIFIED HYPERLIPIDEMIA TYPE: Primary | ICD-10-CM

## 2023-12-08 DIAGNOSIS — E78.5 HYPERLIPIDEMIA, UNSPECIFIED HYPERLIPIDEMIA TYPE: ICD-10-CM

## 2023-12-08 LAB
ALBUMIN SERPL-MCNC: 4.3 G/DL (ref 3.5–5.2)
ALBUMIN/GLOB SERPL: 1.4 G/DL
ALP SERPL-CCNC: 112 U/L (ref 39–117)
ALT SERPL W P-5'-P-CCNC: 17 U/L (ref 1–33)
ANION GAP SERPL CALCULATED.3IONS-SCNC: 13 MMOL/L (ref 5–15)
AST SERPL-CCNC: 20 U/L (ref 1–32)
BILIRUB SERPL-MCNC: 0.3 MG/DL (ref 0–1.2)
BUN SERPL-MCNC: 23 MG/DL (ref 8–23)
BUN/CREAT SERPL: 24.2 (ref 7–25)
CALCIUM SPEC-SCNC: 9.3 MG/DL (ref 8.6–10.5)
CHLORIDE SERPL-SCNC: 102 MMOL/L (ref 98–107)
CHOLEST SERPL-MCNC: 187 MG/DL (ref 0–200)
CO2 SERPL-SCNC: 26 MMOL/L (ref 22–29)
CREAT SERPL-MCNC: 0.95 MG/DL (ref 0.57–1)
EGFRCR SERPLBLD CKD-EPI 2021: 65 ML/MIN/1.73
GLOBULIN UR ELPH-MCNC: 3 GM/DL
GLUCOSE SERPL-MCNC: 104 MG/DL (ref 65–99)
HDLC SERPL-MCNC: 93 MG/DL (ref 40–60)
LDLC SERPL CALC-MCNC: 83 MG/DL (ref 0–100)
LDLC/HDLC SERPL: 0.89 {RATIO}
POTASSIUM SERPL-SCNC: 3.8 MMOL/L (ref 3.5–5.2)
PROT SERPL-MCNC: 7.3 G/DL (ref 6–8.5)
SODIUM SERPL-SCNC: 141 MMOL/L (ref 136–145)
TRIGL SERPL-MCNC: 56 MG/DL (ref 0–150)
VLDLC SERPL-MCNC: 11 MG/DL (ref 5–40)

## 2023-12-08 PROCEDURE — 36415 COLL VENOUS BLD VENIPUNCTURE: CPT

## 2023-12-08 PROCEDURE — 80053 COMPREHEN METABOLIC PANEL: CPT

## 2023-12-08 PROCEDURE — 80061 LIPID PANEL: CPT

## 2023-12-29 ENCOUNTER — HOSPITAL ENCOUNTER (OUTPATIENT)
Dept: RADIATION ONCOLOGY | Facility: HOSPITAL | Age: 68
Setting detail: RADIATION/ONCOLOGY SERIES
Discharge: HOME | End: 2023-12-29
Payer: MEDICARE

## 2023-12-29 ENCOUNTER — OFFICE VISIT (OUTPATIENT)
Dept: RADIATION ONCOLOGY | Facility: HOSPITAL | Age: 68
End: 2023-12-29
Payer: MEDICARE

## 2023-12-29 VITALS
TEMPERATURE: 97.2 F | RESPIRATION RATE: 16 BRPM | WEIGHT: 173.5 LBS | OXYGEN SATURATION: 94 % | SYSTOLIC BLOOD PRESSURE: 136 MMHG | BODY MASS INDEX: 33.88 KG/M2 | HEART RATE: 70 BPM | DIASTOLIC BLOOD PRESSURE: 80 MMHG

## 2023-12-29 DIAGNOSIS — C34.12 MALIGNANT NEOPLASM OF UPPER LOBE OF LEFT LUNG: Primary | ICD-10-CM

## 2023-12-29 PROCEDURE — G0463 HOSPITAL OUTPT CLINIC VISIT: HCPCS

## 2023-12-29 NOTE — PROGRESS NOTES
FOLLOW UP NOTE    PATIENT:                                                      Lizbeth Johns  MEDICAL RECORD #:                        [id number]  :                                                          1955  COMPLETION DATE:   2023  DIAGNOSIS:     Malignant neoplasm of upper lobe of left lung  - Stage IA2 (cT1b, cN0, cM0)    Primary adenocarcinoma of right lung  - Stage IB (cT2a, cN0, cM0)      BRIEF HISTORY:  Lizbeth Johns is a 67 y.o. female with known history of former tobacco abuse and a previous early-stage adenocarcinoma of the right upper lobe of lung status post prior resection on 2019 with Dr. Barreto.  She did not require adjuvant treatment.  She was found on surveillance chest imaging to have interval increase in size and density of a spiculated 1.1 cm peripheral left upper lobe lung lesion.  She was sent for PET/CT scan which showed isolated hypermetabolism with maximum SUV 4.7 within this left upper lobe lung lesion, clinically consistent with malignancy.  Adjacent to this lesion is a 6 mm medial left upper lobe lung lesion which is not PET avid.  There is otherwise no evidence of regional or distant metastatic disease and no evidence of recurrence in the contralateral right lung.  The patient wished to avoid additional work-up with biopsy.  The patient underwent definitive, empiric treatment with a course of SBRT to a dose of 48 Gray in 4 fractions delivered to the PET positive left upper lobe lung lesion, completing 2023.  She tolerated treatment well.      Since we last saw the patient, she has undergone restaging PET/CT scan and returns today following imaging.  From a symptom standpoint, she endorses stable exertional dyspnea and cough with clear sputum in the setting of COPD.  She denies new or progressive chest complaints.  She denies hemoptysis, purulent sputum, chest pain, fever, chills, weight loss, or additional acute concerns today.        MEDICATIONS:  Medication reconciliation for the patient was reviewed and confirmed in the electronic medical record.    Review of Systems   Respiratory:  Positive for cough and shortness of breath (with exertion).    All other systems reviewed and are negative.          KPS 90%      Physical Exam  Vitals and nursing note reviewed.   Constitutional:       General: She is not in acute distress.     Appearance: Normal appearance. She is well-developed.   HENT:      Head: Normocephalic and atraumatic.   Eyes:      Conjunctiva/sclera: Conjunctivae normal.      Pupils: Pupils are equal, round, and reactive to light.   Cardiovascular:      Rate and Rhythm: Normal rate and regular rhythm.      Heart sounds: No murmur heard.     No friction rub.   Pulmonary:      Effort: Pulmonary effort is normal.      Breath sounds: Normal breath sounds. No wheezing.   Musculoskeletal:         General: Normal range of motion.      Cervical back: Normal range of motion and neck supple.   Lymphadenopathy:      Cervical: No cervical adenopathy.   Skin:     General: Skin is warm and dry.   Neurological:      Mental Status: She is alert and oriented to person, place, and time.   Psychiatric:         Behavior: Behavior normal.         Thought Content: Thought content normal.         Judgment: Judgment normal.         VITAL SIGNS:   Vitals:    12/29/23 1508   BP: 136/80   Pulse: 70   Resp: 16   Temp: 97.2 °F (36.2 °C)   TempSrc: Temporal   SpO2: 94%   Weight: 78.7 kg (173 lb 8 oz)   PainSc: 0-No pain           IMAGING:  I have personally reviewed the following imaging study:  Narrative & Impression   NM PET/CT SKULL BASE TO MID THIGH     Date of Exam: 12/1/2023 2:16 PM EST     Indication: f/u scans after cyberknife treatment.     Comparison: PET/CT 7/28/2023.     Technique: 13.25 mCi of F-18 FDG was administered intravenously. PET imaging was obtained from skull base to mid-thigh approximately 60 minutes after radiotracer injection. A low dose non contrast CT  was obtained for attenuation correction and anatomic   localization. Fused PET-CT and 3D MIP reconstructions were utilized for image interpretation.  Fasting blood glucose level: 107 mg/dl.     Reference uptake values:  Mediastinum: 3.5 SUVmax  Liver: 4.1 SUVmax  Normalization method: Body Weight     Findings:     Head and neck: No suspicious metabolic activity.  Physiologic activity is present in the aerodigestive structures and brain.     Thorax:   -The left upper lobe solid pulmonary nodule has undergone interval SBRT measures 0.9 x 0.6 cm and has a max SUV of 2.02 (CT image 100), previously 1.0 x 1.0 cm with max SUV of 4.68.   -The additional left upper lobe solid left upper lobe pulmonary nodule measures 0.4 cm, previously measuring 0.6 cm. No significant hypermetabolic activity associated with this lesion, however its size is below the resolution of PET.   -Right upper lobectomy without associated suspicious hypermetabolic activity.   -No suspicious lymphadenopathy.     Abdomen and pelvis: No suspicious metabolic activity. Physiologic activity is present in the gastrointestinal and genitourinary tracts.     Musculoskeletal and extremities: No suspicious metabolic activity. Physiologic activity is present, some of which is related to degenerative change.     Ancillary CT Findings: Multiple bilateral renal cysts. Calcified uterine fibroids. 2.0 cm left adrenal nodule is non-hypermetabolic with intermediate density, not significantly changed dating back to 2019. Atherosclerosis.     IMPRESSION:  Impression:     Decreased size and hypermetabolism of the left upper lobe pulmonary nodule that has undergone interval SBRT.     Decreased size of the additional left upper lobe pulmonary nodule, remaining below the resolution of PET.     No other sites of suspicious hypermetabolic activity.        Electronically Signed: Matthew Martinez MD    12/4/2023 11:52 AM EST    Workstation ID: [id number]             The following  portions of the patient's history were reviewed and updated as appropriate: allergies, current medications, past family history, past medical history, past social history, past surgical history and problem list.         Diagnoses and all orders for this visit:    1. Malignant neoplasm of upper lobe of left lung (Primary)         IMPRESSION:   Lizbeth Johns is a 67 y.o. female with what appears to be an early stage bronchogenic carcinoma of the left upper lobe of lung.  The patient was not interested in pursuing a biopsy and was more interested in an empiric radiation based approach.  She is now 3 months status post SBRT to the PET positive, peripherally located left upper lobe lung lesion.  She tolerated treatment well and did not develop acute radiation-related toxicities.  Breathing remains stable in the setting of COPD.  Restaging PET/CT scan 12/1/2023 demonstrates decreased size and hypermetabolism of the treated left upper lobe pulmonary nodule consistent with interval treatment response.  No evidence of new pulmonary parenchymal lung lesions, mediastinal/hilar lymphadenopathy, or progressive/metastatic disease.  An additional, tiny left upper lobe pulmonary nodule is mildly decreased in size and continues to be non-hypermetabolic.  We discussed that should this untreated lesion demonstrate metabolic uptake or interval change/growth, we will have a very low threshold to treat down the road as needed.  The patient and I reviewed follow-up intervals, surveillance imaging, and continued expectations for response to treatment.  We discussed NCCN guidelines which recommend CT chest ± contrast every 3-6 months for 3 years, then CT chest ± contrast every 6 months for 2 years, then a low-dose noncontrast-enhanced CT chest annually thereafter for continued surveillance.  Medical oncology has ordered repeat CT scan of the chest due 3/28/2024, and we will schedule the patient to return to our clinic in 6 months or sooner  should clinical/radiographic findings warrant.      RECOMMENDATIONS:      Primary bronchogenic carcinoma of the left upper lobe of lung  -Radiographic diagnosis  -cT1b, cN0, cM0  -Located more peripherally  -Slow interval growth over time  -Hypermetabolic on PET scan  -High risk smoking history  -History of previous malignancy resected by Dr. Barreto  -Patient declines biopsy  -Status post empiric SBRT 48 Mart in 4 fractions              -completed 9/27/2023  -Repeat PET/CT 12/1/2023 demonstrating interval treatment response with decreased size and hypermetabolism of the treated lesion and no evidence of new or progressive disease  -Repeat CT chest scheduled 3/28/2024 per medical oncology  -Follows with Dr. José in [place]  -RTC 6 months or sooner as needed     Separate left upper lobe lung lesion  -Located more medially  -Tiny/subcentimeter and below resolution of PET  -Continues to remain non-hypermetabolic on PET  -Continue to observe for the time being  -May be a candidate for SBRT down the road if interval enlargement     Adenocarcinoma of the right upper lobe of lung  -Status post resection with Dr. Barreto 8/30/2019  -Pathologic stage IB (pT2a, pN0, cM0)  -Did not require adjuvant treatment  -No evidence of local recurrence on surveillance imaging  -Continue to monitor     COPD  -Management per primary  -Stable      Return in about 6 months (around 6/29/2024) for Office Visit.    MARGARET Becerra    I spent a total of 30 minutes on today's visit, with more than 15 minutes in direct face to face communication, and the remainder of the time spent in reviewing the relevant history, records, available imaging, and for documentation.

## 2024-01-02 ENCOUNTER — OFFICE VISIT (OUTPATIENT)
Dept: GASTROENTEROLOGY | Facility: CLINIC | Age: 69
End: 2024-01-02
Payer: MEDICARE

## 2024-01-02 VITALS
BODY MASS INDEX: 33.77 KG/M2 | SYSTOLIC BLOOD PRESSURE: 120 MMHG | HEIGHT: 60 IN | OXYGEN SATURATION: 89 % | WEIGHT: 172 LBS | DIASTOLIC BLOOD PRESSURE: 76 MMHG | RESPIRATION RATE: 14 BRPM | HEART RATE: 71 BPM

## 2024-01-02 DIAGNOSIS — R74.8 ELEVATED ALKALINE PHOSPHATASE LEVEL: Chronic | ICD-10-CM

## 2024-01-02 DIAGNOSIS — Z85.118 HISTORY OF LUNG CANCER: Chronic | ICD-10-CM

## 2024-01-02 DIAGNOSIS — Z87.11 HISTORY OF GASTRIC ULCER: Chronic | ICD-10-CM

## 2024-01-02 DIAGNOSIS — Z86.010 PERSONAL HISTORY OF COLONIC POLYPS: Primary | Chronic | ICD-10-CM

## 2024-01-02 DIAGNOSIS — D50.9 IRON DEFICIENCY ANEMIA, UNSPECIFIED IRON DEFICIENCY ANEMIA TYPE: Chronic | ICD-10-CM

## 2024-01-02 DIAGNOSIS — E66.09 CLASS 1 OBESITY DUE TO EXCESS CALORIES WITH SERIOUS COMORBIDITY AND BODY MASS INDEX (BMI) OF 33.0 TO 33.9 IN ADULT: Chronic | ICD-10-CM

## 2024-01-02 DIAGNOSIS — Z80.0 FAMILY HISTORY OF COLON CANCER: Chronic | ICD-10-CM

## 2024-01-02 PROBLEM — Z12.11 COLON CANCER SCREENING: Status: RESOLVED | Noted: 2017-01-31 | Resolved: 2024-01-02

## 2024-01-02 PROBLEM — E66.811 CLASS 1 OBESITY DUE TO EXCESS CALORIES WITH SERIOUS COMORBIDITY AND BODY MASS INDEX (BMI) OF 33.0 TO 33.9 IN ADULT: Chronic | Status: ACTIVE | Noted: 2024-01-02

## 2024-01-02 PROCEDURE — 3078F DIAST BP <80 MM HG: CPT | Performed by: NURSE PRACTITIONER

## 2024-01-02 PROCEDURE — 3074F SYST BP LT 130 MM HG: CPT | Performed by: NURSE PRACTITIONER

## 2024-01-02 PROCEDURE — 1160F RVW MEDS BY RX/DR IN RCRD: CPT | Performed by: NURSE PRACTITIONER

## 2024-01-02 PROCEDURE — 1159F MED LIST DOCD IN RCRD: CPT | Performed by: NURSE PRACTITIONER

## 2024-01-02 PROCEDURE — 99214 OFFICE O/P EST MOD 30 MIN: CPT | Performed by: NURSE PRACTITIONER

## 2024-01-02 RX ORDER — SODIUM CHLORIDE 9 MG/ML
70 INJECTION, SOLUTION INTRAVENOUS CONTINUOUS PRN
Status: CANCELLED | OUTPATIENT
Start: 2024-01-02

## 2024-01-02 RX ORDER — SODIUM CHLORIDE 9 MG/ML
70 INJECTION, SOLUTION INTRAVENOUS CONTINUOUS PRN
OUTPATIENT
Start: 2024-01-02

## 2024-01-02 RX ORDER — SODIUM, POTASSIUM,MAG SULFATES 17.5-3.13G
SOLUTION, RECONSTITUTED, ORAL ORAL
Qty: 177 ML | Refills: 0 | Status: SHIPPED | OUTPATIENT
Start: 2024-01-02

## 2024-01-02 NOTE — PROGRESS NOTES
New Patient Consult      Date: 2024   Patient Name: Lizbeth Johns  MRN: [id number]  : 1955     Primary Care Provider: Emily Ferreira MD    Chief Complaint   Patient presents with    Colon Cancer Screening     History of Present Illness: Lizbeth Johns is a 68 y.o. female who is here today to establish care with gastroenterology for colon cancer screening.     Her last colonoscopy was in  by Dr. Siddiqui with polyps removed. She had follow up sigmoidoscopy in  by Dr. Siddiqui that was unremarkable. Her brother possibly had colon cancer diagnosed in his 50's. She has a history of lung cancer that is being monitored per patient.     The patient denies recent change in bowel habits. There is no diarrhea or constipation. There is no history of abdominal pain. There is no history of overt GI bleed (hematemesis melena or hematochezia). The patient denies nausea or vomiting. There is no history of reflux. The patient denies dysphagia or odynophagia. There is no history of recent significant weight loss. There is no history of liver disease in the past.     Subjective      Past Medical History:   Diagnosis Date    Acid reflux     Arthritis     Body piercing     ears    Cataract, bilateral     COPD (chronic obstructive pulmonary disease)     Full dentures     Patient advised no adhesives DOS    History of radiation therapy 2023    MIGUELINA lung    Hypertension     Leg cramps     Lung cancer     Lung mass     right    On home oxygen therapy     2L [facility] HS and daily prn     SOB (shortness of breath)     xray showed spot on lungs    Wears glasses     Rx glasses     Past Surgical History:   Procedure Laterality Date    BRONCHOSCOPY N/A 2019    Procedure: BRONCHOSCOPY WITH FLUOROSCOPY, BIOPSY, BRUSHINGS, AND WASHINGS;  Surgeon: Gudelia Patel MD;  Location: [facility];  Service: Pulmonary    COLONOSCOPY N/A 2017    Procedure: COLONOSCOPY with hot and cold snare polypectomies,  hot  biopsy polypectomies, biopsies, resolution clip placement;  Surgeon: Zackery Siddiqui MD;  Location: [facility] ENDOSCOPY;  Service:     COLONOSCOPY N/A 06/04/2018    Procedure: COLONOSCOPY WITH HOT SNARE POLYPECTOMY X 7; COLD SNARE POLYPECTOMY X 3; HOT BIOPSY POLYPECTOMY X 20; COLD BIOPSY POLYPECTOMY X 2; THERMAL ABLATION OF COLON POLYPS X 23; CLIP PLACEMENT X 2; BIOPSIES;  Surgeon: Zackery Siddiqui MD;  Location: [facility] ENDOSCOPY;  Service: Gastroenterology    COLONOSCOPY N/A 06/19/2019    Procedure: COLONOSCOPY with cold biopsy polypectomies and cold biopsy;  Surgeon: Zackery Siddiqui MD;  Location: [facility] ENDOSCOPY;  Service: Gastroenterology    ENDOSCOPY  06/13/2019    ENDOSCOPY N/A 01/09/2020    Procedure: ESOPHAGOGASTRODUODENOSCOPY;  Surgeon: Zackery Siddiqui MD;  Location: [facility] ENDOSCOPY;  Service: Gastroenterology    MULTIPLE TOOTH EXTRACTIONS      full extraction    ORIF TIBIA/FIBULA FRACTURES Left     OTHER SURGICAL HISTORY      SIGMOIDOSCOPY N/A 01/09/2020    Procedure: FLEXIBLE SIGMOIDOSCOPY; ANOSCOPY;  Surgeon: Zackery Siddiqui MD;  Location: [facility] ENDOSCOPY;  Service: Gastroenterology    THORACOSCOPY Right 08/30/2019    Procedure: BRONCHOSCOPY,  THORACOSCOPY VIDEO ASSISTED with right upper lobe wedge RESECTION , RIGHT UPPER lobectomy with mediastinal lymph node dissection AND INTERCOSTAL CRYOABLATION OF RIGHT CHEST;  Surgeon: Brian Barreto MD;  Location: [facility] OR;  Service: Cardiothoracic    TUBAL ABDOMINAL LIGATION       Family History   Problem Relation Age of Onset    Cancer Mother     Lung cancer Mother     No Known Problems Father     Cirrhosis Brother     Liver disease Brother     Colon cancer Brother         Possibly colon cancer diagnosed in his 50's, patient unsure.    Stomach cancer Neg Hx     Esophageal cancer Neg Hx     Breast cancer Neg Hx      Social History     Socioeconomic History    Marital status:     Number of children: 4   Tobacco Use    Smoking status: Former     Packs/day:  1.00     Years: 50.00     Additional pack years: 0.00     Total pack years: 50.00     Types: Cigarettes     Quit date: 2019     Years since quittin.3    Smokeless tobacco: Never    Tobacco comments:     Pt states that she quit in 2019   Vaping Use    Vaping Use: Never used   Substance and Sexual Activity    Alcohol use: Yes     Alcohol/week: 2.0 standard drinks of alcohol     Types: 2 Glasses of wine per week     Comment: once in a while     Drug use: No    Sexual activity: Not Currently     Birth control/protection: Post-menopausal       Current Outpatient Medications:     amLODIPine (NORVASC) 10 MG tablet, amlodipine 10 mg tablet  Take 1 tablet every day by oral route., Disp: , Rfl:     aspirin 81 MG EC tablet, Take 1 tablet by mouth Daily., Disp: , Rfl:     atorvastatin (LIPITOR) 20 MG tablet, Take 1 tablet by mouth Daily., Disp: , Rfl:     carvedilol (COREG) 12.5 MG tablet, Take 1 tablet by mouth 2 (Two) Times a Day., Disp: , Rfl:     diphenhydrAMINE (BENADRYL) 50 MG tablet, Take one tablet by mouth 1 hour prior to exam, Disp: 1 tablet, Rfl: 0    predniSONE (DELTASONE) 50 MG tablet, Take one tablet by mouth 13 hours, 7 hours, and 1 hour prior to exam, Disp: 3 tablet, Rfl: 0    tiotropium bromide-olodaterol (STIOLTO RESPIMAT) 2.5-2.5 MCG/ACT aerosol solution inhaler, Inhale 2 puffs Daily., Disp: 1 inhaler, Rfl: 5    tiZANidine (ZANAFLEX) 4 MG tablet, Take 1 tablet every day by oral route at bedtime., Disp: , Rfl:     sodium-potassium-magnesium sulfates (Suprep Bowel Prep Kit) 17.5-3.13-1.6 GM/177ML solution oral solution, Use as directed for colonoscopy prep. Patient has instructions., Disp: 177 mL, Rfl: 0  No current facility-administered medications for this visit.    Facility-Administered Medications Ordered in Other Visits:     Chlorhexidine Gluconate Cloth 2 % pads 1 application, 1 application , Topical, Q12H PRN, Scarlet, Candelaria, PA-C     Allergies   Allergen Reactions    Contrast Dye (Echo Or  "Unknown Ct/Mr) Hives     The following portions of the patient's history were reviewed and updated as appropriate: allergies, current medications, past family history, past medical history, past social history, past surgical history and problem list.    Objective     Physical Exam  Vitals and nursing note reviewed.   Constitutional:       General: She is not in acute distress.     Appearance: Normal appearance. She is well-developed.   HENT:      Head: Normocephalic and atraumatic.      Mouth/Throat:      Mouth: Mucous membranes are not pale, not dry and not cyanotic.   Eyes:      General: Lids are normal.   Neck:      Trachea: Trachea normal.   Cardiovascular:      Rate and Rhythm: Normal rate.   Pulmonary:      Effort: Pulmonary effort is normal. No respiratory distress.      Breath sounds: Normal breath sounds.   Abdominal:      Tenderness: There is no abdominal tenderness.   Skin:     General: Skin is warm and dry.   Neurological:      Mental Status: She is alert and oriented to person, place, and time.   Psychiatric:         Mood and Affect: Mood normal.         Speech: Speech normal.         Behavior: Behavior normal. Behavior is cooperative.       Vitals:    01/02/24 0918   BP: 120/76   Pulse: 71   Resp: 14   SpO2: (!) 89%   Weight: 78 kg (172 lb)   Height: 152.4 cm (60\")     Body mass index is 33.59 kg/m².     Results Review:   I have reviewed the patient's new clinical and imaging results.    Lab on 12/08/2023   Component Date Value Ref Range Status    Glucose 12/08/2023 104 (H)  65 - 99 mg/dL Final    BUN 12/08/2023 23  8 - 23 mg/dL Final    Creatinine 12/08/2023 0.95  0.57 - 1.00 mg/dL Final    Sodium 12/08/2023 141  136 - 145 mmol/L Final    Potassium 12/08/2023 3.8  3.5 - 5.2 mmol/L Final    Chloride 12/08/2023 102  98 - 107 mmol/L Final    CO2 12/08/2023 26.0  22.0 - 29.0 mmol/L Final    Calcium 12/08/2023 9.3  8.6 - 10.5 mg/dL Final    Total Protein 12/08/2023 7.3  6.0 - 8.5 g/dL Final    Albumin " 12/08/2023 4.3  3.5 - 5.2 g/dL Final    ALT (SGPT) 12/08/2023 17  1 - 33 U/L Final    AST (SGOT) 12/08/2023 20  1 - 32 U/L Final    Alkaline Phosphatase 12/08/2023 112  39 - 117 U/L Final    Total Bilirubin 12/08/2023 0.3  0.0 - 1.2 mg/dL Final    Globulin 12/08/2023 3.0  gm/dL Final    A/G Ratio 12/08/2023 1.4  g/dL Final    BUN/Creatinine Ratio 12/08/2023 24.2  7.0 - 25.0 Final    Anion Gap 12/08/2023 13.0  5.0 - 15.0 mmol/L Final    eGFR 12/08/2023 65.0  >60.0 mL/min/1.73 Final    Total Cholesterol 12/08/2023 187  0 - 200 mg/dL Final    Triglycerides 12/08/2023 56  0 - 150 mg/dL Final    HDL Cholesterol 12/08/2023 93 (H)  40 - 60 mg/dL Final    LDL Cholesterol  12/08/2023 83  0 - 100 mg/dL Final    VLDL Cholesterol 12/08/2023 11  5 - 40 mg/dL Final    LDL/HDL Ratio 12/08/2023 0.89   Final   Hospital Outpatient Visit on 12/01/2023   Component Date Value Ref Range Status    Glucose 12/01/2023 107  70 - 130 mg/dL Final      NM PET/CT Skull Base to Mid Thigh     Result Date: 12/4/2023  Impression: Decreased size and hypermetabolism of the left upper lobe pulmonary nodule that has undergone interval SBRT. Decreased size of the additional left upper lobe pulmonary nodule, remaining below the resolution of PET. No other sites of suspicious hypermetabolic activity.      Colonoscopy dated June 4, 2018 per Dr. Siddiqui   - Pandiverticulosis more pronounced in the left colon.    - Multiple colon polyps.  32 were removed ranging between 3 mm and 10 mm in size.  Additionally, 23 small polyps were thermally ablated.   - 2 cm lipomatous lesion in the ascending colon which is somewhat firm than classic lipoma.  Multiple biopsies were obtained to include the deeper tissue.  The lesion is otherwise stable.    - Internal hemorrhoids.    - Ascending colon, polyp, biopsy revealed tubular adenoma.  Descending colon, polyps, biopsies revealed tubular adenoma.  Sigmoid colon, polyps, biopsies revealed tubular adenoma.  Hyperplastic polyps.   Lymphoid aggregates.  Rectal polyps, biopsies revealed hyperplastic polyps.  Transverse colon, polyp, biopsies revealed tubular adenoma.  Ascending colon, polyp #2, biopsies revealed benign colonic mucosa with lymphoid aggregate.  Ascending colon polyp, #3, biopsies revealed tubular adenoma.  Ascending, polyp, #4, biopsy revealed tubular adenoma.  Colon, hepatic flexure, polyp, biopsy revealed benign colonic mucosa with lymphoid aggregate.  Ascending colon, lipoma, biopsies revealed benign colonic mucosa with lymphoid aggregate.  Scant submucosa present.     Colonoscopy dated June 19, 2019 per Dr. Siddiqui   - Pandiverticulosis.  More pronounced in the left colon as compared to the transverse colon and right colon.    - Colon polyps.  5 were removed.  3 mm in size.    - Submucosal lipoma within the proximal ascending colon.    - Internal hemorrhoids.    - Ascending colon, lipoma, biopsy revealed unremarkable colonic mucosa.  The findings do not rule out the possibility of an unsampled submucosal lesion.  Clinical and endoscopic correlation is required.  Rectal polyps, biopsy revealed fragments of hyperplastic polyp.  Sigmoid colon polyp, biopsy revealed unremarkable colonic mucosa.     EGD dated 1/9/2020 per Dr. Siddiqui  Erosive distal esophagitis. LA class A.  Small sliding hiatal hernia less than 3 cm.  Erythematous-erosive gastritis.  Healing gastric ulcerations  -Second portion of duodenum biopsy unremarkable.  Gastric antrum biopsy nearly healed ulcer with reactive gastropathy with features compatible with healing ulcer site.  No H. pylori.  Antrum body and fundus biopsy with an unremarkable oxyntic mucosa, no H. pylori.    Flexible sigmoidoscopy dated 1/9/2020 per Dr. Siddiqui  1.  Exam limited to distal sigmoid colon.  2.  Small internal hemorrhoids.  - Follow up colonoscopy in 3 years.    Assessment / Plan      1. Personal history of colonic polyps  2. Family history of colon cancer  Her last colonoscopy was in 2019  per Dr. HUITRON with polyps removed.  She had follow-up sigmoidoscopy in 2020 that was unremarkable.  Her brother possibly had colon cancer diagnosed in his 50s.  Colonoscopy for surveillance.    - Case Request  - sodium-potassium-magnesium sulfates (Suprep Bowel Prep Kit) 17.5-3.13-1.6 GM/177ML solution oral solution; Use as directed for colonoscopy prep. Patient has instructions.  Dispense: 177 mL; Refill: 0    3. Iron deficiency anemia, unspecified iron deficiency anemia type  4. History of lung cancer  5. History of gastric ulcer  Upon review of records, she has a history of mild anemia.  Labs dated 6/5/2023 with hemoglobin 10.8, B12 level 531, iron 61, iron saturation decreased at 15%.  Patient denies any GI bleeding.  There is no history of vaginal bleeding, hematuria or nosebleeds.  She has a history of lung cancer that is being monitored by oncology according to the patient.  EGD in 2020 with gastric ulcers and erosive distal esophagitis.  She is not on PPI therapy at this time as she has not needed it.  PET scan dated 12/4/2023 with unremarkable GI tract.  She is due for colonoscopy for surveillance of polyps.  Will also schedule EGD to rule out upper GI source of anemia.    - Case Request    6. Elevated alkaline phosphatase level  7. Class 1 obesity due to excess calories with serious comorbidity and body mass index (BMI) of 33.0 to 33.9 in adult  BMI 33.59  She has a history of mild elevation of alkaline phosphatase in the past.  Recent labs with normal alkaline phosphatase, total bilirubin, AST and ALT.  PET scan dated 12/4/2023 with unremarkable GI tract. No abnormality of liver noted. She is being monitored for history of lung cancer by oncology.  Recommend low-fat diet, exercise and weight reduction.    Patient Instructions   Upper endoscopy-EGD: The indications, technique, alternatives and potential risk and complications were discussed with the patient including but not limited to bleeding, perforations,  missing lesions and anesthetic complications. The patient understands and wishes to proceed with the procedure and has given their verbal consent. Written patient education information was given to the patient.   Colonoscopy: The indications, technique, alternatives and potential risk and complications were discussed with the patient including but not limited to bleeding, perforations, missing lesions and anesthetic complications. The patient understands and wishes to proceed with the procedure and has given their verbal consent. Written patient education information was given to the patient.   The patient will call if they have further questions before procedure.    Jonathan Brito, APRN  1/2/2024    Please note that portions of this note may have been completed with a voice recognition program.

## 2024-01-02 NOTE — PATIENT INSTRUCTIONS
Upper endoscopy-EGD: The indications, technique, alternatives and potential risk and complications were discussed with the patient including but not limited to bleeding, perforations, missing lesions and anesthetic complications. The patient understands and wishes to proceed with the procedure and has given their verbal consent. Written patient education information was given to the patient.   Colonoscopy: The indications, technique, alternatives and potential risk and complications were discussed with the patient including but not limited to bleeding, perforations, missing lesions and anesthetic complications. The patient understands and wishes to proceed with the procedure and has given their verbal consent. Written patient education information was given to the patient.   The patient will call if they have further questions before procedure.

## 2024-01-04 ENCOUNTER — PATIENT ROUNDING (BHMG ONLY) (OUTPATIENT)
Dept: GASTROENTEROLOGY | Facility: CLINIC | Age: 69
End: 2024-01-04
Payer: MEDICARE

## 2024-01-04 NOTE — PROGRESS NOTES
January 4, 2024    Hello, may I speak with Lizbeth Johns?    My name is Kaitlynn     I am  with [facility]  [address]  [place] [address] [phone].    Before we get started may I verify your date of birth? 1955    I am calling to officially welcome you to our practice and ask about your recent visit. Is this a good time to talk? yes    Tell me about your visit with us. What things went well? everything went  good        We're always looking for ways to make our patients' experiences even better. Do you have recommendations on ways we may improve?  no    Overall were you satisfied with your first visit to our practice? yes       I appreciate you taking the time to speak with me today. Is there anything else I can do for you? no      Thank you, and have a great day.

## 2024-01-11 NOTE — PRE-PROCEDURE INSTRUCTIONS
PAT phone history completed with patient for upcoming procedure on 1/23/24 with Dr. Denny.    PAT PASS reviewed with patient and they verbalize understanding of the following:     Do not eat or drink anything after midnight the night before procedure unless otherwise instructed by physician/surgeon's office, this includes no gum, candy, mints, tobacco products or e-cigarettes.  Do not shave the area to be operated on at least 48 hours prior to procedure.  Do not wear makeup, lotion, hair products, or nail polish.  Do not wear any jewelry and remove all piercings.  Do not wear any adhesive if you wear dentures.  Do not wear contacts; bring in glasses if needed.  Only take medications on the morning of procedure as instructed by PAT nurse per anesthesia guidelines or as instructed by physician's office.  If you are on any blood thinners reach out to the physician/surgeon's office for instructions on when/if they will need to be stopped prior to procedure.  Bring in picture ID and insurance card, advanced directive copies if applicable, CPAP/BIPAP/Inhalers if indicated morning of procedure, leave any other valuables at home.  Ensure you have arranged for someone to drive you home the day of your procedure and someone to care for you at home afterwards. It is recommended that you do not drive, drink alcohol, or make any major legal decisions for at least 24 hours after your procedure is complete.    Instructions given on hospital entrance and registration location.

## 2024-01-23 ENCOUNTER — ANESTHESIA EVENT (OUTPATIENT)
Dept: GASTROENTEROLOGY | Facility: HOSPITAL | Age: 69
End: 2024-01-23
Payer: MEDICARE

## 2024-01-23 ENCOUNTER — ANESTHESIA (OUTPATIENT)
Dept: GASTROENTEROLOGY | Facility: HOSPITAL | Age: 69
End: 2024-01-23
Payer: MEDICARE

## 2024-01-23 ENCOUNTER — HOSPITAL ENCOUNTER (OUTPATIENT)
Facility: HOSPITAL | Age: 69
Setting detail: HOSPITAL OUTPATIENT SURGERY
Discharge: HOME | End: 2024-01-23
Attending: INTERNAL MEDICINE | Admitting: INTERNAL MEDICINE
Payer: MEDICARE

## 2024-01-23 VITALS
OXYGEN SATURATION: 94 % | BODY MASS INDEX: 33.77 KG/M2 | SYSTOLIC BLOOD PRESSURE: 127 MMHG | DIASTOLIC BLOOD PRESSURE: 72 MMHG | HEIGHT: 60 IN | TEMPERATURE: 97 F | RESPIRATION RATE: 16 BRPM | HEART RATE: 64 BPM | WEIGHT: 172 LBS

## 2024-01-23 DIAGNOSIS — D50.9 IRON DEFICIENCY ANEMIA, UNSPECIFIED IRON DEFICIENCY ANEMIA TYPE: ICD-10-CM

## 2024-01-23 DIAGNOSIS — Z87.11 HISTORY OF GASTRIC ULCER: ICD-10-CM

## 2024-01-23 PROCEDURE — 88305 TISSUE EXAM BY PATHOLOGIST: CPT

## 2024-01-23 PROCEDURE — 25810000003 SODIUM CHLORIDE 0.9 % SOLUTION: Performed by: NURSE PRACTITIONER

## 2024-01-23 PROCEDURE — 43239 EGD BIOPSY SINGLE/MULTIPLE: CPT | Performed by: INTERNAL MEDICINE

## 2024-01-23 PROCEDURE — 25010000002 PROPOFOL 10 MG/ML EMULSION: Performed by: NURSE ANESTHETIST, CERTIFIED REGISTERED

## 2024-01-23 RX ORDER — MAGNESIUM HYDROXIDE 1200 MG/15ML
LIQUID ORAL AS NEEDED
Status: DISCONTINUED | OUTPATIENT
Start: 2024-01-23 | End: 2024-01-23 | Stop reason: HOSPADM

## 2024-01-23 RX ORDER — PANTOPRAZOLE SODIUM 40 MG/1
40 TABLET, DELAYED RELEASE ORAL DAILY
Qty: 30 TABLET | Refills: 5 | Status: SHIPPED | OUTPATIENT
Start: 2024-01-23

## 2024-01-23 RX ORDER — PROPOFOL 10 MG/ML
VIAL (ML) INTRAVENOUS AS NEEDED
Status: DISCONTINUED | OUTPATIENT
Start: 2024-01-23 | End: 2024-01-23 | Stop reason: SURG

## 2024-01-23 RX ORDER — SODIUM CHLORIDE 9 MG/ML
70 INJECTION, SOLUTION INTRAVENOUS CONTINUOUS PRN
Status: DISCONTINUED | OUTPATIENT
Start: 2024-01-23 | End: 2024-01-23 | Stop reason: HOSPADM

## 2024-01-23 RX ADMIN — PROPOFOL 200 MG: 10 INJECTION, EMULSION INTRAVENOUS at 08:16

## 2024-01-23 RX ADMIN — SODIUM CHLORIDE 70 ML/HR: 9 INJECTION, SOLUTION INTRAVENOUS at 07:02

## 2024-01-23 RX ADMIN — LIDOCAINE HYDROCHLORIDE 60 MG: 20 INJECTION, SOLUTION INTRAVENOUS at 08:16

## 2024-01-23 NOTE — H&P
[facility]  HISTORY AND PHYSICAL    Patient Name: Lizbeth Johns  : 1955  MRN: [id number]    Chief Complaint:   For EGD    History Of Presenting Illness:    H/o PUD   TEJAL      Past Medical History:   Diagnosis Date    Acid reflux     Arthritis     Body piercing     ears    Cataract, bilateral     COPD (chronic obstructive pulmonary disease)     Disease of thyroid gland     Full dentures     Patient advised no adhesives DOS    History of radiation therapy 2023    MIGUELINA lung    Hypertension     Leg cramps     Lung cancer     bilateral-in remission    Lung mass     right    On home oxygen therapy     2L NC HS and daily prn     SOB (shortness of breath)     xray showed spot on lungs    Wears glasses     Rx glasses       Past Surgical History:   Procedure Laterality Date    BRONCHOSCOPY N/A 2019    Procedure: BRONCHOSCOPY WITH FLUOROSCOPY, BIOPSY, BRUSHINGS, AND WASHINGS;  Surgeon: Gudelia Patel MD;  Location: [facility] OR;  Service: Pulmonary    COLONOSCOPY N/A 2017    Procedure: COLONOSCOPY with hot and cold snare polypectomies,  hot biopsy polypectomies, biopsies, resolution clip placement;  Surgeon: Zackery Siddiqui MD;  Location: [facility] ENDOSCOPY;  Service:     COLONOSCOPY N/A 2018    Procedure: COLONOSCOPY WITH HOT SNARE POLYPECTOMY X 7; COLD SNARE POLYPECTOMY X 3; HOT BIOPSY POLYPECTOMY X 20; COLD BIOPSY POLYPECTOMY X 2; THERMAL ABLATION OF COLON POLYPS X 23; CLIP PLACEMENT X 2; BIOPSIES;  Surgeon: Zackery Siddiqui MD;  Location: [facility] ENDOSCOPY;  Service: Gastroenterology    COLONOSCOPY N/A 2019    Procedure: COLONOSCOPY with cold biopsy polypectomies and cold biopsy;  Surgeon: Zackeyr Siddiqui MD;  Location: [facility] ENDOSCOPY;  Service: Gastroenterology    ENDOSCOPY  2019    ENDOSCOPY N/A 2020    Procedure: ESOPHAGOGASTRODUODENOSCOPY;  Surgeon: Zackery Siddiqui MD;  Location: [facility] ENDOSCOPY;  Service: Gastroenterology    LUNG CANCER SURGERY  Right     MULTIPLE TOOTH EXTRACTIONS      full extraction    ORIF TIBIA/FIBULA FRACTURES Left     OTHER SURGICAL HISTORY      SIGMOIDOSCOPY N/A 2020    Procedure: FLEXIBLE SIGMOIDOSCOPY; ANOSCOPY;  Surgeon: Zackery Siddiqui MD;  Location: [facility] ENDOSCOPY;  Service: Gastroenterology    THORACOSCOPY Right 2019    Procedure: BRONCHOSCOPY,  THORACOSCOPY VIDEO ASSISTED with right upper lobe wedge RESECTION , RIGHT UPPER lobectomy with mediastinal lymph node dissection AND INTERCOSTAL CRYOABLATION OF RIGHT CHEST;  Surgeon: Brian Barreto MD;  Location: [facility] OR;  Service: Cardiothoracic    TUBAL ABDOMINAL LIGATION         Social History     Socioeconomic History    Marital status:     Number of children: 4   Tobacco Use    Smoking status: Former     Packs/day: 1.00     Years: 50.00     Additional pack years: 0.00     Total pack years: 50.00     Types: Cigarettes     Quit date: 2019     Years since quittin.4    Smokeless tobacco: Never    Tobacco comments:     Pt states that she quit in 2019   Vaping Use    Vaping Use: Never used   Substance and Sexual Activity    Alcohol use: Yes     Alcohol/week: 2.0 standard drinks of alcohol     Types: 2 Glasses of wine per week     Comment: once in a while     Drug use: No    Sexual activity: Defer       Family History   Problem Relation Age of Onset    Cancer Mother     Lung cancer Mother     No Known Problems Father     Cirrhosis Brother     Liver disease Brother     Colon cancer Brother         Possibly colon cancer diagnosed in his 50's, patient unsure.    Stomach cancer Neg Hx     Esophageal cancer Neg Hx     Breast cancer Neg Hx        Prior to Admission Medications:  Medications Prior to Admission   Medication Sig Dispense Refill Last Dose    amLODIPine (NORVASC) 10 MG tablet amlodipine 10 mg tablet   Take 1 tablet every day by oral route.   2024 at 0800    aspirin 81 MG EC tablet Take 1 tablet by mouth Daily.   2024    atorvastatin  (LIPITOR) 20 MG tablet Take 1 tablet by mouth Daily.   1/22/2024 at 0800    carvedilol (COREG) 12.5 MG tablet Take 1 tablet by mouth 2 (Two) Times a Day.   1/22/2024 at 2200    diphenhydrAMINE (BENADRYL) 50 MG tablet Take one tablet by mouth 1 hour prior to exam 1 tablet 0 Past Week    predniSONE (DELTASONE) 50 MG tablet Take one tablet by mouth 13 hours, 7 hours, and 1 hour prior to exam 3 tablet 0 Past Week    sodium-potassium-magnesium sulfates (Suprep Bowel Prep Kit) 17.5-3.13-1.6 GM/177ML solution oral solution Use as directed for colonoscopy prep. Patient has instructions. 177 mL 0 Unknown    tiotropium bromide-olodaterol (STIOLTO RESPIMAT) 2.5-2.5 MCG/ACT aerosol solution inhaler Inhale 2 puffs Daily. (Patient not taking: Reported on 1/11/2024) 1 inhaler 5 Not Taking    tiZANidine (ZANAFLEX) 4 MG tablet Take 1 tablet every day by oral route at bedtime. (Patient not taking: Reported on 1/11/2024)   Not Taking       Allergies:  Allergies   Allergen Reactions    Contrast Dye (Echo Or Unknown Ct/Mr) Hives        Vitals: Temp:  [97 °F (36.1 °C)] 97 °F (36.1 °C)  Heart Rate:  [68] 68  Resp:  [18] 18  BP: (140)/(82) 140/82    Review Of Systems:  Constitutional:  Negative for chills, fever, and unexpected weight change.  Respiratory:  Negative for cough, chest tightness, shortness of breath, and wheezing.  Cardiovascular:  Negative for chest pain, palpitations, and leg swelling.  Gastrointestinal:  Negative for abdominal distention, abdominal pain, nausea, vomiting.  Neurological:  Negative for weakness, numbness, and headaches.     Physical Exam:    General Appearance:  Alert, cooperative, in no acute distress.   Lungs:   Clear to auscultation, respirations regular, even and                 unlabored.   Heart:  Regular rhythm and normal rate.   Abdomen:   Normal bowel sounds, no masses, no organomegaly. Soft, nontender, nondistended   Neurologic: Alert and oriented x 3. Moves all four limbs equally       Assessment  & Plan     Assessment:  Active Problems:    History of gastric ulcer    Iron deficiency anemia      Plan: ESOPHAGOGASTRODUODENOSCOPY (N/A)     Lana Denny MD  1/23/2024

## 2024-01-23 NOTE — ANESTHESIA POSTPROCEDURE EVALUATION
Patient: Lizbeth Johns    Procedure Summary       Date: 01/23/24 Room / Location: [facility] ENDOSCOPY 2 / [facility] ENDOSCOPY    Anesthesia Start: 0812 Anesthesia Stop: 0823    Procedure: ESOPHAGOGASTRODUODENOSCOPY WITH BIOPSY (Esophagus) Diagnosis:       Iron deficiency anemia, unspecified iron deficiency anemia type      History of gastric ulcer      (Iron deficiency anemia, unspecified iron deficiency anemia type [D50.9])      (History of gastric ulcer [Z87.11])    Surgeons: Lana Denny MD Provider: Bryan King CRNA    Anesthesia Type: MAC ASA Status: 3            Anesthesia Type: MAC    Vitals  No vitals data found for the desired time range.          Post Anesthesia Care and Evaluation    Patient location during evaluation: bedside  Patient participation: complete - patient participated  Level of consciousness: awake  Pain score: 0  Pain management: adequate    Airway patency: patent  Anesthetic complications: No anesthetic complications  PONV Status: controlled  Cardiovascular status: acceptable and stable  Respiratory status: acceptable and room air  Hydration status: acceptable    Comments: See nursing documentation for post op vital signs

## 2024-01-23 NOTE — DISCHARGE INSTRUCTIONS
- Discharge patient to home (ambulatory).   - Resume previous diet.   - Continue present medications.   - PPI daily- consider long-tern PPI with ASA  - Avoid NSAIDS  - Await pathology results.   - Return to my office in 8 weeks.    No pushing, pulling, tugging,  heavy lifting, or strenuous activity.  No major decision making, driving, or drinking alcoholic beverages for 24 hours. ( due to the medications you have  received)  Always use good hand hygiene/washing techniques.  NO driving while taking pain medications.    * if you have an incision:  Check your incision area every day for signs of infection.   Check for:  * more redness, swelling, or pain  *more fluid or blood  *warmth  *pus or bad smell    To assist you in voiding:  Drink plenty of fluids  Listen to running water while attempting to void.    If you are unable to urinate and you have an uncomfortable urge to void or it has been   6 hours since you were discharged, return to the Emergency Room

## 2024-01-23 NOTE — ANESTHESIA PREPROCEDURE EVALUATION
Anesthesia Evaluation     Patient summary reviewed and Nursing notes reviewed   NPO Solid Status: > 8 hours  NPO Liquid Status: > 8 hours           Airway   Mallampati: II  TM distance: >3 FB  Neck ROM: full  No difficulty expected  Dental    (+) lower dentures and upper dentures    Pulmonary - normal exam   (+) a smoker Former, lung cancer, COPD,home oxygen (2l nc), shortness of breath  Cardiovascular - normal exam  Exercise tolerance: good (4-7 METS)    (+) hypertension      Neuro/Psych- negative ROS  GI/Hepatic/Renal/Endo    (+) obesity, morbid obesity, GERD, renal disease-, thyroid problem     Musculoskeletal     Abdominal  - normal exam    Bowel sounds: normal.   Substance History - negative use     OB/GYN negative ob/gyn ROS         Other   arthritis,   history of cancer    ROS/Med Hx Other: Right upper lobectomy                Anesthesia Plan    ASA 3     MAC     (Risks and benefits discussed including risk of aspiration, recall and dental damage. All patient questions answered.    Will continue with plan of care.)  intravenous induction     Anesthetic plan, risks, benefits, and alternatives have been provided, discussed and informed consent has been obtained with: patient.  Pre-procedure education provided  Plan discussed with CRNA.

## 2024-01-24 LAB — REF LAB TEST METHOD: NORMAL

## 2024-01-24 NOTE — PRE-PROCEDURE INSTRUCTIONS
PAT phone history completed with patient for upcoming procedure on 2/2/24 with Dr. Denny.    PAT PASS reviewed with patient and they verbalize understanding of the following:     Do not eat or drink anything after midnight the night before procedure unless otherwise instructed by physician/surgeon's office, this includes no gum, candy, mints, tobacco products or e-cigarettes.  Do not shave the area to be operated on at least 48 hours prior to procedure.  Do not wear makeup, lotion, hair products, or nail polish.  Do not wear any jewelry and remove all piercings.  Do not wear any adhesive if you wear dentures.  Do not wear contacts; bring in glasses if needed.  Only take medications on the morning of procedure as instructed by PAT nurse per anesthesia guidelines or as instructed by physician's office.  If you are on any blood thinners reach out to the physician/surgeon's office for instructions on when/if they will need to be stopped prior to procedure.  Bring in picture ID and insurance card, advanced directive copies if applicable, CPAP/BIPAP/Inhalers if indicated morning of procedure, leave any other valuables at home.  Ensure you have arranged for someone to drive you home the day of your procedure and someone to care for you at home afterwards. It is recommended that you do not drive, drink alcohol, or make any major legal decisions for at least 24 hours after your procedure is complete.    Instructions given on hospital entrance and registration location.

## 2024-01-26 ENCOUNTER — TELEPHONE (OUTPATIENT)
Dept: GASTROENTEROLOGY | Facility: CLINIC | Age: 69
End: 2024-01-26
Payer: MEDICARE

## 2024-01-26 NOTE — TELEPHONE ENCOUNTER
Patient is scheduled for colonoscopy with Dr. Denny on 2/2/24 with arrival time of 930 am. Called patient to confirm appointment. She has confirmed

## 2024-02-02 ENCOUNTER — HOSPITAL ENCOUNTER (OUTPATIENT)
Facility: HOSPITAL | Age: 69
Setting detail: HOSPITAL OUTPATIENT SURGERY
Discharge: HOME | End: 2024-02-02
Attending: INTERNAL MEDICINE | Admitting: INTERNAL MEDICINE
Payer: MEDICARE

## 2024-02-02 ENCOUNTER — ANESTHESIA EVENT (OUTPATIENT)
Dept: GASTROENTEROLOGY | Facility: HOSPITAL | Age: 69
End: 2024-02-02
Payer: MEDICARE

## 2024-02-02 ENCOUNTER — ANESTHESIA (OUTPATIENT)
Dept: GASTROENTEROLOGY | Facility: HOSPITAL | Age: 69
End: 2024-02-02
Payer: MEDICARE

## 2024-02-02 VITALS
HEART RATE: 72 BPM | TEMPERATURE: 97.4 F | RESPIRATION RATE: 16 BRPM | OXYGEN SATURATION: 95 % | DIASTOLIC BLOOD PRESSURE: 75 MMHG | SYSTOLIC BLOOD PRESSURE: 127 MMHG

## 2024-02-02 DIAGNOSIS — Z86.010 PERSONAL HISTORY OF COLONIC POLYPS: ICD-10-CM

## 2024-02-02 PROCEDURE — 45380 COLONOSCOPY AND BIOPSY: CPT | Performed by: INTERNAL MEDICINE

## 2024-02-02 PROCEDURE — 45385 COLONOSCOPY W/LESION REMOVAL: CPT | Performed by: INTERNAL MEDICINE

## 2024-02-02 PROCEDURE — 25010000002 PROPOFOL 200 MG/20ML EMULSION: Performed by: NURSE ANESTHETIST, CERTIFIED REGISTERED

## 2024-02-02 PROCEDURE — 25810000003 SODIUM CHLORIDE 0.9 % SOLUTION: Performed by: NURSE PRACTITIONER

## 2024-02-02 PROCEDURE — 25010000002 PROPOFOL 10 MG/ML EMULSION: Performed by: NURSE ANESTHETIST, CERTIFIED REGISTERED

## 2024-02-02 RX ORDER — LIDOCAINE HCL/PF 100 MG/5ML
SYRINGE (ML) INJECTION AS NEEDED
Status: DISCONTINUED | OUTPATIENT
Start: 2024-02-02 | End: 2024-02-02 | Stop reason: SURG

## 2024-02-02 RX ORDER — PROPOFOL 10 MG/ML
INJECTION, EMULSION INTRAVENOUS AS NEEDED
Status: DISCONTINUED | OUTPATIENT
Start: 2024-02-02 | End: 2024-02-02 | Stop reason: SURG

## 2024-02-02 RX ORDER — SODIUM CHLORIDE 9 MG/ML
70 INJECTION, SOLUTION INTRAVENOUS CONTINUOUS PRN
Status: DISCONTINUED | OUTPATIENT
Start: 2024-02-02 | End: 2024-02-02 | Stop reason: HOSPADM

## 2024-02-02 RX ORDER — SIMETHICONE 40MG/0.6ML
SUSPENSION, DROPS(FINAL DOSAGE FORM)(ML) ORAL AS NEEDED
Status: DISCONTINUED | OUTPATIENT
Start: 2024-02-02 | End: 2024-02-02 | Stop reason: HOSPADM

## 2024-02-02 RX ADMIN — PROPOFOL 100 MG: 10 INJECTION, EMULSION INTRAVENOUS at 10:44

## 2024-02-02 RX ADMIN — PROPOFOL 140 MCG/KG/MIN: 10 INJECTION, EMULSION INTRAVENOUS at 10:44

## 2024-02-02 RX ADMIN — SODIUM CHLORIDE 70 ML/HR: 9 INJECTION, SOLUTION INTRAVENOUS at 10:02

## 2024-02-02 RX ADMIN — Medication 40 MG: at 10:44

## 2024-02-02 NOTE — ANESTHESIA POSTPROCEDURE EVALUATION
Patient: Lizbeth Johns    Procedure Summary       Date: 02/02/24 Room / Location: [facility] ENDOSCOPY 2 / [facility] ENDOSCOPY    Anesthesia Start: 1041 Anesthesia Stop: 1106    Procedure: COLONOSCOPY with biopsy and polypectomy (Anus) Diagnosis:       Personal history of colonic polyps      (Personal history of colonic polyps [Z86.010])    Surgeons: Lana Denny MD Provider: Andre Ortiz CRNA    Anesthesia Type: MAC ASA Status: 3            Anesthesia Type: MAC    Vitals  HR 69  Sat 98  Resp 15  BP 96/55  Temp 98        Post Anesthesia Care and Evaluation    Patient location during evaluation: bedside  Patient participation: complete - patient participated  Level of consciousness: awake and alert and sleepy but conscious  Pain score: 0  Pain management: adequate    Airway patency: patent  Anesthetic complications: No anesthetic complications  PONV Status: none  Cardiovascular status: acceptable  Respiratory status: acceptable  Hydration status: acceptable

## 2024-02-02 NOTE — DISCHARGE INSTRUCTIONS
- Discharge patient to home.   - High fiber diet.   - Continue present medications.   - Await pathology results.   - Repeat colonoscopy in 5 years for surveillance and high risk screening.   - Return to GI office in 8 weeks.     No pushing, pulling, tugging,  heavy lifting, or strenuous activity.  No major decision making, driving, or drinking alcoholic beverages for 24 hours. ( due to the medications you have  received)  Always use good hand hygiene/washing techniques.  NO driving while taking pain medications.    * if you have an incision:  Check your incision area every day for signs of infection.   Check for:  * more redness, swelling, or pain  *more fluid or blood  *warmth  *pus or bad smellTo assist you in voiding:  Drink plenty of fluids  Listen to running water while attempting to void.    If you are unable to urinate and you have an uncomfortable urge to void or it has been   6 hours since you were discharged, return to the Emergency Room

## 2024-02-02 NOTE — H&P
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    Past Medical History:   Diagnosis Date    Acid reflux     Arthritis     Body piercing     ears    Cataract, bilateral     COPD (chronic obstructive pulmonary disease)     Disease of thyroid gland     Full dentures     Patient advised no adhesives DOS    History of radiation therapy 2023    MIGUELINA lung    Hypertension     Leg cramps     Lung cancer     bilateral-in remission    Lung mass     right    On home oxygen therapy     2L [place] and daily prn     SOB (shortness of breath)     xray showed spot on lungs    Wears glasses     Rx glasses       Past Surgical History:   Procedure Laterality Date    BRONCHOSCOPY N/A 2019    Procedure: BRONCHOSCOPY WITH FLUOROSCOPY, BIOPSY, BRUSHINGS, AND WASHINGS;  Surgeon: Gudelia Patel MD;  Location: [facility] OR;  Service: Pulmonary    COLONOSCOPY N/A 2017    Procedure: COLONOSCOPY with hot and cold snare polypectomies,  hot biopsy polypectomies, biopsies, resolution clip placement;  Surgeon: Zackery Siddiqui MD;  Location: [facility] ENDOSCOPY;  Service:     COLONOSCOPY N/A 2018    Procedure: COLONOSCOPY WITH HOT SNARE POLYPECTOMY X 7; COLD SNARE POLYPECTOMY X 3; HOT BIOPSY POLYPECTOMY X 20; COLD BIOPSY POLYPECTOMY X 2; THERMAL ABLATION OF COLON POLYPS X 23; CLIP PLACEMENT X 2; BIOPSIES;  Surgeon: Zackery Siddiqui MD;  Location: [facility] ENDOSCOPY;  Service: Gastroenterology    COLONOSCOPY N/A 2019    Procedure: COLONOSCOPY with cold biopsy polypectomies and cold biopsy;  Surgeon: Zackery Siddiqui MD;  Location: [facility] ENDOSCOPY;  Service: Gastroenterology    ENDOSCOPY  2019    ENDOSCOPY N/A 2020    Procedure: ESOPHAGOGASTRODUODENOSCOPY;  Surgeon: Zackery Siddiqui MD;  Location: [facility]  ENDOSCOPY;  Service: Gastroenterology    ENDOSCOPY N/A 2024    Procedure: ESOPHAGOGASTRODUODENOSCOPY WITH BIOPSY;  Surgeon: Lana Denny MD;  Location: [facility] ENDOSCOPY;  Service: Gastroenterology;  Laterality: N/A;    LUNG CANCER SURGERY Right     MULTIPLE TOOTH EXTRACTIONS      full extraction    ORIF TIBIA/FIBULA FRACTURES Left     OTHER SURGICAL HISTORY      SIGMOIDOSCOPY N/A 2020    Procedure: FLEXIBLE SIGMOIDOSCOPY; ANOSCOPY;  Surgeon: Zackery Siddiqui MD;  Location: [facility] ENDOSCOPY;  Service: Gastroenterology    THORACOSCOPY Right 2019    Procedure: BRONCHOSCOPY,  THORACOSCOPY VIDEO ASSISTED with right upper lobe wedge RESECTION , RIGHT UPPER lobectomy with mediastinal lymph node dissection AND INTERCOSTAL CRYOABLATION OF RIGHT CHEST;  Surgeon: Brian Barreto MD;  Location: [facility] OR;  Service: Cardiothoracic    TUBAL ABDOMINAL LIGATION         Social History     Socioeconomic History    Marital status:     Number of children: 4   Tobacco Use    Smoking status: Former     Packs/day: 1.00     Years: 50.00     Additional pack years: 0.00     Total pack years: 50.00     Types: Cigarettes     Quit date: 2019     Years since quittin.4    Smokeless tobacco: Never    Tobacco comments:     Pt states that she quit in 2019   Vaping Use    Vaping Use: Never used   Substance and Sexual Activity    Alcohol use: Yes     Alcohol/week: 2.0 standard drinks of alcohol     Types: 2 Glasses of wine per week     Comment: once in a while     Drug use: No    Sexual activity: Defer       Family History   Problem Relation Age of Onset    Cancer Mother     Lung cancer Mother     No Known Problems Father     Cirrhosis Brother     Liver disease Brother     Colon cancer Brother         Possibly colon cancer diagnosed in his 50's, patient unsure.    Stomach cancer Neg Hx     Esophageal cancer Neg Hx     Breast cancer Neg Hx        Prior to Admission Medications:  Medications Prior to Admission    Medication Sig Dispense Refill Last Dose    amLODIPine (NORVASC) 10 MG tablet amlodipine 10 mg tablet   Take 1 tablet every day by oral route.   2/1/2024    aspirin 81 MG EC tablet Take 1 tablet by mouth Daily.   1/27/2024    atorvastatin (LIPITOR) 20 MG tablet Take 1 tablet by mouth Daily.   2/1/2024    carvedilol (COREG) 12.5 MG tablet Take 1 tablet by mouth 2 (Two) Times a Day.   2/1/2024    pantoprazole (PROTONIX) 40 MG EC tablet Take 1 tablet by mouth Daily. Indications: Stomach Ulcer 30 tablet 5 2/1/2024    sodium-potassium-magnesium sulfates (Suprep Bowel Prep Kit) 17.5-3.13-1.6 GM/177ML solution oral solution Use as directed for colonoscopy prep. Patient has instructions. 177 mL 0 2/1/2024    tiotropium bromide-olodaterol (STIOLTO RESPIMAT) 2.5-2.5 MCG/ACT aerosol solution inhaler Inhale 2 puffs Daily. (Patient not taking: Reported on 1/11/2024) 1 inhaler 5 Unknown       Allergies:  Allergies   Allergen Reactions    Contrast Dye (Echo Or Unknown Ct/Mr) Hives        Vitals: Temp:  [97.3 °F (36.3 °C)] 97.3 °F (36.3 °C)  Heart Rate:  [71] 71  Resp:  [16] 16  BP: (146)/(87) 146/87    Review Of Systems:  Constitutional:  Negative for chills, fever, and unexpected weight change.  Respiratory:  Negative for cough, chest tightness, shortness of breath, and wheezing.  Cardiovascular:  Negative for chest pain, palpitations, and leg swelling.  Gastrointestinal:  Negative for abdominal distention, abdominal pain, nausea, vomiting.  Neurological:  Negative for weakness, numbness, and headaches.     Physical Exam:    General Appearance:  Alert, cooperative, in no acute distress.   Lungs:   Clear to auscultation, respirations regular, even and                 unlabored.   Heart:  Regular rhythm and normal rate.   Abdomen:   Normal bowel sounds, no masses, no organomegaly. Soft, nontender, nondistended   Neurologic: Alert and oriented x 3. Moves all four limbs equally       Assessment & Plan     Assessment:  Principal  Problem:    Personal history of colonic polyps      Plan: COLONOSCOPY (N/A)     Lana Denny MD  2/2/2024

## 2024-02-02 NOTE — ANESTHESIA PREPROCEDURE EVALUATION
Anesthesia Evaluation     Patient summary reviewed and Nursing notes reviewed   no history of anesthetic complications:   NPO Solid Status: > 8 hours  NPO Liquid Status: > 8 hours           Airway   Mallampati: II  TM distance: >3 FB  Neck ROM: full  No difficulty expected  Dental    (+) lower dentures and upper dentures    Pulmonary - normal exam   (+) a smoker Former, lung cancer, COPD,home oxygen (2l nc), shortness of breath  Cardiovascular - normal exam  Exercise tolerance: good (4-7 METS)    (+) hypertension      Neuro/Psych- negative ROS  GI/Hepatic/Renal/Endo    (+) obesity, morbid obesity, GERD, renal disease-, thyroid problem     Musculoskeletal     Abdominal  - normal exam    Bowel sounds: normal.   Substance History - negative use     OB/GYN negative ob/gyn ROS         Other   arthritis,   history of cancer    ROS/Med Hx Other: Right upper lobectomy        1/23/24 EGD- no issues with anesthesia                Anesthesia Plan    ASA 3     MAC     (Risks and benefits discussed including risk of aspiration, recall and dental damage. All patient questions answered.    Will continue with plan of care.)  intravenous induction     Anesthetic plan, risks, benefits, and alternatives have been provided, discussed and informed consent has been obtained with: patient.  Pre-procedure education provided

## 2024-02-05 LAB — REF LAB TEST METHOD: NORMAL

## 2024-03-25 RX ORDER — DIPHENHYDRAMINE HCL 50 MG
CAPSULE ORAL
Qty: 1 CAPSULE | Refills: 0 | Status: SHIPPED | OUTPATIENT
Start: 2024-03-25 | End: 2024-03-29 | Stop reason: SDUPTHER

## 2024-03-25 RX ORDER — PREDNISONE 50 MG/1
TABLET ORAL
Qty: 3 TABLET | Refills: 0 | Status: SHIPPED | OUTPATIENT
Start: 2024-03-25 | End: 2024-03-29 | Stop reason: SDUPTHER

## 2024-03-28 ENCOUNTER — HOSPITAL ENCOUNTER (OUTPATIENT)
Dept: CT IMAGING | Facility: HOSPITAL | Age: 69
Discharge: HOME | End: 2024-03-28
Admitting: INTERNAL MEDICINE
Payer: MEDICARE

## 2024-03-28 DIAGNOSIS — C34.12 MALIGNANT NEOPLASM OF UPPER LOBE OF LEFT LUNG: ICD-10-CM

## 2024-03-28 PROCEDURE — 25510000001 IOPAMIDOL 61 % SOLUTION: Performed by: INTERNAL MEDICINE

## 2024-03-28 PROCEDURE — 71260 CT THORAX DX C+: CPT

## 2024-03-28 RX ADMIN — IOPAMIDOL 100 ML: 612 INJECTION, SOLUTION INTRAVENOUS at 10:46

## 2024-03-29 ENCOUNTER — LAB (OUTPATIENT)
Dept: LAB | Facility: HOSPITAL | Age: 69
End: 2024-03-29
Payer: MEDICARE

## 2024-03-29 ENCOUNTER — OFFICE VISIT (OUTPATIENT)
Dept: ONCOLOGY | Facility: CLINIC | Age: 69
End: 2024-03-29
Payer: MEDICARE

## 2024-03-29 VITALS
HEIGHT: 60 IN | HEART RATE: 73 BPM | TEMPERATURE: 97.9 F | BODY MASS INDEX: 34.36 KG/M2 | OXYGEN SATURATION: 98 % | RESPIRATION RATE: 16 BRPM | SYSTOLIC BLOOD PRESSURE: 125 MMHG | WEIGHT: 175 LBS | DIASTOLIC BLOOD PRESSURE: 67 MMHG

## 2024-03-29 DIAGNOSIS — R91.1 LUNG NODULE: ICD-10-CM

## 2024-03-29 DIAGNOSIS — C34.91 PRIMARY ADENOCARCINOMA OF RIGHT LUNG: Primary | ICD-10-CM

## 2024-03-29 LAB
ALBUMIN SERPL-MCNC: 4.3 G/DL (ref 3.5–5.2)
ALBUMIN/GLOB SERPL: 1.2 G/DL
ALP SERPL-CCNC: 134 U/L (ref 39–117)
ALT SERPL W P-5'-P-CCNC: 14 U/L (ref 1–33)
ANION GAP SERPL CALCULATED.3IONS-SCNC: 12.1 MMOL/L (ref 5–15)
AST SERPL-CCNC: 19 U/L (ref 1–32)
BASOPHILS # BLD AUTO: 0.03 10*3/MM3 (ref 0–0.2)
BASOPHILS NFR BLD AUTO: 0.5 % (ref 0–1.5)
BILIRUB SERPL-MCNC: 0.4 MG/DL (ref 0–1.2)
BUN SERPL-MCNC: 21 MG/DL (ref 8–23)
BUN/CREAT SERPL: 19.8 (ref 7–25)
CALCIUM SPEC-SCNC: 9.4 MG/DL (ref 8.6–10.5)
CHLORIDE SERPL-SCNC: 99 MMOL/L (ref 98–107)
CO2 SERPL-SCNC: 26.9 MMOL/L (ref 22–29)
CREAT SERPL-MCNC: 1.06 MG/DL (ref 0.57–1)
DEPRECATED RDW RBC AUTO: 46.7 FL (ref 37–54)
EGFRCR SERPLBLD CKD-EPI 2021: 56.6 ML/MIN/1.73
EOSINOPHIL # BLD AUTO: 0.13 10*3/MM3 (ref 0–0.4)
EOSINOPHIL NFR BLD AUTO: 2.1 % (ref 0.3–6.2)
ERYTHROCYTE [DISTWIDTH] IN BLOOD BY AUTOMATED COUNT: 14.5 % (ref 12.3–15.4)
GLOBULIN UR ELPH-MCNC: 3.7 GM/DL
GLUCOSE SERPL-MCNC: 96 MG/DL (ref 65–99)
HCT VFR BLD AUTO: 31.6 % (ref 34–46.6)
HGB BLD-MCNC: 10.6 G/DL (ref 12–15.9)
IMM GRANULOCYTES # BLD AUTO: 0.02 10*3/MM3 (ref 0–0.05)
IMM GRANULOCYTES NFR BLD AUTO: 0.3 % (ref 0–0.5)
LYMPHOCYTES # BLD AUTO: 1.22 10*3/MM3 (ref 0.7–3.1)
LYMPHOCYTES NFR BLD AUTO: 20 % (ref 19.6–45.3)
MCH RBC QN AUTO: 29.5 PG (ref 26.6–33)
MCHC RBC AUTO-ENTMCNC: 33.5 G/DL (ref 31.5–35.7)
MCV RBC AUTO: 88 FL (ref 79–97)
MONOCYTES # BLD AUTO: 0.61 10*3/MM3 (ref 0.1–0.9)
MONOCYTES NFR BLD AUTO: 10 % (ref 5–12)
NEUTROPHILS NFR BLD AUTO: 4.09 10*3/MM3 (ref 1.7–7)
NEUTROPHILS NFR BLD AUTO: 67.1 % (ref 42.7–76)
NRBC BLD AUTO-RTO: 0 /100 WBC (ref 0–0.2)
PLATELET # BLD AUTO: 275 10*3/MM3 (ref 140–450)
PMV BLD AUTO: 9.6 FL (ref 6–12)
POTASSIUM SERPL-SCNC: 4 MMOL/L (ref 3.5–5.2)
PROT SERPL-MCNC: 8 G/DL (ref 6–8.5)
RBC # BLD AUTO: 3.59 10*6/MM3 (ref 3.77–5.28)
SODIUM SERPL-SCNC: 138 MMOL/L (ref 136–145)
WBC NRBC COR # BLD AUTO: 6.1 10*3/MM3 (ref 3.4–10.8)

## 2024-03-29 PROCEDURE — 1126F AMNT PAIN NOTED NONE PRSNT: CPT | Performed by: NURSE PRACTITIONER

## 2024-03-29 PROCEDURE — 36415 COLL VENOUS BLD VENIPUNCTURE: CPT | Performed by: NURSE PRACTITIONER

## 2024-03-29 PROCEDURE — 3078F DIAST BP <80 MM HG: CPT | Performed by: NURSE PRACTITIONER

## 2024-03-29 PROCEDURE — 99214 OFFICE O/P EST MOD 30 MIN: CPT | Performed by: NURSE PRACTITIONER

## 2024-03-29 PROCEDURE — 1160F RVW MEDS BY RX/DR IN RCRD: CPT | Performed by: NURSE PRACTITIONER

## 2024-03-29 PROCEDURE — 80053 COMPREHEN METABOLIC PANEL: CPT | Performed by: NURSE PRACTITIONER

## 2024-03-29 PROCEDURE — 1159F MED LIST DOCD IN RCRD: CPT | Performed by: NURSE PRACTITIONER

## 2024-03-29 PROCEDURE — 3074F SYST BP LT 130 MM HG: CPT | Performed by: NURSE PRACTITIONER

## 2024-03-29 PROCEDURE — 85025 COMPLETE CBC W/AUTO DIFF WBC: CPT | Performed by: NURSE PRACTITIONER

## 2024-03-29 RX ORDER — PREDNISONE 50 MG/1
TABLET ORAL
Qty: 3 TABLET | Refills: 0 | Status: SHIPPED | OUTPATIENT
Start: 2024-03-29

## 2024-03-29 RX ORDER — ALBUTEROL SULFATE 90 UG/1
AEROSOL, METERED RESPIRATORY (INHALATION)
COMMUNITY
Start: 2024-03-27

## 2024-03-29 RX ORDER — DIPHENHYDRAMINE HCL 50 MG
CAPSULE ORAL
Qty: 1 CAPSULE | Refills: 0 | Status: SHIPPED | OUTPATIENT
Start: 2024-03-29
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
## 2024-04-04 ENCOUNTER — OFFICE VISIT (OUTPATIENT)
Dept: GASTROENTEROLOGY | Facility: CLINIC | Age: 69
End: 2024-04-04
Payer: MEDICARE

## 2024-04-04 VITALS
OXYGEN SATURATION: 98 % | BODY MASS INDEX: 35.53 KG/M2 | RESPIRATION RATE: 16 BRPM | SYSTOLIC BLOOD PRESSURE: 108 MMHG | DIASTOLIC BLOOD PRESSURE: 66 MMHG | WEIGHT: 181 LBS | HEART RATE: 96 BPM | HEIGHT: 60 IN

## 2024-04-04 DIAGNOSIS — Z85.118 HISTORY OF LUNG CANCER: Chronic | ICD-10-CM

## 2024-04-04 DIAGNOSIS — K25.9 GASTRIC ULCER WITHOUT HEMORRHAGE OR PERFORATION, UNSPECIFIED CHRONICITY: Chronic | ICD-10-CM

## 2024-04-04 DIAGNOSIS — D50.9 IRON DEFICIENCY ANEMIA, UNSPECIFIED IRON DEFICIENCY ANEMIA TYPE: Primary | Chronic | ICD-10-CM

## 2024-04-04 DIAGNOSIS — Z80.0 FAMILY HISTORY OF COLON CANCER: Chronic | ICD-10-CM

## 2024-04-04 DIAGNOSIS — Z12.11 ENCOUNTER FOR SCREENING FOR MALIGNANT NEOPLASM OF COLON: Chronic | ICD-10-CM

## 2024-04-04 NOTE — PATIENT INSTRUCTIONS
Antireflux measures: Avoid fried, fatty foods, alcohol, chocolate, coffee, tea,  soft drinks, all carbonated beverages (including sparkling water), peppermint and spearmint, spicy foods, tomatoes and tomato based foods, onions, peppers, and garlic.   Other antireflux measures include weight reduction if overweight, avoiding tight clothing around the abdomen, elevating the head of the bed 6 inches with blocks under the head board, and don't drink or eat before going to bed and avoid lying down immediately after meals.  Continue to avoid vaping/smoking.  Pantoprazole 40 mg 1 by mouth in the am 30 minutes before breakfast.  Avoid NSAIDs, including Ibuprofen, Motrin, Advil, Aleve, Naprosyn, etc.   Tylenol products are ok to take.  CT Abdomen and pelvis  Colonoscopy for high risk screening in 5 years, February 2029.   Follow up: 4 months

## 2024-04-04 NOTE — PROGRESS NOTES
Follow Up Note     Date: 2024   Patient Name: Lizbeth Johns  MRN: [id number]  : 1955     Primary Care Provider: Emily Ferreira MD     Chief Complaint   Patient presents with    Follow-up     After procedures     History of present illness:   2024  Lizbeth Johns is a 68 y.o. female who is here today for follow up after procedures. She has been taking the pantoprazole 40 mg daily since her scopes. She denies having any GI symptoms at this time. She denies any episodes of GI bleeding.    Interval History:  2024  Her last colonoscopy was in 2019 by Dr. Siddiqui with polyps removed. She had follow up sigmoidoscopy in  by Dr. Siddiqui that was unremarkable. Her brother possibly had colon cancer diagnosed in his 50's. She has a history of lung cancer that is being monitored per patient.      The patient denies recent change in bowel habits. There is no diarrhea or constipation. There is no history of abdominal pain. There is no history of overt GI bleed (hematemesis melena or hematochezia). The patient denies nausea or vomiting. There is no history of reflux. The patient denies dysphagia or odynophagia. There is no history of recent significant weight loss. There is no history of liver disease in the past.     Subjective      Past Medical History:   Diagnosis Date    Acid reflux     Arthritis     Body piercing     ears    Cataract, bilateral     COPD (chronic obstructive pulmonary disease)     Disease of thyroid gland     Full dentures     Patient advised no adhesives DOS    History of radiation therapy 2023    MIGUELINA lung    Hypertension     Leg cramps     Lung cancer     bilateral-in remission    Lung mass     right    On home oxygen therapy     2L [place] [facility] and daily prn     SOB (shortness of breath)     xray showed spot on lungs    Wears glasses     Rx glasses     Past Surgical History:   Procedure Laterality Date    BRONCHOSCOPY N/A 2019    Procedure: BRONCHOSCOPY WITH  FLUOROSCOPY, BIOPSY, BRUSHINGS, AND WASHINGS;  Surgeon: Gudelia Patel MD;  Location: [facility] OR;  Service: Pulmonary    COLONOSCOPY N/A 02/23/2017    Procedure: COLONOSCOPY with hot and cold snare polypectomies,  hot biopsy polypectomies, biopsies, resolution clip placement;  Surgeon: Zackery Siddiqui MD;  Location: [facility] ENDOSCOPY;  Service:     COLONOSCOPY N/A 06/04/2018    Procedure: COLONOSCOPY WITH HOT SNARE POLYPECTOMY X 7; COLD SNARE POLYPECTOMY X 3; HOT BIOPSY POLYPECTOMY X 20; COLD BIOPSY POLYPECTOMY X 2; THERMAL ABLATION OF COLON POLYPS X 23; CLIP PLACEMENT X 2; BIOPSIES;  Surgeon: Zackery Siddiqui MD;  Location: [facility] ENDOSCOPY;  Service: Gastroenterology    COLONOSCOPY N/A 06/19/2019    Procedure: COLONOSCOPY with cold biopsy polypectomies and cold biopsy;  Surgeon: Zackery Siddiqui MD;  Location: [facility] ENDOSCOPY;  Service: Gastroenterology    COLONOSCOPY N/A 2/2/2024    Procedure: COLONOSCOPY with biopsy and polypectomy;  Surgeon: Lana Denny MD;  Location: [facility] ENDOSCOPY;  Service: Gastroenterology;  Laterality: N/A;    ENDOSCOPY  06/13/2019    ENDOSCOPY N/A 01/09/2020    Procedure: ESOPHAGOGASTRODUODENOSCOPY;  Surgeon: Zackery Siddiqui MD;  Location: [facility] ENDOSCOPY;  Service: Gastroenterology    ENDOSCOPY N/A 1/23/2024    Procedure: ESOPHAGOGASTRODUODENOSCOPY WITH BIOPSY;  Surgeon: Lana Denny MD;  Location: [facility] ENDOSCOPY;  Service: Gastroenterology;  Laterality: N/A;    LUNG CANCER SURGERY Right     MULTIPLE TOOTH EXTRACTIONS      full extraction    ORIF TIBIA/FIBULA FRACTURES Left     OTHER SURGICAL HISTORY      SIGMOIDOSCOPY N/A 01/09/2020    Procedure: FLEXIBLE SIGMOIDOSCOPY; ANOSCOPY;  Surgeon: Zackery Siddiqui MD;  Location: [facility] ENDOSCOPY;  Service: Gastroenterology    THORACOSCOPY Right 08/30/2019    Procedure: BRONCHOSCOPY,  THORACOSCOPY VIDEO ASSISTED with right upper lobe wedge RESECTION , RIGHT UPPER lobectomy with mediastinal lymph node dissection AND  INTERCOSTAL CRYOABLATION OF RIGHT CHEST;  Surgeon: Brian Barreto MD;  Location: [facility];  Service: Cardiothoracic    TUBAL ABDOMINAL LIGATION       Family History   Problem Relation Age of Onset    Cancer Mother     Lung cancer Mother     No Known Problems Father     Cirrhosis Brother     Liver disease Brother     Colon cancer Brother         Possibly colon cancer diagnosed in his 50's, patient unsure.    Stomach cancer Neg Hx     Esophageal cancer Neg Hx     Breast cancer Neg Hx      Social History     Socioeconomic History    Marital status:     Number of children: 4   Tobacco Use    Smoking status: Former     Current packs/day: 0.00     Average packs/day: 1 pack/day for 50.0 years (50.0 ttl pk-yrs)     Types: Cigarettes     Start date: 1969     Quit date: 2019     Years since quittin.6    Smokeless tobacco: Never    Tobacco comments:     Pt states that she quit in 2019   Vaping Use    Vaping status: Never Used   Substance and Sexual Activity    Alcohol use: Yes     Alcohol/week: 2.0 standard drinks of alcohol     Types: 2 Glasses of wine per week     Comment: once in a while     Drug use: No    Sexual activity: Defer       Current Outpatient Medications:     albuterol sulfate  (90 Base) MCG/ACT inhaler, , Disp: , Rfl:     amLODIPine (NORVASC) 10 MG tablet, amlodipine 10 mg tablet  Take 1 tablet every day by oral route., Disp: , Rfl:     aspirin 81 MG EC tablet, Take 1 tablet by mouth Daily., Disp: , Rfl:     atorvastatin (LIPITOR) 20 MG tablet, Take 1 tablet by mouth Daily., Disp: , Rfl:     carvedilol (COREG) 12.5 MG tablet, Take 1 tablet by mouth 2 (Two) Times a Day., Disp: , Rfl:     diphenhydrAMINE (BENADRYL) 50 MG capsule, Take 1 tablet by mouth 1 hour prior to CT scan., Disp: 1 capsule, Rfl: 0    pantoprazole (PROTONIX) 40 MG EC tablet, Take 1 tablet by mouth Daily. Indications: Stomach Ulcer, Disp: 30 tablet, Rfl: 5    predniSONE (DELTASONE) 50 MG tablet, Take 1 tablet by  "mouth 13 hours, 7 hours, and 1 hour before CT scan., Disp: 3 tablet, Rfl: 0    tiotropium bromide-olodaterol (STIOLTO RESPIMAT) 2.5-2.5 MCG/ACT aerosol solution inhaler, Inhale 2 puffs Daily., Disp: 1 inhaler, Rfl: 5  No current facility-administered medications for this visit.    Facility-Administered Medications Ordered in Other Visits:     Chlorhexidine Gluconate Cloth 2 % pads 1 application, 1 application , Topical, Q12H PRN, Candelaria Novak PA-C    Allergies   Allergen Reactions    Contrast Dye (Echo Or Unknown Ct/Mr) Hives     The following portions of the patient's history were reviewed and updated as appropriate: allergies, current medications, past family history, past medical history, past social history, past surgical history and problem list.  Objective     Physical Exam  Vitals and nursing note reviewed.   Constitutional:       General: She is not in acute distress.     Appearance: Normal appearance. She is well-developed.   HENT:      Head: Normocephalic and atraumatic.      Mouth/Throat:      Mouth: Mucous membranes are not pale, not dry and not cyanotic.   Eyes:      General: Lids are normal.   Neck:      Trachea: Trachea normal.   Cardiovascular:      Rate and Rhythm: Normal rate.   Pulmonary:      Effort: Pulmonary effort is normal. No respiratory distress.      Breath sounds: Normal breath sounds.   Abdominal:      Tenderness: There is no abdominal tenderness.   Skin:     General: Skin is warm and dry.   Neurological:      Mental Status: She is alert and oriented to person, place, and time.   Psychiatric:         Mood and Affect: Mood normal.         Speech: Speech normal.         Behavior: Behavior normal. Behavior is cooperative.       Vitals:    04/04/24 1507   BP: 108/66   Pulse: 96   Resp: 16   SpO2: 98%   Weight: 82.1 kg (181 lb)   Height: 152.4 cm (60\")     Body mass index is 35.35 kg/m².     Results Review:   I reviewed the patient's new clinical results.    Office Visit on 03/29/2024 "   Component Date Value Ref Range Status    Glucose 03/29/2024 96  65 - 99 mg/dL Final    BUN 03/29/2024 21  8 - 23 mg/dL Final    Creatinine 03/29/2024 1.06 (H)  0.57 - 1.00 mg/dL Final    Sodium 03/29/2024 138  136 - 145 mmol/L Final    Potassium 03/29/2024 4.0  3.5 - 5.2 mmol/L Final    Chloride 03/29/2024 99  98 - 107 mmol/L Final    CO2 03/29/2024 26.9  22.0 - 29.0 mmol/L Final    Calcium 03/29/2024 9.4  8.6 - 10.5 mg/dL Final    Total Protein 03/29/2024 8.0  6.0 - 8.5 g/dL Final    Albumin 03/29/2024 4.3  3.5 - 5.2 g/dL Final    ALT (SGPT) 03/29/2024 14  1 - 33 U/L Final    AST (SGOT) 03/29/2024 19  1 - 32 U/L Final    Alkaline Phosphatase 03/29/2024 134 (H)  39 - 117 U/L Final    Total Bilirubin 03/29/2024 0.4  0.0 - 1.2 mg/dL Final    Globulin 03/29/2024 3.7  gm/dL Final    A/G Ratio 03/29/2024 1.2  g/dL Final    BUN/Creatinine Ratio 03/29/2024 19.8  7.0 - 25.0 Final    Anion Gap 03/29/2024 12.1  5.0 - 15.0 mmol/L Final    eGFR 03/29/2024 56.6 (L)  >60.0 mL/min/1.73 Final    WBC 03/29/2024 6.10  3.40 - 10.80 10*3/mm3 Final    RBC 03/29/2024 3.59 (L)  3.77 - 5.28 10*6/mm3 Final    Hemoglobin 03/29/2024 10.6 (L)  12.0 - 15.9 g/dL Final    Hematocrit 03/29/2024 31.6 (L)  34.0 - 46.6 % Final    MCV 03/29/2024 88.0  79.0 - 97.0 fL Final    MCH 03/29/2024 29.5  26.6 - 33.0 pg Final    MCHC 03/29/2024 33.5  31.5 - 35.7 g/dL Final    RDW 03/29/2024 14.5  12.3 - 15.4 % Final    RDW-SD 03/29/2024 46.7  37.0 - 54.0 fl Final    MPV 03/29/2024 9.6  6.0 - 12.0 fL Final    Platelets 03/29/2024 275  140 - 450 10*3/mm3 Final   Admission on 02/02/2024, Discharged on 02/02/2024   Component Date Value Ref Range Status    Reference Lab Report 02/02/2024    Final                    Value:[facility]  [address] [place] [address]  Phone: [phone] or [phone]  Fax: [phone]  Henok Byers M.D., Medical Director    PATIENT NAME                                     LABORATORY [address]    HERIBERTO HORTON.                           [id number]  [id number]                                 AGE                    SEX   SSN              CLIENT REF #  [facility]                    68        1955      F     [id number]      [id number]    [address]                        REQUESTING M.D.           ATTENDING M.D.         COPY TO.  [address]                                [address], [place] [address]                         [facility]  DATE COLLECTED            DATE RECEIVED          DATE REPORTED  2024    DIAGNOSIS:  A.     TERMINAL ILEUM BIOPSY:  Intestinal mucosa without pathologic alterations  B.     ASCENDING                           COLON BIOPSY, LIPOMA:  Colonic type mucosa with no significant histopathologic abnormalities  Negative for significant inflammation or atypia  C.     TRANSVERSE COLON POLYP:  Benign mucosal polyp, negative for dysplasia  D.     SIGMOID COLON POLYP, X2:  Benign mucosal polyp, negative for dysplasia    RLL                           REVIEWED, DIAGNOSED AND ELECTRONICALLY  SIGNED BY:    Henok Byers M.D., [id number]  CPT CODES:  88305x4     Admission on 2024, Discharged on 2024   Component Date Value Ref Range Status    Reference Lab Report 2024    Final                    Value:[facility]  [address] [place] [address]  Phone: [phone] or [phone]  Fax: [phone]  Henok Byers M.D., Medical Director    PATIENT NAME                                     LABORATORY [address]   HERIBERTO HORTON.                           [id number]  [id number]                                 AGE                    SEX   SSN              CLIENT REF #  [facility]                    68        1955      F     [id number]      [id number]    [address]                         REQUESTING M.D.           ATTENDING M.D.         COPY TO.  [address]                                [address], [place] [address]                         Counts include 234 beds at the [facility]  DATE COLLECTED            DATE RECEIVED          DATE REPORTED  01/23/2024 01/23/2024 01/24/2024    DIAGNOSIS:  A.     DUODENUM, BIOPSY:  Small bowel mucosa with no significant pathologic change  Negative for                           significantly increased intraepithelial lymphocytes  B.     ANTRUM AND BODY, BIOPSY:  Gastric mucosa with reactive changes  No Helicobacter pylori-like organisms seen  Negative for dysplasia or malignancy  C.     STOMACH, BODY, BIOPSY, ULCER SCAR:  Antral type mucosa with reactive changes  No Helicobacter pylori-like organisms seen  Negative for dysplasia or malignancy    YASMINK                              REVIEWED, DIAGNOSED AND ELECTRONICALLY  SIGNED BY:    Luis Alfredo Piña M.D., F.C.A.P.  CPT CODES:  88305x3        NM PET/CT Skull Base to Mid Thigh     Result Date: 12/4/2023  Impression: Decreased size and hypermetabolism of the left upper lobe pulmonary nodule that has undergone interval SBRT. Decreased size of the additional left upper lobe pulmonary nodule, remaining below the resolution of PET. No other sites of suspicious hypermetabolic activity.     CT Chest With Contrast Diagnostic     Result Date: 3/28/2024  1. Interval change of previously described left upper lobe nodule into triangular-shaped airspace disease. This may represent posttreatment change, but continued follow-up is recommended. 2. Stable faint nodule in the left upper lobe.       EGD dated 1/23/2024 per Dr. Denny  - Normal oropharynx.  - Z-line regular, 34 cm from the incisors.  - 1-2 cm hiatal hernia.  - Non-obstructing and mild Schatzki ring.  - Mild Erythematous mucosa in the posterior wall of the stomach, antrum and prepyloric region of the stomach. Biopsied.  - Two superficial  Non-bleeding gastric ulcers with no stigmata of bleeding. Biopsied. Clinically benign  - Normal duodenal bulb, first portion of the duodenum, second portion of the duodenum and third portion of the duodenum. Biopsied.  - Persistent ulcer may indicate ASA related  A.     DUODENUM, BIOPSY:  Small bowel mucosa with no significant pathologic change  Negative for significantly increased intraepithelial lymphocytes  B.     ANTRUM AND BODY, BIOPSY:  Gastric mucosa with reactive changes  No Helicobacter pylori-like organisms seen  Negative for dysplasia or malignancy  C.     STOMACH, BODY, BIOPSY, ULCER SCAR:  Antral type mucosa with reactive changes  No Helicobacter pylori-like organisms seen  Negative for dysplasia or malignancy     Colonoscopy dated 2/2/2024 per Dr. Denny  - One 2 to 3 mm polyp in the mid transverse colon, removed with a cold biopsy forceps. Resected and retrieved.  - One 3 mm polyp in the proximal sigmoid colon, removed with a cold snare. Resected and retrieved.  - One 4 mm polyp in the distal sigmoid colon, removed with a cold snare. Resected and retrieved.  - Diverticulosis in the sigmoid colon, in the descending colon, in the transverse colon, in the ascending colon and in the cecum.  - The examined portion of the ileum was normal. Biopsied for anemia.  - Medium-sized lipoma in the proximal ascending colon. Biopsied.  A.     TERMINAL ILEUM BIOPSY:  Intestinal mucosa without pathologic alterations  B.     ASCENDING COLON BIOPSY, LIPOMA:  Colonic type mucosa with no significant histopathologic abnormalities  Negative for significant inflammation or atypia  C.     TRANSVERSE COLON POLYP:  Benign mucosal polyp, negative for dysplasia  D.     SIGMOID COLON POLYP, X2:  Benign mucosal polyp, negative for dysplasia     Assessment / Plan      1. Iron deficiency anemia, unspecified iron deficiency anemia type  2. Gastric ulcer without hemorrhage or perforation, unspecified chronicity  3. History of lung  cancer  Recent labs with hemoglobin 10.6. Patient denies any GI bleeding.  There is no history of vaginal bleeding, hematuria or nosebleeds.  She has a history of lung cancer that is being monitored by oncology. She had CT Chest last week and some changes were noted, she has follow up CT chest in 4 months. PET scan dated 12/4/2023 with unremarkable GI tract.  EGD dated 1/23/2024 with 2 superficial nonbleeding gastric ulcers with no stigmata of bleeding noted, biopsies unremarkable.  Colonoscopy dated 2/2/2024 with benign mucosal polyps removed, otherwise unremarkable.  Terminal ileum biopsies unremarkable.  She had EGD in 2020 with gastric ulcers and erosive distal esophagitis noted.  Persistent ulcer may indicate ASA related, causing anemia.  Continue pantoprazole 40 mg daily.  Consider long-term use of PPI with ASA.  Avoid NSAIDs.  CTAP to rule out other causes of anemia.     - CT Abdomen Pelvis Without Contrast; Future    4. Encounter for screening for malignant neoplasm of colon  5. Family history of colon cancer  Colonoscopy dated 2/2/2024 with 3 small polyps removed, biopsies with benign mucosal polyp, no dysplasia.  Her brother possibly had colon cancer diagnosed in his 50s.  Colonoscopy for high risk screening in 5 years, February 2029.    Prior History:  6. Elevated alkaline phosphatase level  7. Class 1 obesity due to excess calories with serious comorbidity and body mass index (BMI) of 33.0 to 33.9 in adult  BMI 33.59  She has a history of mild elevation of alkaline phosphatase in the past.  Recent labs with normal alkaline phosphatase, total bilirubin, AST and ALT.  PET scan dated 12/4/2023 with unremarkable GI tract. No abnormality of liver noted. She is being monitored for history of lung cancer by oncology.  Recommend low-fat diet, exercise and weight reduction.    Patient Instructions   Antireflux measures: Avoid fried, fatty foods, alcohol, chocolate, coffee, tea,  soft drinks, all carbonated  beverages (including sparkling water), peppermint and spearmint, spicy foods, tomatoes and tomato based foods, onions, peppers, and garlic.   Other antireflux measures include weight reduction if overweight, avoiding tight clothing around the abdomen, elevating the head of the bed 6 inches with blocks under the head board, and don't drink or eat before going to bed and avoid lying down immediately after meals.  Continue to avoid vaping/smoking.  Pantoprazole 40 mg 1 by mouth in the am 30 minutes before breakfast.  Avoid NSAIDs, including Ibuprofen, Motrin, Advil, Aleve, Naprosyn, etc.   Tylenol products are ok to take.  CT Abdomen and pelvis  Colonoscopy for high risk screening in 5 years, February 2029.   Follow up: 4 months    Jonathan Brito, APRN  4/4/2024    Please note that portions of this note were completed with a voice recognition program.

## 2024-04-10 ENCOUNTER — TRANSCRIBE ORDERS (OUTPATIENT)
Dept: ADMINISTRATIVE | Facility: HOSPITAL | Age: 69
End: 2024-04-10
Payer: MEDICARE

## 2024-04-10 DIAGNOSIS — Z12.31 VISIT FOR SCREENING MAMMOGRAM: Primary | ICD-10-CM

## 2024-04-25 ENCOUNTER — TRANSCRIBE ORDERS (OUTPATIENT)
Dept: ADMINISTRATIVE | Facility: HOSPITAL | Age: 69
End: 2024-04-25
Payer: MEDICARE

## 2024-04-25 DIAGNOSIS — M79.605 PAIN IN BOTH LOWER EXTREMITIES: Primary | ICD-10-CM

## 2024-04-25 DIAGNOSIS — M79.604 PAIN IN BOTH LOWER EXTREMITIES: Primary | ICD-10-CM

## 2024-04-25 DIAGNOSIS — R22.43 LOCALIZED SWELLING, MASS, OR LUMP OF LOWER EXTREMITY, BILATERAL: ICD-10-CM

## 2024-04-26 ENCOUNTER — HOSPITAL ENCOUNTER (EMERGENCY)
Facility: HOSPITAL | Age: 69
Discharge: HOME | End: 2024-04-26
Attending: STUDENT IN AN ORGANIZED HEALTH CARE EDUCATION/TRAINING PROGRAM
Payer: MEDICARE

## 2024-04-26 ENCOUNTER — APPOINTMENT (OUTPATIENT)
Dept: ULTRASOUND IMAGING | Facility: HOSPITAL | Age: 69
End: 2024-04-26
Payer: MEDICARE

## 2024-04-26 VITALS
OXYGEN SATURATION: 95 % | RESPIRATION RATE: 18 BRPM | HEIGHT: 60 IN | TEMPERATURE: 97.8 F | SYSTOLIC BLOOD PRESSURE: 155 MMHG | BODY MASS INDEX: 36.32 KG/M2 | WEIGHT: 185 LBS | HEART RATE: 70 BPM | DIASTOLIC BLOOD PRESSURE: 77 MMHG

## 2024-04-26 DIAGNOSIS — R60.0 BILATERAL LEG EDEMA: Primary | ICD-10-CM

## 2024-04-26 DIAGNOSIS — N17.9 ACUTE KIDNEY INJURY: ICD-10-CM

## 2024-04-26 LAB
ALBUMIN SERPL-MCNC: 4 G/DL (ref 3.5–5.2)
ALBUMIN/GLOB SERPL: 1.3 G/DL
ALP SERPL-CCNC: 128 U/L (ref 39–117)
ALT SERPL W P-5'-P-CCNC: 20 U/L (ref 1–33)
ANION GAP SERPL CALCULATED.3IONS-SCNC: 9.7 MMOL/L (ref 5–15)
AST SERPL-CCNC: 17 U/L (ref 1–32)
BASOPHILS # BLD AUTO: 0.03 10*3/MM3 (ref 0–0.2)
BASOPHILS NFR BLD AUTO: 0.4 % (ref 0–1.5)
BILIRUB SERPL-MCNC: 0.2 MG/DL (ref 0–1.2)
BUN SERPL-MCNC: 30 MG/DL (ref 8–23)
BUN/CREAT SERPL: 23.8 (ref 7–25)
CALCIUM SPEC-SCNC: 9.3 MG/DL (ref 8.6–10.5)
CHLORIDE SERPL-SCNC: 102 MMOL/L (ref 98–107)
CO2 SERPL-SCNC: 26.3 MMOL/L (ref 22–29)
CREAT SERPL-MCNC: 1.26 MG/DL (ref 0.57–1)
D DIMER PPP FEU-MCNC: 8.32 MCGFEU/ML (ref 0–0.7)
DEPRECATED RDW RBC AUTO: 50.9 FL (ref 37–54)
EGFRCR SERPLBLD CKD-EPI 2021: 46 ML/MIN/1.73
EOSINOPHIL # BLD AUTO: 0.16 10*3/MM3 (ref 0–0.4)
EOSINOPHIL NFR BLD AUTO: 2.3 % (ref 0.3–6.2)
ERYTHROCYTE [DISTWIDTH] IN BLOOD BY AUTOMATED COUNT: 15.9 % (ref 12.3–15.4)
GLOBULIN UR ELPH-MCNC: 3.2 GM/DL
GLUCOSE SERPL-MCNC: 100 MG/DL (ref 65–99)
HCT VFR BLD AUTO: 31.3 % (ref 34–46.6)
HGB BLD-MCNC: 10.6 G/DL (ref 12–15.9)
IMM GRANULOCYTES # BLD AUTO: 0.03 10*3/MM3 (ref 0–0.05)
IMM GRANULOCYTES NFR BLD AUTO: 0.4 % (ref 0–0.5)
LYMPHOCYTES # BLD AUTO: 1.51 10*3/MM3 (ref 0.7–3.1)
LYMPHOCYTES NFR BLD AUTO: 21.6 % (ref 19.6–45.3)
MCH RBC QN AUTO: 29.5 PG (ref 26.6–33)
MCHC RBC AUTO-ENTMCNC: 33.9 G/DL (ref 31.5–35.7)
MCV RBC AUTO: 87.2 FL (ref 79–97)
MONOCYTES # BLD AUTO: 0.76 10*3/MM3 (ref 0.1–0.9)
MONOCYTES NFR BLD AUTO: 10.9 % (ref 5–12)
NEUTROPHILS NFR BLD AUTO: 4.51 10*3/MM3 (ref 1.7–7)
NEUTROPHILS NFR BLD AUTO: 64.4 % (ref 42.7–76)
NRBC BLD AUTO-RTO: 0 /100 WBC (ref 0–0.2)
NT-PROBNP SERPL-MCNC: 126.2 PG/ML (ref 0–900)
PLATELET # BLD AUTO: 302 10*3/MM3 (ref 140–450)
PMV BLD AUTO: 9.2 FL (ref 6–12)
POTASSIUM SERPL-SCNC: 4.7 MMOL/L (ref 3.5–5.2)
PROT SERPL-MCNC: 7.2 G/DL (ref 6–8.5)
RBC # BLD AUTO: 3.59 10*6/MM3 (ref 3.77–5.28)
SODIUM SERPL-SCNC: 138 MMOL/L (ref 136–145)
WBC NRBC COR # BLD AUTO: 7 10*3/MM3 (ref 3.4–10.8)

## 2024-04-26 PROCEDURE — 85025 COMPLETE CBC W/AUTO DIFF WBC: CPT | Performed by: STUDENT IN AN ORGANIZED HEALTH CARE EDUCATION/TRAINING PROGRAM

## 2024-04-26 PROCEDURE — 99284 EMERGENCY DEPT VISIT MOD MDM: CPT

## 2024-04-26 PROCEDURE — 85379 FIBRIN DEGRADATION QUANT: CPT | Performed by: STUDENT IN AN ORGANIZED HEALTH CARE EDUCATION/TRAINING PROGRAM

## 2024-04-26 PROCEDURE — 93970 EXTREMITY STUDY: CPT

## 2024-04-26 PROCEDURE — 83880 ASSAY OF NATRIURETIC PEPTIDE: CPT | Performed by: STUDENT IN AN ORGANIZED HEALTH CARE EDUCATION/TRAINING PROGRAM

## 2024-04-26 PROCEDURE — 80053 COMPREHEN METABOLIC PANEL: CPT | Performed by: STUDENT IN AN ORGANIZED HEALTH CARE EDUCATION/TRAINING PROGRAM

## 2024-04-26 NOTE — ED PROVIDER NOTES
EMERGENCY DEPARTMENT ENCOUNTER    Pt Name: Lizbeth Johns  MRN: [id number]  Pt :   1955  Room Number:    Date of encounter:  2024  PCP: Emily Ferreira MD  ED Provider: Jose Davidson MD    Historian: Patient      HPI:  Chief Complaint: Leg swelling        Context: Lizbeth Johns is a 68 y.o. female who presents to the ED for leg swelling.  Patient reports leg swelling for the past several days.  She was previously on a steroid and antibiotic for COPD exacerbation but states those symptoms have mostly resolved.  She reports swelling in both legs denies previous history of this.  She is not having any chest pain currently and states her shortness of breath is at baseline.  No lightheadedness.  Denies any skin lesions.  Denies history of blood clots.      PAST MEDICAL HISTORY  Past Medical History:   Diagnosis Date    Acid reflux     Arthritis     Body piercing     ears    Cataract, bilateral     COPD (chronic obstructive pulmonary disease)     Disease of thyroid gland     Full dentures     Patient advised no adhesives DOS    History of radiation therapy 2023    MIGUELINA lung    Hypertension     Leg cramps     Lung cancer     bilateral-in remission    Lung mass     right    On home oxygen therapy     2L NC HS and daily prn     SOB (shortness of breath)     xray showed spot on lungs    Wears glasses     Rx glasses         PAST SURGICAL HISTORY  Past Surgical History:   Procedure Laterality Date    BRONCHOSCOPY N/A 2019    Procedure: BRONCHOSCOPY WITH FLUOROSCOPY, BIOPSY, BRUSHINGS, AND WASHINGS;  Surgeon: Gudelia Patel MD;  Location: [facility] OR;  Service: Pulmonary    COLONOSCOPY N/A 2017    Procedure: COLONOSCOPY with hot and cold snare polypectomies,  hot biopsy polypectomies, biopsies, resolution clip placement;  Surgeon: Zackery Siddiqui MD;  Location: [facility] ENDOSCOPY;  Service:     COLONOSCOPY N/A 2018    Procedure: COLONOSCOPY WITH HOT SNARE POLYPECTOMY X 7;  COLD SNARE POLYPECTOMY X 3; HOT BIOPSY POLYPECTOMY X 20; COLD BIOPSY POLYPECTOMY X 2; THERMAL ABLATION OF COLON POLYPS X 23; CLIP PLACEMENT X 2; BIOPSIES;  Surgeon: Zackery Siddiqui MD;  Location: [facility] ENDOSCOPY;  Service: Gastroenterology    COLONOSCOPY N/A 06/19/2019    Procedure: COLONOSCOPY with cold biopsy polypectomies and cold biopsy;  Surgeon: Zackery Siddiqui MD;  Location: [facility] ENDOSCOPY;  Service: Gastroenterology    COLONOSCOPY N/A 2/2/2024    Procedure: COLONOSCOPY with biopsy and polypectomy;  Surgeon: Lana Denny MD;  Location: [facility] ENDOSCOPY;  Service: Gastroenterology;  Laterality: N/A;    ENDOSCOPY  06/13/2019    ENDOSCOPY N/A 01/09/2020    Procedure: ESOPHAGOGASTRODUODENOSCOPY;  Surgeon: Zackery Siddiqui MD;  Location: [facility] ENDOSCOPY;  Service: Gastroenterology    ENDOSCOPY N/A 1/23/2024    Procedure: ESOPHAGOGASTRODUODENOSCOPY WITH BIOPSY;  Surgeon: Lana Denny MD;  Location: [facility] ENDOSCOPY;  Service: Gastroenterology;  Laterality: N/A;    LUNG CANCER SURGERY Right     MULTIPLE TOOTH EXTRACTIONS      full extraction    ORIF TIBIA/FIBULA FRACTURES Left     OTHER SURGICAL HISTORY      SIGMOIDOSCOPY N/A 01/09/2020    Procedure: FLEXIBLE SIGMOIDOSCOPY; ANOSCOPY;  Surgeon: Zackery Siddiqui MD;  Location: [facility] ENDOSCOPY;  Service: Gastroenterology    THORACOSCOPY Right 08/30/2019    Procedure: BRONCHOSCOPY,  THORACOSCOPY VIDEO ASSISTED with right upper lobe wedge RESECTION , RIGHT UPPER lobectomy with mediastinal lymph node dissection AND INTERCOSTAL CRYOABLATION OF RIGHT CHEST;  Surgeon: Brian Barreto MD;  Location: [facility] OR;  Service: Cardiothoracic    TUBAL ABDOMINAL LIGATION           FAMILY HISTORY  Family History   Problem Relation Age of Onset    Cancer Mother     Lung cancer Mother     No Known Problems Father     Cirrhosis Brother     Liver disease Brother     Colon cancer Brother         Possibly colon cancer diagnosed in his 50's, patient unsure.    Stomach  cancer Neg Hx     Esophageal cancer Neg Hx     Breast cancer Neg Hx          SOCIAL HISTORY  Social History     Socioeconomic History    Marital status:     Number of children: 4   Tobacco Use    Smoking status: Former     Current packs/day: 0.00     Average packs/day: 1 pack/day for 50.0 years (50.0 ttl pk-yrs)     Types: Cigarettes     Start date: 1969     Quit date: 2019     Years since quittin.6    Smokeless tobacco: Never    Tobacco comments:     Pt states that she quit in 2019   Vaping Use    Vaping status: Never Used   Substance and Sexual Activity    Alcohol use: Yes     Alcohol/week: 2.0 standard drinks of alcohol     Types: 2 Glasses of wine per week     Comment: once in a while     Drug use: No    Sexual activity: Defer         ALLERGIES  Patient has no known allergies.        REVIEW OF SYSTEMS  Systems reviewed and negative      PHYSICAL EXAM    I have reviewed the triage vital signs and nursing notes.    ED Triage Vitals [24 0928]   Temp Heart Rate Resp BP SpO2   97.8 °F (36.6 °C) 70 18 155/77 95 %      Temp src Heart Rate Source Patient Position BP Location FiO2 (%)   Oral Monitor Sitting Left arm --       Physical Exam  Constitutional:       General: She is not in acute distress.  HENT:      Head: Normocephalic and atraumatic.   Cardiovascular:      Rate and Rhythm: Normal rate.   Pulmonary:      Effort: Pulmonary effort is normal. No respiratory distress.      Breath sounds: No wheezing or rales.   Abdominal:      General: There is no distension.      Palpations: Abdomen is soft.   Musculoskeletal:      Right lower leg: Edema present.      Left lower leg: Edema present.   Skin:     General: Skin is warm.   Neurological:      Mental Status: She is alert and oriented to person, place, and time.         LAB RESULTS  No results found for this or any previous visit (from the past 24 hour(s)).    If labs were ordered, I independently reviewed the results and considered them in  treating the patient.        RADIOLOGY  No Radiology Exams Resulted Within Past 24 Hours    I ordered and independently reviewed the above noted radiographic studies.      I viewed images of venous ultrasound bilateral which showed no obvious clot per my independent interpretation.    See radiologist's dictation for official interpretation.        PROCEDURES    Procedures    No orders to display       MEDICATIONS GIVEN IN ER    Medications - No data to display      MEDICAL DECISION MAKING, PROGRESS, and CONSULTS    All labs, if obtained, have been independently reviewed by me.  All radiology studies, if obtained, have been reviewed by me and the radiologist dictating the report.  All EKG's, if obtained, have been independently viewed and interpreted by me/my attending physician.      Discussion below represents my analysis of pertinent findings related to patient's condition, differential diagnosis, treatment plan and final disposition.                         Differential diagnosis:    DVT, adverse medication effect, CHF, cellulitis, others      Additional sources:    - Discussed/ obtained information from independent historians:      - External (non-ED) record review:      - Chronic or social conditions impacting care:      - Shared decision making:        Orders placed during this visit:  Orders Placed This Encounter   Procedures    Comprehensive Metabolic Panel    BNP    D-dimer, Quantitative    CBC Auto Differential    CBC & Differential         Additional orders considered but not ordered:      ED Course:    Patient is a 68-year-old female who presents for bilateral lower extremity swelling progressive in nature.  She was recently on steroids for lung disease and also takes amlodipine for hypertension.  She denies any worsening shortness of breath no hypoxia on room air.  No rales on examination to suggest CHF.  Her creatinine is slightly up trended from previous she has mild anemia.  D-dimer was ordered and  positive thus ultrasound venous duplex was ordered of the bilateral lower extremities which showed no evidence of DVT.  No major electrolyte derangements and BNP is normal again favoring against CHF.  Suspect this may be due to adverse medication effects with combination of steroids and amlodipine.  Considered further advanced diagnostic testing including CT imaging however felt this is unnecessary and unlikely to change clinical disposition.  Recommendations for compression socks, elevation of the legs, and follow-up outpatient for this in addition to repeat BMP.  Discussed findings with patient she is agreeable to this plan.                Consultants:      Shared Decision Making:    Risks and benefits of discharge and/or observation/admission were discussed.       AS OF 09:42 EDT VITALS:    BP - 155/77  HR - 70  TEMP - 97.8 °F (36.6 °C) (Oral)  O2 SATS - 95%                  DIAGNOSIS  Final diagnoses:   None         DISPOSITION  Home      Please note that portions of this document were completed with voice recognition software.        Jose Davidson MD  04/26/24 [id number]

## 2024-04-26 NOTE — DISCHARGE INSTRUCTIONS
Kidney function was mildly elevated.  Ultrasound showed no evidence of blood clots.  Raise your legs at night  Wear compression socks  Follow up with your PCP to monitor your symptoms and evaluate need for further testing

## 2024-05-02 ENCOUNTER — HOSPITAL ENCOUNTER (OUTPATIENT)
Dept: CT IMAGING | Facility: HOSPITAL | Age: 69
Discharge: HOME | End: 2024-05-02
Admitting: NURSE PRACTITIONER
Payer: MEDICARE

## 2024-05-02 DIAGNOSIS — D50.9 IRON DEFICIENCY ANEMIA, UNSPECIFIED IRON DEFICIENCY ANEMIA TYPE: Chronic | ICD-10-CM

## 2024-05-02 PROCEDURE — 74176 CT ABD & PELVIS W/O CONTRAST: CPT

## 2024-05-20 ENCOUNTER — OFFICE VISIT (OUTPATIENT)
Dept: OBSTETRICS AND GYNECOLOGY | Facility: CLINIC | Age: 69
End: 2024-05-20
Payer: MEDICARE

## 2024-05-20 VITALS
SYSTOLIC BLOOD PRESSURE: 134 MMHG | BODY MASS INDEX: 37.3 KG/M2 | HEIGHT: 60 IN | DIASTOLIC BLOOD PRESSURE: 70 MMHG | WEIGHT: 190 LBS

## 2024-05-20 DIAGNOSIS — Z01.419 WELL WOMAN EXAM WITH ROUTINE GYNECOLOGICAL EXAM: Primary | ICD-10-CM

## 2024-05-20 DIAGNOSIS — D21.9 FIBROIDS: ICD-10-CM

## 2024-05-20 PROCEDURE — 1160F RVW MEDS BY RX/DR IN RCRD: CPT | Performed by: PHYSICIAN ASSISTANT

## 2024-05-20 PROCEDURE — 3075F SYST BP GE 130 - 139MM HG: CPT | Performed by: PHYSICIAN ASSISTANT

## 2024-05-20 PROCEDURE — 3078F DIAST BP <80 MM HG: CPT | Performed by: PHYSICIAN ASSISTANT

## 2024-05-20 PROCEDURE — 1159F MED LIST DOCD IN RCRD: CPT | Performed by: PHYSICIAN ASSISTANT

## 2024-05-20 PROCEDURE — 99397 PER PM REEVAL EST PAT 65+ YR: CPT | Performed by: PHYSICIAN ASSISTANT

## 2024-05-20 PROCEDURE — 99213 OFFICE O/P EST LOW 20 MIN: CPT | Performed by: PHYSICIAN ASSISTANT

## 2024-05-20 NOTE — PROGRESS NOTES
Subjective   Chief Complaint   Patient presents with    Gynecologic Exam     Last pap 23 WNL, MMG 23. 1 year follow up-fibroids.       Lizbeth Johns is a 68 y.o. year old  presenting to be seen for her annual gynecological exam.   Has no complaints  She has known fibroids. Ultrasound done 1 year ago noted multiple calcified fibroids with largest being 3.6 cm  She wants to recheck fibroids to make sure no change. Not having pelvic pain or pressure  Screening mammogram scheduled in July     Past Medical History:   Diagnosis Date    Acid reflux     Arthritis     Body piercing     ears    Cataract, bilateral     COPD (chronic obstructive pulmonary disease)     Disease of thyroid gland     Full dentures     Patient advised no adhesives DOS    History of radiation therapy 2023    MIGUELINA lung    Hyperlipidemia     Hypertension     Leg cramps     Lung cancer     bilateral-in remission    Lung mass     right    On home oxygen therapy     2L NC HS and daily prn     SOB (shortness of breath)     xray showed spot on lungs    Wears glasses     Rx glasses        Current Outpatient Medications:     albuterol sulfate  (90 Base) MCG/ACT inhaler, , Disp: , Rfl:     amLODIPine (NORVASC) 10 MG tablet, amlodipine 10 mg tablet  Take 1 tablet every day by oral route., Disp: , Rfl:     aspirin 81 MG EC tablet, Take 1 tablet by mouth Daily., Disp: , Rfl:     atorvastatin (LIPITOR) 20 MG tablet, Take 1 tablet by mouth Daily., Disp: , Rfl:     carvedilol (COREG) 12.5 MG tablet, Take 1 tablet by mouth 2 (Two) Times a Day., Disp: , Rfl:     diphenhydrAMINE (BENADRYL) 50 MG capsule, Take 1 tablet by mouth 1 hour prior to CT scan., Disp: 1 capsule, Rfl: 0    pantoprazole (PROTONIX) 40 MG EC tablet, Take 1 tablet by mouth Daily. Indications: Stomach Ulcer, Disp: 30 tablet, Rfl: 5    predniSONE (DELTASONE) 50 MG tablet, Take 1 tablet by mouth 13 hours, 7 hours, and 1 hour before CT scan., Disp: 3 tablet, Rfl: 0     tiotropium bromide-olodaterol (STIOLTO RESPIMAT) 2.5-2.5 MCG/ACT aerosol solution inhaler, Inhale 2 puffs Daily., Disp: 1 inhaler, Rfl: 5  No current facility-administered medications for this visit.    Facility-Administered Medications Ordered in Other Visits:     Chlorhexidine Gluconate Cloth 2 % pads 1 application, 1 application , Topical, Q12H PRN, Candelaria Novak PA-C   No Known Allergies   Past Surgical History:   Procedure Laterality Date    BRONCHOSCOPY N/A 06/13/2019    Procedure: BRONCHOSCOPY WITH FLUOROSCOPY, BIOPSY, BRUSHINGS, AND WASHINGS;  Surgeon: Gudelia Patel MD;  Location: [facility] OR;  Service: Pulmonary    COLONOSCOPY N/A 02/23/2017    Procedure: COLONOSCOPY with hot and cold snare polypectomies,  hot biopsy polypectomies, biopsies, resolution clip placement;  Surgeon: Zackery Siddiqui MD;  Location: [facility] ENDOSCOPY;  Service:     COLONOSCOPY N/A 06/04/2018    Procedure: COLONOSCOPY WITH HOT SNARE POLYPECTOMY X 7; COLD SNARE POLYPECTOMY X 3; HOT BIOPSY POLYPECTOMY X 20; COLD BIOPSY POLYPECTOMY X 2; THERMAL ABLATION OF COLON POLYPS X 23; CLIP PLACEMENT X 2; BIOPSIES;  Surgeon: Zackery Siddiqui MD;  Location: [facility] ENDOSCOPY;  Service: Gastroenterology    COLONOSCOPY N/A 06/19/2019    Procedure: COLONOSCOPY with cold biopsy polypectomies and cold biopsy;  Surgeon: Zackery Siddiqui MD;  Location: [facility] ENDOSCOPY;  Service: Gastroenterology    COLONOSCOPY N/A 2/2/2024    Procedure: COLONOSCOPY with biopsy and polypectomy;  Surgeon: Lana Denny MD;  Location: [facility] ENDOSCOPY;  Service: Gastroenterology;  Laterality: N/A;    ENDOSCOPY  06/13/2019    ENDOSCOPY N/A 01/09/2020    Procedure: ESOPHAGOGASTRODUODENOSCOPY;  Surgeon: Zackery Siddiqui MD;  Location: [facility] ENDOSCOPY;  Service: Gastroenterology    ENDOSCOPY N/A 1/23/2024    Procedure: ESOPHAGOGASTRODUODENOSCOPY WITH BIOPSY;  Surgeon: Lana Denny MD;  Location: [facility] ENDOSCOPY;  Service: Gastroenterology;  Laterality: N/A;     LUNG CANCER SURGERY Right     MULTIPLE TOOTH EXTRACTIONS      full extraction    ORIF TIBIA/FIBULA FRACTURES Left     OTHER SURGICAL HISTORY      SIGMOIDOSCOPY N/A 2020    Procedure: FLEXIBLE SIGMOIDOSCOPY; ANOSCOPY;  Surgeon: Zackery Siddiqui MD;  Location: [facility] ENDOSCOPY;  Service: Gastroenterology    THORACOSCOPY Right 2019    Procedure: BRONCHOSCOPY,  THORACOSCOPY VIDEO ASSISTED with right upper lobe wedge RESECTION , RIGHT UPPER lobectomy with mediastinal lymph node dissection AND INTERCOSTAL CRYOABLATION OF RIGHT CHEST;  Surgeon: Brian Barreto MD;  [facility];  Service: Cardiothoracic    TUBAL ABDOMINAL LIGATION        Social History     Socioeconomic History    Marital status:     Number of children: 4   Tobacco Use    Smoking status: Former     Current packs/day: 0.00     Average packs/day: 1 pack/day for 50.0 years (50.0 ttl pk-yrs)     Types: Cigarettes     Start date: 1969     Quit date: 2019     Years since quittin.7    Smokeless tobacco: Never    Tobacco comments:     Pt states that she quit in 2019   Vaping Use    Vaping status: Never Used   Substance and Sexual Activity    Alcohol use: Yes     Alcohol/week: 2.0 standard drinks of alcohol     Types: 2 Glasses of wine per week     Comment: once in a while     Drug use: No    Sexual activity: Defer      Family History   Problem Relation Age of Onset    Cancer Mother     Lung cancer Mother     No Known Problems Father     Cirrhosis Brother     Liver disease Brother     Colon cancer Brother         Possibly colon cancer diagnosed in his 50's, patient unsure.    Stomach cancer Neg Hx     Esophageal cancer Neg Hx     Breast cancer Neg Hx        Review of Systems   Constitutional:  Negative for chills, diaphoresis and fever.   Gastrointestinal: Negative.    Genitourinary:  Negative for difficulty urinating, dysuria, enuresis, pelvic pain, vaginal bleeding and vaginal discharge.           Objective   /70    "Ht 152.4 cm (60\")   Wt 86.2 kg (190 lb)   LMP 03/16/2004   BMI 37.11 kg/m²     Physical Exam  Exam conducted with a chaperone present.   Constitutional:       Appearance: Normal appearance. She is well-developed and well-groomed.   Eyes:      General: Lids are normal.      Extraocular Movements: Extraocular movements intact.      Conjunctiva/sclera: Conjunctivae normal.   Neck:      Thyroid: No thyroid mass or thyromegaly.   Cardiovascular:      Rate and Rhythm: Regular rhythm.      Heart sounds: Normal heart sounds.   Pulmonary:      Effort: Pulmonary effort is normal.      Breath sounds: Normal breath sounds.   Chest:   Breasts:     Breasts are symmetrical.      Right: No inverted nipple, mass, nipple discharge, skin change or tenderness.      Left: No inverted nipple, mass, nipple discharge, skin change or tenderness.   Abdominal:      General: There is no distension.      Palpations: Abdomen is soft.      Tenderness: There is no abdominal tenderness.   Genitourinary:     Exam position: Lithotomy position.      Labia:         Right: No rash, tenderness or lesion.         Left: No rash, tenderness or lesion.       Urethra: No prolapse, urethral pain, urethral swelling or urethral lesion.      Vagina: No vaginal discharge, tenderness or lesions.      Cervix: No cervical motion tenderness, discharge, friability or lesion.      Uterus: Not enlarged and not tender.       Adnexa:         Right: No mass or tenderness.          Left: No mass or tenderness.        Comments: Vaginal tissue atrophic  Musculoskeletal:      Cervical back: Neck supple.   Lymphadenopathy:      Upper Body:      Right upper body: No axillary adenopathy.      Left upper body: No axillary adenopathy.   Skin:     General: Skin is warm and dry.      Findings: No lesion.   Neurological:      General: No focal deficit present.      Mental Status: She is alert and oriented to person, place, and time.   Psychiatric:         Attention and Perception: " Attention normal.         Mood and Affect: Mood normal.         Speech: Speech normal.         Behavior: Behavior normal.         Thought Content: Thought content normal.            Result Review :           US Non-ob Transvaginal (04/25/2023 13:22)         Assessment and Plan  Diagnoses and all orders for this visit:    1. Well woman exam with routine gynecological exam (Primary)    2. Fibroids  -     US Non-ob Transvaginal      Patient Instructions   Self breast exam monthly  Annual mammogram  vit D 2000 mg daily  Regular excercise            This note was electronically signed.    Tracy Garza PA-C   May 20, 2024

## 2024-06-04 ENCOUNTER — LAB (OUTPATIENT)
Dept: LAB | Facility: HOSPITAL | Age: 69
End: 2024-06-04
Payer: MEDICARE

## 2024-06-04 ENCOUNTER — TRANSCRIBE ORDERS (OUTPATIENT)
Dept: LAB | Facility: HOSPITAL | Age: 69
End: 2024-06-04
Payer: MEDICARE

## 2024-06-04 DIAGNOSIS — E78.5 HYPERLIPIDEMIA, UNSPECIFIED HYPERLIPIDEMIA TYPE: ICD-10-CM

## 2024-06-04 DIAGNOSIS — E78.5 HYPERLIPIDEMIA, UNSPECIFIED HYPERLIPIDEMIA TYPE: Primary | ICD-10-CM

## 2024-06-04 LAB
ALBUMIN SERPL-MCNC: 4.1 G/DL (ref 3.5–5.2)
ALBUMIN/GLOB SERPL: 1.2 G/DL
ALP SERPL-CCNC: 143 U/L (ref 39–117)
ALT SERPL W P-5'-P-CCNC: 14 U/L (ref 1–33)
ANION GAP SERPL CALCULATED.3IONS-SCNC: 7 MMOL/L (ref 5–15)
AST SERPL-CCNC: 16 U/L (ref 1–32)
BASOPHILS # BLD AUTO: 0.03 10*3/MM3 (ref 0–0.2)
BASOPHILS NFR BLD AUTO: 0.5 % (ref 0–1.5)
BILIRUB SERPL-MCNC: 0.2 MG/DL (ref 0–1.2)
BUN SERPL-MCNC: 22 MG/DL (ref 8–23)
BUN/CREAT SERPL: 21.6 (ref 7–25)
CALCIUM SPEC-SCNC: 9.8 MG/DL (ref 8.6–10.5)
CHLORIDE SERPL-SCNC: 106 MMOL/L (ref 98–107)
CHOLEST SERPL-MCNC: 156 MG/DL (ref 0–200)
CO2 SERPL-SCNC: 29 MMOL/L (ref 22–29)
CREAT SERPL-MCNC: 1.02 MG/DL (ref 0.57–1)
DEPRECATED RDW RBC AUTO: 45.4 FL (ref 37–54)
EGFRCR SERPLBLD CKD-EPI 2021: 59.3 ML/MIN/1.73
EOSINOPHIL # BLD AUTO: 0.17 10*3/MM3 (ref 0–0.4)
EOSINOPHIL NFR BLD AUTO: 3 % (ref 0.3–6.2)
ERYTHROCYTE [DISTWIDTH] IN BLOOD BY AUTOMATED COUNT: 14.4 % (ref 12.3–15.4)
GLOBULIN UR ELPH-MCNC: 3.3 GM/DL
GLUCOSE SERPL-MCNC: 91 MG/DL (ref 65–99)
HCT VFR BLD AUTO: 31.8 % (ref 34–46.6)
HDLC SERPL-MCNC: 60 MG/DL (ref 40–60)
HGB BLD-MCNC: 10.1 G/DL (ref 12–15.9)
IMM GRANULOCYTES # BLD AUTO: 0.01 10*3/MM3 (ref 0–0.05)
IMM GRANULOCYTES NFR BLD AUTO: 0.2 % (ref 0–0.5)
LDLC SERPL CALC-MCNC: 82 MG/DL (ref 0–100)
LDLC/HDLC SERPL: 1.36 {RATIO}
LYMPHOCYTES # BLD AUTO: 1.1 10*3/MM3 (ref 0.7–3.1)
LYMPHOCYTES NFR BLD AUTO: 19.4 % (ref 19.6–45.3)
MCH RBC QN AUTO: 27.4 PG (ref 26.6–33)
MCHC RBC AUTO-ENTMCNC: 31.8 G/DL (ref 31.5–35.7)
MCV RBC AUTO: 86.4 FL (ref 79–97)
MONOCYTES # BLD AUTO: 0.63 10*3/MM3 (ref 0.1–0.9)
MONOCYTES NFR BLD AUTO: 11.1 % (ref 5–12)
NEUTROPHILS NFR BLD AUTO: 3.74 10*3/MM3 (ref 1.7–7)
NEUTROPHILS NFR BLD AUTO: 65.8 % (ref 42.7–76)
NRBC BLD AUTO-RTO: 0 /100 WBC (ref 0–0.2)
PLATELET # BLD AUTO: 289 10*3/MM3 (ref 140–450)
PMV BLD AUTO: 9.9 FL (ref 6–12)
POTASSIUM SERPL-SCNC: 4.2 MMOL/L (ref 3.5–5.2)
PROT SERPL-MCNC: 7.4 G/DL (ref 6–8.5)
RBC # BLD AUTO: 3.68 10*6/MM3 (ref 3.77–5.28)
SODIUM SERPL-SCNC: 142 MMOL/L (ref 136–145)
TRIGL SERPL-MCNC: 72 MG/DL (ref 0–150)
VLDLC SERPL-MCNC: 14 MG/DL (ref 5–40)
WBC NRBC COR # BLD AUTO: 5.68 10*3/MM3 (ref 3.4–10.8)

## 2024-06-04 PROCEDURE — 80053 COMPREHEN METABOLIC PANEL: CPT

## 2024-06-04 PROCEDURE — 85025 COMPLETE CBC W/AUTO DIFF WBC: CPT

## 2024-06-04 PROCEDURE — 80061 LIPID PANEL: CPT

## 2024-06-04 PROCEDURE — 36415 COLL VENOUS BLD VENIPUNCTURE: CPT

## 2024-06-10 NOTE — TELEPHONE ENCOUNTER
Pt has received recall letter per [facility] for f/u in [place] & test to be in [facility] @  location, pt verified demo   
on unit

## 2024-07-01 ENCOUNTER — OFFICE VISIT (OUTPATIENT)
Dept: RADIATION ONCOLOGY | Facility: HOSPITAL | Age: 69
End: 2024-07-01
Payer: MEDICARE

## 2024-07-01 ENCOUNTER — HOSPITAL ENCOUNTER (OUTPATIENT)
Dept: RADIATION ONCOLOGY | Facility: HOSPITAL | Age: 69
Setting detail: RADIATION/ONCOLOGY SERIES
End: 2024-07-01
Payer: MEDICARE

## 2024-07-01 VITALS
BODY MASS INDEX: 37.71 KG/M2 | OXYGEN SATURATION: 96 % | SYSTOLIC BLOOD PRESSURE: 127 MMHG | TEMPERATURE: 96.8 F | DIASTOLIC BLOOD PRESSURE: 75 MMHG | WEIGHT: 193.1 LBS | RESPIRATION RATE: 16 BRPM | HEART RATE: 85 BPM

## 2024-07-01 DIAGNOSIS — C34.12 MALIGNANT NEOPLASM OF UPPER LOBE OF LEFT LUNG: Primary | ICD-10-CM

## 2024-07-01 PROCEDURE — G0463 HOSPITAL OUTPT CLINIC VISIT: HCPCS

## 2024-07-01 NOTE — PROGRESS NOTES
FOLLOW UP NOTE    PATIENT:                                                      Lizbeth Johns  MEDICAL RECORD #:                        [id number]  :                                                          1955  COMPLETION DATE:                                    2023  DIAGNOSIS:                                                   Malignant neoplasm of upper lobe of left lung  - Stage IA2 (cT1b, cN0, cM0)     Primary adenocarcinoma of right lung  - Stage IB (cT2a, cN0, cM0)      BRIEF HISTORY:  Lizbeth Johns is a 68 y.o. female with known history of former tobacco abuse and a previous early-stage adenocarcinoma of the right upper lobe of lung status post prior resection on 2019 with Dr. Barreto.  She did not require adjuvant treatment.  She was found on surveillance chest imaging to have interval increase in size and density of a spiculated 1.1 cm peripheral left upper lobe lung lesion.  She was sent for PET/CT scan which showed isolated hypermetabolism with maximum SUV 4.7 within this left upper lobe lung lesion, clinically consistent with malignancy.  Adjacent to this lesion is a 6 mm medial left upper lobe lung lesion which is not PET avid.  There is otherwise no evidence of regional or distant metastatic disease and no evidence of recurrence in the contralateral right lung.  The patient wished to avoid additional work-up with biopsy.  The patient underwent definitive, empiric treatment with a course of SBRT to a dose of 48 Gray in 4 fractions delivered to the PET positive left upper lobe lung lesion, completing 2023.  She tolerated treatment well.  Restaging PET/CT scan performed 2023 demonstrated decreased size and hypermetabolism of the treated left upper lobe pulmonary nodule consistent with interval treatment response.  The separate, tiny left upper lobe pulmonary nodule continued to remain non-hypermetabolic.    The patient returns today for scheduled follow-up visit.  From  a symptom standpoint, she continues to endorse stable exertional dyspnea and cough with clear sputum in the setting of COPD.  She reports use of albuterol rescue inhaler as needed with good effect.  She denies recent COPD exacerbation or illness.  She denies purulent sputum, hemoptysis, chest pain, rib pain, or swallowing difficulties.  She denies weight loss or new/progressive focal bony pains.  She recently joined the [employer].         MEDICATIONS: Medication reconciliation for the patient was reviewed and confirmed in the electronic medical record.    Review of Systems   Respiratory:  Positive for cough and shortness of breath (with exertion).    All other systems reviewed and are negative.          KPS 90%      Physical Exam  Vitals and nursing note reviewed.   Constitutional:       General: She is not in acute distress.     Appearance: She is well-developed.   HENT:      Head: Normocephalic and atraumatic.   Eyes:      Conjunctiva/sclera: Conjunctivae normal.      Pupils: Pupils are equal, round, and reactive to light.   Cardiovascular:      Rate and Rhythm: Normal rate and regular rhythm.      Heart sounds: No murmur heard.     No friction rub.   Pulmonary:      Effort: Pulmonary effort is normal. No respiratory distress.      Breath sounds: Normal breath sounds. No wheezing.   Musculoskeletal:         General: Normal range of motion.      Cervical back: Normal range of motion and neck supple.   Lymphadenopathy:      Cervical: No cervical adenopathy.   Skin:     General: Skin is warm and dry.   Neurological:      Mental Status: She is alert and oriented to person, place, and time.   Psychiatric:         Behavior: Behavior normal.         Thought Content: Thought content normal.         Judgment: Judgment normal.       VITAL SIGNS:   Vitals:    07/01/24 1013   BP: 127/75   Pulse: 85   Resp: 16   Temp: 96.8 °F (36 °C)   TempSrc: Temporal   SpO2: 96%   Weight: 87.6 kg (193 lb 1.6 oz)   PainSc: 0-No pain            IMAGING:  I have personally reviewed the following imaging study:  Narrative & Impression   PROCEDURE: CT CHEST W CONTRAST DIAGNOSTIC-     HISTORY: fu scans; C34.12-Malignant neoplasm of upper lobe, left  bronchus or lung     COMPARISON: July 2023.     PROCEDURE: Multiple axial CT images were obtained from the thoracic  inlet through the upper abdomen following the administration of  intravenous contrast.     FINDINGS:  Soft tissue windows demonstrate no adenopathy within the chest. The  heart is normal in size. The aorta is normal in caliber. There are  vascular calcifications. There is no pericardial or pleural effusion.  Previously described 1 cm left upper lobe nodule is no longer  identified. There is a wedge-shaped area of airspace disease at this  location which may represent posttreatment change or pneumonia. Close  follow-up is recommended to document stability. A 6 mm faint nodule in  the left upper lobe is stable.  The visualized upper abdomen shows  several bilateral circumscribed renal lesions, similar to the prior  exam. There is fatty infiltration of the liver. Bone windows reveal no  acute osseous abnormalities.     IMPRESSION:  1. Interval change of previously described left upper lobe nodule into   triangular-shaped airspace disease. This may represent posttreatment  change, but continued follow-up is recommended.  2. Stable faint nodule in the left upper lobe.        This study was performed with techniques to keep radiation doses as low  as reasonably achievable (ALARA). Individualized dose reduction  techniques using automated exposure control or adjustment of mA and/or  kV according to the patient size were employed.           CTDI: 4.55 mGy  DLP: 157.04 mGy.cm                 Images were reviewed, interpreted, and dictated by Dr. Candelaria Madrigal MD  Transcribed by Sharlene Amos PA-C.     This report was signed and finalized on 3/28/2024 1:51 PM by Candelaria Madrigal MD.         The following  portions of the patient's history were reviewed and updated as appropriate: allergies, current medications, past family history, past medical history, past social history, past surgical history and problem list.         Diagnoses and all orders for this visit:    1. Malignant neoplasm of upper lobe of left lung (Primary)         IMPRESSION:  Lizbeth Johns is a 68 y.o. female with a known history of what appears to be an early stage bronchogenic carcinoma of the left upper lobe of lung.  The patient was not interested in pursuing a biopsy and was more interested in an empiric radiation based approach.  She is now 9 months status post SBRT to the PET positive, peripherally located left upper lobe lung lesion.  She tolerated treatment well and did not develop acute radiation-related toxicities.  Breathing remains stable in the setting of COPD.  The patient and I looked at her most recent scan together.  CT scan of the chest performed 3/28/2024 shows interval resolution of the previously treated left upper lobe lesion, with some surrounding consolidation/airspace disease which appears most consistent with postradiation change.  A separate 6 mm faint nodule in the left upper lobe, previously non-hypermetabolic on PET, appears stable in size and appearance.  No evidence of new pulmonary parenchymal lesions, mediastinal/hilar lymphadenopathy, or additional acute chest findings.  The patient and I reviewed follow-up intervals, surveillance imaging, and continued expectations for response to treatment.  We discussed NCCN guidelines which recommend CT chest ± contrast every 3-6 months for 3 years, then CT chest ± contrast every 6 months for 2 years, then a low-dose noncontrast-enhanced CT chest annually thereafter for continued surveillance.  Medical oncology has ordered repeat CT scan of the chest due 7/22/2024, and we will schedule the patient to return to our clinic in 6 months or sooner should clinical/radiographic  findings warrant.          RECOMMENDATIONS:      Primary bronchogenic carcinoma of the left upper lobe of lung  -Radiographic diagnosis  -cT1b, cN0, cM0  -Located more peripherally  -Slow interval growth over time  -Hypermetabolic on PET scan  -High risk smoking history  -History of previous malignancy resected by Dr. Barreto  -Patient declines biopsy  -Status post empiric SBRT 48 Mart in 4 fractions              -completed 9/27/2023  -Repeat PET/CT 12/1/2023 demonstrating interval treatment response with decreased size and hypermetabolism of the treated lesion and no evidence of new or progressive disease  -Repeat CT chest 3/28/2024 showing apparent resolution of the treated nodule and expected post-treatment changes and no evidence of new/progressive disease  -Follows with Dr. José in [place]  -Upcoming repeat CT chest 7/22/2024  -RTC 6 months or sooner as needed     Separate left upper lobe lung lesion  -Located more medially  -Tiny/subcentimeter and below resolution of PET  -Continues to remain non-hypermetabolic on PET  -Continue to observe for the time being  -May be a candidate for SBRT down the road if interval enlargement   -Low threshold to treat if clinically concerning    Adenocarcinoma of the right upper lobe of lung  -Status post resection with Dr. Barreto 8/30/2019  -Pathologic stage IB (pT2a, pN0, cM0)  -Did not require adjuvant treatment  -No evidence of local recurrence on surveillance imaging  -Continue to monitor     COPD  -Management per primary  -Stable         Return in about 6 months (around 1/1/2025) for Office Visit, Appt with MARGARET.    MARGARET Becerra    I spent a total of 30 minutes on todays visit, with more than 15 minutes in direct face to face communication, and the remainder of the time spent in reviewing the relevant history, records, available imaging, and for documentation.

## 2024-07-15 RX ORDER — PANTOPRAZOLE SODIUM 40 MG/1
40 TABLET, DELAYED RELEASE ORAL DAILY
Qty: 30 TABLET | Refills: 5 | Status: SHIPPED | OUTPATIENT
Start: 2024-07-15

## 2024-07-16 ENCOUNTER — HOSPITAL ENCOUNTER (OUTPATIENT)
Dept: MAMMOGRAPHY | Facility: HOSPITAL | Age: 69
Discharge: HOME | End: 2024-07-16
Admitting: FAMILY MEDICINE
Payer: MEDICARE

## 2024-07-16 DIAGNOSIS — Z12.31 VISIT FOR SCREENING MAMMOGRAM: ICD-10-CM

## 2024-07-16 PROCEDURE — 77067 SCR MAMMO BI INCL CAD: CPT

## 2024-07-16 PROCEDURE — 77063 BREAST TOMOSYNTHESIS BI: CPT

## 2024-07-22 ENCOUNTER — HOSPITAL ENCOUNTER (OUTPATIENT)
Dept: CT IMAGING | Facility: HOSPITAL | Age: 69
Discharge: HOME | End: 2024-07-22
Admitting: NURSE PRACTITIONER
Payer: MEDICARE

## 2024-07-22 DIAGNOSIS — C34.91 PRIMARY ADENOCARCINOMA OF RIGHT LUNG: ICD-10-CM

## 2024-07-22 DIAGNOSIS — R91.1 LUNG NODULE: ICD-10-CM

## 2024-07-22 PROCEDURE — 25510000001 IOPAMIDOL 61 % SOLUTION: Performed by: NURSE PRACTITIONER

## 2024-07-22 PROCEDURE — 71260 CT THORAX DX C+: CPT

## 2024-07-22 RX ADMIN — IOPAMIDOL 100 ML: 612 INJECTION, SOLUTION INTRAVENOUS at 13:07

## 2024-08-02 ENCOUNTER — OFFICE VISIT (OUTPATIENT)
Dept: ONCOLOGY | Facility: CLINIC | Age: 69
End: 2024-08-02
Payer: MEDICARE

## 2024-08-02 VITALS
TEMPERATURE: 97.7 F | SYSTOLIC BLOOD PRESSURE: 132 MMHG | RESPIRATION RATE: 16 BRPM | HEART RATE: 70 BPM | BODY MASS INDEX: 38.17 KG/M2 | HEIGHT: 60 IN | DIASTOLIC BLOOD PRESSURE: 68 MMHG | OXYGEN SATURATION: 98 % | WEIGHT: 194.4 LBS

## 2024-08-02 DIAGNOSIS — C34.91 PRIMARY ADENOCARCINOMA OF RIGHT LUNG: Primary | ICD-10-CM

## 2024-08-02 PROCEDURE — 3075F SYST BP GE 130 - 139MM HG: CPT | Performed by: NURSE PRACTITIONER

## 2024-08-02 PROCEDURE — 1126F AMNT PAIN NOTED NONE PRSNT: CPT | Performed by: NURSE PRACTITIONER

## 2024-08-02 PROCEDURE — 3078F DIAST BP <80 MM HG: CPT | Performed by: NURSE PRACTITIONER

## 2024-08-02 PROCEDURE — 99214 OFFICE O/P EST MOD 30 MIN: CPT | Performed by: NURSE PRACTITIONER

## 2024-08-02 RX ORDER — HYDROCHLOROTHIAZIDE 25 MG/1
1 TABLET ORAL DAILY
COMMUNITY
Start: 2024-07-15

## 2024-08-02 RX ORDER — DIPHENHYDRAMINE HCL 50 MG
CAPSULE ORAL
Qty: 1 CAPSULE | Refills: 0 | Status: SHIPPED | OUTPATIENT
Start: 2024-08-02

## 2024-08-02 RX ORDER — PREDNISONE 50 MG/1
TABLET ORAL
Qty: 3 TABLET | Refills: 0 | Status: SHIPPED | OUTPATIENT
Start: 2024-08-02

## 2024-08-02 NOTE — PROGRESS NOTES
DATE OF VISIT: 8/2/2024    REASON FOR VISIT: Followup for right lung adenocarcinoma     PROBLEM LIST:  1. Right upper lobe adenocarcinoma of the lung T2 aN0 M0 stage Ib:  A.  CT chest done on June 2019 revealed 3 cm right upper lobe lung nodule  B.  Bronchoscopy with biopsy done by Dr. Patel June 13, 2019 was negative  C.  Status post surgical resection with lobectomy and lymph node sampling done by Dr. Barreto August 30, 2019  D.  Final pathology revealed moderate differentiated adenocarcinoma 3.5 cm in size clear surgical margins, no lymphovascular invasion, no pleural invasion, and negative lymph nodes.  2.  COPD  3.  Hypertension  4.  Enlarging left upper lobe lung nodule:  A.  Status post CyberKnife with Dr. Lees completed September 7, 2023    HISTORY OF PRESENT ILLNESS: The patient is a very pleasant 68 y.o. female  with past medical history significant for right upper lobe adenocarcinoma diagnosed August 30, 2019.  Patient was treated with surgical resection.  She did not require adjuvant treatment. The patient is here today for scheduled follow-up visit.    SUBJECTIVE: Hannah is here today by herself.  No new symptoms of shortness of breath or cough.  Overall, she is doing well.  No fevers or headaches.  She has noticed some swelling in her ankles that her primary care doctor has started her on a fluid pill.  This has helped.  Overall, she feels like she is doing pretty good.  She is anxious about her scan results.        Past History:  Medical History: has a past medical history of Acid reflux, Arthritis, Body piercing, Cataract, bilateral, COPD (chronic obstructive pulmonary disease), Disease of thyroid gland, Full dentures, History of radiation therapy (09/27/2023), Hyperlipidemia, Hypertension, Leg cramps, Lung cancer, Lung mass, On home oxygen therapy, SOB (shortness of breath), and Wears glasses.   Surgical History: has a past surgical history that includes Tubal ligation; Colonoscopy (N/A,  "02/23/2017); Colonoscopy (N/A, 06/04/2018); Esophagogastroduodenoscopy (06/13/2019); Bronchoscopy (N/A, 06/13/2019); Colonoscopy (N/A, 06/19/2019); Multiple tooth extractions; ORIF tibia & fibula fractures (Left); Thoracoscopy (Right, 08/30/2019); Sigmoidoscopy (N/A, 01/09/2020); Esophagogastroduodenoscopy (N/A, 01/09/2020); Other surgical history; Lung cancer surgery (Right); Esophagogastroduodenoscopy (N/A, 01/23/2024); and Colonoscopy (N/A, 02/02/2024).   Family History: family history includes Cancer in her mother; Cirrhosis in her brother; Colon cancer in her brother; Liver disease in her brother; Lung cancer in her mother; No Known Problems in her father.   Social History: reports that she quit smoking about 4 years ago. Her smoking use included cigarettes. She started smoking about 54 years ago. She has a 50 pack-year smoking history. She has never used smokeless tobacco. She reports current alcohol use of about 2.0 standard drinks of alcohol per week. She reports that she does not use drugs.    (Not in a hospital admission)     Allergies: Patient has no known allergies.     Review of Systems   Constitutional:  Positive for fatigue.   Respiratory: Negative.     Cardiovascular: Negative.    Psychiatric/Behavioral:  The patient is nervous/anxious.        PHYSICAL EXAMINATION:   /68   Pulse 70   Temp 97.7 °F (36.5 °C)   Resp 16   Ht 152.4 cm (60\")   Wt 88.2 kg (194 lb 6.4 oz)   LMP 03/16/2004   SpO2 98%   BMI 37.97 kg/m²    Pain Score    08/02/24 1029   PainSc: 0-No pain          ECOG score: 0            ECOG Performance Status: 0 - Asymptomatic      General Appearance:      alert, cooperative, no apparent distress and appears stated age   Lungs:   Clear to auscultation bilaterally; respirations regular, even, and unlabored bilaterally   Heart:  Regular rate and rhythm, no murmurs appreciated   Abdomen:   Soft, non-tender, and non-distended                 No visits with results within 2 Week(s) " from this visit.   Latest known visit with results is:   Lab on 06/04/2024   Component Date Value Ref Range Status    Total Cholesterol 06/04/2024 156  0 - 200 mg/dL Final    Triglycerides 06/04/2024 72  0 - 150 mg/dL Final    HDL Cholesterol 06/04/2024 60  40 - 60 mg/dL Final    LDL Cholesterol  06/04/2024 82  0 - 100 mg/dL Final    VLDL Cholesterol 06/04/2024 14  5 - 40 mg/dL Final    LDL/HDL Ratio 06/04/2024 1.36   Final    Glucose 06/04/2024 91  65 - 99 mg/dL Final    BUN 06/04/2024 22  8 - 23 mg/dL Final    Creatinine 06/04/2024 1.02 (H)  0.57 - 1.00 mg/dL Final    Sodium 06/04/2024 142  136 - 145 mmol/L Final    Potassium 06/04/2024 4.2  3.5 - 5.2 mmol/L Final    Chloride 06/04/2024 106  98 - 107 mmol/L Final    CO2 06/04/2024 29.0  22.0 - 29.0 mmol/L Final    Calcium 06/04/2024 9.8  8.6 - 10.5 mg/dL Final    Total Protein 06/04/2024 7.4  6.0 - 8.5 g/dL Final    Albumin 06/04/2024 4.1  3.5 - 5.2 g/dL Final    ALT (SGPT) 06/04/2024 14  1 - 33 U/L Final    AST (SGOT) 06/04/2024 16  1 - 32 U/L Final    Alkaline Phosphatase 06/04/2024 143 (H)  39 - 117 U/L Final    Total Bilirubin 06/04/2024 0.2  0.0 - 1.2 mg/dL Final    Globulin 06/04/2024 3.3  gm/dL Final    A/G Ratio 06/04/2024 1.2  g/dL Final    BUN/Creatinine Ratio 06/04/2024 21.6  7.0 - 25.0 Final    Anion Gap 06/04/2024 7.0  5.0 - 15.0 mmol/L Final    eGFR 06/04/2024 59.3 (L)  >60.0 mL/min/1.73 Final    WBC 06/04/2024 5.68  3.40 - 10.80 10*3/mm3 Final    RBC 06/04/2024 3.68 (L)  3.77 - 5.28 10*6/mm3 Final    Hemoglobin 06/04/2024 10.1 (L)  12.0 - 15.9 g/dL Final    Hematocrit 06/04/2024 31.8 (L)  34.0 - 46.6 % Final    MCV 06/04/2024 86.4  79.0 - 97.0 fL Final    MCH 06/04/2024 27.4  26.6 - 33.0 pg Final    MCHC 06/04/2024 31.8  31.5 - 35.7 g/dL Final    RDW 06/04/2024 14.4  12.3 - 15.4 % Final    RDW-SD 06/04/2024 45.4  37.0 - 54.0 fl Final    MPV 06/04/2024 9.9  6.0 - 12.0 fL Final    Platelets 06/04/2024 289  140 - 450 10*3/mm3 Final    Neutrophil %  06/04/2024 65.8  42.7 - 76.0 % Final    Lymphocyte % 06/04/2024 19.4 (L)  19.6 - 45.3 % Final    Monocyte % 06/04/2024 11.1  5.0 - 12.0 % Final    Eosinophil % 06/04/2024 3.0  0.3 - 6.2 % Final    Basophil % 06/04/2024 0.5  0.0 - 1.5 % Final    Immature Grans % 06/04/2024 0.2  0.0 - 0.5 % Final    Neutrophils, Absolute 06/04/2024 3.74  1.70 - 7.00 10*3/mm3 Final    Lymphocytes, Absolute 06/04/2024 1.10  0.70 - 3.10 10*3/mm3 Final    Monocytes, Absolute 06/04/2024 0.63  0.10 - 0.90 10*3/mm3 Final    Eosinophils, Absolute 06/04/2024 0.17  0.00 - 0.40 10*3/mm3 Final    Basophils, Absolute 06/04/2024 0.03  0.00 - 0.20 10*3/mm3 Final    Immature Grans, Absolute 06/04/2024 0.01  0.00 - 0.05 10*3/mm3 Final    nRBC 06/04/2024 0.0  0.0 - 0.2 /100 WBC Final        CT Chest With Contrast Diagnostic    Result Date: 7/23/2024  Narrative: PROCEDURE: CT CHEST W CONTRAST DIAGNOSTIC-  HISTORY: Follow up  Right upper lobe lung adenocarcinoma stage Ib and left lung nodule; C34.91-Malignant neoplasm of unspecified part of right bronchus or lung; R91.1-Solitary pulmonary nodule  COMPARISON: 03/28/2024  PROCEDURE: The patient was injected with IV contrast.  Axial images were obtained from the lung apex to the mid abdomen by computed tomography. This study was performed with techniques to keep radiation doses as low as reasonably achievable, (ALARA). Individualized dose reduction techniques using automated exposure control or adjustment of mA and/or kV according to the patient size were employed.  FINDINGS:  CHEST: There is no suspicious axillary adenopathy. There is no suspicious hilar or mediastinal adenopathy.  The heart is proper size. There is no pericardial or pleural effusion.  No suspicious infiltrate or nodule identified. Again noted is postsurgical change in the right lung from previous right upper lobe wedge resection. There is volume loss with elevation of the right hemidiaphragm. Right perihilar surgical clips noted. Minimal  linear scarring identified. No new nodules seen. Particular attention paid to the lateral aspect of the left upper lobe. The previously described area of triangular airspace disease has decreased in overall volume consistent with interval scarring. No new nodularity identified in the left lung. No bony destructive lesion seen. Limited images of the upper abdomen demonstrate bilateral circumscribed renal cyst similar to prior.      Impression: Interval decrease in size of the left upper lobe airspace disease/mass compared to prior, otherwise stable exam..    CTDI: 4.67 mGy DLP:164.27 mGy.cm  This report was signed and finalized on 7/23/2024 11:58 AM by Candelaria Madrigal MD.      Mammo Screening Digital Tomosynthesis Bilateral With CAD    Result Date: 7/16/2024  Narrative: EXAMINATION: MAMMO SCREENING DIGITAL TOMOSYNTHESIS BILATERAL W CAD-  CLINICAL INDICATION: Routine screening.  TECHNIQUE: Bilateral CC and MLO views were obtained. The study was read with the assistance of CAD.  Tomosynthesis was performed.  COMPARISON: 04/20/2023 and 02/11/2022  DENSITY:  There are scattered areas of fibroglandular density  FINDINGS:  There are no spiculated masses, areas of distortion or suspicious calcifications. Nodules of the upper outer quadrants are noted compatible with benign lymph nodes.      Impression: No mammographic evidence of malignancy   BI-RADS CATEGORY: 2 , BENIGN FINDING(S).    RECOMMENDED FOLLOW-UP:  Annual screening mammography.    NOTES: Mammography does not detect approximately 10-15% of breast cancers. Physical examination of the breasts by a physician and regular monthly breast self examinations are integral parts of breast cancer screening.   A normal mammogram does not exclude breast cancer if there is an abnormal finding on physical examination. When clinically indicated, a biopsy should not be postponed because of a normal mammogram report.   Please allow this report to serve as a order for additional imaging  follow-up  [place]. [id number]  NOTE: If a biopsy is performed on this patient, a copy of the pathology report would be appreciated.   This report was signed and finalized on 7/16/2024 8:54 PM by Erickson Chau MD.         ASSESSMENT: The patient is a very pleasant 68 y.o. female  with right upper lobe lung adenocarcinoma      PLAN:    1.  Right upper lobe lung adenocarcinoma stage Ib:  A.  We discussed CT chest from 7/22/2024 showed interval decrease in the size of the left upper lobe airspace disease/mass compared to prior exam.    B.  We will plan to repeat CT scan in 4 months.    C.  We discussed labs from June 2024 was stable overall.  Will plan to repeat prior to return.  D.  We will send in prednisone and Benadryl prior to next CT scan.      2.  Left upper lobe lung nodules:  A.  Status post CyberKnife radiotherapy with Dr. Lees done September 7, 2023.    3.  Hypertension:  A.  She will continue Norvasc daily and Coreg twice a day.    FOLLOW UP: 4 months with CT scan.    Chandni Odell, APRN  8/2/2024

## 2024-08-08 ENCOUNTER — LAB (OUTPATIENT)
Dept: LAB | Facility: HOSPITAL | Age: 69
End: 2024-08-08
Payer: MEDICARE

## 2024-08-08 ENCOUNTER — OFFICE VISIT (OUTPATIENT)
Dept: GASTROENTEROLOGY | Facility: CLINIC | Age: 69
End: 2024-08-08
Payer: MEDICARE

## 2024-08-08 VITALS
OXYGEN SATURATION: 92 % | WEIGHT: 199 LBS | DIASTOLIC BLOOD PRESSURE: 82 MMHG | BODY MASS INDEX: 38.86 KG/M2 | HEART RATE: 56 BPM | SYSTOLIC BLOOD PRESSURE: 124 MMHG

## 2024-08-08 DIAGNOSIS — E66.01 CLASS 2 SEVERE OBESITY DUE TO EXCESS CALORIES WITH SERIOUS COMORBIDITY AND BODY MASS INDEX (BMI) OF 38.0 TO 38.9 IN ADULT: Chronic | ICD-10-CM

## 2024-08-08 DIAGNOSIS — D64.9 ANEMIA, UNSPECIFIED TYPE: Primary | Chronic | ICD-10-CM

## 2024-08-08 DIAGNOSIS — Z80.0 FAMILY HISTORY OF COLON CANCER: ICD-10-CM

## 2024-08-08 DIAGNOSIS — Z87.11 HISTORY OF GASTRIC ULCER: Chronic | ICD-10-CM

## 2024-08-08 DIAGNOSIS — Z85.118 HISTORY OF LUNG CANCER: Chronic | ICD-10-CM

## 2024-08-08 DIAGNOSIS — D64.9 ANEMIA, UNSPECIFIED TYPE: Chronic | ICD-10-CM

## 2024-08-08 DIAGNOSIS — Z12.11 ENCOUNTER FOR SCREENING FOR MALIGNANT NEOPLASM OF COLON: ICD-10-CM

## 2024-08-08 DIAGNOSIS — C34.91 PRIMARY ADENOCARCINOMA OF RIGHT LUNG: ICD-10-CM

## 2024-08-08 DIAGNOSIS — R74.8 ELEVATED ALKALINE PHOSPHATASE LEVEL: Chronic | ICD-10-CM

## 2024-08-08 LAB
ALBUMIN SERPL-MCNC: 4.4 G/DL (ref 3.5–5.2)
ALBUMIN/GLOB SERPL: 1.2 G/DL
ALP SERPL-CCNC: 173 U/L (ref 39–117)
ALT SERPL W P-5'-P-CCNC: 15 U/L (ref 1–33)
ANION GAP SERPL CALCULATED.3IONS-SCNC: 12.4 MMOL/L (ref 5–15)
AST SERPL-CCNC: 20 U/L (ref 1–32)
BASOPHILS # BLD AUTO: 0.03 10*3/MM3 (ref 0–0.2)
BASOPHILS NFR BLD AUTO: 0.4 % (ref 0–1.5)
BILIRUB SERPL-MCNC: 0.2 MG/DL (ref 0–1.2)
BUN SERPL-MCNC: 41 MG/DL (ref 8–23)
BUN/CREAT SERPL: 21.5 (ref 7–25)
CALCIUM SPEC-SCNC: 9.5 MG/DL (ref 8.6–10.5)
CHLORIDE SERPL-SCNC: 103 MMOL/L (ref 98–107)
CO2 SERPL-SCNC: 24.6 MMOL/L (ref 22–29)
CREAT SERPL-MCNC: 1.91 MG/DL (ref 0.57–1)
DEPRECATED RDW RBC AUTO: 48.2 FL (ref 37–54)
EGFRCR SERPLBLD CKD-EPI 2021: 27.9 ML/MIN/1.73
EOSINOPHIL # BLD AUTO: 0.23 10*3/MM3 (ref 0–0.4)
EOSINOPHIL NFR BLD AUTO: 3.1 % (ref 0.3–6.2)
ERYTHROCYTE [DISTWIDTH] IN BLOOD BY AUTOMATED COUNT: 15.7 % (ref 12.3–15.4)
GLOBULIN UR ELPH-MCNC: 3.6 GM/DL
GLUCOSE SERPL-MCNC: 89 MG/DL (ref 65–99)
HCT VFR BLD AUTO: 32.4 % (ref 34–46.6)
HGB BLD-MCNC: 10.6 G/DL (ref 12–15.9)
IMM GRANULOCYTES # BLD AUTO: 0.02 10*3/MM3 (ref 0–0.05)
IMM GRANULOCYTES NFR BLD AUTO: 0.3 % (ref 0–0.5)
IRON 24H UR-MRATE: 48 MCG/DL (ref 37–145)
IRON SATN MFR SERPL: 11 % (ref 20–50)
LYMPHOCYTES # BLD AUTO: 1.55 10*3/MM3 (ref 0.7–3.1)
LYMPHOCYTES NFR BLD AUTO: 21.2 % (ref 19.6–45.3)
MCH RBC QN AUTO: 27.8 PG (ref 26.6–33)
MCHC RBC AUTO-ENTMCNC: 32.7 G/DL (ref 31.5–35.7)
MCV RBC AUTO: 85 FL (ref 79–97)
MONOCYTES # BLD AUTO: 0.72 10*3/MM3 (ref 0.1–0.9)
MONOCYTES NFR BLD AUTO: 9.8 % (ref 5–12)
NEUTROPHILS NFR BLD AUTO: 4.76 10*3/MM3 (ref 1.7–7)
NEUTROPHILS NFR BLD AUTO: 65.2 % (ref 42.7–76)
NRBC BLD AUTO-RTO: 0 /100 WBC (ref 0–0.2)
PLATELET # BLD AUTO: 245 10*3/MM3 (ref 140–450)
PMV BLD AUTO: 9.8 FL (ref 6–12)
POTASSIUM SERPL-SCNC: 4.2 MMOL/L (ref 3.5–5.2)
PROT SERPL-MCNC: 8 G/DL (ref 6–8.5)
RBC # BLD AUTO: 3.81 10*6/MM3 (ref 3.77–5.28)
SODIUM SERPL-SCNC: 140 MMOL/L (ref 136–145)
TIBC SERPL-MCNC: 432 MCG/DL (ref 298–536)
TRANSFERRIN SERPL-MCNC: 290 MG/DL (ref 200–360)
WBC NRBC COR # BLD AUTO: 7.31 10*3/MM3 (ref 3.4–10.8)

## 2024-08-08 PROCEDURE — 82607 VITAMIN B-12: CPT

## 2024-08-08 PROCEDURE — 83540 ASSAY OF IRON: CPT

## 2024-08-08 PROCEDURE — 82977 ASSAY OF GGT: CPT

## 2024-08-08 PROCEDURE — 80053 COMPREHEN METABOLIC PANEL: CPT

## 2024-08-08 PROCEDURE — 36415 COLL VENOUS BLD VENIPUNCTURE: CPT

## 2024-08-08 PROCEDURE — 84466 ASSAY OF TRANSFERRIN: CPT

## 2024-08-08 PROCEDURE — 85025 COMPLETE CBC W/AUTO DIFF WBC: CPT

## 2024-08-08 NOTE — PATIENT INSTRUCTIONS
Antireflux measures: Avoid fried, fatty foods, alcohol, chocolate, coffee, tea,  soft drinks, all carbonated beverages (including sparkling water), peppermint and spearmint, spicy foods, tomatoes and tomato based foods, onions, peppers, and garlic.   Other antireflux measures include weight reduction if overweight, avoiding tight clothing around the abdomen, elevating the head of the bed 6 inches with blocks under the head board, and don't drink or eat before going to bed and avoid lying down immediately after meals.  Continue to avoid vaping/smoking.  Pantoprazole 40 mg 1 by mouth in the am 30 minutes before breakfast.  Avoid NSAIDs, including Ibuprofen, Motrin, Advil, Aleve, Naprosyn, etc.   Tylenol products are ok to take.   High fiber, low fat diet with liberal water intake.   Advised to increase activity.   Advised to lose 20 pounds in the next 6-12 months.  Labs  Colonoscopy for high risk screening in 5 years, February 2029.  Follow up: 6 months

## 2024-08-08 NOTE — PROGRESS NOTES
Follow Up Note     Date: 2024   Patient Name: Lizbeth Johns  MRN: [id number]  : 1955     Primary Care Provider: Emily Ferreira MD     Chief Complaint   Patient presents with    Anemia     History of present illness:   2024  Lizbeth Johns is a 68 y.o. female who is here today for follow up for anemia. Denies any GI bleeding. Continues to stay on PPI daily. Denies any other GI symptoms at this time.     Interval History:  2024  Lizbeth Johns is a 68 y.o. female who is here today for follow up after procedures. She has been taking the pantoprazole 40 mg daily since her scopes. She denies having any GI symptoms at this time. She denies any episodes of GI bleeding.     2024  Her last colonoscopy was in 2019 by Dr. Siddiqui with polyps removed. She had follow up sigmoidoscopy in  by Dr. Siddiqui that was unremarkable. Her brother possibly had colon cancer diagnosed in his 50's. She has a history of lung cancer that is being monitored per patient.      The patient denies recent change in bowel habits. There is no diarrhea or constipation. There is no history of abdominal pain. There is no history of overt GI bleed (hematemesis melena or hematochezia). The patient denies nausea or vomiting. There is no history of reflux. The patient denies dysphagia or odynophagia. There is no history of recent significant weight loss. There is no history of liver disease in the past.     Subjective      Past Medical History:   Diagnosis Date    Acid reflux     Arthritis     Body piercing     ears    Cataract, bilateral     COPD (chronic obstructive pulmonary disease)     Disease of thyroid gland     Full dentures     Patient advised no adhesives DOS    History of radiation therapy 2023    MIGUELINA lung    Hyperlipidemia     Hypertension     Leg cramps     Lung cancer     bilateral-in remission    Lung mass     right    On home oxygen therapy     2L [facility] and daily prn     SOB (shortness of  breath)     xray showed spot on lungs    Wears glasses     Rx glasses     Past Surgical History:   Procedure Laterality Date    BRONCHOSCOPY N/A 06/13/2019    Procedure: BRONCHOSCOPY WITH FLUOROSCOPY, BIOPSY, BRUSHINGS, AND WASHINGS;  Surgeon: Gudelia Patel MD;  Location: [facility] OR;  Service: Pulmonary    COLONOSCOPY N/A 02/23/2017    Procedure: COLONOSCOPY with hot and cold snare polypectomies,  hot biopsy polypectomies, biopsies, resolution clip placement;  Surgeon: Zackery Siddiqui MD;  Location: [facility] ENDOSCOPY;  Service:     COLONOSCOPY N/A 06/04/2018    Procedure: COLONOSCOPY WITH HOT SNARE POLYPECTOMY X 7; COLD SNARE POLYPECTOMY X 3; HOT BIOPSY POLYPECTOMY X 20; COLD BIOPSY POLYPECTOMY X 2; THERMAL ABLATION OF COLON POLYPS X 23; CLIP PLACEMENT X 2; BIOPSIES;  Surgeon: Zackery Siddiqui MD;  Location: [facility] ENDOSCOPY;  Service: Gastroenterology    COLONOSCOPY N/A 06/19/2019    Procedure: COLONOSCOPY with cold biopsy polypectomies and cold biopsy;  Surgeon: Zackery Siddiqui MD;  Location: [facility] ENDOSCOPY;  Service: Gastroenterology    COLONOSCOPY N/A 02/02/2024    Procedure: COLONOSCOPY with biopsy and polypectomy;  Surgeon: Lana Denny MD;  Location: [facility] ENDOSCOPY;  Service: Gastroenterology;  Laterality: N/A;    ENDOSCOPY  06/13/2019    ENDOSCOPY N/A 01/09/2020    Procedure: ESOPHAGOGASTRODUODENOSCOPY;  Surgeon: Zackery Siddiqui MD;  Location: [facility] ENDOSCOPY;  Service: Gastroenterology    ENDOSCOPY N/A 01/23/2024    Procedure: ESOPHAGOGASTRODUODENOSCOPY WITH BIOPSY;  Surgeon: Lana Denny MD;  Location: [facility] ENDOSCOPY;  Service: Gastroenterology;  Laterality: N/A;    LUNG CANCER SURGERY Right     MULTIPLE TOOTH EXTRACTIONS      full extraction    ORIF TIBIA/FIBULA FRACTURES Left     OTHER SURGICAL HISTORY      SIGMOIDOSCOPY N/A 01/09/2020    Procedure: FLEXIBLE SIGMOIDOSCOPY; ANOSCOPY;  Surgeon: Zackery Siddiqui MD;  Location: [facility] ENDOSCOPY;  Service: Gastroenterology     THORACOSCOPY Right 2019    Procedure: BRONCHOSCOPY,  THORACOSCOPY VIDEO ASSISTED with right upper lobe wedge RESECTION , RIGHT UPPER lobectomy with mediastinal lymph node dissection AND INTERCOSTAL CRYOABLATION OF RIGHT CHEST;  Surgeon: Brian Barreto MD;  Location: [facility];  Service: Cardiothoracic    TUBAL ABDOMINAL LIGATION       Family History   Problem Relation Age of Onset    Cancer Mother     Lung cancer Mother     No Known Problems Father     Cirrhosis Brother     Liver disease Brother     Colon cancer Brother         Possibly colon cancer diagnosed in his 50's, patient unsure.    Stomach cancer Neg Hx     Esophageal cancer Neg Hx     Breast cancer Neg Hx      Social History     Socioeconomic History    Marital status:     Number of children: 4   Tobacco Use    Smoking status: Former     Current packs/day: 0.00     Average packs/day: 1 pack/day for 50.0 years (50.0 ttl pk-yrs)     Types: Cigarettes     Start date: 1969     Quit date: 2019     Years since quittin.9    Smokeless tobacco: Never    Tobacco comments:     Pt states that she quit in 2019   Vaping Use    Vaping status: Never Used   Substance and Sexual Activity    Alcohol use: Yes     Alcohol/week: 2.0 standard drinks of alcohol     Types: 2 Glasses of wine per week     Comment: once in a while     Drug use: No    Sexual activity: Defer       Current Outpatient Medications:     albuterol sulfate  (90 Base) MCG/ACT inhaler, , Disp: , Rfl:     amLODIPine (NORVASC) 10 MG tablet, amlodipine 10 mg tablet  Take 1 tablet every day by oral route., Disp: , Rfl:     aspirin 81 MG EC tablet, Take 1 tablet by mouth Daily., Disp: , Rfl:     atorvastatin (LIPITOR) 20 MG tablet, Take 1 tablet by mouth Daily., Disp: , Rfl:     Calcium-Vitamin D-Vitamin K (VIACTIV CALCIUM PLUS D PO), 1 po Qday, Disp: , Rfl:     carvedilol (COREG) 12.5 MG tablet, Take 1 tablet by mouth 2 (Two) Times a Day., Disp: , Rfl:     hydroCHLOROthiazide 25  MG tablet, Take 1 tablet by mouth Daily., Disp: , Rfl:     pantoprazole (PROTONIX) 40 MG EC tablet, TAKE 1 TABLET BY MOUTH DAILY, Disp: 30 tablet, Rfl: 5    predniSONE (DELTASONE) 50 MG tablet, Take 1 tablet by mouth 13 hours, 7 hours, and 1 hour before CT scan., Disp: 3 tablet, Rfl: 0    tiotropium bromide-olodaterol (STIOLTO RESPIMAT) 2.5-2.5 MCG/ACT aerosol solution inhaler, Inhale 2 puffs Daily., Disp: 1 inhaler, Rfl: 5    diphenhydrAMINE (BENADRYL) 50 MG capsule, Take 1 tablet by mouth 1 hour prior to CT scan. (Patient not taking: Reported on 8/8/2024), Disp: 1 capsule, Rfl: 0  No current facility-administered medications for this visit.    Facility-Administered Medications Ordered in Other Visits:     Chlorhexidine Gluconate Cloth 2 % pads 1 application, 1 application , Topical, Q12H PRN, Candelaria Novak PA-C    No Known Allergies    The following portions of the patient's history were reviewed and updated as appropriate: allergies, current medications, past family history, past medical history, past social history, past surgical history and problem list.  Objective     Physical Exam  Vitals and nursing note reviewed.   Constitutional:       General: She is not in acute distress.     Appearance: Normal appearance. She is well-developed.   HENT:      Head: Normocephalic and atraumatic.      Mouth/Throat:      Mouth: Mucous membranes are not pale, not dry and not cyanotic.   Eyes:      General: Lids are normal.   Neck:      Trachea: Trachea normal.   Cardiovascular:      Rate and Rhythm: Normal rate.   Pulmonary:      Effort: Pulmonary effort is normal. No respiratory distress.      Breath sounds: Normal breath sounds.   Abdominal:      Tenderness: There is no abdominal tenderness.   Skin:     General: Skin is warm and dry.   Neurological:      Mental Status: She is alert and oriented to person, place, and time.   Psychiatric:         Mood and Affect: Mood normal.         Speech: Speech normal.          Behavior: Behavior normal. Behavior is cooperative.       Vitals:    08/08/24 1521   BP: 124/82   Pulse: 56   SpO2: 92%   Weight: 90.3 kg (199 lb)     Body mass index is 38.86 kg/m².     Results Review:   I reviewed the patient's new clinical results.    Lab on 06/04/2024   Component Date Value Ref Range Status    Total Cholesterol 06/04/2024 156  0 - 200 mg/dL Final    Triglycerides 06/04/2024 72  0 - 150 mg/dL Final    HDL Cholesterol 06/04/2024 60  40 - 60 mg/dL Final    LDL Cholesterol  06/04/2024 82  0 - 100 mg/dL Final    VLDL Cholesterol 06/04/2024 14  5 - 40 mg/dL Final    LDL/HDL Ratio 06/04/2024 1.36   Final    Glucose 06/04/2024 91  65 - 99 mg/dL Final    BUN 06/04/2024 22  8 - 23 mg/dL Final    Creatinine 06/04/2024 1.02 (H)  0.57 - 1.00 mg/dL Final    Sodium 06/04/2024 142  136 - 145 mmol/L Final    Potassium 06/04/2024 4.2  3.5 - 5.2 mmol/L Final    Chloride 06/04/2024 106  98 - 107 mmol/L Final    CO2 06/04/2024 29.0  22.0 - 29.0 mmol/L Final    Calcium 06/04/2024 9.8  8.6 - 10.5 mg/dL Final    Total Protein 06/04/2024 7.4  6.0 - 8.5 g/dL Final    Albumin 06/04/2024 4.1  3.5 - 5.2 g/dL Final    ALT (SGPT) 06/04/2024 14  1 - 33 U/L Final    AST (SGOT) 06/04/2024 16  1 - 32 U/L Final    Alkaline Phosphatase 06/04/2024 143 (H)  39 - 117 U/L Final    Total Bilirubin 06/04/2024 0.2  0.0 - 1.2 mg/dL Final    Globulin 06/04/2024 3.3  gm/dL Final    A/G Ratio 06/04/2024 1.2  g/dL Final    BUN/Creatinine Ratio 06/04/2024 21.6  7.0 - 25.0 Final    Anion Gap 06/04/2024 7.0  5.0 - 15.0 mmol/L Final    eGFR 06/04/2024 59.3 (L)  >60.0 mL/min/1.73 Final    WBC 06/04/2024 5.68  3.40 - 10.80 10*3/mm3 Final    RBC 06/04/2024 3.68 (L)  3.77 - 5.28 10*6/mm3 Final    Hemoglobin 06/04/2024 10.1 (L)  12.0 - 15.9 g/dL Final    Hematocrit 06/04/2024 31.8 (L)  34.0 - 46.6 % Final    MCV 06/04/2024 86.4  79.0 - 97.0 fL Final    MCH 06/04/2024 27.4  26.6 - 33.0 pg Final    MCHC 06/04/2024 31.8  31.5 - 35.7 g/dL Final    RDW  06/04/2024 14.4  12.3 - 15.4 % Final    RDW-SD 06/04/2024 45.4  37.0 - 54.0 fl Final    MPV 06/04/2024 9.9  6.0 - 12.0 fL Final    Platelets 06/04/2024 289  140 - 450 10*3/mm3 Final      NM PET/CT Skull Base to Mid Thigh     Result Date: 12/4/2023  Impression: Decreased size and hypermetabolism of the left upper lobe pulmonary nodule that has undergone interval SBRT. Decreased size of the additional left upper lobe pulmonary nodule, remaining below the resolution of PET. No other sites of suspicious hypermetabolic activity.      CT Chest With Contrast Diagnostic     Result Date: 3/28/2024  1. Interval change of previously described left upper lobe nodule into triangular-shaped airspace disease. This may represent posttreatment change, but continued follow-up is recommended. 2. Stable faint nodule in the left upper lobe.        CTAP without contrast 5/2/2024  1. Diverticulosis.  2. Bilateral renal cysts.  3. Suspect degenerated uterine fibroids.  -  The limited noncontrast images of the liver  are normal. The gallbladder is present. There is no CT visualized stone. The pancreas has an unremarkable unenhanced appearance.     CT Chest With Contrast Diagnostic     Result Date: 7/23/2024  Interval decrease in size of the left upper lobe airspace disease/mass compared to prior, otherwise stable exam..    EGD dated 1/23/2024 per Dr. Denny  - Normal oropharynx.  - Z-line regular, 34 cm from the incisors.  - 1-2 cm hiatal hernia.  - Non-obstructing and mild Schatzki ring.  - Mild Erythematous mucosa in the posterior wall of the stomach, antrum and prepyloric region of the stomach. Biopsied.  - Two superficial Non-bleeding gastric ulcers with no stigmata of bleeding. Biopsied. Clinically benign  - Normal duodenal bulb, first portion of the duodenum, second portion of the duodenum and third portion of the duodenum. Biopsied.  - Persistent ulcer may indicate ASA related  A.     DUODENUM, BIOPSY:  Small bowel mucosa with no  significant pathologic change  Negative for significantly increased intraepithelial lymphocytes  B.     ANTRUM AND BODY, BIOPSY:  Gastric mucosa with reactive changes  No Helicobacter pylori-like organisms seen  Negative for dysplasia or malignancy  C.     STOMACH, BODY, BIOPSY, ULCER SCAR:  Antral type mucosa with reactive changes  No Helicobacter pylori-like organisms seen  Negative for dysplasia or malignancy      Colonoscopy dated 2/2/2024 per Dr. Denny  - One 2 to 3 mm polyp in the mid transverse colon, removed with a cold biopsy forceps. Resected and retrieved.  - One 3 mm polyp in the proximal sigmoid colon, removed with a cold snare. Resected and retrieved.  - One 4 mm polyp in the distal sigmoid colon, removed with a cold snare. Resected and retrieved.  - Diverticulosis in the sigmoid colon, in the descending colon, in the transverse colon, in the ascending colon and in the cecum.  - The examined portion of the ileum was normal. Biopsied for anemia.  - Medium-sized lipoma in the proximal ascending colon. Biopsied.  A.     TERMINAL ILEUM BIOPSY:  Intestinal mucosa without pathologic alterations  B.     ASCENDING COLON BIOPSY, LIPOMA:  Colonic type mucosa with no significant histopathologic abnormalities  Negative for significant inflammation or atypia  C.     TRANSVERSE COLON POLYP:  Benign mucosal polyp, negative for dysplasia  D.     SIGMOID COLON POLYP, X2:  Benign mucosal polyp, negative for dysplasia     Assessment / Plan      1. Anemia, unspecified type  2. History of lung cancer  3. History of gastric ulcer  She has a history of normocytic anemia off-and-on for the past 5 years or so.  Labs in June 2024 with hemoglobin 10.1.  Patient denies any GI bleeding.  No history of vaginal bleeding or hematuria. She has a history of lung cancer and follows with oncology. CTAP dated 5/2/2024 with uterine fibroids, GI tract unremarkable.  She has followed up with gynecology.  Chest CT dated 7/23/2024 with  decrease in size of left upper lobe airspace, this is monitored by oncology. EGD dated 1/23/2024 with 2 superficial nonbleeding gastric ulcers with no stigmata of bleeding noted, biopsies unremarkable.  Colonoscopy dated 2/2/2024 with benign mucosal polyps removed, otherwise unremarkable.  Terminal ileum biopsies unremarkable.  She had EGD in 2020 with gastric ulcers and erosive distal esophagitis noted.  Persistent ulcer may indicate ASA related, causing anemia.  Continue pantoprazole 40 mg daily.  Consider long-term use of PPI with ASA.  Avoid NSAIDs.  Labs  Discussed small bowel PillCam if continues to have iron deficiency anemia.  She is agreeable.    - CBC (No Diff); Future  - Comprehensive Metabolic Panel; Future  - Vitamin B12; Future  - Iron Profile; Future    4. Elevated alkaline phosphatase level  5. Class 2 severe obesity due to excess calories with serious comorbidity and body mass index (BMI) of 38.0 to 38.9 in adult  BMI 38.86  She has a history of mild elevation of alkaline phosphatase off-and-on for the past few years.  Normal AST, ALT and total bilirubin. She has a history of lung cancer and is followed by oncology.  PET scan dated 12/4/2023 with GI tract unremarkable, no abnormality of liver noted.  CTAP dated 5/2/2024 with liver unremarkable.  Recommend low-fat diet, exercise and weight reduction.  Further evaluation if GGT elevated.    - Gamma GT; Future    6. Encounter for screening for malignant neoplasm of colon  7. Family history of colon cancer  Colonoscopy dated 2/2/2024 with 3 small polyps removed, biopsies with benign mucosal polyp, no dysplasia.  Her brother possibly had colon cancer diagnosed in his 50s.  Colonoscopy for high risk screening in 5 years, February 2029.    Patient Instructions   Antireflux measures: Avoid fried, fatty foods, alcohol, chocolate, coffee, tea,  soft drinks, all carbonated beverages (including sparkling water), peppermint and spearmint, spicy foods, tomatoes  and tomato based foods, onions, peppers, and garlic.   Other antireflux measures include weight reduction if overweight, avoiding tight clothing around the abdomen, elevating the head of the bed 6 inches with blocks under the head board, and don't drink or eat before going to bed and avoid lying down immediately after meals.  Continue to avoid vaping/smoking.  Pantoprazole 40 mg 1 by mouth in the am 30 minutes before breakfast.  Avoid NSAIDs, including Ibuprofen, Motrin, Advil, Aleve, Naprosyn, etc.   Tylenol products are ok to take.   High fiber, low fat diet with liberal water intake.   Advised to increase activity.   Advised to lose 20 pounds in the next 6-12 months.  Labs  Colonoscopy for high risk screening in 5 years, February 2029.  Follow up: 6 months    Jonathan Brito, APRN  8/8/2024    Please note that portions of this note were completed with a voice recognition program.

## 2024-08-09 LAB
GGT SERPL-CCNC: 27 U/L (ref 5–36)
VIT B12 BLD-MCNC: 513 PG/ML (ref 211–946)

## 2024-08-12 DIAGNOSIS — D50.0 IRON DEFICIENCY ANEMIA DUE TO CHRONIC BLOOD LOSS: Primary | ICD-10-CM

## 2024-08-20 ENCOUNTER — TELEPHONE (OUTPATIENT)
Dept: ONCOLOGY | Facility: CLINIC | Age: 69
End: 2024-08-20
Payer: MEDICARE

## 2024-08-20 DIAGNOSIS — R79.89 ELEVATED SERUM CREATININE: Primary | ICD-10-CM

## 2024-08-20 NOTE — PROGRESS NOTES
Discussed with patient that creatinine was elevated at 1.91.  I asked her to follow-up with primary care.  We also plan to check repeat creatinine in September while she is trying to see primary care.  I will call her with results.

## 2024-08-20 NOTE — TELEPHONE ENCOUNTER
Caller: Lizbeth Johns    Relationship: Self    Best call back number: [phone]    What is the best time to reach you: ASAP    Who are you requesting to speak with (clinical staff, provider,  specific staff member): CLINICAL     What was the call regarding: PATIENT WOULD LIKE TO HOW TO GO ABOUT GETTING A SMALL/TINY OXYGEN TANK THAT SHE CAN TAKE WITH HER OUT OF HOUSE.    CALL TO ADVISE

## 2024-08-23 NOTE — TELEPHONE ENCOUNTER
RN called patient back to advise that she go to her PCP for an oxygen test. Patient states that she already did that and they have gotten her an oxygen tank. Nothing more needed at this time.

## 2024-08-30 ENCOUNTER — TELEPHONE (OUTPATIENT)
Dept: GASTROENTEROLOGY | Facility: CLINIC | Age: 69
End: 2024-08-30
Payer: MEDICARE

## 2024-08-30 NOTE — TELEPHONE ENCOUNTER
----- Message from Cait TUBBS sent at 8/27/2024 11:06 AM EDT -----  Regarding: PILL CAM  Pill Cam - Approved - start date 09/03/2024 to 11/01/2024

## 2024-09-17 ENCOUNTER — LAB (OUTPATIENT)
Dept: LAB | Facility: HOSPITAL | Age: 69
End: 2024-09-17
Payer: MEDICARE

## 2024-09-17 DIAGNOSIS — R79.89 ELEVATED SERUM CREATININE: ICD-10-CM

## 2024-09-17 LAB
ALBUMIN SERPL-MCNC: 4.2 G/DL (ref 3.5–5.2)
ALBUMIN/GLOB SERPL: 1.2 G/DL
ALP SERPL-CCNC: 167 U/L (ref 39–117)
ALT SERPL W P-5'-P-CCNC: 16 U/L (ref 1–33)
ANION GAP SERPL CALCULATED.3IONS-SCNC: 11.7 MMOL/L (ref 5–15)
AST SERPL-CCNC: 22 U/L (ref 1–32)
BILIRUB SERPL-MCNC: 0.3 MG/DL (ref 0–1.2)
BUN SERPL-MCNC: 33 MG/DL (ref 8–23)
BUN/CREAT SERPL: 26.8 (ref 7–25)
CALCIUM SPEC-SCNC: 9.9 MG/DL (ref 8.6–10.5)
CHLORIDE SERPL-SCNC: 102 MMOL/L (ref 98–107)
CO2 SERPL-SCNC: 27.3 MMOL/L (ref 22–29)
CREAT SERPL-MCNC: 1.23 MG/DL (ref 0.57–1)
EGFRCR SERPLBLD CKD-EPI 2021: 47.4 ML/MIN/1.73
GLOBULIN UR ELPH-MCNC: 3.5 GM/DL
GLUCOSE SERPL-MCNC: 109 MG/DL (ref 65–99)
POTASSIUM SERPL-SCNC: 3.8 MMOL/L (ref 3.5–5.2)
PROT SERPL-MCNC: 7.7 G/DL (ref 6–8.5)
SODIUM SERPL-SCNC: 141 MMOL/L (ref 136–145)

## 2024-09-17 PROCEDURE — 80053 COMPREHEN METABOLIC PANEL: CPT

## 2024-09-17 PROCEDURE — 36415 COLL VENOUS BLD VENIPUNCTURE: CPT

## 2024-10-07 ENCOUNTER — PROCEDURE VISIT (OUTPATIENT)
Dept: GASTROENTEROLOGY | Facility: CLINIC | Age: 69
End: 2024-10-07
Payer: MEDICARE

## 2024-10-07 VITALS — HEART RATE: 68 BPM | DIASTOLIC BLOOD PRESSURE: 80 MMHG | OXYGEN SATURATION: 98 % | SYSTOLIC BLOOD PRESSURE: 142 MMHG

## 2024-10-07 DIAGNOSIS — D50.0 IRON DEFICIENCY ANEMIA DUE TO CHRONIC BLOOD LOSS: Primary | ICD-10-CM

## 2024-10-07 NOTE — PROGRESS NOTES
Patient presented to office for Pill Cam Endoscopy. Received consent. Discussed risks and benefits. Patient hooked to sensor belt monitor that was paired to capsule. Patient swallowed capsule without difficulty at 0817. Patient returned to office at 1600 to return equipment. Patient did not have any complaints of abdominal pain, nausea or vomiting. Pictures downloaded for review by Dr. Denny.

## 2024-10-14 ENCOUNTER — TELEPHONE (OUTPATIENT)
Dept: GASTROENTEROLOGY | Facility: CLINIC | Age: 69
End: 2024-10-14
Payer: MEDICARE

## 2024-10-14 NOTE — TELEPHONE ENCOUNTER
----- Message from Usha ARCE sent at 10/11/2024  2:50 PM EDT -----    ----- Message -----  From: Lana Denny MD  Sent: 10/11/2024   2:44 PM EDT  To: e [facility]    Let her know that her PillCam study showed a small lesion in the small bowel and that look like a lipoma cluster of fat cells.  Otherwise no major abnormality identified.  Will discuss more when she comes back for a follow-up

## 2024-12-02 ENCOUNTER — TELEPHONE (OUTPATIENT)
Dept: ONCOLOGY | Facility: CLINIC | Age: 69
End: 2024-12-02
Payer: MEDICARE

## 2024-12-02 NOTE — TELEPHONE ENCOUNTER
Caller: Lizbeth Johns    Relationship: Self    Best call back number: [phone]      Who are you requesting to speak with (clinical staff, provider,  specific staff member): CLINICAL      What was the call regarding: PT ASKING IF THERE SHOULD BE LABS DRAWN PRIOR TO HER 12/6 APPT.    PLEASE CALL PT TO ADVISE

## 2024-12-03 DIAGNOSIS — C34.91 PRIMARY ADENOCARCINOMA OF RIGHT LUNG: Primary | ICD-10-CM

## 2024-12-03 NOTE — TELEPHONE ENCOUNTER
Called patient back per MARGARET Parnell to tell her that she should get labs prior to the Dec. 6 appt.  I had to leave voicemail as patient did not answer.

## 2024-12-04 ENCOUNTER — LAB (OUTPATIENT)
Dept: LAB | Facility: HOSPITAL | Age: 69
End: 2024-12-04
Payer: MEDICARE

## 2024-12-04 DIAGNOSIS — C34.91 PRIMARY ADENOCARCINOMA OF RIGHT LUNG: ICD-10-CM

## 2024-12-04 LAB
ALBUMIN SERPL-MCNC: 4.2 G/DL (ref 3.5–5.2)
ALBUMIN/GLOB SERPL: 1.2 G/DL
ALP SERPL-CCNC: 178 U/L (ref 39–117)
ALT SERPL W P-5'-P-CCNC: 13 U/L (ref 1–33)
ANION GAP SERPL CALCULATED.3IONS-SCNC: 13.7 MMOL/L (ref 5–15)
AST SERPL-CCNC: 18 U/L (ref 1–32)
BASOPHILS # BLD AUTO: 0.03 10*3/MM3 (ref 0–0.2)
BASOPHILS NFR BLD AUTO: 0.5 % (ref 0–1.5)
BILIRUB SERPL-MCNC: 0.2 MG/DL (ref 0–1.2)
BUN SERPL-MCNC: 29 MG/DL (ref 8–23)
BUN/CREAT SERPL: 22.7 (ref 7–25)
CALCIUM SPEC-SCNC: 9.5 MG/DL (ref 8.6–10.5)
CHLORIDE SERPL-SCNC: 101 MMOL/L (ref 98–107)
CO2 SERPL-SCNC: 26.3 MMOL/L (ref 22–29)
CREAT SERPL-MCNC: 1.28 MG/DL (ref 0.57–1)
DEPRECATED RDW RBC AUTO: 50 FL (ref 37–54)
EGFRCR SERPLBLD CKD-EPI 2021: 45.2 ML/MIN/1.73
EOSINOPHIL # BLD AUTO: 0.18 10*3/MM3 (ref 0–0.4)
EOSINOPHIL NFR BLD AUTO: 3.1 % (ref 0.3–6.2)
ERYTHROCYTE [DISTWIDTH] IN BLOOD BY AUTOMATED COUNT: 15.9 % (ref 12.3–15.4)
GLOBULIN UR ELPH-MCNC: 3.6 GM/DL
GLUCOSE SERPL-MCNC: 101 MG/DL (ref 65–99)
HCT VFR BLD AUTO: 33.9 % (ref 34–46.6)
HGB BLD-MCNC: 10.9 G/DL (ref 12–15.9)
IMM GRANULOCYTES # BLD AUTO: 0.01 10*3/MM3 (ref 0–0.05)
IMM GRANULOCYTES NFR BLD AUTO: 0.2 % (ref 0–0.5)
LYMPHOCYTES # BLD AUTO: 1.36 10*3/MM3 (ref 0.7–3.1)
LYMPHOCYTES NFR BLD AUTO: 23.2 % (ref 19.6–45.3)
MCH RBC QN AUTO: 27.5 PG (ref 26.6–33)
MCHC RBC AUTO-ENTMCNC: 32.2 G/DL (ref 31.5–35.7)
MCV RBC AUTO: 85.4 FL (ref 79–97)
MONOCYTES # BLD AUTO: 0.54 10*3/MM3 (ref 0.1–0.9)
MONOCYTES NFR BLD AUTO: 9.2 % (ref 5–12)
NEUTROPHILS NFR BLD AUTO: 3.74 10*3/MM3 (ref 1.7–7)
NEUTROPHILS NFR BLD AUTO: 63.8 % (ref 42.7–76)
NRBC BLD AUTO-RTO: 0 /100 WBC (ref 0–0.2)
PLATELET # BLD AUTO: 282 10*3/MM3 (ref 140–450)
PMV BLD AUTO: 9.4 FL (ref 6–12)
POTASSIUM SERPL-SCNC: 4.5 MMOL/L (ref 3.5–5.2)
PROT SERPL-MCNC: 7.8 G/DL (ref 6–8.5)
RBC # BLD AUTO: 3.97 10*6/MM3 (ref 3.77–5.28)
SODIUM SERPL-SCNC: 141 MMOL/L (ref 136–145)
WBC NRBC COR # BLD AUTO: 5.86 10*3/MM3 (ref 3.4–10.8)

## 2024-12-04 PROCEDURE — 80053 COMPREHEN METABOLIC PANEL: CPT

## 2024-12-04 PROCEDURE — 85025 COMPLETE CBC W/AUTO DIFF WBC: CPT

## 2024-12-04 PROCEDURE — 36415 COLL VENOUS BLD VENIPUNCTURE: CPT

## 2024-12-06 ENCOUNTER — OFFICE VISIT (OUTPATIENT)
Dept: ONCOLOGY | Facility: CLINIC | Age: 69
End: 2024-12-06
Payer: MEDICARE

## 2024-12-06 VITALS
RESPIRATION RATE: 16 BRPM | OXYGEN SATURATION: 97 % | WEIGHT: 197.4 LBS | BODY MASS INDEX: 36.33 KG/M2 | HEART RATE: 67 BPM | TEMPERATURE: 97.5 F | HEIGHT: 62 IN | SYSTOLIC BLOOD PRESSURE: 143 MMHG | DIASTOLIC BLOOD PRESSURE: 73 MMHG

## 2024-12-06 DIAGNOSIS — C34.91 PRIMARY ADENOCARCINOMA OF RIGHT LUNG: Primary | ICD-10-CM

## 2024-12-06 DIAGNOSIS — C34.91 PRIMARY ADENOCARCINOMA OF RIGHT LUNG: ICD-10-CM

## 2024-12-06 DIAGNOSIS — R79.89 ELEVATED SERUM CREATININE: ICD-10-CM

## 2024-12-06 PROCEDURE — 1126F AMNT PAIN NOTED NONE PRSNT: CPT | Performed by: NURSE PRACTITIONER

## 2024-12-06 PROCEDURE — 1159F MED LIST DOCD IN RCRD: CPT | Performed by: NURSE PRACTITIONER

## 2024-12-06 PROCEDURE — 3078F DIAST BP <80 MM HG: CPT | Performed by: NURSE PRACTITIONER

## 2024-12-06 PROCEDURE — 1160F RVW MEDS BY RX/DR IN RCRD: CPT | Performed by: NURSE PRACTITIONER

## 2024-12-06 PROCEDURE — 3077F SYST BP >= 140 MM HG: CPT | Performed by: NURSE PRACTITIONER

## 2024-12-06 PROCEDURE — 99214 OFFICE O/P EST MOD 30 MIN: CPT | Performed by: NURSE PRACTITIONER

## 2024-12-06 RX ORDER — PREDNISONE 50 MG/1
TABLET ORAL
Qty: 3 TABLET | Refills: 0 | Status: SHIPPED | OUTPATIENT
Start: 2024-12-06

## 2024-12-06 RX ORDER — LORATADINE 10 MG/1
1 TABLET ORAL DAILY
COMMUNITY
Start: 2024-09-10

## 2024-12-06 NOTE — PROGRESS NOTES
DATE OF VISIT: 12/6/2024    REASON FOR VISIT: Followup for right lung adenocarcinoma     PROBLEM LIST:  1. Right upper lobe adenocarcinoma of the lung T2 aN0 M0 stage Ib:  A.  CT chest done on June 2019 revealed 3 cm right upper lobe lung nodule  B.  Bronchoscopy with biopsy done by Dr. Patel June 13, 2019 was negative  C.  Status post surgical resection with lobectomy and lymph node sampling done by Dr. Barreto August 30, 2019  D.  Final pathology revealed moderate differentiated adenocarcinoma 3.5 cm in size clear surgical margins, no lymphovascular invasion, no pleural invasion, and negative lymph nodes.  2.  COPD  3.  Hypertension  4.  Enlarging left upper lobe lung nodule:  A.  Status post CyberKnife with Dr. Lees completed September 7, 2023    HISTORY OF PRESENT ILLNESS: The patient is a very pleasant 68 y.o. female  with past medical history significant for right upper lobe adenocarcinoma diagnosed August 30, 2019.  Patient was treated with surgical resection.  She did not require adjuvant treatment. The patient is here today for scheduled follow-up visit.    SUBJECTIVE: Hannah is here today by herself.  No new symptoms of shortness of breath or cough.  Over all doing well.  She continues to stay active.  Her scans were unfortunately scheduled at the end of December.  She does have Benadryl and prednisone for that.       Past History:  Medical History: has a past medical history of Acid reflux, Arthritis, Body piercing, Cataract, bilateral, COPD (chronic obstructive pulmonary disease), Disease of thyroid gland, Full dentures, History of radiation therapy (09/27/2023), Hyperlipidemia, Hypertension, Leg cramps, Lung cancer, Lung mass, On home oxygen therapy, SOB (shortness of breath), and Wears glasses.   Surgical History: has a past surgical history that includes Tubal ligation; Colonoscopy (N/A, 02/23/2017); Colonoscopy (N/A, 06/04/2018); Esophagogastroduodenoscopy (06/13/2019); Bronchoscopy (N/A,  "06/13/2019); Colonoscopy (N/A, 06/19/2019); Multiple tooth extractions; ORIF tibia & fibula fractures (Left); Thoracoscopy (Right, 08/30/2019); Sigmoidoscopy (N/A, 01/09/2020); Esophagogastroduodenoscopy (N/A, 01/09/2020); Other surgical history; Lung cancer surgery (Right); Esophagogastroduodenoscopy (N/A, 01/23/2024); and Colonoscopy (N/A, 02/02/2024).   Family History: family history includes Cancer in her mother; Cirrhosis in her brother; Colon cancer in her brother; Liver disease in her brother; Lung cancer in her mother; No Known Problems in her father.   Social History: reports that she quit smoking about 5 years ago. Her smoking use included cigarettes. She started smoking about 55 years ago. She has a 50 pack-year smoking history. She has never used smokeless tobacco. She reports current alcohol use of about 2.0 standard drinks of alcohol per week. She reports that she does not use drugs.    (Not in a hospital admission)     Allergies: Patient has no known allergies.     Review of Systems   Constitutional:  Positive for fatigue.   Respiratory: Negative.     Cardiovascular: Negative.    Psychiatric/Behavioral:  The patient is nervous/anxious.        PHYSICAL EXAMINATION:   /73   Pulse 67   Temp 97.5 °F (36.4 °C)   Resp 16   Ht 157.4 cm (61.97\")   Wt 89.5 kg (197 lb 6.4 oz)   LMP 03/16/2004   SpO2 97%   BMI 36.14 kg/m²    Pain Score    12/06/24 1050   PainSc: 0-No pain          ECOG score: 0            ECOG Performance Status: 0 - Asymptomatic      General Appearance:      alert, cooperative, no apparent distress and appears stated age   Lungs:   Clear to auscultation bilaterally; respirations regular, even, and unlabored bilaterally   Heart:  Regular rate and rhythm, no murmurs appreciated   Abdomen:   Soft, non-tender, and non-distended                 Lab on 12/04/2024   Component Date Value Ref Range Status    Glucose 12/04/2024 101 (H)  65 - 99 mg/dL Final    BUN 12/04/2024 29 (H)  8 - 23 " mg/dL Final    Creatinine 12/04/2024 1.28 (H)  0.57 - 1.00 mg/dL Final    Sodium 12/04/2024 141  136 - 145 mmol/L Final    Potassium 12/04/2024 4.5  3.5 - 5.2 mmol/L Final    Chloride 12/04/2024 101  98 - 107 mmol/L Final    CO2 12/04/2024 26.3  22.0 - 29.0 mmol/L Final    Calcium 12/04/2024 9.5  8.6 - 10.5 mg/dL Final    Total Protein 12/04/2024 7.8  6.0 - 8.5 g/dL Final    Albumin 12/04/2024 4.2  3.5 - 5.2 g/dL Final    ALT (SGPT) 12/04/2024 13  1 - 33 U/L Final    AST (SGOT) 12/04/2024 18  1 - 32 U/L Final    Alkaline Phosphatase 12/04/2024 178 (H)  39 - 117 U/L Final    Total Bilirubin 12/04/2024 0.2  0.0 - 1.2 mg/dL Final    Globulin 12/04/2024 3.6  gm/dL Final    A/G Ratio 12/04/2024 1.2  g/dL Final    BUN/Creatinine Ratio 12/04/2024 22.7  7.0 - 25.0 Final    Anion Gap 12/04/2024 13.7  5.0 - 15.0 mmol/L Final    eGFR 12/04/2024 45.2 (L)  >60.0 mL/min/1.73 Final    WBC 12/04/2024 5.86  3.40 - 10.80 10*3/mm3 Final    RBC 12/04/2024 3.97  3.77 - 5.28 10*6/mm3 Final    Hemoglobin 12/04/2024 10.9 (L)  12.0 - 15.9 g/dL Final    Hematocrit 12/04/2024 33.9 (L)  34.0 - 46.6 % Final    MCV 12/04/2024 85.4  79.0 - 97.0 fL Final    MCH 12/04/2024 27.5  26.6 - 33.0 pg Final    MCHC 12/04/2024 32.2  31.5 - 35.7 g/dL Final    RDW 12/04/2024 15.9 (H)  12.3 - 15.4 % Final    RDW-SD 12/04/2024 50.0  37.0 - 54.0 fl Final    MPV 12/04/2024 9.4  6.0 - 12.0 fL Final    Platelets 12/04/2024 282  140 - 450 10*3/mm3 Final    Neutrophil % 12/04/2024 63.8  42.7 - 76.0 % Final    Lymphocyte % 12/04/2024 23.2  19.6 - 45.3 % Final    Monocyte % 12/04/2024 9.2  5.0 - 12.0 % Final    Eosinophil % 12/04/2024 3.1  0.3 - 6.2 % Final    Basophil % 12/04/2024 0.5  0.0 - 1.5 % Final    Immature Grans % 12/04/2024 0.2  0.0 - 0.5 % Final    Neutrophils, Absolute 12/04/2024 3.74  1.70 - 7.00 10*3/mm3 Final    Lymphocytes, Absolute 12/04/2024 1.36  0.70 - 3.10 10*3/mm3 Final    Monocytes, Absolute 12/04/2024 0.54  0.10 - 0.90 10*3/mm3 Final     Eosinophils, Absolute 12/04/2024 0.18  0.00 - 0.40 10*3/mm3 Final    Basophils, Absolute 12/04/2024 0.03  0.00 - 0.20 10*3/mm3 Final    Immature Grans, Absolute 12/04/2024 0.01  0.00 - 0.05 10*3/mm3 Final    nRBC 12/04/2024 0.0  0.0 - 0.2 /100 WBC Final        No results found.      ASSESSMENT: The patient is a very pleasant 68 y.o. female  with right upper lobe lung adenocarcinoma      PLAN:    1.  Right upper lobe lung adenocarcinoma stage Ib:  A.  No recent CT scan.  This is scheduled for December 26, 2024.  We will attempt to reschedule to an earlier date.  I reviewed her last CT chest from  7/22/2024 that showed interval decrease in the size of the left upper lobe airspace disease/mass compared to prior exam.    B.  We will plan to repeat CT scan in 4 months.  This would be due April 2024.  I will call her with results.  C.  We discussed labs from December 2024 was stable overall.  Will plan to repeat prior to return.  D.  We will send in prednisone and Benadryl prior to next CT scan.      2.  Left upper lobe lung nodules:  A.  Status post CyberKnife radiotherapy with Dr. Lees done September 7, 2023.    3.  Hypertension:  A.  She will continue Norvasc daily and Coreg twice a day.    FOLLOW UP: 4 months with CT scan.    Chandni Odell, APRN  12/6/2024

## 2024-12-10 ENCOUNTER — TRANSCRIBE ORDERS (OUTPATIENT)
Dept: LAB | Facility: HOSPITAL | Age: 69
End: 2024-12-10
Payer: MEDICARE

## 2024-12-10 ENCOUNTER — LAB (OUTPATIENT)
Dept: LAB | Facility: HOSPITAL | Age: 69
End: 2024-12-10
Payer: MEDICARE

## 2024-12-10 DIAGNOSIS — E78.5 HYPERLIPIDEMIA, UNSPECIFIED HYPERLIPIDEMIA TYPE: Primary | ICD-10-CM

## 2024-12-10 DIAGNOSIS — E78.5 HYPERLIPIDEMIA, UNSPECIFIED HYPERLIPIDEMIA TYPE: ICD-10-CM

## 2024-12-10 LAB
ALBUMIN SERPL-MCNC: 4.1 G/DL (ref 3.5–5.2)
ALBUMIN/GLOB SERPL: 1.1 G/DL
ALP SERPL-CCNC: 161 U/L (ref 39–117)
ALT SERPL W P-5'-P-CCNC: 15 U/L (ref 1–33)
ANION GAP SERPL CALCULATED.3IONS-SCNC: 8.8 MMOL/L (ref 5–15)
AST SERPL-CCNC: 22 U/L (ref 1–32)
BILIRUB SERPL-MCNC: 0.3 MG/DL (ref 0–1.2)
BUN SERPL-MCNC: 32 MG/DL (ref 8–23)
BUN/CREAT SERPL: 25.2 (ref 7–25)
CALCIUM SPEC-SCNC: 9.9 MG/DL (ref 8.6–10.5)
CHLORIDE SERPL-SCNC: 103 MMOL/L (ref 98–107)
CO2 SERPL-SCNC: 29.2 MMOL/L (ref 22–29)
CREAT SERPL-MCNC: 1.27 MG/DL (ref 0.57–1)
EGFRCR SERPLBLD CKD-EPI 2021: 45.6 ML/MIN/1.73
GLOBULIN UR ELPH-MCNC: 3.8 GM/DL
GLUCOSE SERPL-MCNC: 87 MG/DL (ref 65–99)
POTASSIUM SERPL-SCNC: 4.2 MMOL/L (ref 3.5–5.2)
PROT SERPL-MCNC: 7.9 G/DL (ref 6–8.5)
SODIUM SERPL-SCNC: 141 MMOL/L (ref 136–145)

## 2024-12-10 PROCEDURE — 80061 LIPID PANEL: CPT | Performed by: FAMILY MEDICINE

## 2024-12-10 PROCEDURE — 36415 COLL VENOUS BLD VENIPUNCTURE: CPT

## 2024-12-10 PROCEDURE — 80053 COMPREHEN METABOLIC PANEL: CPT

## 2024-12-12 ENCOUNTER — LAB REQUISITION (OUTPATIENT)
Dept: LAB | Facility: HOSPITAL | Age: 69
End: 2024-12-12
Payer: MEDICARE

## 2024-12-12 DIAGNOSIS — E78.5 HYPERLIPIDEMIA, UNSPECIFIED: ICD-10-CM

## 2024-12-12 LAB
CHOLEST SERPL-MCNC: 182 MG/DL (ref 0–200)
HDLC SERPL-MCNC: 62 MG/DL (ref 40–60)
LDLC SERPL CALC-MCNC: 103 MG/DL (ref 0–100)
LDLC/HDLC SERPL: 1.64 {RATIO}
TRIGL SERPL-MCNC: 91 MG/DL (ref 0–150)
VLDLC SERPL-MCNC: 17 MG/DL (ref 5–40)

## 2024-12-13 ENCOUNTER — HOSPITAL ENCOUNTER (OUTPATIENT)
Dept: CT IMAGING | Facility: HOSPITAL | Age: 69
Discharge: HOME | End: 2024-12-13
Payer: MEDICARE

## 2024-12-13 DIAGNOSIS — C34.91 PRIMARY ADENOCARCINOMA OF RIGHT LUNG: ICD-10-CM

## 2024-12-13 PROCEDURE — 71260 CT THORAX DX C+: CPT

## 2024-12-13 PROCEDURE — 25510000001 IOPAMIDOL 61 % SOLUTION: Performed by: NURSE PRACTITIONER

## 2024-12-13 RX ORDER — IOPAMIDOL 612 MG/ML
100 INJECTION, SOLUTION INTRAVASCULAR
Status: COMPLETED | OUTPATIENT
Start: 2024-12-13 | End: 2024-12-13

## 2024-12-13 RX ADMIN — IOPAMIDOL 100 ML: 612 INJECTION, SOLUTION INTRAVENOUS at 18:00

## 2024-12-17 ENCOUNTER — TELEPHONE (OUTPATIENT)
Dept: ONCOLOGY | Facility: CLINIC | Age: 69
End: 2024-12-17
Payer: MEDICARE

## 2024-12-17 NOTE — TELEPHONE ENCOUNTER
Caller: Lizbeth Johns    Relationship: Self    Best call back number: [phone]    What is the best time to reach you: ANY    Who are you requesting to speak with (clinical staff, provider,  specific staff member): MAYBE MACHELLE    What was the call regarding: PT STATES SHE HAD A MISSED CALL FROM OUR OFFICE, NO VM LEFT BUT THINKS IT MAY HAVE BEEN MACHELLE WITH CT RESULTS.    Is it okay if the provider responds through Transcriptichart: NO

## 2024-12-18 NOTE — TELEPHONE ENCOUNTER
RN called patient and communicated that her scan is stable and we will keep same f/u in April. Patient v/u and appreciation

## 2024-12-20 DIAGNOSIS — C34.12 MALIGNANT NEOPLASM OF UPPER LOBE OF LEFT LUNG: ICD-10-CM

## 2024-12-20 DIAGNOSIS — C34.91 PRIMARY ADENOCARCINOMA OF RIGHT LUNG: Primary | ICD-10-CM

## 2025-01-23 DIAGNOSIS — Z87.11 HISTORY OF GASTRIC ULCER: Primary | ICD-10-CM

## 2025-01-23 RX ORDER — PANTOPRAZOLE SODIUM 40 MG/1
40 TABLET, DELAYED RELEASE ORAL DAILY
Qty: 90 TABLET | Refills: 0 | Status: SHIPPED | OUTPATIENT
Start: 2025-01-23

## 2025-01-28 ENCOUNTER — OFFICE VISIT (OUTPATIENT)
Dept: RADIATION ONCOLOGY | Facility: HOSPITAL | Age: 70
End: 2025-01-28
Payer: MEDICARE

## 2025-01-28 ENCOUNTER — HOSPITAL ENCOUNTER (OUTPATIENT)
Dept: RADIATION ONCOLOGY | Facility: HOSPITAL | Age: 70
Setting detail: RADIATION/ONCOLOGY SERIES
Discharge: HOME | End: 2025-01-28
Payer: MEDICARE

## 2025-01-28 VITALS
WEIGHT: 197.1 LBS | SYSTOLIC BLOOD PRESSURE: 158 MMHG | OXYGEN SATURATION: 94 % | HEART RATE: 62 BPM | DIASTOLIC BLOOD PRESSURE: 74 MMHG | RESPIRATION RATE: 16 BRPM | BODY MASS INDEX: 36.09 KG/M2 | TEMPERATURE: 97.1 F

## 2025-01-28 DIAGNOSIS — C34.12 MALIGNANT NEOPLASM OF UPPER LOBE OF LEFT LUNG: Primary | ICD-10-CM

## 2025-01-28 PROCEDURE — G0463 HOSPITAL OUTPT CLINIC VISIT: HCPCS

## 2025-02-02 ENCOUNTER — HOSPITAL ENCOUNTER (EMERGENCY)
Facility: HOSPITAL | Age: 70
Discharge: HOME | End: 2025-02-02
Attending: STUDENT IN AN ORGANIZED HEALTH CARE EDUCATION/TRAINING PROGRAM | Admitting: STUDENT IN AN ORGANIZED HEALTH CARE EDUCATION/TRAINING PROGRAM
Payer: MEDICARE

## 2025-02-02 ENCOUNTER — APPOINTMENT (OUTPATIENT)
Dept: GENERAL RADIOLOGY | Facility: HOSPITAL | Age: 70
End: 2025-02-02
Payer: MEDICARE

## 2025-02-02 VITALS
BODY MASS INDEX: 38.28 KG/M2 | OXYGEN SATURATION: 95 % | HEIGHT: 60 IN | SYSTOLIC BLOOD PRESSURE: 137 MMHG | RESPIRATION RATE: 14 BRPM | TEMPERATURE: 97.8 F | WEIGHT: 195 LBS | DIASTOLIC BLOOD PRESSURE: 83 MMHG | HEART RATE: 58 BPM

## 2025-02-02 DIAGNOSIS — M25.561 ACUTE PAIN OF RIGHT KNEE: Primary | ICD-10-CM

## 2025-02-02 PROCEDURE — 25010000002 KETOROLAC TROMETHAMINE PER 15 MG

## 2025-02-02 PROCEDURE — 99283 EMERGENCY DEPT VISIT LOW MDM: CPT

## 2025-02-02 PROCEDURE — 73562 X-RAY EXAM OF KNEE 3: CPT

## 2025-02-02 PROCEDURE — 99282 EMERGENCY DEPT VISIT SF MDM: CPT | Performed by: STUDENT IN AN ORGANIZED HEALTH CARE EDUCATION/TRAINING PROGRAM

## 2025-02-02 PROCEDURE — 96372 THER/PROPH/DIAG INJ SC/IM: CPT

## 2025-02-02 PROCEDURE — 25010000002 DEXAMETHASONE SODIUM PHOSPHATE 10 MG/ML SOLUTION

## 2025-02-02 RX ORDER — MELOXICAM 7.5 MG/1
15 TABLET ORAL DAILY
Qty: 14 TABLET | Refills: 0 | Status: SHIPPED | OUTPATIENT
Start: 2025-02-02 | End: 2025-02-09

## 2025-02-02 RX ORDER — KETOROLAC TROMETHAMINE 30 MG/ML
30 INJECTION, SOLUTION INTRAMUSCULAR; INTRAVENOUS ONCE
Status: COMPLETED | OUTPATIENT
Start: 2025-02-02 | End: 2025-02-02

## 2025-02-02 RX ORDER — DEXAMETHASONE SODIUM PHOSPHATE 10 MG/ML
10 INJECTION, SOLUTION INTRAMUSCULAR; INTRAVENOUS ONCE
Status: COMPLETED | OUTPATIENT
Start: 2025-02-02 | End: 2025-02-02

## 2025-02-02 RX ORDER — PREDNISONE 50 MG/1
50 TABLET ORAL DAILY
Qty: 5 TABLET | Refills: 0 | Status: SHIPPED | OUTPATIENT
Start: 2025-02-02 | End: 2025-02-07

## 2025-02-02 RX ADMIN — DEXAMETHASONE SODIUM PHOSPHATE 10 MG: 10 INJECTION INTRAMUSCULAR; INTRAVENOUS at 15:40

## 2025-02-02 RX ADMIN — KETOROLAC TROMETHAMINE 30 MG: 30 INJECTION, SOLUTION INTRAMUSCULAR; INTRAVENOUS at 15:40

## 2025-02-02 NOTE — DISCHARGE INSTRUCTIONS
Follow up with Orthopaedic Surgery regarding your knee pain.  We did not see any obvious fracture on your Xray, but suspect this pain is related to ARTHRITIS and will need further evaluation.  We will try a course of steroids, anti-inflammatories and encourage close follow up appointment with Orthopaedic Surgery as soon as possible.

## 2025-02-02 NOTE — ED PROVIDER NOTES
EMERGENCY DEPARTMENT ENCOUNTER    Pt Name: Lizbeth Johns  MRN: [id number]  Pt :   1955  Room Number:  02SF/02  Date of encounter:  2025  PCP: Emily Ferreira MD  ED Provider: Kentrell Phelps PA-C    Historian: Patient, nursing      HPI:  Chief Complaint: Right knee pain        Context: Lizbeth Johns is a 69 y.o. female who presents to the ED c/o right knee pain for the past 2 weeks.  Patient states she has been diagnosed with arthritis in her knee but she is over the past 2 weeks had worsening pain in her right knee particularly in the morning but after she gets up out of bed and gets moving throughout the day the pain decreases.  The patient denies any falls or other injuries and denies any numbness in the leg weakness in her lower extremity, calf swelling, calf tenderness, leg discoloration or any other complaint today.      PAST MEDICAL HISTORY  Past Medical History:   Diagnosis Date    Acid reflux     Arthritis     Body piercing     ears    Cataract, bilateral     COPD (chronic obstructive pulmonary disease)     Disease of thyroid gland     Full dentures     Patient advised no adhesives DOS    History of radiation therapy 2023    MIGUELINA lung    Hyperlipidemia     Hypertension     Leg cramps     Lung cancer     bilateral-in remission    Lung mass     right    On home oxygen therapy     2L [place] [facility] and daily prn     SOB (shortness of breath)     xray showed spot on lungs    Wears glasses     Rx glasses         PAST SURGICAL HISTORY  Past Surgical History:   Procedure Laterality Date    BRONCHOSCOPY N/A 2019    Procedure: BRONCHOSCOPY WITH FLUOROSCOPY, BIOPSY, BRUSHINGS, AND WASHINGS;  Surgeon: Gudelia Patel MD;  Location: [facility] OR;  Service: Pulmonary    COLONOSCOPY N/A 2017    Procedure: COLONOSCOPY with hot and cold snare polypectomies,  hot biopsy polypectomies, biopsies, resolution clip placement;  Surgeon: Zackrey Siddiqui MD;  Location: [facility] ENDOSCOPY;  Service:      COLONOSCOPY N/A 06/04/2018    Procedure: COLONOSCOPY WITH HOT SNARE POLYPECTOMY X 7; COLD SNARE POLYPECTOMY X 3; HOT BIOPSY POLYPECTOMY X 20; COLD BIOPSY POLYPECTOMY X 2; THERMAL ABLATION OF COLON POLYPS X 23; CLIP PLACEMENT X 2; BIOPSIES;  Surgeon: Zackery Siddiqui MD;  Location: [facility] ENDOSCOPY;  Service: Gastroenterology    COLONOSCOPY N/A 06/19/2019    Procedure: COLONOSCOPY with cold biopsy polypectomies and cold biopsy;  Surgeon: Zackery Siddiqui MD;  Location: [facility] ENDOSCOPY;  Service: Gastroenterology    COLONOSCOPY N/A 02/02/2024    Procedure: COLONOSCOPY with biopsy and polypectomy;  Surgeon: Lana Denny MD;  Location: [facility] ENDOSCOPY;  Service: Gastroenterology;  Laterality: N/A;    ENDOSCOPY  06/13/2019    ENDOSCOPY N/A 01/09/2020    Procedure: ESOPHAGOGASTRODUODENOSCOPY;  Surgeon: Zackery Siddiqui MD;  Location: [facility] ENDOSCOPY;  Service: Gastroenterology    ENDOSCOPY N/A 01/23/2024    Procedure: ESOPHAGOGASTRODUODENOSCOPY WITH BIOPSY;  Surgeon: Lana Denny MD;  Location: [facility] ENDOSCOPY;  Service: Gastroenterology;  Laterality: N/A;    LUNG CANCER SURGERY Right     MULTIPLE TOOTH EXTRACTIONS      full extraction    ORIF TIBIA/FIBULA FRACTURES Left     OTHER SURGICAL HISTORY      SIGMOIDOSCOPY N/A 01/09/2020    Procedure: FLEXIBLE SIGMOIDOSCOPY; ANOSCOPY;  Surgeon: Zackery Siddiqui MD;  Location: [facility] ENDOSCOPY;  Service: Gastroenterology    THORACOSCOPY Right 08/30/2019    Procedure: BRONCHOSCOPY,  THORACOSCOPY VIDEO ASSISTED with right upper lobe wedge RESECTION , RIGHT UPPER lobectomy with mediastinal lymph node dissection AND INTERCOSTAL CRYOABLATION OF RIGHT CHEST;  Surgeon: Brian Barreto MD;  Location: [facility] OR;  Service: Cardiothoracic    TUBAL ABDOMINAL LIGATION           FAMILY HISTORY  Family History   Problem Relation Age of Onset    Cancer Mother     Lung cancer Mother     No Known Problems Father     Cirrhosis Brother     Liver disease Brother     Colon cancer  Brother         Possibly colon cancer diagnosed in his 50's, patient unsure.    Stomach cancer Neg Hx     Esophageal cancer Neg Hx     Breast cancer Neg Hx          SOCIAL HISTORY  Social History     Socioeconomic History    Marital status:     Number of children: 4   Tobacco Use    Smoking status: Former     Current packs/day: 0.00     Average packs/day: 1 pack/day for 50.0 years (50.0 ttl pk-yrs)     Types: Cigarettes     Start date: 1969     Quit date: 2019     Years since quittin.4    Smokeless tobacco: Never    Tobacco comments:     Pt states that she quit in 2019   Vaping Use    Vaping status: Never Used   Substance and Sexual Activity    Alcohol use: Yes     Alcohol/week: 2.0 standard drinks of alcohol     Types: 2 Glasses of wine per week     Comment: once in a while     Drug use: No    Sexual activity: Not Currently     Birth control/protection: Post-menopausal         ALLERGIES  Patient has no known allergies.        REVIEW OF SYSTEMS  Review of Systems   Constitutional:  Negative for chills and fever.   HENT:  Negative for congestion and sore throat.    Respiratory:  Negative for cough and shortness of breath.    Cardiovascular:  Negative for chest pain.   Gastrointestinal:  Negative for abdominal pain, nausea and vomiting.   Genitourinary:  Negative for dysuria.   Musculoskeletal:  Negative for back pain.        Right knee pain     Skin:  Negative for wound.   Neurological:  Negative for dizziness and headaches.   Psychiatric/Behavioral:  Negative for confusion.    All other systems reviewed and are negative.         All systems reviewed and negative except for those discussed in HPI.       PHYSICAL EXAM    I have reviewed the triage vital signs and nursing notes.    ED Triage Vitals [25 1425]   Temp Heart Rate Resp BP SpO2   97.8 °F (36.6 °C) 58 14 137/83 95 %      Temp src Heart Rate Source Patient Position BP Location FiO2 (%)   Oral Monitor Sitting Left arm --        Physical Exam  Vitals and nursing note reviewed.   Constitutional:       General: She is not in acute distress.     Appearance: She is not ill-appearing, toxic-appearing or diaphoretic.   HENT:      Head: Normocephalic and atraumatic.      Mouth/Throat:      Mouth: Mucous membranes are moist.      Pharynx: Oropharynx is clear.   Eyes:      Extraocular Movements: Extraocular movements intact.   Cardiovascular:      Rate and Rhythm: Normal rate.      Heart sounds: Normal heart sounds.   Pulmonary:      Effort: Pulmonary effort is normal. No respiratory distress.      Breath sounds: Normal breath sounds.   Abdominal:      Tenderness: There is no abdominal tenderness.   Musculoskeletal:      Right knee: No swelling, deformity or bony tenderness. Tenderness present.      Left knee: No swelling, deformity or bony tenderness. No tenderness.   Skin:     General: Skin is warm and dry.      Findings: No rash.   Neurological:      Mental Status: She is alert.             LAB RESULTS  No results found for this or any previous visit (from the past 24 hours).    If labs were ordered, I independently reviewed the results and considered them in treating the patient.        RADIOLOGY  No Radiology Exams Resulted Within Past 24 Hours    I ordered and independently reviewed the above noted radiographic studies.      I viewed images of right knee xray which showed degenerative changes but no acute bony abnormalities per my independent interpretation.    See radiologist's dictation for official interpretation.        PROCEDURES    Procedures    No orders to display       MEDICATIONS GIVEN IN ER    Medications   dexAMETHasone sodium phosphate injection 10 mg (10 mg Intramuscular Given 2/2/25 1540)   ketorolac (TORADOL) injection 30 mg (30 mg Intramuscular Given 2/2/25 1540)         MEDICAL DECISION MAKING, PROGRESS, and CONSULTS    All labs, if obtained, have been independently reviewed by me.  All radiology studies, if obtained,  have been reviewed by me and the radiologist dictating the report.  All EKG's, if obtained, have been independently viewed and interpreted by me/my attending physician.      Discussion below represents my analysis of pertinent findings related to patient's condition, differential diagnosis, treatment plan and final disposition.    Patient is a 69-year-old female presenting to the ER today for evaluation of 2-week history of right knee pain, the pain is worse when she first wakes up in the morning but after getting up and moving around the pain eases off.  No traumatic injury per history.  Vital signs show she is hemodynamically stable today per my independent interpretation.  Patient's right lower extremity is neurovascularly intact easily palpable pulses normal capillary refill there is no calf swelling calf size disparity palpable cords or calf tenderness no discoloration of skin and I have no clinical suspicion for DVT or limb ischemia.  X-rays were ordered and per my independent interpretation prior to radiology overread showed no obvious fracture or some arthritic changes noted.  I suspect arthritis is primary cause of pain in the absence of obvious fracture and will provide patient with a hinged knee brace, prescription for short course steroids and anti-inflammatories and orthopedic surgery follow-up.  ED return precautions were clearly explained and the patient verbalized understanding of and agreement for this plan of care.                       Differential diagnosis:    Differential diagnosis included but was not limited to fracture, sprain, contusion, arthritis      Additional sources:    - Discussed/ obtained information from independent historians: None    - External (non-ED) record review: Previous medical records are    - Chronic or social conditions impacting care: None    Orders placed during this visit:  Orders Placed This Encounter   Procedures    XR Knee 3 View Right    Ambulatory Referral to  Orthopedic Surgery    Obtain & Apply The Following- Lower extremity; Hinged knee brace         Additional orders considered but not ordered: None      ED Course:    Consultants: None                Shared Decision Making:  After my consideration of clinical presentation and any laboratory/radiology studies obtained, I discussed the findings with the patient/patient representative who is in agreement with the treatment plan and the final disposition.   Risks and benefits of discharge and/or observation/admission were discussed.       AS OF 16:12 EST VITALS:    BP - 137/83  HR - 58  TEMP - 97.8 °F (36.6 °C) (Oral)  O2 SATS - 95%                  DIAGNOSIS  Final diagnoses:   Acute pain of right knee         DISPOSITION  Discharge    Please note that portions of this document were completed with voice recognition software.      Kentrell Phelps PA-C  02/02/25 0212

## 2025-02-05 ENCOUNTER — OFFICE VISIT (OUTPATIENT)
Dept: GASTROENTEROLOGY | Facility: CLINIC | Age: 70
End: 2025-02-05
Payer: MEDICARE

## 2025-02-05 VITALS
HEART RATE: 74 BPM | WEIGHT: 202 LBS | OXYGEN SATURATION: 98 % | SYSTOLIC BLOOD PRESSURE: 122 MMHG | DIASTOLIC BLOOD PRESSURE: 84 MMHG | BODY MASS INDEX: 39.45 KG/M2

## 2025-02-05 DIAGNOSIS — D63.8 ANEMIA OF CHRONIC DISEASE: Primary | Chronic | ICD-10-CM

## 2025-02-05 DIAGNOSIS — R74.8 ELEVATED ALKALINE PHOSPHATASE LEVEL: Chronic | ICD-10-CM

## 2025-02-05 DIAGNOSIS — E66.01 CLASS 2 SEVERE OBESITY DUE TO EXCESS CALORIES WITH SERIOUS COMORBIDITY AND BODY MASS INDEX (BMI) OF 38.0 TO 38.9 IN ADULT: Chronic | ICD-10-CM

## 2025-02-05 DIAGNOSIS — Z80.0 FAMILY HISTORY OF COLON CANCER: ICD-10-CM

## 2025-02-05 DIAGNOSIS — Z85.118 HISTORY OF LUNG CANCER: Chronic | ICD-10-CM

## 2025-02-05 DIAGNOSIS — Z12.11 ENCOUNTER FOR SCREENING FOR MALIGNANT NEOPLASM OF COLON: Chronic | ICD-10-CM

## 2025-02-05 DIAGNOSIS — E66.812 CLASS 2 SEVERE OBESITY DUE TO EXCESS CALORIES WITH SERIOUS COMORBIDITY AND BODY MASS INDEX (BMI) OF 38.0 TO 38.9 IN ADULT: Chronic | ICD-10-CM

## 2025-02-05 NOTE — PROGRESS NOTES
Follow Up Note     Date: 2025   Patient Name: Lizbeth Johns  MRN: [id number]  : 1955     Primary Care Provider: Emily Ferreira MD     Chief Complaint   Patient presents with    Anemia     2025  History of Present Illness  The patient is a 69-year-old female who is here for a follow-up on anemia.    She reports no presence of blood in her stool or any instances of black stools. She denies any GI symptoms.     Interval History:  2024  Lizbeth Johns is a 68 y.o. female who is here today for follow up for anemia. Denies any GI bleeding. Continues to stay on PPI daily. Denies any other GI symptoms at this time.      2024  Lizbeth Johns is a 68 y.o. female who is here today for follow up after procedures. She has been taking the pantoprazole 40 mg daily since her scopes. She denies having any GI symptoms at this time. She denies any episodes of GI bleeding.     2024  Her last colonoscopy was in 2019 by Dr. Siddiqui with polyps removed. She had follow up sigmoidoscopy in  by Dr. Siddiqui that was unremarkable. Her brother possibly had colon cancer diagnosed in his 50's. She has a history of lung cancer that is being monitored per patient.      The patient denies recent change in bowel habits. There is no diarrhea or constipation. There is no history of abdominal pain. There is no history of overt GI bleed (hematemesis melena or hematochezia). The patient denies nausea or vomiting. There is no history of reflux. The patient denies dysphagia or odynophagia. There is no history of recent significant weight loss. There is no history of liver disease in the past.     Subjective      Past Medical History:   Diagnosis Date    Acid reflux     Arthritis     Body piercing     ears    Cataract, bilateral     COPD (chronic obstructive pulmonary disease)     Disease of thyroid gland     Full dentures     Patient advised no adhesives DOS    History of radiation therapy 2023     MIGUELINA lung    Hyperlipidemia     Hypertension     Leg cramps     Lung cancer     bilateral-in remission    Lung mass     right    On home oxygen therapy     2L [place] HS and daily prn     SOB (shortness of breath)     xray showed spot on lungs    Wears glasses     Rx glasses     Past Surgical History:   Procedure Laterality Date    BRONCHOSCOPY N/A 06/13/2019    Procedure: BRONCHOSCOPY WITH FLUOROSCOPY, BIOPSY, BRUSHINGS, AND WASHINGS;  Surgeon: Gudelia Patel MD;  Location: [facility] OR;  Service: Pulmonary    COLONOSCOPY N/A 02/23/2017    Procedure: COLONOSCOPY with hot and cold snare polypectomies,  hot biopsy polypectomies, biopsies, resolution clip placement;  Surgeon: Zackery Siddiqui MD;  Location: [facility] ENDOSCOPY;  Service:     COLONOSCOPY N/A 06/04/2018    Procedure: COLONOSCOPY WITH HOT SNARE POLYPECTOMY X 7; COLD SNARE POLYPECTOMY X 3; HOT BIOPSY POLYPECTOMY X 20; COLD BIOPSY POLYPECTOMY X 2; THERMAL ABLATION OF COLON POLYPS X 23; CLIP PLACEMENT X 2; BIOPSIES;  Surgeon: Zackery Siddiqui MD;  Location: [facility] ENDOSCOPY;  Service: Gastroenterology    COLONOSCOPY N/A 06/19/2019    Procedure: COLONOSCOPY with cold biopsy polypectomies and cold biopsy;  Surgeon: Zackery Siddiqui MD;  Location: [facility] ENDOSCOPY;  Service: Gastroenterology    COLONOSCOPY N/A 02/02/2024    Procedure: COLONOSCOPY with biopsy and polypectomy;  Surgeon: Lana Denny MD;  Location: [facility] ENDOSCOPY;  Service: Gastroenterology;  Laterality: N/A;    ENDOSCOPY  06/13/2019    ENDOSCOPY N/A 01/09/2020    Procedure: ESOPHAGOGASTRODUODENOSCOPY;  Surgeon: Zackery Siddiqui MD;  Location: [facility] ENDOSCOPY;  Service: Gastroenterology    ENDOSCOPY N/A 01/23/2024    Procedure: ESOPHAGOGASTRODUODENOSCOPY WITH BIOPSY;  Surgeon: Lana Denny MD;  Location: [facility] ENDOSCOPY;  Service: Gastroenterology;  Laterality: N/A;    LUNG CANCER SURGERY Right     MULTIPLE TOOTH EXTRACTIONS      full extraction    ORIF TIBIA/FIBULA FRACTURES Left      OTHER SURGICAL HISTORY      SIGMOIDOSCOPY N/A 2020    Procedure: FLEXIBLE SIGMOIDOSCOPY; ANOSCOPY;  Surgeon: Zackery Siddiqui MD;  Location: [facility] ENDOSCOPY;  Service: Gastroenterology    THORACOSCOPY Right 2019    Procedure: BRONCHOSCOPY,  THORACOSCOPY VIDEO ASSISTED with right upper lobe wedge RESECTION , RIGHT UPPER lobectomy with mediastinal lymph node dissection AND INTERCOSTAL CRYOABLATION OF RIGHT CHEST;  Surgeon: Brian Barreto MD;  Location: [facility] OR;  Service: Cardiothoracic    TUBAL ABDOMINAL LIGATION       Family History   Problem Relation Age of Onset    Cancer Mother     Lung cancer Mother     No Known Problems Father     Cirrhosis Brother     Liver disease Brother     Colon cancer Brother         Possibly colon cancer diagnosed in his 50's, patient unsure.    Stomach cancer Neg Hx     Esophageal cancer Neg Hx     Breast cancer Neg Hx      Social History     Socioeconomic History    Marital status:     Number of children: 4   Tobacco Use    Smoking status: Former     Current packs/day: 0.00     Average packs/day: 1 pack/day for 50.0 years (50.0 ttl pk-yrs)     Types: Cigarettes     Start date: 1969     Quit date: 2019     Years since quittin.4    Smokeless tobacco: Never    Tobacco comments:     Pt states that she quit in 2019   Vaping Use    Vaping status: Never Used   Substance and Sexual Activity    Alcohol use: Yes     Alcohol/week: 2.0 standard drinks of alcohol     Types: 2 Glasses of wine per week     Comment: once in a while     Drug use: No    Sexual activity: Not Currently     Birth control/protection: Post-menopausal       Current Outpatient Medications:     albuterol sulfate  (90 Base) MCG/ACT inhaler, , Disp: , Rfl:     amLODIPine (NORVASC) 10 MG tablet, amlodipine 10 mg tablet  Take 1 tablet every day by oral route., Disp: , Rfl:     aspirin 81 MG EC tablet, Take 1 tablet by mouth Daily., Disp: , Rfl:     atorvastatin (LIPITOR) 20 MG tablet, Take  1 tablet by mouth Daily., Disp: , Rfl:     carvedilol (COREG) 12.5 MG tablet, Take 1 tablet by mouth 2 (Two) Times a Day., Disp: , Rfl:     hydroCHLOROthiazide 25 MG tablet, Take 1 tablet by mouth Daily., Disp: , Rfl:     loratadine (CLARITIN) 10 MG tablet, Take 1 tablet by mouth Daily., Disp: , Rfl:     meloxicam (MOBIC) 7.5 MG tablet, Take 2 tablets by mouth Daily for 7 days., Disp: 14 tablet, Rfl: 0    pantoprazole (PROTONIX) 40 MG EC tablet, Take 1 tablet by mouth Daily., Disp: 90 tablet, Rfl: 0    predniSONE (DELTASONE) 50 MG tablet, Take 1 tablet by mouth 13 hours, 7 hours, and 1 hour before CT scan., Disp: 3 tablet, Rfl: 0    predniSONE (DELTASONE) 50 MG tablet, Take 1 tablet by mouth Daily for 5 days., Disp: 5 tablet, Rfl: 0    tiotropium bromide-olodaterol (STIOLTO RESPIMAT) 2.5-2.5 MCG/ACT aerosol solution inhaler, Inhale 2 puffs Daily., Disp: 1 inhaler, Rfl: 5  No current facility-administered medications for this visit.    Facility-Administered Medications Ordered in Other Visits:     Chlorhexidine Gluconate Cloth 2 % pads 1 application, 1 application , Topical, Q12H PRN, Candelaria Novak PA-C    No Known Allergies    The following portions of the patient's history were reviewed and updated as appropriate: allergies, current medications, past family history, past medical history, past social history, past surgical history and problem list.  Objective     Physical Exam  Vitals and nursing note reviewed.   Constitutional:       General: She is not in acute distress.     Appearance: Normal appearance. She is well-developed.   HENT:      Head: Normocephalic and atraumatic.      Mouth/Throat:      Mouth: Mucous membranes are not pale, not dry and not cyanotic.   Eyes:      General: Lids are normal.   Neck:      Trachea: Trachea normal.   Cardiovascular:      Rate and Rhythm: Normal rate.   Pulmonary:      Effort: Pulmonary effort is normal. No respiratory distress.      Breath sounds: Normal breath sounds.    Abdominal:      Tenderness: There is no abdominal tenderness.   Skin:     General: Skin is warm and dry.   Neurological:      Mental Status: She is alert and oriented to person, place, and time.   Psychiatric:         Mood and Affect: Mood normal.         Speech: Speech normal.         Behavior: Behavior normal. Behavior is cooperative.       Vitals:    02/05/25 1409   BP: 122/84   Pulse: 74   SpO2: 98%   Weight: 91.6 kg (202 lb)     Body mass index is 39.45 kg/m².     Results Review:   I reviewed the patient's new clinical results.    Lab Requisition on 12/10/2024   Component Date Value Ref Range Status    Total Cholesterol 12/10/2024 182  0 - 200 mg/dL Final    Triglycerides 12/10/2024 91  0 - 150 mg/dL Final    HDL Cholesterol 12/10/2024 62 (H)  40 - 60 mg/dL Final    LDL Cholesterol  12/10/2024 103 (H)  0 - 100 mg/dL Final    VLDL Cholesterol 12/10/2024 17  5 - 40 mg/dL Final    LDL/HDL Ratio 12/10/2024 1.64   Final   Lab on 12/10/2024   Component Date Value Ref Range Status    Glucose 12/10/2024 87  65 - 99 mg/dL Final    BUN 12/10/2024 32 (H)  8 - 23 mg/dL Final    Creatinine 12/10/2024 1.27 (H)  0.57 - 1.00 mg/dL Final    Sodium 12/10/2024 141  136 - 145 mmol/L Final    Potassium 12/10/2024 4.2  3.5 - 5.2 mmol/L Final    Chloride 12/10/2024 103  98 - 107 mmol/L Final    CO2 12/10/2024 29.2 (H)  22.0 - 29.0 mmol/L Final    Calcium 12/10/2024 9.9  8.6 - 10.5 mg/dL Final    Total Protein 12/10/2024 7.9  6.0 - 8.5 g/dL Final    Albumin 12/10/2024 4.1  3.5 - 5.2 g/dL Final    ALT (SGPT) 12/10/2024 15  1 - 33 U/L Final    AST (SGOT) 12/10/2024 22  1 - 32 U/L Final    Alkaline Phosphatase 12/10/2024 161 (H)  39 - 117 U/L Final    Total Bilirubin 12/10/2024 0.3  0.0 - 1.2 mg/dL Final    Globulin 12/10/2024 3.8  gm/dL Final    A/G Ratio 12/10/2024 1.1  g/dL Final    BUN/Creatinine Ratio 12/10/2024 25.2 (H)  7.0 - 25.0 Final    Anion Gap 12/10/2024 8.8  5.0 - 15.0 mmol/L Final    eGFR 12/10/2024 45.6 (L)  >60.0  mL/min/1.73 Final   Lab on 12/04/2024   Component Date Value Ref Range Status    Glucose 12/04/2024 101 (H)  65 - 99 mg/dL Final    BUN 12/04/2024 29 (H)  8 - 23 mg/dL Final    Creatinine 12/04/2024 1.28 (H)  0.57 - 1.00 mg/dL Final    Sodium 12/04/2024 141  136 - 145 mmol/L Final    Potassium 12/04/2024 4.5  3.5 - 5.2 mmol/L Final    Chloride 12/04/2024 101  98 - 107 mmol/L Final    CO2 12/04/2024 26.3  22.0 - 29.0 mmol/L Final    Calcium 12/04/2024 9.5  8.6 - 10.5 mg/dL Final    Total Protein 12/04/2024 7.8  6.0 - 8.5 g/dL Final    Albumin 12/04/2024 4.2  3.5 - 5.2 g/dL Final    ALT (SGPT) 12/04/2024 13  1 - 33 U/L Final    AST (SGOT) 12/04/2024 18  1 - 32 U/L Final    Alkaline Phosphatase 12/04/2024 178 (H)  39 - 117 U/L Final    Total Bilirubin 12/04/2024 0.2  0.0 - 1.2 mg/dL Final    Globulin 12/04/2024 3.6  gm/dL Final    A/G Ratio 12/04/2024 1.2  g/dL Final    BUN/Creatinine Ratio 12/04/2024 22.7  7.0 - 25.0 Final    Anion Gap 12/04/2024 13.7  5.0 - 15.0 mmol/L Final    eGFR 12/04/2024 45.2 (L)  >60.0 mL/min/1.73 Final    WBC 12/04/2024 5.86  3.40 - 10.80 10*3/mm3 Final    RBC 12/04/2024 3.97  3.77 - 5.28 10*6/mm3 Final    Hemoglobin 12/04/2024 10.9 (L)  12.0 - 15.9 g/dL Final    Hematocrit 12/04/2024 33.9 (L)  34.0 - 46.6 % Final    MCV 12/04/2024 85.4  79.0 - 97.0 fL Final    MCH 12/04/2024 27.5  26.6 - 33.0 pg Final    MCHC 12/04/2024 32.2  31.5 - 35.7 g/dL Final    RDW 12/04/2024 15.9 (H)  12.3 - 15.4 % Final    RDW-SD 12/04/2024 50.0  37.0 - 54.0 fl Final    MPV 12/04/2024 9.4  6.0 - 12.0 fL Final    Platelets 12/04/2024 282  140 - 450 10*3/mm3 Final      Comprehensive Metabolic Panel (08/08/2024 16:12)  CBC & Differential (08/08/2024 16:12)  Iron Profile (08/08/2024 16:12)  Vitamin B12 (08/08/2024 16:12)  Gamma GT (08/08/2024 16:12)  Comprehensive Metabolic Panel (09/17/2024 10:34)    NM PET/CT Skull Base to Mid Thigh     Result Date: 12/4/2023  Impression: Decreased size and hypermetabolism of the left  upper lobe pulmonary nodule that has undergone interval SBRT. Decreased size of the additional left upper lobe pulmonary nodule, remaining below the resolution of PET. No other sites of suspicious hypermetabolic activity.      CT Chest With Contrast Diagnostic     Result Date: 3/28/2024  1. Interval change of previously described left upper lobe nodule into triangular-shaped airspace disease. This may represent posttreatment change, but continued follow-up is recommended. 2. Stable faint nodule in the left upper lobe.        CTAP without contrast 5/2/2024  1. Diverticulosis.  2. Bilateral renal cysts.  3. Suspect degenerated uterine fibroids.  -  The limited noncontrast images of the liver  are normal. The gallbladder is present. There is no CT visualized stone. The pancreas has an unremarkable unenhanced appearance.      CT Chest With Contrast Diagnostic     Result Date: 7/23/2024  Interval decrease in size of the left upper lobe airspace disease/mass compared to prior, otherwise stable exam..    CT Chest With Contrast Diagnostic     Result Date: 12/14/2024  Improved left upper lobe airspace disease as described, recommend continued follow-up.  Stable 2 cm left adrenal lesion.      EGD dated 1/23/2024 per Dr. Denny  - Normal oropharynx.  - Z-line regular, 34 cm from the incisors.  - 1-2 cm hiatal hernia.  - Non-obstructing and mild Schatzki ring.  - Mild Erythematous mucosa in the posterior wall of the stomach, antrum and prepyloric region of the stomach. Biopsied.  - Two superficial Non-bleeding gastric ulcers with no stigmata of bleeding. Biopsied. Clinically benign  - Normal duodenal bulb, first portion of the duodenum, second portion of the duodenum and third portion of the duodenum. Biopsied.  - Persistent ulcer may indicate ASA related  A.     DUODENUM, BIOPSY:  Small bowel mucosa with no significant pathologic change  Negative for significantly increased intraepithelial lymphocytes  B.     ANTRUM AND  BODY, BIOPSY:  Gastric mucosa with reactive changes  No Helicobacter pylori-like organisms seen  Negative for dysplasia or malignancy  C.     STOMACH, BODY, BIOPSY, ULCER SCAR:  Antral type mucosa with reactive changes  No Helicobacter pylori-like organisms seen  Negative for dysplasia or malignancy      Colonoscopy dated 2/2/2024 per Dr. Denny  - One 2 to 3 mm polyp in the mid transverse colon, removed with a cold biopsy forceps. Resected and retrieved.  - One 3 mm polyp in the proximal sigmoid colon, removed with a cold snare. Resected and retrieved.  - One 4 mm polyp in the distal sigmoid colon, removed with a cold snare. Resected and retrieved.  - Diverticulosis in the sigmoid colon, in the descending colon, in the transverse colon, in the ascending colon and in the cecum.  - The examined portion of the ileum was normal. Biopsied for anemia.  - Medium-sized lipoma in the proximal ascending colon. Biopsied.  A.     TERMINAL ILEUM BIOPSY:  Intestinal mucosa without pathologic alterations  B.     ASCENDING COLON BIOPSY, LIPOMA:  Colonic type mucosa with no significant histopathologic abnormalities  Negative for significant inflammation or atypia  C.     TRANSVERSE COLON POLYP:  Benign mucosal polyp, negative for dysplasia  D.     SIGMOID COLON POLYP, X2:  Benign mucosal polyp, negative for dysplasia     Capsule Endoscopy     10/7/2024  Polypoid lesion in the ileum? Lipoma versus other benign small bowel lesion suspected. Otherwise unremarkable small bowel PillCam study.    Assessment / Plan      1. Anemia of chronic disease  2. History of lung cancer  Patient denies any GI bleeding. She is followed by oncology for history of lung cancer. She has a long standing history of iron deficiency anemia. She has a history of lung cancer and follows with oncology. CTAP dated 5/2/2024 with uterine fibroids, GI tract unremarkable.  She has followed up with gynecology.  Chest CT dated 7/23/2024 with decrease in size of left  upper lobe airspace, this is monitored by oncology. EGD dated 1/23/2024 with 2 superficial nonbleeding gastric ulcers with no stigmata of bleeding noted, biopsies unremarkable.  Colonoscopy dated 2/2/2024 with benign mucosal polyps removed, otherwise unremarkable.  Terminal ileum biopsies unremarkable.  Small bowel pillcam 10/7/2024 with polypoid lesion/lipoma in ileum, otherwise unremarkable. No GI cause for iron deficiency anemia. Per labs, the patient has a history of chronic kidney disease for the past year, labs 12/10/2024 with creatinine 1.27/GFR 45.6. The patient states she has not been evaluated by nephrology in the past. Iron deficiency anemia likely due to anemia of chronic disease.     Recommend evaluation by nephrology for CKD.  If continues to have anemia, recommend evaluation by hematology to rule out other causes.     3. Class 2 severe obesity due to excess calories with serious comorbidity and body mass index (BMI) of 38.0 to 38.9 in adult  4. Elevated alkaline phosphatase level - GGT normal  BMI 39.45  She has mild elevation of alkaline phosphatase off and on for a few years. No history of elevated ALT, AST or total bilirubin. GGT normal. She has a history of lung cancer and is followed by oncology.  PET scan dated 12/4/2023 with GI tract unremarkable, no abnormality of liver noted.  CTAP dated 5/2/2024 with liver unremarkable.  Recommend low-fat diet, exercise and weight reduction.  No further liver work up indicated at this time.    5. Encounter for screening for malignant neoplasm of colon  6. Family history of colon cancer  Colonoscopy dated 2/2/2024 with 3 small polyps removed, biopsies with benign mucosal polyp, no dysplasia.  Her brother possibly had colon cancer diagnosed in his 50s.  Colonoscopy for high risk screening in 5 years, February 2029.    Patient Instructions   Antireflux measures: Avoid fried, fatty foods, alcohol, chocolate, coffee, tea,  soft drinks, all carbonated beverages  (including sparkling water), peppermint and spearmint, spicy foods, tomatoes and tomato based foods, onions, peppers, and garlic.   Other antireflux measures include weight reduction if overweight, avoiding tight clothing around the abdomen, elevating the head of the bed 6 inches with blocks under the head board, and don't drink or eat before going to bed and avoid lying down immediately after meals.  Continue to avoid vaping/smoking.  Pantoprazole 40 mg 1 by mouth in the am 30 minutes before breakfast.  Avoid NSAIDs, including Ibuprofen, Motrin, Advil, Aleve, Naprosyn, etc.   Tylenol products are ok to take.   High fiber, low fat diet with liberal water intake.   Advised to increase activity.   Advised to lose 20 pounds in the next 6-12 months.  Colonoscopy for high risk screening in 5 years, February 2029.  Follow up: the patient will call back      MARGARET Sanchez  2/5/2025    Please note that portions of this note were completed with a voice recognition program.     Patient or patient representative verbalized consent for the use of Ambient Listening during the visit with  MARGARET Sanchez for chart documentation. 2/5/2025  14:23 EST

## 2025-02-05 NOTE — PATIENT INSTRUCTIONS
Antireflux measures: Avoid fried, fatty foods, alcohol, chocolate, coffee, tea,  soft drinks, all carbonated beverages (including sparkling water), peppermint and spearmint, spicy foods, tomatoes and tomato based foods, onions, peppers, and garlic.   Other antireflux measures include weight reduction if overweight, avoiding tight clothing around the abdomen, elevating the head of the bed 6 inches with blocks under the head board, and don't drink or eat before going to bed and avoid lying down immediately after meals.  Continue to avoid vaping/smoking.  Pantoprazole 40 mg 1 by mouth in the am 30 minutes before breakfast.  Avoid NSAIDs, including Ibuprofen, Motrin, Advil, Aleve, Naprosyn, etc.   Tylenol products are ok to take.   High fiber, low fat diet with liberal water intake.   Advised to increase activity.   Advised to lose 20 pounds in the next 6-12 months.  Colonoscopy for high risk screening in 5 years, February 2029.  Follow up: the patient will call back

## 2025-03-26 ENCOUNTER — TELEPHONE (OUTPATIENT)
Dept: ONCOLOGY | Facility: CLINIC | Age: 70
End: 2025-03-26
Payer: MEDICARE

## 2025-03-26 NOTE — TELEPHONE ENCOUNTER
Caller: Lizbeth Johns    Relationship to patient: Self    Best call back number: [phone]     Chief complaint: PATIENT TO RESCHEDULE  4/2/25 APPT    Type of visit: FU1    Requested date: AFTER CT SCAN

## 2025-04-18 ENCOUNTER — APPOINTMENT (OUTPATIENT)
Facility: HOSPITAL | Age: 70
End: 2025-04-18
Payer: MEDICARE

## 2025-04-18 ENCOUNTER — HOSPITAL ENCOUNTER (OUTPATIENT)
Facility: HOSPITAL | Age: 70
Setting detail: OBSERVATION
Discharge: HOME | End: 2025-04-19
Attending: EMERGENCY MEDICINE | Admitting: STUDENT IN AN ORGANIZED HEALTH CARE EDUCATION/TRAINING PROGRAM
Payer: MEDICARE

## 2025-04-18 DIAGNOSIS — J44.1 COPD EXACERBATION: ICD-10-CM

## 2025-04-18 DIAGNOSIS — N18.31 STAGE 3A CHRONIC KIDNEY DISEASE: ICD-10-CM

## 2025-04-18 DIAGNOSIS — J44.1 CHRONIC OBSTRUCTIVE PULMONARY DISEASE WITH ACUTE EXACERBATION: Primary | ICD-10-CM

## 2025-04-18 DIAGNOSIS — E86.0 DEHYDRATION: ICD-10-CM

## 2025-04-18 LAB
ALBUMIN SERPL-MCNC: 4.3 G/DL (ref 3.5–5.2)
ALBUMIN/GLOB SERPL: 1.2 G/DL
ALP SERPL-CCNC: 177 U/L (ref 39–117)
ALT SERPL W P-5'-P-CCNC: 12 U/L (ref 1–33)
ANION GAP SERPL CALCULATED.3IONS-SCNC: 8.8 MMOL/L (ref 5–15)
AST SERPL-CCNC: 21 U/L (ref 1–32)
B PARAPERT DNA SPEC QL NAA+PROBE: NOT DETECTED
B PERT DNA SPEC QL NAA+PROBE: NOT DETECTED
BASOPHILS # BLD AUTO: 0.02 10*3/MM3 (ref 0–0.2)
BASOPHILS NFR BLD AUTO: 0.3 % (ref 0–1.5)
BILIRUB SERPL-MCNC: 0.2 MG/DL (ref 0–1.2)
BUN SERPL-MCNC: 32 MG/DL (ref 8–23)
BUN/CREAT SERPL: 27.8 (ref 7–25)
C PNEUM DNA NPH QL NAA+NON-PROBE: NOT DETECTED
CALCIUM SPEC-SCNC: 10.3 MG/DL (ref 8.6–10.5)
CHLORIDE SERPL-SCNC: 101 MMOL/L (ref 98–107)
CO2 SERPL-SCNC: 32.2 MMOL/L (ref 22–29)
CREAT SERPL-MCNC: 1.15 MG/DL (ref 0.57–1)
DEPRECATED RDW RBC AUTO: 50.4 FL (ref 37–54)
EGFRCR SERPLBLD CKD-EPI 2021: 51 ML/MIN/1.73
EOSINOPHIL # BLD AUTO: 0.33 10*3/MM3 (ref 0–0.4)
EOSINOPHIL NFR BLD AUTO: 5 % (ref 0.3–6.2)
ERYTHROCYTE [DISTWIDTH] IN BLOOD BY AUTOMATED COUNT: 15.6 % (ref 12.3–15.4)
FLUAV SUBTYP SPEC NAA+PROBE: NOT DETECTED
FLUBV RNA ISLT QL NAA+PROBE: NOT DETECTED
GEN 5 1HR TROPONIN T REFLEX: 15 NG/L
GLOBULIN UR ELPH-MCNC: 3.7 GM/DL
GLUCOSE SERPL-MCNC: 134 MG/DL (ref 65–99)
HADV DNA SPEC NAA+PROBE: NOT DETECTED
HCOV 229E RNA SPEC QL NAA+PROBE: NOT DETECTED
HCOV HKU1 RNA SPEC QL NAA+PROBE: NOT DETECTED
HCOV NL63 RNA SPEC QL NAA+PROBE: NOT DETECTED
HCOV OC43 RNA SPEC QL NAA+PROBE: NOT DETECTED
HCT VFR BLD AUTO: 33.1 % (ref 34–46.6)
HGB BLD-MCNC: 10.3 G/DL (ref 12–15.9)
HMPV RNA NPH QL NAA+NON-PROBE: NOT DETECTED
HOLD SPECIMEN: NORMAL
HPIV1 RNA ISLT QL NAA+PROBE: NOT DETECTED
HPIV2 RNA SPEC QL NAA+PROBE: NOT DETECTED
HPIV3 RNA NPH QL NAA+PROBE: NOT DETECTED
HPIV4 P GENE NPH QL NAA+PROBE: NOT DETECTED
IMM GRANULOCYTES # BLD AUTO: 0.01 10*3/MM3 (ref 0–0.05)
IMM GRANULOCYTES NFR BLD AUTO: 0.2 % (ref 0–0.5)
LYMPHOCYTES # BLD AUTO: 1.41 10*3/MM3 (ref 0.7–3.1)
LYMPHOCYTES NFR BLD AUTO: 21.5 % (ref 19.6–45.3)
M PNEUMO IGG SER IA-ACNC: NOT DETECTED
MAGNESIUM SERPL-MCNC: 1.9 MG/DL (ref 1.6–2.4)
MCH RBC QN AUTO: 27 PG (ref 26.6–33)
MCHC RBC AUTO-ENTMCNC: 31.1 G/DL (ref 31.5–35.7)
MCV RBC AUTO: 86.9 FL (ref 79–97)
MONOCYTES # BLD AUTO: 0.6 10*3/MM3 (ref 0.1–0.9)
MONOCYTES NFR BLD AUTO: 9.2 % (ref 5–12)
NEUTROPHILS NFR BLD AUTO: 4.18 10*3/MM3 (ref 1.7–7)
NEUTROPHILS NFR BLD AUTO: 63.8 % (ref 42.7–76)
NT-PROBNP SERPL-MCNC: 216 PG/ML (ref 0–900)
PLATELET # BLD AUTO: 268 10*3/MM3 (ref 140–450)
PMV BLD AUTO: 9 FL (ref 6–12)
POTASSIUM SERPL-SCNC: 4.5 MMOL/L (ref 3.5–5.2)
PROT SERPL-MCNC: 8 G/DL (ref 6–8.5)
QT INTERVAL: 388 MS
QT INTERVAL: 426 MS
QTC INTERVAL: 421 MS
QTC INTERVAL: 421 MS
RBC # BLD AUTO: 3.81 10*6/MM3 (ref 3.77–5.28)
RHINOVIRUS RNA SPEC NAA+PROBE: NOT DETECTED
RSV RNA NPH QL NAA+NON-PROBE: NOT DETECTED
SARS-COV-2 RNA RESP QL NAA+PROBE: NOT DETECTED
SODIUM SERPL-SCNC: 142 MMOL/L (ref 136–145)
TROPONIN T % DELTA: -17
TROPONIN T NUMERIC DELTA: -3 NG/L
TROPONIN T SERPL HS-MCNC: 18 NG/L
WBC NRBC COR # BLD AUTO: 6.55 10*3/MM3 (ref 3.4–10.8)
WHOLE BLOOD HOLD COAG: NORMAL
WHOLE BLOOD HOLD SPECIMEN: NORMAL

## 2025-04-18 PROCEDURE — G0378 HOSPITAL OBSERVATION PER HR: HCPCS

## 2025-04-18 PROCEDURE — 71045 X-RAY EXAM CHEST 1 VIEW: CPT

## 2025-04-18 PROCEDURE — 94799 UNLISTED PULMONARY SVC/PX: CPT

## 2025-04-18 PROCEDURE — 99291 CRITICAL CARE FIRST HOUR: CPT | Performed by: EMERGENCY MEDICINE

## 2025-04-18 PROCEDURE — 25810000003 SODIUM CHLORIDE 0.9 % SOLUTION 250 ML FLEX CONT: Performed by: EMERGENCY MEDICINE

## 2025-04-18 PROCEDURE — 93005 ELECTROCARDIOGRAM TRACING: CPT | Performed by: EMERGENCY MEDICINE

## 2025-04-18 PROCEDURE — 94640 AIRWAY INHALATION TREATMENT: CPT

## 2025-04-18 PROCEDURE — 25010000002 DEXAMETHASONE PER 1 MG: Performed by: EMERGENCY MEDICINE

## 2025-04-18 PROCEDURE — 93005 ELECTROCARDIOGRAM TRACING: CPT

## 2025-04-18 PROCEDURE — 25010000002 AZITHROMYCIN PER 500 MG: Performed by: EMERGENCY MEDICINE

## 2025-04-18 PROCEDURE — 84484 ASSAY OF TROPONIN QUANT: CPT | Performed by: EMERGENCY MEDICINE

## 2025-04-18 PROCEDURE — 94664 DEMO&/EVAL PT USE INHALER: CPT

## 2025-04-18 PROCEDURE — 99285 EMERGENCY DEPT VISIT HI MDM: CPT

## 2025-04-18 PROCEDURE — 0202U NFCT DS 22 TRGT SARS-COV-2: CPT | Performed by: EMERGENCY MEDICINE

## 2025-04-18 PROCEDURE — 25810000003 SODIUM CHLORIDE 0.9 % SOLUTION: Performed by: EMERGENCY MEDICINE

## 2025-04-18 PROCEDURE — 96365 THER/PROPH/DIAG IV INF INIT: CPT

## 2025-04-18 PROCEDURE — 83880 ASSAY OF NATRIURETIC PEPTIDE: CPT | Performed by: EMERGENCY MEDICINE

## 2025-04-18 PROCEDURE — 96375 TX/PRO/DX INJ NEW DRUG ADDON: CPT

## 2025-04-18 PROCEDURE — 83735 ASSAY OF MAGNESIUM: CPT | Performed by: EMERGENCY MEDICINE

## 2025-04-18 PROCEDURE — 80053 COMPREHEN METABOLIC PANEL: CPT | Performed by: EMERGENCY MEDICINE

## 2025-04-18 PROCEDURE — 85025 COMPLETE CBC W/AUTO DIFF WBC: CPT | Performed by: EMERGENCY MEDICINE

## 2025-04-18 PROCEDURE — 25010000002 METHYLPREDNISOLONE PER 125 MG: Performed by: PHYSICIAN ASSISTANT

## 2025-04-18 RX ORDER — SODIUM CHLORIDE 0.9 % (FLUSH) 0.9 %
10 SYRINGE (ML) INJECTION AS NEEDED
Status: DISCONTINUED | OUTPATIENT
Start: 2025-04-18 | End: 2025-04-19 | Stop reason: HOSPADM

## 2025-04-18 RX ORDER — HYDROCHLOROTHIAZIDE 25 MG/1
25 TABLET ORAL ONCE
Status: COMPLETED | OUTPATIENT
Start: 2025-04-18 | End: 2025-04-18

## 2025-04-18 RX ORDER — IPRATROPIUM BROMIDE AND ALBUTEROL SULFATE 2.5; .5 MG/3ML; MG/3ML
3 SOLUTION RESPIRATORY (INHALATION) ONCE
Status: COMPLETED | OUTPATIENT
Start: 2025-04-18 | End: 2025-04-18

## 2025-04-18 RX ORDER — LORATADINE 10 MG/1
10 TABLET ORAL DAILY
Status: DISCONTINUED | OUTPATIENT
Start: 2025-04-18 | End: 2025-04-19 | Stop reason: HOSPADM

## 2025-04-18 RX ORDER — PANTOPRAZOLE SODIUM 40 MG/1
40 TABLET, DELAYED RELEASE ORAL ONCE
Status: COMPLETED | OUTPATIENT
Start: 2025-04-18 | End: 2025-04-18

## 2025-04-18 RX ORDER — METHOCARBAMOL 750 MG/1
TABLET, FILM COATED ORAL
COMMUNITY
Start: 2025-03-27

## 2025-04-18 RX ORDER — ALBUTEROL SULFATE 0.83 MG/ML
10 SOLUTION RESPIRATORY (INHALATION) CONTINUOUS
Status: DISPENSED | OUTPATIENT
Start: 2025-04-18 | End: 2025-04-18

## 2025-04-18 RX ORDER — BENZONATATE 100 MG/1
100 CAPSULE ORAL EVERY 8 HOURS PRN
Status: DISCONTINUED | OUTPATIENT
Start: 2025-04-18 | End: 2025-04-19 | Stop reason: HOSPADM

## 2025-04-18 RX ORDER — ATORVASTATIN CALCIUM 10 MG/1
20 TABLET, FILM COATED ORAL ONCE
Status: COMPLETED | OUTPATIENT
Start: 2025-04-18 | End: 2025-04-18

## 2025-04-18 RX ORDER — SODIUM CHLORIDE 9 MG/ML
40 INJECTION, SOLUTION INTRAVENOUS AS NEEDED
Status: DISCONTINUED | OUTPATIENT
Start: 2025-04-18 | End: 2025-04-19 | Stop reason: HOSPADM

## 2025-04-18 RX ORDER — IPRATROPIUM BROMIDE AND ALBUTEROL SULFATE 2.5; .5 MG/3ML; MG/3ML
3 SOLUTION RESPIRATORY (INHALATION)
Status: DISCONTINUED | OUTPATIENT
Start: 2025-04-18 | End: 2025-04-18

## 2025-04-18 RX ORDER — CARVEDILOL 12.5 MG/1
12.5 TABLET ORAL ONCE
Status: COMPLETED | OUTPATIENT
Start: 2025-04-18 | End: 2025-04-18

## 2025-04-18 RX ORDER — ACETAMINOPHEN 500 MG
1000 TABLET ORAL EVERY 6 HOURS PRN
COMMUNITY

## 2025-04-18 RX ORDER — IPRATROPIUM BROMIDE AND ALBUTEROL SULFATE 2.5; .5 MG/3ML; MG/3ML
3 SOLUTION RESPIRATORY (INHALATION)
Status: DISCONTINUED | OUTPATIENT
Start: 2025-04-18 | End: 2025-04-19 | Stop reason: HOSPADM

## 2025-04-18 RX ORDER — IPRATROPIUM BROMIDE AND ALBUTEROL SULFATE 2.5; .5 MG/3ML; MG/3ML
3 SOLUTION RESPIRATORY (INHALATION) EVERY 4 HOURS PRN
Status: DISCONTINUED | OUTPATIENT
Start: 2025-04-18 | End: 2025-04-18

## 2025-04-18 RX ORDER — ASPIRIN 81 MG/1
81 TABLET, CHEWABLE ORAL ONCE
Status: COMPLETED | OUTPATIENT
Start: 2025-04-18 | End: 2025-04-18

## 2025-04-18 RX ORDER — METHYLPREDNISOLONE SODIUM SUCCINATE 125 MG/2ML
40 INJECTION INTRAMUSCULAR; INTRAVENOUS ONCE
Status: COMPLETED | OUTPATIENT
Start: 2025-04-18 | End: 2025-04-18

## 2025-04-18 RX ORDER — DEXAMETHASONE SODIUM PHOSPHATE 10 MG/ML
10 INJECTION, SOLUTION INTRA-ARTICULAR; INTRALESIONAL; INTRAMUSCULAR; INTRAVENOUS; SOFT TISSUE ONCE
Status: COMPLETED | OUTPATIENT
Start: 2025-04-18 | End: 2025-04-18

## 2025-04-18 RX ORDER — SODIUM CHLORIDE 0.9 % (FLUSH) 0.9 %
10 SYRINGE (ML) INJECTION EVERY 12 HOURS SCHEDULED
Status: DISCONTINUED | OUTPATIENT
Start: 2025-04-18 | End: 2025-04-19 | Stop reason: HOSPADM

## 2025-04-18 RX ADMIN — BENZONATATE 100 MG: 100 CAPSULE ORAL at 15:17

## 2025-04-18 RX ADMIN — IPRATROPIUM BROMIDE AND ALBUTEROL SULFATE 3 ML: 2.5; .5 SOLUTION RESPIRATORY (INHALATION) at 14:12

## 2025-04-18 RX ADMIN — HYDROCHLOROTHIAZIDE 25 MG: 25 TABLET ORAL at 14:01

## 2025-04-18 RX ADMIN — METHYLPREDNISOLONE SODIUM SUCCINATE 40 MG: 125 INJECTION INTRAMUSCULAR; INTRAVENOUS at 19:08

## 2025-04-18 RX ADMIN — SODIUM CHLORIDE 500 ML: 0.9 INJECTION, SOLUTION INTRAVENOUS at 06:41

## 2025-04-18 RX ADMIN — ATORVASTATIN CALCIUM 20 MG: 10 TABLET, FILM COATED ORAL at 14:01

## 2025-04-18 RX ADMIN — IPRATROPIUM BROMIDE AND ALBUTEROL SULFATE 3 ML: 2.5; .5 SOLUTION RESPIRATORY (INHALATION) at 07:59

## 2025-04-18 RX ADMIN — CARVEDILOL 12.5 MG: 12.5 TABLET, FILM COATED ORAL at 14:01

## 2025-04-18 RX ADMIN — PANTOPRAZOLE SODIUM 40 MG: 40 TABLET, DELAYED RELEASE ORAL at 14:01

## 2025-04-18 RX ADMIN — ALBUTEROL SULFATE 10 MG: 2.5 SOLUTION RESPIRATORY (INHALATION) at 06:08

## 2025-04-18 RX ADMIN — ASPIRIN 81 MG: 81 TABLET, CHEWABLE ORAL at 14:01

## 2025-04-18 RX ADMIN — LORATADINE 10 MG: 10 TABLET ORAL at 14:01

## 2025-04-18 RX ADMIN — IPRATROPIUM BROMIDE AND ALBUTEROL SULFATE 3 ML: 2.5; .5 SOLUTION RESPIRATORY (INHALATION) at 21:53

## 2025-04-18 RX ADMIN — Medication 10 ML: at 08:19

## 2025-04-18 RX ADMIN — IPRATROPIUM BROMIDE AND ALBUTEROL SULFATE 3 ML: 2.5; .5 SOLUTION RESPIRATORY (INHALATION) at 18:33

## 2025-04-18 RX ADMIN — AZITHROMYCIN DIHYDRATE 500 MG: 500 INJECTION, POWDER, LYOPHILIZED, FOR SOLUTION INTRAVENOUS at 06:12

## 2025-04-18 RX ADMIN — DEXAMETHASONE SODIUM PHOSPHATE 10 MG: 10 INJECTION INTRAMUSCULAR; INTRAVENOUS at 06:42

## 2025-04-18 RX ADMIN — IPRATROPIUM BROMIDE AND ALBUTEROL SULFATE 3 ML: 2.5; .5 SOLUTION RESPIRATORY (INHALATION) at 06:01

## 2025-04-18 NOTE — ED NOTES
Lizbeth Johns    Nursing Report ED to Floor:  Mental status: a/o x4  Ambulatory status: unable to assess   Oxygen Therapy:  3L NC   Cardiac Rhythm: NSR   Admitted from: ED  Safety Concerns:  none  Precautions: none  Social Issues: none  ED Room #:  06    ED Nurse Phone [email or web address] [phone] or may call [phone].      HPI:   Chief Complaint   Patient presents with    Shortness of Breath       Past Medical History:  Past Medical History:   Diagnosis Date    Acid reflux     Arthritis     Body piercing     ears    Cataract, bilateral     COPD (chronic obstructive pulmonary disease)     Disease of thyroid gland     Full dentures     Patient advised no adhesives DOS    History of radiation therapy 09/27/2023    MIGUELINA lung    Hyperlipidemia     Hypertension     Leg cramps     Lung cancer     bilateral-in remission    Lung mass     right    On home oxygen therapy     2L NC HS and daily prn     SOB (shortness of breath)     xray showed spot on lungs    Wears glasses     Rx glasses        Past Surgical History:  Past Surgical History:   Procedure Laterality Date    BRONCHOSCOPY N/A 06/13/2019    Procedure: BRONCHOSCOPY WITH FLUOROSCOPY, BIOPSY, BRUSHINGS, AND WASHINGS;  Surgeon: Gudelia Patel MD;  Location: [facility] OR;  Service: Pulmonary    COLONOSCOPY N/A 02/23/2017    Procedure: COLONOSCOPY with hot and cold snare polypectomies,  hot biopsy polypectomies, biopsies, resolution clip placement;  Surgeon: Zackery Siddiqui MD;  Location: [facility] ENDOSCOPY;  Service:     COLONOSCOPY N/A 06/04/2018    Procedure: COLONOSCOPY WITH HOT SNARE POLYPECTOMY X 7; COLD SNARE POLYPECTOMY X 3; HOT BIOPSY POLYPECTOMY X 20; COLD BIOPSY POLYPECTOMY X 2; THERMAL ABLATION OF COLON POLYPS X 23; CLIP PLACEMENT X 2; BIOPSIES;  Surgeon: Zackery Siddiqui MD;  Location: [facility] ENDOSCOPY;  Service: Gastroenterology    COLONOSCOPY N/A 06/19/2019    Procedure: COLONOSCOPY with cold biopsy polypectomies and cold biopsy;  Surgeon: Zackery Siddiqui MD;   Location: [facility] ENDOSCOPY;  Service: Gastroenterology    COLONOSCOPY N/A 02/02/2024    Procedure: COLONOSCOPY with biopsy and polypectomy;  Surgeon: Lana Denny MD;  Location: [facility] ENDOSCOPY;  Service: Gastroenterology;  Laterality: N/A;    ENDOSCOPY  06/13/2019    ENDOSCOPY N/A 01/09/2020    Procedure: ESOPHAGOGASTRODUODENOSCOPY;  Surgeon: Zackery Siddiqui MD;  Location: [facility] ENDOSCOPY;  Service: Gastroenterology    ENDOSCOPY N/A 01/23/2024    Procedure: ESOPHAGOGASTRODUODENOSCOPY WITH BIOPSY;  Surgeon: Lana Denny MD;  Location: [facility] ENDOSCOPY;  Service: Gastroenterology;  Laterality: N/A;    LUNG CANCER SURGERY Right     MULTIPLE TOOTH EXTRACTIONS      full extraction    ORIF TIBIA/FIBULA FRACTURES Left     OTHER SURGICAL HISTORY      SIGMOIDOSCOPY N/A 01/09/2020    Procedure: FLEXIBLE SIGMOIDOSCOPY; ANOSCOPY;  Surgeon: Zackery Siddiqui MD;  Location: [facility] ENDOSCOPY;  Service: Gastroenterology    THORACOSCOPY Right 08/30/2019    Procedure: BRONCHOSCOPY,  THORACOSCOPY VIDEO ASSISTED with right upper lobe wedge RESECTION , RIGHT UPPER lobectomy with mediastinal lymph node dissection AND INTERCOSTAL CRYOABLATION OF RIGHT CHEST;  Surgeon: Brian Barreto MD;  Location: [facility] OR;  Service: Cardiothoracic    TUBAL ABDOMINAL LIGATION          Admitting Doctor:   No admitting provider for patient encounter.    Consulting Provider(s):  Consults       No orders found from 3/20/2025 to 4/19/2025.             Admitting Diagnosis:   The primary encounter diagnosis was Chronic obstructive pulmonary disease with acute exacerbation. A diagnosis of Dehydration was also pertinent to this visit.    Most Recent Vitals:   Vitals:    04/18/25 0630 04/18/25 0631 04/18/25 0700 04/18/25 0730   BP: 128/72  141/78 129/80   Patient Position:       Pulse: 60 61 69 63   Resp:  14     Temp:       TempSrc:       SpO2:  100% 99% 99%   Weight:       Height:           Active LDAs/IV Access:   Lines, Drains & Airways        Active LDAs       Name Placement date Placement time Site Days    Peripheral IV 04/18/25 0557 20 G Anterior;Distal;Right Forearm 04/18/25 0557  Forearm  less than 1                    Labs (abnormal labs have a star):   Labs Reviewed   COMPREHENSIVE METABOLIC PANEL - Abnormal; Notable for the following components:       Result Value    Glucose 134 (*)     BUN 32 (*)     Creatinine 1.15 (*)     CO2 32.2 (*)     Alkaline Phosphatase 177 (*)     BUN/Creatinine Ratio 27.8 (*)     eGFR 51.0 (*)     All other components within normal limits    Narrative:     GFR Categories in Chronic Kidney Disease (CKD)      GFR Category          GFR (mL/min/1.73)    Interpretation  G1                     90 or greater         Normal or high (1)  G2                      60-89                Mild decrease (1)  G3a                   45-59                Mild to moderate decrease  G3b                   30-44                Moderate to severe decrease  G4                    15-29                Severe decrease  G5                    14 or less           Kidney failure          (1)In the absence of evidence of kidney disease, neither GFR category G1 or G2 fulfill the criteria for CKD.    eGFR calculation 2021 CKD-EPI creatinine equation, which does not include race as a factor   TROPONIN - Abnormal; Notable for the following components:    HS Troponin T 18 (*)     All other components within normal limits   CBC WITH AUTO DIFFERENTIAL - Abnormal; Notable for the following components:    Hemoglobin 10.3 (*)     Hematocrit 33.1 (*)     MCHC 31.1 (*)     RDW 15.6 (*)     All other components within normal limits   HIGH SENSITIVITIY TROPONIN T 1HR - Abnormal; Notable for the following components:    HS Troponin T 15 (*)     All other components within normal limits    Narrative:     High Sensitive Troponin T Reference Range:  <14.0 ng/L- Negative Female for AMI  <22.0 ng/L- Negative Male for AMI  >=14 - Abnormal Female indicating possible  myocardial injury.  >=22 - Abnormal Male indicating possible myocardial injury.   Clinicians would have to utilize clinical acumen, EKG, Troponin, and serial changes to determine if it is an Acute Myocardial Infarction or myocardial injury due to an underlying chronic condition.        RESPIRATORY PANEL PCR W/ COVID-19 (SARS-COV-2), NP SWAB IN UTM/VTP, 2 HR TAT - Normal    Narrative:     In the setting of a positive respiratory panel with a viral infection PLUS a negative procalcitonin without other underlying concern for bacterial infection, consider observing off antibiotics or discontinuation of antibiotics and continue supportive care. If the respiratory panel is positive for atypical bacterial infection (Bordetella pertussis, Chlamydophila pneumoniae, or Mycoplasma pneumoniae), consider antibiotic de-escalation to target atypical bacterial infection.   BNP (IN-HOUSE) - Normal    Narrative:     This assay is used as an aid in the diagnosis of individuals suspected of having heart failure. It can be used as an aid in the diagnosis of acute decompensated heart failure (ADHF) in patients presenting with signs and symptoms of ADHF to the emergency department (ED). In addition, NT-proBNP of <300 pg/mL indicates ADHF is not likely.    Age Range Result Interpretation  NT-proBNP Concentration (pg/mL:      <50             Positive            >450                   Gray                 300-450                    Negative             <300    50-75           Positive            >900                  Gray                300-900                  Negative            <300      >75             Positive            >1800                  Gray                300-1800                  Negative            <300   MAGNESIUM - Normal   RAINBOW DRAW    Narrative:     The following orders were created for panel order [place] Draw.  Procedure                               Abnormality         Status                     ---------                                -----------         ------                     Green Top (Gel)[[id number]]                                  Final result               Lavender Top[[id number]]                                     Final result               Gold Top - SST[[id number]]                                   Final result               Mart Top[[id number]]                                         Final result               Light Blue Top[[id number]]                                   Final result                 Please view results for these tests on the individual orders.   CBC AND DIFFERENTIAL    Narrative:     The following orders were created for panel order CBC & Differential.  Procedure                               Abnormality         Status                     ---------                               -----------         ------                     CBC Auto Differential[[id number]]        Abnormal            Final result                 Please view results for these tests on the individual orders.   GREEN TOP   LAVENDER TOP   GOLD TOP - SST   GRAY TOP   LIGHT BLUE TOP       Meds Given in ED:   Medications   sodium chloride 0.9 % flush 10 mL (has no administration in time range)   albuterol (PROVENTIL) nebulizer solution 0.083% 2.5 mg/3mL (0 mg Nebulization Stopped 4/18/25 0725)   ipratropium-albuterol (DUO-NEB) nebulizer solution 3 mL (has no administration in time range)   ipratropium-albuterol (DUO-NEB) nebulizer solution 3 mL (3 mL Nebulization Given 4/18/25 0601)   azithromycin (ZITHROMAX) 500 mg in sodium chloride 0.9 % 250 mL IVPB-VTB (0 mg Intravenous Stopped 4/18/25 0715)   dexAMETHasone (DECADRON) injection 10 mg (10 mg Intravenous Given 4/18/25 0642)   sodium chloride 0.9 % bolus 500 mL (500 mL Intravenous New Bag 4/18/25 0641)           Last NIH score:                                                          Dysphagia screening results:        [place] Coma Scale:  No data recorded     CIWA:        Restraint Type:             Isolation Status:  No active isolations

## 2025-04-18 NOTE — FSED PROVIDER NOTE
Subjective  History of Present Illness:    Patient presents to the emergency department with cough, congestion, shortness of breath, body aches and chills for the last several days.  States that she has a history of COPD and is on 2 L of oxygen all the time at home.  Denies any recent hospitalizations.  States that she has had some associated chest tightness.  Denies any vomiting.      Nurses Notes reviewed and agree, including vitals, allergies, social history and prior medical history.     REVIEW OF SYSTEMS: All systems reviewed and not pertinent unless noted.  Review of Systems   Constitutional:  Positive for chills and fever.   Respiratory:  Positive for cough and shortness of breath.    Cardiovascular:  Positive for chest pain.       Past Medical History:   Diagnosis Date    Acid reflux     Arthritis     Body piercing     ears    Cataract, bilateral     COPD (chronic obstructive pulmonary disease)     Disease of thyroid gland     Full dentures     Patient advised no adhesives DOS    History of radiation therapy 09/27/2023    MIGUELINA lung    Hyperlipidemia     Hypertension     Leg cramps     Lung cancer     bilateral-in remission    Lung mass     right    On home oxygen therapy     2L NC HS and daily prn     SOB (shortness of breath)     xray showed spot on lungs    Wears glasses     Rx glasses       Allergies:    Patient has no known allergies.      Past Surgical History:   Procedure Laterality Date    BRONCHOSCOPY N/A 06/13/2019    Procedure: BRONCHOSCOPY WITH FLUOROSCOPY, BIOPSY, BRUSHINGS, AND WASHINGS;  Surgeon: Gudelia Patel MD;  Location: [facility] OR;  Service: Pulmonary    COLONOSCOPY N/A 02/23/2017    Procedure: COLONOSCOPY with hot and cold snare polypectomies,  hot biopsy polypectomies, biopsies, resolution clip placement;  Surgeon: Zackery Siddiqui MD;  Location: [facility] ENDOSCOPY;  Service:     COLONOSCOPY N/A 06/04/2018    Procedure: COLONOSCOPY WITH HOT SNARE POLYPECTOMY X 7; COLD SNARE POLYPECTOMY X  3; HOT BIOPSY POLYPECTOMY X 20; COLD BIOPSY POLYPECTOMY X 2; THERMAL ABLATION OF COLON POLYPS X 23; CLIP PLACEMENT X 2; BIOPSIES;  Surgeon: Zackery Siddiqui MD;  Location: [facility] ENDOSCOPY;  Service: Gastroenterology    COLONOSCOPY N/A 06/19/2019    Procedure: COLONOSCOPY with cold biopsy polypectomies and cold biopsy;  Surgeon: Zackery Siddiqui MD;  Location: [facility] ENDOSCOPY;  Service: Gastroenterology    COLONOSCOPY N/A 02/02/2024    Procedure: COLONOSCOPY with biopsy and polypectomy;  Surgeon: Lana Denny MD;  Location: [facility] ENDOSCOPY;  Service: Gastroenterology;  Laterality: N/A;    ENDOSCOPY  06/13/2019    ENDOSCOPY N/A 01/09/2020    Procedure: ESOPHAGOGASTRODUODENOSCOPY;  Surgeon: Zackery Siddiqui MD;  Location: [facility] ENDOSCOPY;  Service: Gastroenterology    ENDOSCOPY N/A 01/23/2024    Procedure: ESOPHAGOGASTRODUODENOSCOPY WITH BIOPSY;  Surgeon: Lana Denny MD;  Location: [facility] ENDOSCOPY;  Service: Gastroenterology;  Laterality: N/A;    LUNG CANCER SURGERY Right     MULTIPLE TOOTH EXTRACTIONS      full extraction    ORIF TIBIA/FIBULA FRACTURES Left     OTHER SURGICAL HISTORY      SIGMOIDOSCOPY N/A 01/09/2020    Procedure: FLEXIBLE SIGMOIDOSCOPY; ANOSCOPY;  Surgeon: Zackery Siddiqui MD;  Location: [facility] ENDOSCOPY;  Service: Gastroenterology    THORACOSCOPY Right 08/30/2019    Procedure: BRONCHOSCOPY,  THORACOSCOPY VIDEO ASSISTED with right upper lobe wedge RESECTION , RIGHT UPPER lobectomy with mediastinal lymph node dissection AND INTERCOSTAL CRYOABLATION OF RIGHT CHEST;  Surgeon: Brian Barreto MD;  Location: [facility] OR;  Service: Cardiothoracic    TUBAL ABDOMINAL LIGATION           Social History     Socioeconomic History    Marital status:     Number of children: 4   Tobacco Use    Smoking status: Former     Current packs/day: 0.00     Average packs/day: 1 pack/day for 50.0 years (50.0 ttl pk-yrs)     Types: Cigarettes     Start date: 8/30/1969     Quit date: 8/30/2019      "Years since quittin.6    Smokeless tobacco: Never    Tobacco comments:     Pt states that she quit in 2019   Vaping Use    Vaping status: Never Used   Substance and Sexual Activity    Alcohol use: Yes     Alcohol/week: 2.0 standard drinks of alcohol     Types: 2 Glasses of wine per week     Comment: once in a while     Drug use: No    Sexual activity: Not Currently     Birth control/protection: Post-menopausal         Family History   Problem Relation Age of Onset    Cancer Mother     Lung cancer Mother     No Known Problems Father     Cirrhosis Brother     Liver disease Brother     Colon cancer Brother         Possibly colon cancer diagnosed in his 50's, patient unsure.    Stomach cancer Neg Hx     Esophageal cancer Neg Hx     Breast cancer Neg Hx        Objective  Physical Exam:  /80   Pulse 63   Temp 97.9 °F (36.6 °C) (Oral)   Resp 14   Ht 152.4 cm (60\")   Wt 90.7 kg (200 lb)   LMP 2004   SpO2 99%   BMI 39.06 kg/m²      Physical Exam  Vitals and nursing note reviewed.   Constitutional:       General: She is in acute distress.      Appearance: She is well-developed and normal weight. She is ill-appearing. She is not toxic-appearing or diaphoretic.   HENT:      Head: Normocephalic and atraumatic.   Eyes:      Extraocular Movements: Extraocular movements intact.      Pupils: Pupils are equal, round, and reactive to light.   Cardiovascular:      Rate and Rhythm: Normal rate and regular rhythm.      Pulses: Normal pulses.   Pulmonary:      Breath sounds: Decreased breath sounds and wheezing present. No rhonchi or rales.   Abdominal:      General: Bowel sounds are normal.      Palpations: Abdomen is soft.      Tenderness: There is no abdominal tenderness.   Musculoskeletal:         General: Normal range of motion.      Cervical back: Normal range of motion.      Right lower leg: Edema present.      Left lower leg: Edema present.   Skin:     Capillary Refill: Capillary refill takes less than 2 " seconds.   Neurological:      General: No focal deficit present.      Mental Status: She is alert and oriented to person, place, and time.   Psychiatric:         Mood and Affect: Mood normal.         Behavior: Behavior normal.         Critical Care    Performed by: Jethro Beltran DO  Authorized by: Jethro Beltran DO    Critical care provider statement:     Critical care time (minutes):  30    Critical care start time:  4/18/2025 6:00 AM    Critical care end time:  4/18/2025 6:32 AM    Critical care time was exclusive of:  Separately billable procedures and treating other patients and teaching time    Critical care was necessary to treat or prevent imminent or life-threatening deterioration of the following conditions:  Respiratory failure    Critical care was time spent personally by me on the following activities:  Evaluation of patient's response to treatment, examination of patient, obtaining history from patient or surrogate, review of old charts, re-evaluation of patient's condition, pulse oximetry, ordering and review of radiographic studies, ordering and review of laboratory studies and ordering and performing treatments and interventions    I assumed direction of critical care for this patient from another provider in my specialty: no        ED Course:    ED Course as of 04/18/25 0741   Fri Apr 18, 2025   0734 HS Troponin T(!): 15  Slight trend down patient is not having pressure was only experiencing this during severe respiratory challenges she also has mild chronic kidney disease which also elevate the troponin slightly.  Do not suspect cardiac involvement [JN]   0735 Patient still wheezing on respiratory exam subjectively feels much better not hypoxic second DuoNeb ordered.  Discussed [JN]   0735  disposition with the patient and her family.  She does not have home therapy other than oxygen for her COPD she does not have albuterol does not have nebulizer treatments and is not on any inhaled  steroids.  Will admit her to the CDU for ongoing treatment of COPD exacerbation and initiation of home regimen [JN]      ED Course User Index  [JN] Markos Short MD       Lab Results (last 24 hours)       Procedure Component Value Units Date/Time    CBC & Differential [[id number]]  (Abnormal) Collected: 04/18/25 0558    Specimen: Blood Updated: 04/18/25 0605    Narrative:      The following orders were created for panel order CBC & Differential.  Procedure                               Abnormality         Status                     ---------                               -----------         ------                     CBC Auto Differential[[id number]]        Abnormal            Final result                 Please view results for these tests on the individual orders.    Comprehensive Metabolic Panel [[id number]]  (Abnormal) Collected: 04/18/25 0558    Specimen: Blood Updated: 04/18/25 0623     Glucose 134 mg/dL      BUN 32 mg/dL      Creatinine 1.15 mg/dL      Sodium 142 mmol/L      Potassium 4.5 mmol/L      Chloride 101 mmol/L      CO2 32.2 mmol/L      Calcium 10.3 mg/dL      Total Protein 8.0 g/dL      Albumin 4.3 g/dL      ALT (SGPT) 12 U/L      AST (SGOT) 21 U/L      Alkaline Phosphatase 177 U/L      Total Bilirubin 0.2 mg/dL      Globulin 3.7 gm/dL      A/G Ratio 1.2 g/dL      BUN/Creatinine Ratio 27.8     Anion Gap 8.8 mmol/L      eGFR 51.0 mL/min/1.73     Narrative:      GFR Categories in Chronic Kidney Disease (CKD)      GFR Category          GFR (mL/min/1.73)    Interpretation  G1                     90 or greater         Normal or high (1)  G2                      60-89                Mild decrease (1)  G3a                   45-59                Mild to moderate decrease  G3b                   30-44                Moderate to severe decrease  G4                    15-29                Severe decrease  G5                    14 or less           Kidney failure          (1)In the absence of evidence of kidney  disease, neither GFR category G1 or G2 fulfill the criteria for CKD.    eGFR calculation 2021 CKD-EPI creatinine equation, which does not include race as a factor    BNP [[id number]]  (Normal) Collected: 04/18/25 0558    Specimen: Blood Updated: 04/18/25 0620     proBNP 216.0 pg/mL     Narrative:      This assay is used as an aid in the diagnosis of individuals suspected of having heart failure. It can be used as an aid in the diagnosis of acute decompensated heart failure (ADHF) in patients presenting with signs and symptoms of ADHF to the emergency department (ED). In addition, NT-proBNP of <300 pg/mL indicates ADHF is not likely.    Age Range Result Interpretation  NT-proBNP Concentration (pg/mL:      <50             Positive            >450                   Gray                 300-450                    Negative             <300    50-75           Positive            >900                  Gray                300-900                  Negative            <300      >75             Positive            >1800                  Gray                300-1800                  Negative            <300    High Sensitivity Troponin T [[id number]]  (Abnormal) Collected: 04/18/25 0558    Specimen: Blood Updated: 04/18/25 0620     HS Troponin T 18 ng/L     CBC Auto Differential [[id number]]  (Abnormal) Collected: 04/18/25 0558    Specimen: Blood Updated: 04/18/25 0605     WBC 6.55 10*3/mm3      RBC 3.81 10*6/mm3      Hemoglobin 10.3 g/dL      Hematocrit 33.1 %      MCV 86.9 fL      MCH 27.0 pg      MCHC 31.1 g/dL      RDW 15.6 %      RDW-SD 50.4 fl      MPV 9.0 fL      Platelets 268 10*3/mm3      Neutrophil % 63.8 %      Lymphocyte % 21.5 %      Monocyte % 9.2 %      Eosinophil % 5.0 %      Basophil % 0.3 %      Immature Grans % 0.2 %      Neutrophils, Absolute 4.18 10*3/mm3      Lymphocytes, Absolute 1.41 10*3/mm3      Monocytes, Absolute 0.60 10*3/mm3      Eosinophils, Absolute 0.33 10*3/mm3      Basophils, Absolute 0.02  10*3/mm3      Immature Grans, Absolute 0.01 10*3/mm3     Magnesium [[id number]]  (Normal) Collected: 04/18/25 0558    Specimen: Blood Updated: 04/18/25 0623     Magnesium 1.9 mg/dL     Respiratory Panel PCR w/COVID-19(SARS-CoV-2) MARZENA/LOLY/CHRISTIAN/PAD/COR/GIO In-House, NP Swab in UTM/VTM, 2 HR TAT - Swab, Nasopharynx [[id number]]  (Normal) Collected: 04/18/25 0612    Specimen: Swab from Nasopharynx Updated: 04/18/25 0708     ADENOVIRUS, PCR Not Detected     Coronavirus 229E Not Detected     Coronavirus HKU1 Not Detected     Coronavirus NL63 Not Detected     Coronavirus OC43 Not Detected     COVID19 Not Detected     Human Metapneumovirus Not Detected     Human Rhinovirus/Enterovirus Not Detected     Influenza A PCR Not Detected     Influenza B PCR Not Detected     Parainfluenza Virus 1 Not Detected     Parainfluenza Virus 2 Not Detected     Parainfluenza Virus 3 Not Detected     Parainfluenza Virus 4 Not Detected     RSV, PCR Not Detected     Bordetella pertussis pcr Not Detected     Bordetella parapertussis PCR Not Detected     Chlamydophila pneumoniae PCR Not Detected     Mycoplasma pneumo by PCR Not Detected    Narrative:      In the setting of a positive respiratory panel with a viral infection PLUS a negative procalcitonin without other underlying concern for bacterial infection, consider observing off antibiotics or discontinuation of antibiotics and continue supportive care. If the respiratory panel is positive for atypical bacterial infection (Bordetella pertussis, Chlamydophila pneumoniae, or Mycoplasma pneumoniae), consider antibiotic de-escalation to target atypical bacterial infection.    High Sensitivity Troponin T 1Hr [[id number]]  (Abnormal) Collected: 04/18/25 0650    Specimen: Blood Updated: 04/18/25 0709     HS Troponin T 15 ng/L      Troponin T Numeric Delta -3 ng/L      Troponin T % Delta -17    Narrative:      High Sensitive Troponin T Reference Range:  <14.0 ng/L- Negative Female for AMI  <22.0 ng/L-  Negative Male for AMI  >=14 - Abnormal Female indicating possible myocardial injury.  >=22 - Abnormal Male indicating possible myocardial injury.   Clinicians would have to utilize clinical acumen, EKG, Troponin, and serial changes to determine if it is an Acute Myocardial Infarction or myocardial injury due to an underlying chronic condition.                  XR Chest 1 View  Result Date: 4/18/2025  XR CHEST 1 VW Date of Exam: 4/18/2025 5:56 AM EDT Indication: SOA triage protocol Comparison: 12/13/2024. Findings: Stable elevation of the right hemidiaphragm. There are no airspace consolidations. No pleural fluid. No pneumothorax. The pulmonary vasculature appears within normal limits. The cardiac and mediastinal silhouette appear unremarkable. No acute osseous abnormality identified.     Impression: Impression: No acute cardiopulmonary process. Electronically Signed: Clarisse Monsalve MD  4/18/2025 6:09 AM EDT  Workstation ID: [id number]         MDM  Number of Diagnoses or Management Options  Chronic obstructive pulmonary disease with acute exacerbation  Dehydration  Stage 3a chronic kidney disease  Diagnosis management comments: Patient was evaluated and was ultimately given a DuoNeb and started on a continuous albuterol treatment for an hour.  Chest x-ray was negative.  This is likely a viral COPD exacerbation.  Started on Decadron given IV dose of azithromycin.       Amount and/or Complexity of Data Reviewed  Clinical lab tests: reviewed  Tests in the radiology section of CPT®: reviewed  Tests in the medicine section of CPT®: reviewed        Medications   sodium chloride 0.9 % flush 10 mL (has no administration in time range)   albuterol (PROVENTIL) nebulizer solution 0.083% 2.5 mg/3mL (0 mg Nebulization Stopped 4/18/25 0725)   ipratropium-albuterol (DUO-NEB) nebulizer solution 3 mL (has no administration in time range)   ipratropium-albuterol (DUO-NEB) nebulizer solution 3 mL (3 mL Nebulization Given 4/18/25 0601)    azithromycin (ZITHROMAX) 500 mg in sodium chloride 0.9 % 250 mL IVPB-VTB (0 mg Intravenous Stopped 4/18/25 0715)   dexAMETHasone (DECADRON) injection 10 mg (10 mg Intravenous Given 4/18/25 0642)   sodium chloride 0.9 % bolus 500 mL (500 mL Intravenous New Bag 4/18/25 0641)       Data interpreted: Nursing notes reviewed, vital signs reviewed.  Labs independently interpreted by me (CBC, CMP, lipase, UA, troponin, ABG, lactic acid, procalcitonin).  Imaging independently interpreted by me (x-ray, CT scan).  EKG independently interpreted by me.  O2 saturation:    Counseling: Discussed the results above with the patient regarding need for admission or discharge.  Patient understands and agrees plan of care.      -----  ED Disposition       ED Disposition   Decision to Admit    Condition   --    Comment   --             Final diagnoses:   Chronic obstructive pulmonary disease with acute exacerbation   Dehydration   Stage 3a chronic kidney disease     Your Follow-Up Providers    Follow-up information has not been specified.       Contact information for after-discharge care    Follow-up information has not been specified.          Your medication list        CONTINUE taking these medications        Instructions Last Dose Given Next Dose Due   albuterol sulfate  (90 Base) MCG/ACT inhaler  Commonly known as: PROVENTIL HFA;VENTOLIN HFA;PROAIR HFA           amLODIPine 10 MG tablet  Commonly known as: NORVASC      amlodipine 10 mg tablet   Take 1 tablet every day by oral route.       aspirin 81 MG EC tablet      Take 1 tablet by mouth Daily.       atorvastatin 20 MG tablet  Commonly known as: LIPITOR      Take 1 tablet by mouth Daily.       carvedilol 12.5 MG tablet  Commonly known as: COREG      Take 1 tablet by mouth 2 (Two) Times a Day.       hydroCHLOROthiazide 25 MG tablet      Take 1 tablet by mouth Daily.       loratadine 10 MG tablet  Commonly known as: CLARITIN      Take 1 tablet by mouth Daily.        pantoprazole 40 MG EC tablet  Commonly known as: PROTONIX      Take 1 tablet by mouth Daily.       predniSONE 50 MG tablet  Commonly known as: DELTASONE      Take 1 tablet by mouth 13 hours, 7 hours, and 1 hour before CT scan.       tiotropium bromide-olodaterol 2.5-2.5 MCG/ACT aerosol solution inhaler  Commonly known as: STIOLTO RESPIMAT      Inhale 2 puffs Daily.

## 2025-04-18 NOTE — H&P
[facility]     HISTORY AND PHYSICAL    Patient Name: Lizbeth Johns  : 1955  MRN: [id number]  Primary Care Physician:  Emily Ferreira MD  Date of admission: 2025    Subjective   Subjective     Chief Complaint: Shortness of breath    Shortness of Breath  Associated symptoms include rhinorrhea.     Patient is a 69 y.o. female with a significant past medical history of COPD on 2 L nasal cannula, CKD, lung cancer in remission, HTN, hyperlipidemia who presented to the ED on 25 with complaints of shortness of breath, chest tightness, cough, congestion, body aches, chills over the last few days.  Patient wears 2 L nasal cannula at baseline and has not required increased oxygen.  Patient evaluated in ED with workup concerning for COPD exacerbation.  It was deemed by emergency department provider patient would benefit from further management and treatment in the CDU.    Review of Systems   Constitutional:  Positive for chills.        Body aches, fatigue   HENT:  Positive for rhinorrhea.    Respiratory:  Positive for cough, chest tightness and shortness of breath.    All other systems reviewed and are negative.      Objective   Objective     Vitals:   Temp:  [97.8 °F (36.6 °C)-97.9 °F (36.6 °C)] 97.8 °F (36.6 °C)  Heart Rate:  [60-76] 60  Resp:  [14-22] 16  BP: (128-164)/(68-80) 145/68  Flow (L/min) (Oxygen Therapy):  [3] 3      Physical Exam  Vitals and nursing note reviewed.   Constitutional:       General: She is not in acute distress.     Appearance: She is not toxic-appearing.      Comments: Elderly     HENT:      Head: Normocephalic and atraumatic.      Right Ear: Tympanic membrane, ear canal and external ear normal.      Left Ear: Tympanic membrane, ear canal and external ear normal.      Nose: Rhinorrhea present.      Mouth/Throat:      Pharynx: Oropharynx is clear. No oropharyngeal exudate.   Cardiovascular:      Rate and Rhythm: Normal rate and regular rhythm.   Pulmonary:      Effort:  "Pulmonary effort is normal. No respiratory distress.      Breath sounds: No rhonchi or rales.      Comments: Very mild wheezing to bilateral lower lobes.  Skin:     General: Skin is warm and dry.   Neurological:      Mental Status: She is alert. Mental status is at baseline.   Psychiatric:         Mood and Affect: Mood normal.         Behavior: Behavior normal.         Thought Content: Thought content normal.          Procedures    XR Chest 1 View  Result Date: 4/18/2025  XR CHEST 1 VW Date of Exam: 4/18/2025 5:56 AM EDT Indication: SOA triage protocol Comparison: 12/13/2024. Findings: Stable elevation of the right hemidiaphragm. There are no airspace consolidations. No pleural fluid. No pneumothorax. The pulmonary vasculature appears within normal limits. The cardiac and mediastinal silhouette appear unremarkable. No acute osseous abnormality identified.     Impression: Impression: No acute cardiopulmonary process. Electronically Signed: Clarisse Monsalve MD  4/18/2025 6:09 AM EDT  Workstation ID: [id number]             No results found for: \"SITE\", \"ALLENTEST\", \"PHART\", \"YKU6VYU\", \"PO2ART\", \"CZY8FUE\", \"BASEEXCESS\", \"W9CQWHYE\", \"HGBBG\", \"HCTABG\", \"OXYHEMOGLOBI\", \"METHHGBN\", \"CARBOXYHGB\", \"CO2CT\", \"BAROMETRIC\", \"MODALITY\", \"FIO2\"    Results from last 7 days   Lab Units 04/18/25  0650 04/18/25  0558   HSTROP T ng/L 15* 18*       Results from last 7 days   Lab Units 04/18/25  0558   WBC 10*3/mm3 6.55   HEMOGLOBIN g/dL 10.3*   HEMATOCRIT % 33.1*   PLATELETS 10*3/mm3 268       Results from last 7 days   Lab Units 04/18/25  0558   SODIUM mmol/L 142   POTASSIUM mmol/L 4.5   CHLORIDE mmol/L 101   CO2 mmol/L 32.2*   BUN mg/dL 32*   CREATININE mg/dL 1.15*   CALCIUM mg/dL 10.3   BILIRUBIN mg/dL 0.2   ALK PHOS U/L 177*   ALT (SGPT) U/L 12   AST (SGOT) U/L 21   GLUCOSE mg/dL 134*       Lab Results   Component Value Date    CHOL 182 12/10/2024    TRIG 91 12/10/2024    HDL 62 (H) 12/10/2024     (H) 12/10/2024               No " "results found for: \"URINECX\"    Result Review    Result Review:  I have personally reviewed the results from the time of this admission to 4/18/2025 08:30 EDT and agree with these findings:  [x]  Laboratory list / accordion  []  Microbiology  [x]  Radiology  [x]  EKG/Telemetry   []  Cardiology/Vascular   []  Pathology  []  Old records  []  Other:  Most notable findings include: Initial troponin of 18, trending down to 15.  Patient without active chest pain.  Suspect slight elevation in troponin secondary to patient's CKD.  Creatinine 1.15 today which is at patient's baseline.  Chest x-ray was unremarkable.      Assessment & Plan   Assessment / Plan     Brief Patient Summary:  Patient is a 69 y.o. female with a significant past medical history of COPD on 2 L nasal cannula, CKD, lung cancer in remission, HTN, hyperlipidemia who presented to the ED on 4/18/25 with complaints of shortness of breath, chest tightness, cough, congestion, body aches, chills over the last few days.  Patient wears 2 L nasal cannula at baseline and has not required increased oxygen.  Patient evaluated in ED with workup concerning for COPD exacerbation.  Admitted to the CDU for further management.      Active Hospital Problems:  Active Hospital Problems    Diagnosis     **COPD exacerbation      Plan:   COPD Exacerbation  Received Duoneb, 1 hour continuous albuterol neb, 10 mg IV decadron, 500 mg IV Zithromax in ED.  Will withhold further IV steroids at this time given duration of action of decadron. Will continue neb treatments q4-6 h PRN.    Disposition:  I expect patient to be discharged later today 4/18/25 vs tomorrow 4/19/25.    Kathryn Ramon PA-C   "

## 2025-04-19 ENCOUNTER — READMISSION MANAGEMENT (OUTPATIENT)
Dept: CALL CENTER | Facility: HOSPITAL | Age: 70
End: 2025-04-19
Payer: MEDICARE

## 2025-04-19 VITALS
DIASTOLIC BLOOD PRESSURE: 62 MMHG | SYSTOLIC BLOOD PRESSURE: 119 MMHG | RESPIRATION RATE: 16 BRPM | TEMPERATURE: 99.2 F | HEIGHT: 60 IN | WEIGHT: 200 LBS | OXYGEN SATURATION: 99 % | BODY MASS INDEX: 39.27 KG/M2 | HEART RATE: 57 BPM

## 2025-04-19 LAB
ALBUMIN SERPL-MCNC: 4 G/DL (ref 3.5–5.2)
ALBUMIN/GLOB SERPL: 1.1 G/DL
ALP SERPL-CCNC: 158 U/L (ref 39–117)
ALT SERPL W P-5'-P-CCNC: 10 U/L (ref 1–33)
ANION GAP SERPL CALCULATED.3IONS-SCNC: 12 MMOL/L (ref 5–15)
AST SERPL-CCNC: 16 U/L (ref 1–32)
BASOPHILS # BLD AUTO: 0 10*3/MM3 (ref 0–0.2)
BASOPHILS NFR BLD AUTO: 0 % (ref 0–1.5)
BILIRUB SERPL-MCNC: 0.2 MG/DL (ref 0–1.2)
BUN SERPL-MCNC: 33 MG/DL (ref 8–23)
BUN/CREAT SERPL: 29.7 (ref 7–25)
CALCIUM SPEC-SCNC: 10 MG/DL (ref 8.6–10.5)
CHLORIDE SERPL-SCNC: 102 MMOL/L (ref 98–107)
CO2 SERPL-SCNC: 28 MMOL/L (ref 22–29)
CREAT SERPL-MCNC: 1.11 MG/DL (ref 0.57–1)
DEPRECATED RDW RBC AUTO: 48.8 FL (ref 37–54)
EGFRCR SERPLBLD CKD-EPI 2021: 53.3 ML/MIN/1.73
EOSINOPHIL # BLD AUTO: 0 10*3/MM3 (ref 0–0.4)
EOSINOPHIL NFR BLD AUTO: 0 % (ref 0.3–6.2)
ERYTHROCYTE [DISTWIDTH] IN BLOOD BY AUTOMATED COUNT: 15.6 % (ref 12.3–15.4)
GLOBULIN UR ELPH-MCNC: 3.5 GM/DL
GLUCOSE SERPL-MCNC: 145 MG/DL (ref 65–99)
HCT VFR BLD AUTO: 30 % (ref 34–46.6)
HGB BLD-MCNC: 9.6 G/DL (ref 12–15.9)
IMM GRANULOCYTES # BLD AUTO: 0.03 10*3/MM3 (ref 0–0.05)
IMM GRANULOCYTES NFR BLD AUTO: 0.4 % (ref 0–0.5)
LYMPHOCYTES # BLD AUTO: 0.94 10*3/MM3 (ref 0.7–3.1)
LYMPHOCYTES NFR BLD AUTO: 11.9 % (ref 19.6–45.3)
MCH RBC QN AUTO: 27.1 PG (ref 26.6–33)
MCHC RBC AUTO-ENTMCNC: 32 G/DL (ref 31.5–35.7)
MCV RBC AUTO: 84.7 FL (ref 79–97)
MONOCYTES # BLD AUTO: 0.24 10*3/MM3 (ref 0.1–0.9)
MONOCYTES NFR BLD AUTO: 3 % (ref 5–12)
NEUTROPHILS NFR BLD AUTO: 6.68 10*3/MM3 (ref 1.7–7)
NEUTROPHILS NFR BLD AUTO: 84.7 % (ref 42.7–76)
PLATELET # BLD AUTO: 267 10*3/MM3 (ref 140–450)
PMV BLD AUTO: 9.2 FL (ref 6–12)
POTASSIUM SERPL-SCNC: 4.1 MMOL/L (ref 3.5–5.2)
PROT SERPL-MCNC: 7.5 G/DL (ref 6–8.5)
RBC # BLD AUTO: 3.54 10*6/MM3 (ref 3.77–5.28)
SODIUM SERPL-SCNC: 142 MMOL/L (ref 136–145)
WBC NRBC COR # BLD AUTO: 7.89 10*3/MM3 (ref 3.4–10.8)

## 2025-04-19 PROCEDURE — 94799 UNLISTED PULMONARY SVC/PX: CPT

## 2025-04-19 PROCEDURE — 85025 COMPLETE CBC W/AUTO DIFF WBC: CPT | Performed by: PHYSICIAN ASSISTANT

## 2025-04-19 PROCEDURE — G0378 HOSPITAL OBSERVATION PER HR: HCPCS

## 2025-04-19 PROCEDURE — 80053 COMPREHEN METABOLIC PANEL: CPT | Performed by: PHYSICIAN ASSISTANT

## 2025-04-19 RX ORDER — BENZONATATE 100 MG/1
100 CAPSULE ORAL 3 TIMES DAILY PRN
Qty: 15 CAPSULE | Refills: 0 | Status: SHIPPED | OUTPATIENT
Start: 2025-04-19 | End: 2025-04-24

## 2025-04-19 RX ORDER — AZITHROMYCIN 250 MG/1
250 TABLET, FILM COATED ORAL DAILY
Qty: 4 TABLET | Refills: 0 | Status: SHIPPED | OUTPATIENT
Start: 2025-04-19 | End: 2025-04-23

## 2025-04-19 RX ORDER — IPRATROPIUM BROMIDE AND ALBUTEROL SULFATE 2.5; .5 MG/3ML; MG/3ML
3 SOLUTION RESPIRATORY (INHALATION) EVERY 6 HOURS PRN
Qty: 360 ML | Refills: 0 | Status: SHIPPED | OUTPATIENT
Start: 2025-04-19

## 2025-04-19 RX ORDER — PREDNISONE 20 MG/1
20 TABLET ORAL 2 TIMES DAILY
Qty: 10 TABLET | Refills: 0 | Status: SHIPPED | OUTPATIENT
Start: 2025-04-19 | End: 2025-04-24

## 2025-04-19 RX ADMIN — IPRATROPIUM BROMIDE AND ALBUTEROL SULFATE 3 ML: 2.5; .5 SOLUTION RESPIRATORY (INHALATION) at 02:32

## 2025-04-19 RX ADMIN — IPRATROPIUM BROMIDE AND ALBUTEROL SULFATE 3 ML: 2.5; .5 SOLUTION RESPIRATORY (INHALATION) at 07:15

## 2025-04-19 NOTE — PLAN OF CARE
Goal Outcome Evaluation:     Pt alert/oriented x4. VS stable. Tolerating diet. Ambulating in room independently. Orders to discharge home. Pt/son provided discharge instructions. Nebulizer provided and instructions given. Pt/son verbalized/read back understanding. Pt discharged to home.                                          
Goal Outcome Evaluation:  Plan of Care Reviewed With: patient        Progress: improving                                
no

## 2025-04-19 NOTE — DISCHARGE SUMMARY
[facility]     Discharge Note    Patient Name: Lizbeth Johns  : 1955  MRN: [id number]  Primary Care Physician:  Emily Ferreira MD    Date of admission: 2025  Date of Discharge:  2025    Chief Complaint: shortness of breath    Shortness of Breath      Patient is a 69 y.o. female with PMHx of COPD on 2L NC, CKD, lung cancer in remission, HTN, HLD who presented to the ED yesterday with complaints of shortness of breath for the past few weeks.  Patient also complained of chest tightness, cough, congestion, body aches, chills.  No new oxygen requirement.  Patient's labs in the ED showed a troponin of 18 with repeat at 15.  Creatinine was slightly elevated at 1.15.  RVP and CXR were not of concern.  It was deemed by provider in the ED that patient was having a COPD exacerbation and that she would be admitted to the CDU for further treatment of symptoms and trending of labs.    Review of Systems   All other systems reviewed and are negative.    Objective     Vital Signs  Temp:  [97.8 °F (36.6 °C)-99.2 °F (37.3 °C)] 99.2 °F (37.3 °C)  Heart Rate:  [60-83] 83  Resp:  [14-20] 17  BP: (119-145)/(61-80) 119/62    Physical Exam  Constitutional:       General: She is not in acute distress.     Appearance: Normal appearance. She is not ill-appearing.   HENT:      Head: Normocephalic and atraumatic.   Cardiovascular:      Rate and Rhythm: Normal rate and regular rhythm.      Pulses: Normal pulses.   Pulmonary:      Effort: Pulmonary effort is normal. No respiratory distress.      Breath sounds: Normal breath sounds.   Abdominal:      General: Abdomen is flat. Bowel sounds are normal.      Palpations: Abdomen is soft.   Musculoskeletal:         General: No swelling or tenderness. Normal range of motion.      Cervical back: Normal range of motion and neck supple.   Skin:     General: Skin is warm and dry.   Neurological:      General: No focal deficit present.      Mental Status: She is alert and  oriented to person, place, and time.   Psychiatric:         Mood and Affect: Mood normal.         Behavior: Behavior normal.       Discharge Diagnosis:   COPD exacerbation    Condition on Discharge:  stable, improved    CDU Course  Patient had an uneventful stay while in the CDU.  Patient did receive DuoNeb treatments, as well as Solu-Medrol, Decadron, and Tessalon Perles.  Was also given Zithromax in the ED.  Patient states she feels much better.  Advised patient to follow-up with PCP for further evaluation and return to ED if symptoms worsened.  Will start patient on azithromycin, Tessalon Perles, and prednisone.  Patient was agreeable to plan and discharge.    Discharge Medications     Discharge Medications        New Medications        Instructions Start Date   azithromycin 250 MG tablet  Commonly known as: Zithromax   250 mg, Oral, Daily      benzonatate 100 MG capsule  Commonly known as: TESSALON   100 mg, Oral, 3 Times Daily PRN             Changes to Medications        Instructions Start Date   amLODIPine 10 MG tablet  Commonly known as: NORVASC  What changed: See the new instructions.   amlodipine 10 mg tablet   Take 1 tablet every day by oral route.      predniSONE 50 MG tablet  Commonly known as: DELTASONE  What changed: Another medication with the same name was added. Make sure you understand how and when to take each.   Take 1 tablet by mouth 13 hours, 7 hours, and 1 hour before CT scan.      predniSONE 20 MG tablet  Commonly known as: DELTASONE  What changed: You were already taking a medication with the same name, and this prescription was added. Make sure you understand how and when to take each.   20 mg, Oral, 2 Times Daily             Continue These Medications        Instructions Start Date   acetaminophen 500 MG tablet  Commonly known as: TYLENOL   1,000 mg, Every 6 Hours PRN      albuterol sulfate  (90 Base) MCG/ACT inhaler  Commonly known as: PROVENTIL HFA;VENTOLIN HFA;PROAIR HFA        aspirin 81 MG EC tablet   81 mg, Daily      atorvastatin 20 MG tablet  Commonly known as: LIPITOR   20 mg, Daily      carvedilol 12.5 MG tablet  Commonly known as: COREG   1 tablet, 2 Times Daily      hydroCHLOROthiazide 25 MG tablet   1 tablet, Daily      loratadine 10 MG tablet  Commonly known as: CLARITIN   1 tablet, Daily      pantoprazole 40 MG EC tablet  Commonly known as: PROTONIX   40 mg, Oral, Daily      tiotropium bromide-olodaterol 2.5-2.5 MCG/ACT aerosol solution inhaler  Commonly known as: STIOLTO RESPIMAT   2 puffs, Inhalation, Daily             ASK your doctor about these medications        Instructions Start Date   methocarbamol 750 MG tablet  Commonly known as: ROBAXIN                Follow-up Appointments  Future Appointments   Date Time Provider Department Center   4/21/2025 11:30 AM [facility] CT 1 [facility]   5/7/2025  2:45 PM Vinnie José MD E ONC [facility]   2/2/2026 10:30 AM Pari Ortega APRN [facility] None     Total time in CDU: 24 hours  I spent 45 minutes with patient discussing patient plan of care, discharge instructions, and follow-up appointments with PCP.    Darren Leblanc PA-C

## 2025-04-20 NOTE — OUTREACH NOTE
Prep Survey      Flowsheet Row Responses   [facility] facility patient discharged from? [place]   Is LACE score < 7 ? No   Eligibility Readm Mgmt   Discharge diagnosis COPD exacerbation   Does the patient have one of the following disease processes/diagnoses(primary or secondary)? COPD   Does the patient have Home health ordered? No   Is there a DME ordered? No   Prep survey completed? Yes            LUIS MARROQUIN - Registered Nurse

## 2025-04-21 ENCOUNTER — HOSPITAL ENCOUNTER (OUTPATIENT)
Dept: CT IMAGING | Facility: HOSPITAL | Age: 70
Discharge: HOME | End: 2025-04-21
Admitting: NURSE PRACTITIONER
Payer: MEDICARE

## 2025-04-21 DIAGNOSIS — C34.12 MALIGNANT NEOPLASM OF UPPER LOBE OF LEFT LUNG: ICD-10-CM

## 2025-04-21 DIAGNOSIS — C34.91 PRIMARY ADENOCARCINOMA OF RIGHT LUNG: ICD-10-CM

## 2025-04-21 PROCEDURE — 25510000001 IOPAMIDOL 61 % SOLUTION: Performed by: NURSE PRACTITIONER

## 2025-04-21 PROCEDURE — 71260 CT THORAX DX C+: CPT

## 2025-04-21 RX ORDER — IOPAMIDOL 612 MG/ML
100 INJECTION, SOLUTION INTRAVASCULAR
Status: COMPLETED | OUTPATIENT
Start: 2025-04-21 | End: 2025-04-21

## 2025-04-21 RX ADMIN — IOPAMIDOL 100 ML: 612 INJECTION, SOLUTION INTRAVENOUS at 11:33

## 2025-04-24 ENCOUNTER — READMISSION MANAGEMENT (OUTPATIENT)
Dept: CALL CENTER | Facility: HOSPITAL | Age: 70
End: 2025-04-24
Payer: MEDICARE

## 2025-04-24 NOTE — OUTREACH NOTE
COPD/PN Week 1 Survey      Flowsheet Row Responses   [facility] patient discharged from? [place]   Does the patient have one of the following disease processes/diagnoses(primary or secondary)? COPD   Week 1 attempt successful? Yes   Call start time 1209   Call end time 1211   Discharge diagnosis COPD exacerbation   Person spoke with today (if not patient) and relationship Daughter- Tonja   Meds reviewed with patient/caregiver? Yes   Does the patient have all medications ordered at discharge? Yes   Prescription comments START taking:  azithromycin (Zithromax)  benzonatate (TESSALON)  ipratropium-albuterol (DUO-NEB)  CHANGE how you take:  predniSONE (DELTASONE) -- multiple changes   Is the patient taking all medications as directed (includes completed medication regime)? Yes   Medication comments new inhaler prescribed.   Does the patient have a primary care provider?  Yes   Comments regarding PCP Has seen her provider.   Has home health visited the patient within 72 hours of discharge? N/A   Psychosocial issues? No   Did the patient receive a copy of their discharge instructions? Yes   Nursing interventions Reviewed instructions with patient   What is the patient's perception of their health status since discharge? Improving   Is the patient/caregiver able to teach back the hierarchy of who to call/visit for symptoms/problems? PCP, Specialist, Home health nurse, Urgent Care, ED, 911 Yes   Week 1 call completed? Yes   Graduated Yes   Wrap up additional comments daughter reports patient is doing well. No concerns or questions noted.   Call end time 1211            Faby ZARATE - Registered Nurse

## 2025-05-07 ENCOUNTER — OFFICE VISIT (OUTPATIENT)
Dept: ONCOLOGY | Facility: CLINIC | Age: 70
End: 2025-05-07
Payer: MEDICARE

## 2025-05-07 VITALS
WEIGHT: 197.5 LBS | HEART RATE: 62 BPM | HEIGHT: 60 IN | DIASTOLIC BLOOD PRESSURE: 69 MMHG | TEMPERATURE: 96.7 F | SYSTOLIC BLOOD PRESSURE: 131 MMHG | OXYGEN SATURATION: 92 % | BODY MASS INDEX: 38.77 KG/M2

## 2025-05-07 DIAGNOSIS — C34.12 MALIGNANT NEOPLASM OF UPPER LOBE OF LEFT LUNG: Primary | ICD-10-CM

## 2025-05-07 RX ORDER — CARVEDILOL 25 MG/1
TABLET ORAL
COMMUNITY
Start: 2025-05-04

## 2025-05-07 RX ORDER — TIOTROPIUM BROMIDE INHALATION SPRAY 3.12 UG/1
SPRAY, METERED RESPIRATORY (INHALATION)
COMMUNITY
Start: 2025-04-24

## 2025-06-03 LAB
QT INTERVAL: 388 MS
QT INTERVAL: 426 MS
QTC INTERVAL: 421 MS
QTC INTERVAL: 421 MS

## 2025-06-13 ENCOUNTER — LAB (OUTPATIENT)
Dept: LAB | Facility: HOSPITAL | Age: 70
End: 2025-06-13
Payer: MEDICARE

## 2025-06-13 ENCOUNTER — TRANSCRIBE ORDERS (OUTPATIENT)
Dept: LAB | Facility: HOSPITAL | Age: 70
End: 2025-06-13
Payer: MEDICARE

## 2025-06-13 DIAGNOSIS — E78.5 HYPERLIPIDEMIA, UNSPECIFIED HYPERLIPIDEMIA TYPE: ICD-10-CM

## 2025-06-13 DIAGNOSIS — E78.5 HYPERLIPIDEMIA, UNSPECIFIED HYPERLIPIDEMIA TYPE: Primary | ICD-10-CM

## 2025-06-13 LAB
ALBUMIN SERPL-MCNC: 4.1 G/DL (ref 3.5–5.2)
ALBUMIN/GLOB SERPL: 1.2 G/DL
ALP SERPL-CCNC: 159 U/L (ref 39–117)
ALT SERPL W P-5'-P-CCNC: 13 U/L (ref 1–33)
ANION GAP SERPL CALCULATED.3IONS-SCNC: 12.7 MMOL/L (ref 5–15)
AST SERPL-CCNC: 19 U/L (ref 1–32)
BASOPHILS # BLD AUTO: 0.03 10*3/MM3 (ref 0–0.2)
BASOPHILS NFR BLD AUTO: 0.6 % (ref 0–1.5)
BILIRUB SERPL-MCNC: 0.2 MG/DL (ref 0–1.2)
BUN SERPL-MCNC: 25 MG/DL (ref 8–23)
BUN/CREAT SERPL: 15.9 (ref 7–25)
CALCIUM SPEC-SCNC: 9.8 MG/DL (ref 8.6–10.5)
CHLORIDE SERPL-SCNC: 104 MMOL/L (ref 98–107)
CHOLEST SERPL-MCNC: 167 MG/DL (ref 0–200)
CO2 SERPL-SCNC: 24.3 MMOL/L (ref 22–29)
CREAT SERPL-MCNC: 1.57 MG/DL (ref 0.57–1)
DEPRECATED RDW RBC AUTO: 49 FL (ref 37–54)
EGFRCR SERPLBLD CKD-EPI 2021: 35.1 ML/MIN/1.73
EOSINOPHIL # BLD AUTO: 0.17 10*3/MM3 (ref 0–0.4)
EOSINOPHIL NFR BLD AUTO: 3.4 % (ref 0.3–6.2)
ERYTHROCYTE [DISTWIDTH] IN BLOOD BY AUTOMATED COUNT: 15.8 % (ref 12.3–15.4)
GLOBULIN UR ELPH-MCNC: 3.5 GM/DL
GLUCOSE SERPL-MCNC: 94 MG/DL (ref 65–99)
HCT VFR BLD AUTO: 31.5 % (ref 34–46.6)
HDLC SERPL-MCNC: 52 MG/DL (ref 40–60)
HGB BLD-MCNC: 10.5 G/DL (ref 12–15.9)
IMM GRANULOCYTES # BLD AUTO: 0.02 10*3/MM3 (ref 0–0.05)
IMM GRANULOCYTES NFR BLD AUTO: 0.4 % (ref 0–0.5)
LDLC SERPL CALC-MCNC: 95 MG/DL (ref 0–100)
LDLC/HDLC SERPL: 1.77 {RATIO}
LYMPHOCYTES # BLD AUTO: 1.31 10*3/MM3 (ref 0.7–3.1)
LYMPHOCYTES NFR BLD AUTO: 25.8 % (ref 19.6–45.3)
MCH RBC QN AUTO: 28.4 PG (ref 26.6–33)
MCHC RBC AUTO-ENTMCNC: 33.3 G/DL (ref 31.5–35.7)
MCV RBC AUTO: 85.1 FL (ref 79–97)
MONOCYTES # BLD AUTO: 0.52 10*3/MM3 (ref 0.1–0.9)
MONOCYTES NFR BLD AUTO: 10.3 % (ref 5–12)
NEUTROPHILS NFR BLD AUTO: 3.02 10*3/MM3 (ref 1.7–7)
NEUTROPHILS NFR BLD AUTO: 59.5 % (ref 42.7–76)
NRBC BLD AUTO-RTO: 0 /100 WBC (ref 0–0.2)
PLATELET # BLD AUTO: 270 10*3/MM3 (ref 140–450)
PMV BLD AUTO: 9.9 FL (ref 6–12)
POTASSIUM SERPL-SCNC: 4 MMOL/L (ref 3.5–5.2)
PROT SERPL-MCNC: 7.6 G/DL (ref 6–8.5)
RBC # BLD AUTO: 3.7 10*6/MM3 (ref 3.77–5.28)
SODIUM SERPL-SCNC: 141 MMOL/L (ref 136–145)
TRIGL SERPL-MCNC: 114 MG/DL (ref 0–150)
VLDLC SERPL-MCNC: 20 MG/DL (ref 5–40)
WBC NRBC COR # BLD AUTO: 5.07 10*3/MM3 (ref 3.4–10.8)

## 2025-06-13 PROCEDURE — 80061 LIPID PANEL: CPT

## 2025-06-13 PROCEDURE — 80053 COMPREHEN METABOLIC PANEL: CPT

## 2025-06-13 PROCEDURE — 85025 COMPLETE CBC W/AUTO DIFF WBC: CPT

## 2025-06-13 PROCEDURE — 36415 COLL VENOUS BLD VENIPUNCTURE: CPT

## 2025-06-17 ENCOUNTER — TRANSCRIBE ORDERS (OUTPATIENT)
Dept: ADMINISTRATIVE | Facility: HOSPITAL | Age: 70
End: 2025-06-17
Payer: MEDICARE

## 2025-06-17 DIAGNOSIS — Z78.0 ASYMPTOMATIC MENOPAUSAL STATE: Primary | ICD-10-CM

## 2025-06-18 ENCOUNTER — TRANSCRIBE ORDERS (OUTPATIENT)
Dept: ADMINISTRATIVE | Facility: HOSPITAL | Age: 70
End: 2025-06-18
Payer: MEDICARE

## 2025-06-18 DIAGNOSIS — Z12.31 ENCOUNTER FOR SCREENING MAMMOGRAM FOR MALIGNANT NEOPLASM OF BREAST: Primary | ICD-10-CM

## 2025-08-25 ENCOUNTER — TRANSCRIBE ORDERS (OUTPATIENT)
Dept: ADMINISTRATIVE | Facility: HOSPITAL | Age: 70
End: 2025-08-25
Payer: MEDICARE

## 2025-08-25 DIAGNOSIS — N18.32 STAGE 3B CHRONIC KIDNEY DISEASE: Primary | ICD-10-CM

## (undated) DEVICE — Device

## (undated) DEVICE — ELECTRD BLD EXT EDGE/INSUL 6IN

## (undated) DEVICE — FRCP BX RADJAW4 NDL 2.8 240 STD OG

## (undated) DEVICE — CLTH CLENS READYCLEANSE PERI CARE PK/5

## (undated) DEVICE — GLV SURG SENSICARE MICRO PF LF 7 STRL

## (undated) DEVICE — VLV SXN AIR/H2O ORCAPOD3 1P/U STRL

## (undated) DEVICE — TRAP,MUCUS SPECIMEN,40CC: Brand: MEDLINE

## (undated) DEVICE — SUT MNCRYL PLS ANTIB UD 4/0 PS2 18IN

## (undated) DEVICE — PK THORACOTOMY 10

## (undated) DEVICE — PAD GRND REM POLYHESIVE A/ DISP

## (undated) DEVICE — Device: Brand: DEFENDO AIR/WATER/SUCTION AND BIOPSY VALVE

## (undated) DEVICE — ANOSCP PLSTC 9/10DX3 3/8IN

## (undated) DEVICE — ENDOGATOR AUXILIARY WATER JET CONNECTOR: Brand: ENDOGATOR

## (undated) DEVICE — 1860S HEALTH CARE RESPIRATOR N95 120EA/C: Brand: 3M™

## (undated) DEVICE — CONMED SCOPE SAVER BITE BLOCK, 20X27 MM: Brand: SCOPE SAVER

## (undated) DEVICE — BRUSH CYTO ENDO CELLEBRITY 1X2MM 140CM

## (undated) DEVICE — SUCTION CANISTER, 1500CC, RIGID: Brand: DEROYAL

## (undated) DEVICE — ELECTRD BLD EDGE/INSUL1P SFTY SLV 2.75IN

## (undated) DEVICE — ELECTRD BLD EDGE/INSUL1P 2.4X5.1MM STRL

## (undated) DEVICE — ENDOSCOPY PORT CONNECTOR FOR OLYMPUS® SCOPES: Brand: ERBE

## (undated) DEVICE — JELLY,LUBE,STERILE,FLIP TOP,TUBE,2-OZ: Brand: MEDLINE

## (undated) DEVICE — SNAR POLYP SENSATION STDOVL 27 240 BX40

## (undated) DEVICE — FRCP BIOP COLD ENDOJAW ALLGTR W/NDL 2.8X2300MM BLU

## (undated) DEVICE — LUBE JELLY PK/2.75GM STRL BX/144

## (undated) DEVICE — ENDOPATH 5MM ENDOSCOPIC BLUNT TIP DISSECTORS (12 POUCHES CONTAINING 3 DISSECTORS EACH): Brand: ENDOPATH

## (undated) DEVICE — SAFESECURE,SECUREMENT,FOLEY CATH,STERILE: Brand: MEDLINE

## (undated) DEVICE — SYS PROB ABL/CRYO CRYOSPHERE 18IN

## (undated) DEVICE — HYBRID TUBING/CAP SET FOR OLYMPUS® SCOPES: Brand: ERBE

## (undated) DEVICE — SPNG GZ STRL 2S 4X4 12PLY

## (undated) DEVICE — CUFF SCD HEMOFORCE SEQ CALF STD MD

## (undated) DEVICE — ANTIBACTERIAL UNDYED BRAIDED (POLYGLACTIN 910), SYNTHETIC ABSORBABLE SURGICAL SUTURE: Brand: COATED VICRYL

## (undated) DEVICE — PRECISOR BRONCHO BIOPSY ALLIGATOR CUP FORCEPS/CHANNEL-GUARD COATED SHAFT, 1.8 MM X 115 CM: Brand: PRECISOR BRONCHO

## (undated) DEVICE — LINEAR ADAPTER: Brand: SIGNIA

## (undated) DEVICE — DRN WND CH RND FUL/FLUT NO/TROC 3/8IN 28F

## (undated) DEVICE — SUT VIC 2/0 CT2 27IN J269H

## (undated) DEVICE — BLAKE SILICONE DRAINS CARDIO CONNECTOR 2:1: Brand: BLAKE

## (undated) DEVICE — MAGNETIC DRAPE: Brand: DEVON

## (undated) DEVICE — SUCTION CANISTER, 2500CC, RIGID: Brand: DEROYAL

## (undated) DEVICE — VISUALIZATION SYSTEM: Brand: CLEARIFY

## (undated) DEVICE — INTENDED USE FOR SURGICAL MARKING ON INTACT SKIN, ALSO PROVIDES A PERMANENT METHOD OF IDENTIFYING OBJECTS IN THE OPERATING ROOM: Brand: WRITESITE® REGULAR TIP SKIN MARKER

## (undated) DEVICE — QUICK CATCH IN-LINE SUCTION POLYP TRAP IS USED FOR SUCTION RETRIEVAL OF ENDOSCOPICALLY REMOVED POLYPS.

## (undated) DEVICE — SUT ETHIB 1 CT1 30IN  X425H

## (undated) DEVICE — 2963 MEDIPORE SOFT CLOTH TAPE 3 IN X 10 YD 12 RLS/CS: Brand: 3M™ MEDIPORE™

## (undated) DEVICE — TOTAL TRAY, 16FR 10ML SIL FOLEY, URN: Brand: MEDLINE

## (undated) DEVICE — SYR LL TP 10ML STRL

## (undated) DEVICE — FRCP BIOP RADLJAW4 HOT 2.2X240 BX40

## (undated) DEVICE — CVR HNDL LT SURG ACCSSRY BLU STRL

## (undated) DEVICE — SKIN AFFIX SURG ADHESIVE 72/CS 0.55ML: Brand: MEDLINE

## (undated) DEVICE — GLV SURG SENSICARE W/ALOE PF LF 7.5 STRL

## (undated) DEVICE — SINGLE-USE POLYPECTOMY SNARE: Brand: CAPTIVATOR II

## (undated) DEVICE — TISSUE RETRIEVAL SYSTEM: Brand: INZII RETRIEVAL SYSTEM